# Patient Record
Sex: MALE | Race: WHITE | Employment: FULL TIME | URBAN - METROPOLITAN AREA
[De-identification: names, ages, dates, MRNs, and addresses within clinical notes are randomized per-mention and may not be internally consistent; named-entity substitution may affect disease eponyms.]

---

## 2018-11-13 ENCOUNTER — HOSPITAL ENCOUNTER (EMERGENCY)
Age: 38
Discharge: HOME | End: 2018-11-13
Payer: COMMERCIAL

## 2018-11-13 ENCOUNTER — APPOINTMENT (OUTPATIENT)
Dept: CT IMAGING | Age: 38
End: 2018-11-13
Payer: COMMERCIAL

## 2018-11-13 VITALS
DIASTOLIC BLOOD PRESSURE: 82 MMHG | WEIGHT: 220 LBS | OXYGEN SATURATION: 97 % | RESPIRATION RATE: 16 BRPM | TEMPERATURE: 98.6 F | BODY MASS INDEX: 30.8 KG/M2 | SYSTOLIC BLOOD PRESSURE: 122 MMHG | HEART RATE: 78 BPM | HEIGHT: 71 IN

## 2018-11-13 DIAGNOSIS — M47.816 OSTEOARTHRITIS OF LUMBAR SPINE, UNSPECIFIED SPINAL OSTEOARTHRITIS COMPLICATION STATUS: ICD-10-CM

## 2018-11-13 DIAGNOSIS — S39.012A STRAIN OF LUMBAR REGION, INITIAL ENCOUNTER: Primary | ICD-10-CM

## 2018-11-13 LAB
BILIRUBIN URINE: NEGATIVE
BLOOD, URINE: NEGATIVE
CLARITY: CLEAR
COLOR: YELLOW
GLUCOSE URINE: NEGATIVE MG/DL
KETONES, URINE: NEGATIVE MG/DL
LEUKOCYTE ESTERASE, URINE: NEGATIVE
NITRITE, URINE: NEGATIVE
PH UA: 7 (ref 5–9)
PROTEIN UA: NEGATIVE MG/DL
SPECIFIC GRAVITY UA: 1.01 (ref 1–1.03)
UROBILINOGEN, URINE: 0.2 E.U./DL

## 2018-11-13 PROCEDURE — 6370000000 HC RX 637 (ALT 250 FOR IP): Performed by: NURSE PRACTITIONER

## 2018-11-13 PROCEDURE — 81003 URINALYSIS AUTO W/O SCOPE: CPT

## 2018-11-13 PROCEDURE — 72131 CT LUMBAR SPINE W/O DYE: CPT

## 2018-11-13 PROCEDURE — 99284 EMERGENCY DEPT VISIT MOD MDM: CPT

## 2018-11-13 PROCEDURE — 96372 THER/PROPH/DIAG INJ SC/IM: CPT

## 2018-11-13 PROCEDURE — 6360000002 HC RX W HCPCS: Performed by: NURSE PRACTITIONER

## 2018-11-13 RX ORDER — NAPROXEN 500 MG/1
500 TABLET ORAL 2 TIMES DAILY PRN
Qty: 14 TABLET | Refills: 0 | Status: SHIPPED | OUTPATIENT
Start: 2018-11-13 | End: 2020-08-24 | Stop reason: HOSPADM

## 2018-11-13 RX ORDER — KETOROLAC TROMETHAMINE 30 MG/ML
30 INJECTION, SOLUTION INTRAMUSCULAR; INTRAVENOUS ONCE
Status: COMPLETED | OUTPATIENT
Start: 2018-11-13 | End: 2018-11-13

## 2018-11-13 RX ORDER — CYCLOBENZAPRINE HCL 10 MG
10 TABLET ORAL 3 TIMES DAILY PRN
Qty: 10 TABLET | Refills: 0 | Status: SHIPPED | OUTPATIENT
Start: 2018-11-13 | End: 2020-10-05 | Stop reason: CLARIF

## 2018-11-13 RX ORDER — ORPHENADRINE CITRATE 30 MG/ML
60 INJECTION INTRAMUSCULAR; INTRAVENOUS ONCE
Status: DISCONTINUED | OUTPATIENT
Start: 2018-11-13 | End: 2018-11-13

## 2018-11-13 RX ADMIN — MAGESIUM CITRATE 300 ML: 1.75 LIQUID ORAL at 17:38

## 2018-11-13 RX ADMIN — KETOROLAC TROMETHAMINE 30 MG: 30 INJECTION, SOLUTION INTRAMUSCULAR at 16:21

## 2018-11-13 ASSESSMENT — PAIN DESCRIPTION - ORIENTATION: ORIENTATION: LOWER

## 2018-11-13 ASSESSMENT — PAIN SCALES - GENERAL: PAINLEVEL_OUTOF10: 10

## 2018-11-13 ASSESSMENT — PAIN DESCRIPTION - PAIN TYPE: TYPE: ACUTE PAIN

## 2018-11-13 ASSESSMENT — PAIN DESCRIPTION - LOCATION: LOCATION: BACK

## 2018-11-13 NOTE — ED PROVIDER NOTES
[facility]         Department of Emergency Medicine   ED  Provider Note  Admit Date/RoomTime: 11/13/2018  3:15 PM  ED Room: 12/12   Chief Complaint:   Back Pain (during work-  occured at [phone] today. diificulty walking)    History of Present Illness   Source of history provided by:  patient. History/Exam Limitations: none. Ezzie Kussmaul is a 43 y.o. old male who has a past medical history of:   Past Medical History:   Diagnosis Date    Diabetes mellitus ([facility] [address]     Hypertension     Thyroid disease     presents to the emergency department by private vehicle, for acute, sharp and throbbing bilateral, midline lower lumbar spine pain with radiation to bilateral hips since 1:30 PM today. He reports Was using a sawzaw and it got stuck in a piece of material and he felt pain with the jerking movement of pulling on the saw. He has a history of no prior back problems. Since onset the symptoms have been constant and gradually worsening and moderate to severe in severity. There has been no bowel or bladder incontinence associated with the pain. There have been associated symptoms of pain in left leg and denies any weakness, numbness, incontinence, dysuria, hematuria, abdominal pain, fever, hx cancer, weight loss, recent steroid use, tingling, morning stiffness, leg weakness, new numbness, new weakness, new tingling, abdominal swelling, chest pain, pelvic pain or perianal numbness. The the pain is aggraveated by nothing in particular and any movement and relieved by none, nothing. ROS   Pertinent positives and negatives are stated within HPI, all other systems reviewed and are negative. Past Surgical History:   Procedure Laterality Date    APPENDECTOMY  3/23/2016    laparoscopic   Social History:  reports that he has never smoked. He has never used smokeless tobacco. He reports that he does not drink alcohol or use drugs. Family History: family history is not on file.   Allergies: Norco [hydrocodone-acetaminophen]    Physical Exam           ED Triage Vitals [11/13/18 1520]   BP Temp Temp src Pulse Resp SpO2 Height Weight   -- -- -- -- -- -- 5' 11\" (1.803 m) 220 lb (99.8 kg)      Oxygen Saturation Interpretation: Normal.    Constitutional:  Alert, development consistent with age. HEENT:  NC/NT. Airway patent. Neck:  Normal ROM. Supple. Respiratory:  Clear to auscultation and breath sounds equal.  CV:  Regular rate and rhythm, normal heart sounds, without pathological murmurs, ectopy, gallops, or rubs. GI:  Abdomen Soft, nontender, good bowel sounds. No firm or pulsatile mass. Back: lower lumbar spine central and bilateral.             Tenderness: Moderate. Swelling: no.              Range of Motion: diminished range with pain. CVA Tenderness: No.            Straight leg raising:  Left positive at 30 degrees and right is negative. Skin:  no erythema, rash or swelling noted. Distal Function:              Motor deficit: limited due to pain with guarded movements. Sensory deficit: none; full sensation intact to light touch in bilateral lower extremities. Pulse deficit: none. Calf Tenderness:  No Bilateral.               Edema:  none Bilateral lower extremity(s). Reflexes: Bilateral knee,ankle,biceps normal.  Gait:  limp. Integument:  Normal turgor. Warm, dry, without visible rash. Lymphatics: No lymphangitis or adenopathy noted. Neurological:  Oriented. Motor functions intact.   Physical Exam   Musculoskeletal:        Back:      Lab / Imaging Results   (All laboratory and radiology results have been personally reviewed by myself)  Labs:  Results for orders placed or performed during the hospital encounter of 11/13/18   Urinalysis   Result Value Ref Range    Color, UA Yellow Straw/Yellow    Clarity, UA Clear Clear    Glucose, Ur Negative Negative mg/dL    Bilirubin Urine Negative Negative    Ketones, Urine

## 2018-12-04 ENCOUNTER — HOSPITAL ENCOUNTER (OUTPATIENT)
Dept: PHYSICAL THERAPY | Age: 38
Setting detail: THERAPIES SERIES
Discharge: HOME | End: 2018-12-04
Payer: COMMERCIAL

## 2018-12-04 PROCEDURE — 97161 PT EVAL LOW COMPLEX 20 MIN: CPT

## 2018-12-04 PROCEDURE — G0283 ELEC STIM OTHER THAN WOUND: HCPCS

## 2018-12-04 PROCEDURE — G8982 BODY POS GOAL STATUS: HCPCS

## 2018-12-04 PROCEDURE — G8981 BODY POS CURRENT STATUS: HCPCS

## 2018-12-04 NOTE — FLOWSHEET NOTE
[facility]  Phone: [phone] Fax: [phone]     Industrial Rehabilitation Initial Evaluation      Client Name:Chago Leary Led Number:NA  Evaluation date:12/04/2018                                     Job Title :   Diagnosis:Lumbar strain S40.0A                          Normal Work Hrs:  40/wk  Referring Physician: Dr Madelin Traylor                           Present work status:   ________  First date of Lost time:11/13/2018                             * Not working  ___  Date of Injury: 11/13/2018                                            Luann Deakomal Duty    __X__  Employer:[employer]                                           * Reg.  Duty     ____    Authorized Services:   _X__Therapeautic Exercise   _X__ Electrical Stimulation, Traction, Ultrasound  __X_ Education/Body mechanics  ___ Ergonomic Assessment/FCE    Authorized Visits: ________  Chelsey Johns ___12____Visits  By 12/31/2018    From __11/15/2018 ___ to 12/31/2018     Vocational Status:  Employer: [employer]   Job Title:      Rehabilitation Problems/Impairments:  [x]Pain  4-7/10 mid lumbar region   [x]Decreased ROM:trunk, lumbo-pelvic region and bilateral hips   []Joint Hypomobility:______________________________________  []Joint Hypermobility:______________________________________  []Muscle Spasms:_________________________________________  []Gait: __________________________________________________  [x]Decreased strength:trunk/core and proximal LE's   []Unsafe body mechanics related to work/home activities  [x]Subjective reports of pain limiting work/home activities  []Inability to control onset symptoms  [x]Load handling strength levels below employable levels  [x]Work endurances less than required for employment levels  []Other:__________________________      Short Term Rehabilitation Goals:                    [x] Decrease pain 5/10 or less                     [x] Increase

## 2018-12-06 ENCOUNTER — HOSPITAL ENCOUNTER (OUTPATIENT)
Dept: PHYSICAL THERAPY | Age: 38
Setting detail: THERAPIES SERIES
Discharge: HOME | End: 2018-12-06
Payer: COMMERCIAL

## 2018-12-06 PROCEDURE — G0283 ELEC STIM OTHER THAN WOUND: HCPCS

## 2018-12-06 PROCEDURE — 97110 THERAPEUTIC EXERCISES: CPT

## 2018-12-07 ENCOUNTER — HOSPITAL ENCOUNTER (OUTPATIENT)
Dept: PHYSICAL THERAPY | Age: 38
Setting detail: THERAPIES SERIES
Discharge: HOME | End: 2018-12-07
Payer: COMMERCIAL

## 2018-12-07 PROCEDURE — G0283 ELEC STIM OTHER THAN WOUND: HCPCS

## 2018-12-07 NOTE — PROGRESS NOTES
[facility]  Phone: [phone] Fax: [phone]       Physical Therapy Daily Treatment Note  Date:  2018    Patient Name:  Alexys Kohli    :  1980  MRN: [id number]    Restrictions/Precautions:    Diagnosis:  Back Pain   Treatment Diagnosis:    Insurance/Certification information: Industrial    Referring Physician: Dr Odalys Dodson of care signed (Y/N):    Visit# / total visits:  3/  Pain level: /10  Time In: 1030       Time Out:  1110        Subjective:      Exercises:  Exercise/Equipment Resistance/Repetitions Other comments     Bike 10 min  nt            Seated flex with ball  10 reps  nt     LTR with ball  10 reps  nt                                                                                                                 Other Therapeutic Activities:      Home Exercise Program:      Manual Treatments:      Modalities:  Interferential Estim 20 min     Timed Code Treatment Minutes:  15    Total Treatment Minutes:  25    Treatment/Activity Tolerance:  [x] Patient tolerated treatment well [] Patient limited by fatigue  [] Patient limited by pain  [] Patient limited by other medical complications  [] Other:     Prognosis: [] Good [x] Fair  [] Poor    Patient Requires Follow-up: [x] Yes  [] No    Plan:   [x] Continue per plan of care [] Alter current plan (see comments)  [] Plan of care initiated [] Hold pending MD visit [] Discharge  Plan for Next Session:         Electronically signed by:  Enoch Harper, [address] [facility]

## 2018-12-10 ENCOUNTER — HOSPITAL ENCOUNTER (OUTPATIENT)
Dept: PHYSICAL THERAPY | Age: 38
Setting detail: THERAPIES SERIES
Discharge: HOME | End: 2018-12-10
Payer: COMMERCIAL

## 2018-12-10 PROCEDURE — 97110 THERAPEUTIC EXERCISES: CPT

## 2018-12-10 PROCEDURE — G0283 ELEC STIM OTHER THAN WOUND: HCPCS

## 2018-12-10 NOTE — PROGRESS NOTES
[facility]  Phone: [phone] Fax: [phone]       Physical Therapy Daily Treatment Note  Date:  12/10/2018    Patient Name:  Cee Bunn    :  1980  MRN: [id number]    Restrictions/Precautions:    Diagnosis:  Back Pain   Treatment Diagnosis:    Insurance/Certification information: Industrial    Referring Physician: Dr Que Irby of care signed (Y/N):    Visit# / total visits:  4/  Pain level: /10  Time In: 1400      Time Out:  1450       Subjective:  Client reports pain symptoms modified over the weekend Cites limited activity as the reason for the improvement     Exercises:  Exercise/Equipment Resistance/Repetitions Other comments     Bike 10 min              Seated flex with ball  10 reps       LTR with ball  10 reps  nt                                                                                                                 Other Therapeutic Activities:      Home Exercise Program:      Manual Treatments:      Modalities:  Interferential Estim 20 min     Timed Code Treatment Minutes:  15    Total Treatment Minutes:  25    Treatment/Activity Tolerance:  [x] Patient tolerated treatment well [] Patient limited by fatigue  [] Patient limited by pain  [] Patient limited by other medical complications  [] Other:     Prognosis: [] Good [x] Fair  [] Poor    Patient Requires Follow-up: [x] Yes  [] No    Plan:   [x] Continue per plan of care [] Alter current plan (see comments)  [] Plan of care initiated [] Hold pending MD visit [] Discharge  Plan for Next Session:  Progress exercise as tolerated        Electronically signed by:  Jeremías Delvalle, [address] [place] [address]

## 2018-12-12 ENCOUNTER — HOSPITAL ENCOUNTER (OUTPATIENT)
Dept: PHYSICAL THERAPY | Age: 38
Setting detail: THERAPIES SERIES
Discharge: HOME | End: 2018-12-12
Payer: COMMERCIAL

## 2018-12-12 PROCEDURE — 97110 THERAPEUTIC EXERCISES: CPT

## 2018-12-12 PROCEDURE — G0283 ELEC STIM OTHER THAN WOUND: HCPCS

## 2018-12-13 ENCOUNTER — HOSPITAL ENCOUNTER (OUTPATIENT)
Dept: PHYSICAL THERAPY | Age: 38
Setting detail: THERAPIES SERIES
Discharge: HOME | End: 2018-12-13
Payer: COMMERCIAL

## 2018-12-13 PROCEDURE — 97110 THERAPEUTIC EXERCISES: CPT

## 2018-12-13 PROCEDURE — G0283 ELEC STIM OTHER THAN WOUND: HCPCS

## 2018-12-17 ENCOUNTER — HOSPITAL ENCOUNTER (OUTPATIENT)
Dept: PHYSICAL THERAPY | Age: 38
Setting detail: THERAPIES SERIES
Discharge: HOME | End: 2018-12-17
Payer: COMMERCIAL

## 2018-12-17 PROCEDURE — G0283 ELEC STIM OTHER THAN WOUND: HCPCS

## 2018-12-17 PROCEDURE — 97110 THERAPEUTIC EXERCISES: CPT

## 2018-12-20 ENCOUNTER — HOSPITAL ENCOUNTER (OUTPATIENT)
Dept: PHYSICAL THERAPY | Age: 38
Setting detail: THERAPIES SERIES
Discharge: HOME | End: 2018-12-20
Payer: COMMERCIAL

## 2018-12-20 PROCEDURE — 97110 THERAPEUTIC EXERCISES: CPT

## 2018-12-20 PROCEDURE — G0283 ELEC STIM OTHER THAN WOUND: HCPCS

## 2018-12-31 ENCOUNTER — HOSPITAL ENCOUNTER (OUTPATIENT)
Dept: PHYSICAL THERAPY | Age: 38
Setting detail: THERAPIES SERIES
Discharge: HOME | End: 2018-12-31
Payer: COMMERCIAL

## 2018-12-31 PROCEDURE — G0283 ELEC STIM OTHER THAN WOUND: HCPCS

## 2018-12-31 PROCEDURE — 97110 THERAPEUTIC EXERCISES: CPT

## 2019-01-02 ENCOUNTER — HOSPITAL ENCOUNTER (OUTPATIENT)
Dept: PHYSICAL THERAPY | Age: 39
Setting detail: THERAPIES SERIES
Discharge: HOME | End: 2019-01-02
Payer: COMMERCIAL

## 2019-01-02 PROCEDURE — G0283 ELEC STIM OTHER THAN WOUND: HCPCS

## 2019-01-02 PROCEDURE — 97110 THERAPEUTIC EXERCISES: CPT

## 2019-01-07 ENCOUNTER — HOSPITAL ENCOUNTER (OUTPATIENT)
Dept: PHYSICAL THERAPY | Age: 39
Setting detail: THERAPIES SERIES
Discharge: HOME | End: 2019-01-07
Payer: COMMERCIAL

## 2019-01-07 PROCEDURE — G0283 ELEC STIM OTHER THAN WOUND: HCPCS

## 2019-01-07 PROCEDURE — 97110 THERAPEUTIC EXERCISES: CPT

## 2020-08-24 ENCOUNTER — ANESTHESIA (OUTPATIENT)
Dept: OPERATING ROOM | Age: 40
End: 2020-08-24
Payer: COMMERCIAL

## 2020-08-24 ENCOUNTER — APPOINTMENT (OUTPATIENT)
Dept: GENERAL RADIOLOGY | Age: 40
End: 2020-08-24
Payer: COMMERCIAL

## 2020-08-24 ENCOUNTER — ANESTHESIA EVENT (OUTPATIENT)
Dept: OPERATING ROOM | Age: 40
End: 2020-08-24
Payer: COMMERCIAL

## 2020-08-24 ENCOUNTER — HOSPITAL ENCOUNTER (OUTPATIENT)
Age: 40
Setting detail: OBSERVATION
Discharge: HOME | End: 2020-08-25
Attending: EMERGENCY MEDICINE | Admitting: ORTHOPAEDIC SURGERY
Payer: COMMERCIAL

## 2020-08-24 PROBLEM — S67.22XA: Status: ACTIVE | Noted: 2020-08-24

## 2020-08-24 PROBLEM — S62.613B: Status: ACTIVE | Noted: 2020-08-24

## 2020-08-24 PROBLEM — I99.8 ISCHEMIA OF FINGER: Status: ACTIVE | Noted: 2020-08-24

## 2020-08-24 LAB
ABO/RH: NORMAL
ALBUMIN SERPL-MCNC: 4.4 G/DL (ref 3.5–5.2)
ALP BLD-CCNC: 60 U/L (ref 40–129)
ALT SERPL-CCNC: 24 U/L (ref 0–40)
ANION GAP SERPL CALCULATED.3IONS-SCNC: 15 MMOL/L (ref 7–16)
ANTIBODY SCREEN: NORMAL
APTT: 26.5 SEC (ref 24.5–35.1)
AST SERPL-CCNC: 19 U/L (ref 0–39)
BASOPHILS ABSOLUTE: 0.08 E9/L (ref 0–0.2)
BASOPHILS RELATIVE PERCENT: 0.6 % (ref 0–2)
BILIRUB SERPL-MCNC: 0.3 MG/DL (ref 0–1.2)
BUN BLDV-MCNC: 14 MG/DL (ref 6–20)
CALCIUM SERPL-MCNC: 9.2 MG/DL (ref 8.6–10.2)
CHLORIDE BLD-SCNC: 103 MMOL/L (ref 98–107)
CO2: 24 MMOL/L (ref 22–29)
CREAT SERPL-MCNC: 1.2 MG/DL (ref 0.7–1.2)
EOSINOPHILS ABSOLUTE: 0.11 E9/L (ref 0.05–0.5)
EOSINOPHILS RELATIVE PERCENT: 0.8 % (ref 0–6)
GFR AFRICAN AMERICAN: >60
GFR NON-AFRICAN AMERICAN: >60 ML/MIN/1.73
GLUCOSE BLD-MCNC: 152 MG/DL (ref 74–99)
HCT VFR BLD CALC: 42.9 % (ref 37–54)
HEMOGLOBIN: 14.7 G/DL (ref 12.5–16.5)
IMMATURE GRANULOCYTES #: 0.07 E9/L
IMMATURE GRANULOCYTES %: 0.5 % (ref 0–5)
INR BLD: 1
LYMPHOCYTES ABSOLUTE: 1.97 E9/L (ref 1.5–4)
LYMPHOCYTES RELATIVE PERCENT: 14.7 % (ref 20–42)
MCH RBC QN AUTO: 29 PG (ref 26–35)
MCHC RBC AUTO-ENTMCNC: 34.3 % (ref 32–34.5)
MCV RBC AUTO: 84.6 FL (ref 80–99.9)
MONOCYTES ABSOLUTE: 1.14 E9/L (ref 0.1–0.95)
MONOCYTES RELATIVE PERCENT: 8.5 % (ref 2–12)
NEUTROPHILS ABSOLUTE: 10 E9/L (ref 1.8–7.3)
NEUTROPHILS RELATIVE PERCENT: 74.9 % (ref 43–80)
PDW BLD-RTO: 13 FL (ref 11.5–15)
PLATELET # BLD: 264 E9/L (ref 130–450)
PMV BLD AUTO: 10.5 FL (ref 7–12)
POTASSIUM REFLEX MAGNESIUM: 3.6 MMOL/L (ref 3.5–5)
PROTHROMBIN TIME: 11.1 SEC (ref 9.3–12.4)
RBC # BLD: 5.07 E12/L (ref 3.8–5.8)
SODIUM BLD-SCNC: 142 MMOL/L (ref 132–146)
TOTAL PROTEIN: 6.9 G/DL (ref 6.4–8.3)
WBC # BLD: 13.4 E9/L (ref 4.5–11.5)

## 2020-08-24 PROCEDURE — 20103 EXPL PENTRG WOUND EXTREMITY: CPT | Performed by: ORTHOPAEDIC SURGERY

## 2020-08-24 PROCEDURE — 6360000002 HC RX W HCPCS: Performed by: STUDENT IN AN ORGANIZED HEALTH CARE EDUCATION/TRAINING PROGRAM

## 2020-08-24 PROCEDURE — 86900 BLOOD TYPING SEROLOGIC ABO: CPT

## 2020-08-24 PROCEDURE — 7100000000 HC PACU RECOVERY - FIRST 15 MIN: Performed by: ORTHOPAEDIC SURGERY

## 2020-08-24 PROCEDURE — 2500000003 HC RX 250 WO HCPCS: Performed by: STUDENT IN AN ORGANIZED HEALTH CARE EDUCATION/TRAINING PROGRAM

## 2020-08-24 PROCEDURE — 2709999900 HC NON-CHARGEABLE SUPPLY: Performed by: ORTHOPAEDIC SURGERY

## 2020-08-24 PROCEDURE — 26500 HAND TENDON RECONSTRUCTION: CPT | Performed by: ORTHOPAEDIC SURGERY

## 2020-08-24 PROCEDURE — 6360000002 HC RX W HCPCS: Performed by: EMERGENCY MEDICINE

## 2020-08-24 PROCEDURE — 6360000002 HC RX W HCPCS: Performed by: NURSE ANESTHETIST, CERTIFIED REGISTERED

## 2020-08-24 PROCEDURE — 93005 ELECTROCARDIOGRAM TRACING: CPT | Performed by: EMERGENCY MEDICINE

## 2020-08-24 PROCEDURE — 85025 COMPLETE CBC W/AUTO DIFF WBC: CPT

## 2020-08-24 PROCEDURE — 26735 TREAT FINGER FRACTURE EACH: CPT | Performed by: ORTHOPAEDIC SURGERY

## 2020-08-24 PROCEDURE — 3600000002 HC SURGERY LEVEL 2 BASE: Performed by: ORTHOPAEDIC SURGERY

## 2020-08-24 PROCEDURE — 96375 TX/PRO/DX INJ NEW DRUG ADDON: CPT

## 2020-08-24 PROCEDURE — 86850 RBC ANTIBODY SCREEN: CPT

## 2020-08-24 PROCEDURE — 2500000003 HC RX 250 WO HCPCS: Performed by: ORTHOPAEDIC SURGERY

## 2020-08-24 PROCEDURE — 96374 THER/PROPH/DIAG INJ IV PUSH: CPT

## 2020-08-24 PROCEDURE — 3700000000 HC ANESTHESIA ATTENDED CARE: Performed by: ORTHOPAEDIC SURGERY

## 2020-08-24 PROCEDURE — 99283 EMERGENCY DEPT VISIT LOW MDM: CPT

## 2020-08-24 PROCEDURE — 99285 EMERGENCY DEPT VISIT HI MDM: CPT

## 2020-08-24 PROCEDURE — 85730 THROMBOPLASTIN TIME PARTIAL: CPT

## 2020-08-24 PROCEDURE — 7100000001 HC PACU RECOVERY - ADDTL 15 MIN: Performed by: ORTHOPAEDIC SURGERY

## 2020-08-24 PROCEDURE — 2500000003 HC RX 250 WO HCPCS: Performed by: NURSE ANESTHETIST, CERTIFIED REGISTERED

## 2020-08-24 PROCEDURE — 80053 COMPREHEN METABOLIC PANEL: CPT

## 2020-08-24 PROCEDURE — 73120 X-RAY EXAM OF HAND: CPT

## 2020-08-24 PROCEDURE — 3700000001 HC ADD 15 MINUTES (ANESTHESIA): Performed by: ORTHOPAEDIC SURGERY

## 2020-08-24 PROCEDURE — 86901 BLOOD TYPING SEROLOGIC RH(D): CPT

## 2020-08-24 PROCEDURE — 3600000012 HC SURGERY LEVEL 2 ADDTL 15MIN: Performed by: ORTHOPAEDIC SURGERY

## 2020-08-24 PROCEDURE — 99220 PR INITIAL OBSERVATION CARE/DAY 70 MINUTES: CPT | Performed by: ORTHOPAEDIC SURGERY

## 2020-08-24 PROCEDURE — 85610 PROTHROMBIN TIME: CPT

## 2020-08-24 PROCEDURE — 2580000003 HC RX 258: Performed by: NURSE ANESTHETIST, CERTIFIED REGISTERED

## 2020-08-24 PROCEDURE — 99284 EMERGENCY DEPT VISIT MOD MDM: CPT

## 2020-08-24 PROCEDURE — 11012 DEB SKIN BONE AT FX SITE: CPT | Performed by: ORTHOPAEDIC SURGERY

## 2020-08-24 RX ORDER — MEPERIDINE HYDROCHLORIDE 25 MG/ML
12.5 INJECTION INTRAMUSCULAR; INTRAVENOUS; SUBCUTANEOUS EVERY 5 MIN PRN
Status: DISCONTINUED | OUTPATIENT
Start: 2020-08-24 | End: 2020-08-25 | Stop reason: HOSPADM

## 2020-08-24 RX ORDER — NAPROXEN 500 MG/1
500 TABLET ORAL 2 TIMES DAILY WITH MEALS
Qty: 60 TABLET | Refills: 3 | Status: SHIPPED | OUTPATIENT
Start: 2020-08-24 | End: 2021-02-03 | Stop reason: ALTCHOICE

## 2020-08-24 RX ORDER — SUCCINYLCHOLINE/SOD CL,ISO/PF 200MG/10ML
SYRINGE (ML) INTRAVENOUS PRN
Status: DISCONTINUED | OUTPATIENT
Start: 2020-08-24 | End: 2020-08-25 | Stop reason: SDUPTHER

## 2020-08-24 RX ORDER — FENTANYL CITRATE 50 UG/ML
INJECTION, SOLUTION INTRAMUSCULAR; INTRAVENOUS PRN
Status: DISCONTINUED | OUTPATIENT
Start: 2020-08-24 | End: 2020-08-25 | Stop reason: SDUPTHER

## 2020-08-24 RX ORDER — LIDOCAINE HYDROCHLORIDE 10 MG/ML
20 INJECTION, SOLUTION INFILTRATION; PERINEURAL ONCE
Status: COMPLETED | OUTPATIENT
Start: 2020-08-24 | End: 2020-08-24

## 2020-08-24 RX ORDER — BUPIVACAINE HYDROCHLORIDE 2.5 MG/ML
INJECTION, SOLUTION EPIDURAL; INFILTRATION; INTRACAUDAL PRN
Status: DISCONTINUED | OUTPATIENT
Start: 2020-08-24 | End: 2020-08-25 | Stop reason: ALTCHOICE

## 2020-08-24 RX ORDER — FENTANYL CITRATE 50 UG/ML
50 INJECTION, SOLUTION INTRAMUSCULAR; INTRAVENOUS EVERY 5 MIN PRN
Status: DISCONTINUED | OUTPATIENT
Start: 2020-08-24 | End: 2020-08-25 | Stop reason: HOSPADM

## 2020-08-24 RX ORDER — ROCURONIUM BROMIDE 10 MG/ML
INJECTION, SOLUTION INTRAVENOUS PRN
Status: DISCONTINUED | OUTPATIENT
Start: 2020-08-24 | End: 2020-08-25 | Stop reason: SDUPTHER

## 2020-08-24 RX ORDER — PROPOFOL 10 MG/ML
INJECTION, EMULSION INTRAVENOUS PRN
Status: DISCONTINUED | OUTPATIENT
Start: 2020-08-24 | End: 2020-08-25 | Stop reason: SDUPTHER

## 2020-08-24 RX ORDER — ONDANSETRON 2 MG/ML
INJECTION INTRAMUSCULAR; INTRAVENOUS PRN
Status: DISCONTINUED | OUTPATIENT
Start: 2020-08-24 | End: 2020-08-25 | Stop reason: SDUPTHER

## 2020-08-24 RX ORDER — NAPROXEN 500 MG/1
500 TABLET ORAL 2 TIMES DAILY WITH MEALS
Qty: 60 TABLET | Refills: 3 | Status: SHIPPED | OUTPATIENT
Start: 2020-08-24 | End: 2020-08-24 | Stop reason: SDUPTHER

## 2020-08-24 RX ORDER — TRAMADOL HYDROCHLORIDE 50 MG/1
50 TABLET ORAL EVERY 4 HOURS PRN
Qty: 42 TABLET | Refills: 0 | Status: SHIPPED | OUTPATIENT
Start: 2020-08-24 | End: 2020-08-31

## 2020-08-24 RX ORDER — MIDAZOLAM HYDROCHLORIDE 1 MG/ML
INJECTION INTRAMUSCULAR; INTRAVENOUS PRN
Status: DISCONTINUED | OUTPATIENT
Start: 2020-08-24 | End: 2020-08-25 | Stop reason: SDUPTHER

## 2020-08-24 RX ORDER — CEFAZOLIN SODIUM 1 G/3ML
INJECTION, POWDER, FOR SOLUTION INTRAMUSCULAR; INTRAVENOUS PRN
Status: DISCONTINUED | OUTPATIENT
Start: 2020-08-24 | End: 2020-08-25 | Stop reason: SDUPTHER

## 2020-08-24 RX ORDER — FENTANYL CITRATE 50 UG/ML
25 INJECTION, SOLUTION INTRAMUSCULAR; INTRAVENOUS EVERY 5 MIN PRN
Status: DISCONTINUED | OUTPATIENT
Start: 2020-08-24 | End: 2020-08-25 | Stop reason: HOSPADM

## 2020-08-24 RX ORDER — FENTANYL CITRATE 50 UG/ML
100 INJECTION, SOLUTION INTRAMUSCULAR; INTRAVENOUS ONCE
Status: COMPLETED | OUTPATIENT
Start: 2020-08-24 | End: 2020-08-24

## 2020-08-24 RX ORDER — SODIUM CHLORIDE 9 MG/ML
INJECTION, SOLUTION INTRAVENOUS CONTINUOUS PRN
Status: DISCONTINUED | OUTPATIENT
Start: 2020-08-24 | End: 2020-08-25 | Stop reason: SDUPTHER

## 2020-08-24 RX ORDER — DIPHENHYDRAMINE HYDROCHLORIDE 50 MG/ML
12.5 INJECTION INTRAMUSCULAR; INTRAVENOUS
Status: ACTIVE | OUTPATIENT
Start: 2020-08-24 | End: 2020-08-24

## 2020-08-24 RX ORDER — DEXAMETHASONE SODIUM PHOSPHATE 10 MG/ML
INJECTION, SOLUTION INTRAMUSCULAR; INTRAVENOUS PRN
Status: DISCONTINUED | OUTPATIENT
Start: 2020-08-24 | End: 2020-08-25 | Stop reason: SDUPTHER

## 2020-08-24 RX ORDER — LIDOCAINE HYDROCHLORIDE 20 MG/ML
INJECTION, SOLUTION INTRAVENOUS PRN
Status: DISCONTINUED | OUTPATIENT
Start: 2020-08-24 | End: 2020-08-25 | Stop reason: SDUPTHER

## 2020-08-24 RX ORDER — PROMETHAZINE HYDROCHLORIDE 25 MG/ML
6.25 INJECTION, SOLUTION INTRAMUSCULAR; INTRAVENOUS
Status: DISCONTINUED | OUTPATIENT
Start: 2020-08-24 | End: 2020-08-25 | Stop reason: HOSPADM

## 2020-08-24 RX ADMIN — FENTANYL CITRATE 100 MCG: 50 INJECTION, SOLUTION INTRAMUSCULAR; INTRAVENOUS at 23:13

## 2020-08-24 RX ADMIN — MIDAZOLAM 2 MG: 1 INJECTION INTRAMUSCULAR; INTRAVENOUS at 22:58

## 2020-08-24 RX ADMIN — LIDOCAINE HYDROCHLORIDE 100 MG: 20 INJECTION, SOLUTION INTRAVENOUS at 23:02

## 2020-08-24 RX ADMIN — ROCURONIUM BROMIDE 50 MG: 10 INJECTION, SOLUTION INTRAVENOUS at 23:10

## 2020-08-24 RX ADMIN — FENTANYL CITRATE 100 MCG: 50 INJECTION, SOLUTION INTRAMUSCULAR; INTRAVENOUS at 19:27

## 2020-08-24 RX ADMIN — LIDOCAINE HYDROCHLORIDE 20 ML: 10 INJECTION, SOLUTION INFILTRATION; PERINEURAL at 19:30

## 2020-08-24 RX ADMIN — SODIUM CHLORIDE: 9 INJECTION, SOLUTION INTRAVENOUS at 22:58

## 2020-08-24 RX ADMIN — FENTANYL CITRATE 50 MCG: 50 INJECTION, SOLUTION INTRAMUSCULAR; INTRAVENOUS at 23:02

## 2020-08-24 RX ADMIN — CEFAZOLIN 2000 MG: 1 INJECTION, POWDER, FOR SOLUTION INTRAMUSCULAR; INTRAVENOUS at 23:14

## 2020-08-24 RX ADMIN — Medication 2 G: at 19:30

## 2020-08-24 RX ADMIN — PROPOFOL 200 MG: 10 INJECTION, EMULSION INTRAVENOUS at 23:02

## 2020-08-24 RX ADMIN — DEXAMETHASONE SODIUM PHOSPHATE 10 MG: 10 INJECTION, SOLUTION INTRAMUSCULAR; INTRAVENOUS at 23:10

## 2020-08-24 RX ADMIN — Medication 150 MG: at 23:02

## 2020-08-24 RX ADMIN — ONDANSETRON HYDROCHLORIDE 4 MG: 2 INJECTION, SOLUTION INTRAMUSCULAR; INTRAVENOUS at 23:10

## 2020-08-24 ASSESSMENT — PULMONARY FUNCTION TESTS
PIF_VALUE: 22
PIF_VALUE: 18
PIF_VALUE: 22
PIF_VALUE: 18
PIF_VALUE: 0
PIF_VALUE: 22
PIF_VALUE: 2
PIF_VALUE: 22
PIF_VALUE: 23
PIF_VALUE: 23
PIF_VALUE: 22
PIF_VALUE: 1
PIF_VALUE: 22
PIF_VALUE: 22
PIF_VALUE: 23
PIF_VALUE: 18
PIF_VALUE: 18
PIF_VALUE: 23
PIF_VALUE: 22
PIF_VALUE: 16
PIF_VALUE: 22
PIF_VALUE: 20
PIF_VALUE: 23
PIF_VALUE: 22
PIF_VALUE: 23
PIF_VALUE: 23
PIF_VALUE: 22
PIF_VALUE: 18
PIF_VALUE: 22
PIF_VALUE: 23
PIF_VALUE: 22
PIF_VALUE: 23
PIF_VALUE: 22
PIF_VALUE: 22
PIF_VALUE: 18
PIF_VALUE: 22
PIF_VALUE: 18
PIF_VALUE: 23
PIF_VALUE: 23
PIF_VALUE: 1
PIF_VALUE: 22
PIF_VALUE: 18
PIF_VALUE: 0
PIF_VALUE: 23
PIF_VALUE: 22
PIF_VALUE: 0

## 2020-08-24 ASSESSMENT — PAIN DESCRIPTION - PAIN TYPE: TYPE: ACUTE PAIN

## 2020-08-24 ASSESSMENT — ENCOUNTER SYMPTOMS
CHEST TIGHTNESS: 0
ABDOMINAL PAIN: 0

## 2020-08-24 ASSESSMENT — PAIN SCALES - GENERAL
PAINLEVEL_OUTOF10: 10

## 2020-08-24 ASSESSMENT — PAIN DESCRIPTION - LOCATION: LOCATION: HAND

## 2020-08-24 ASSESSMENT — PAIN DESCRIPTION - ORIENTATION: ORIENTATION: LEFT

## 2020-08-25 ENCOUNTER — APPOINTMENT (OUTPATIENT)
Dept: GENERAL RADIOLOGY | Age: 40
End: 2020-08-25
Payer: COMMERCIAL

## 2020-08-25 VITALS
BODY MASS INDEX: 31.5 KG/M2 | OXYGEN SATURATION: 99 % | HEIGHT: 71 IN | TEMPERATURE: 98.1 F | SYSTOLIC BLOOD PRESSURE: 139 MMHG | RESPIRATION RATE: 18 BRPM | WEIGHT: 225 LBS | DIASTOLIC BLOOD PRESSURE: 70 MMHG | HEART RATE: 110 BPM

## 2020-08-25 VITALS
SYSTOLIC BLOOD PRESSURE: 124 MMHG | OXYGEN SATURATION: 100 % | TEMPERATURE: 97.5 F | DIASTOLIC BLOOD PRESSURE: 60 MMHG | RESPIRATION RATE: 2 BRPM

## 2020-08-25 PROBLEM — S66.922A HAND LACERATION INVOLVING TENDON, LEFT, INITIAL ENCOUNTER: Status: ACTIVE | Noted: 2020-08-25

## 2020-08-25 PROBLEM — S61.412A HAND LACERATION INVOLVING TENDON, LEFT, INITIAL ENCOUNTER: Status: ACTIVE | Noted: 2020-08-25

## 2020-08-25 LAB
EKG ATRIAL RATE: 99 BPM
EKG P AXIS: 66 DEGREES
EKG P-R INTERVAL: 158 MS
EKG Q-T INTERVAL: 360 MS
EKG QRS DURATION: 92 MS
EKG QTC CALCULATION (BAZETT): 462 MS
EKG R AXIS: 42 DEGREES
EKG T AXIS: 32 DEGREES
EKG VENTRICULAR RATE: 99 BPM
HCT VFR BLD CALC: 45.2 % (ref 37–54)
HEMOGLOBIN: 15 G/DL (ref 12.5–16.5)
MCH RBC QN AUTO: 29 PG (ref 26–35)
MCHC RBC AUTO-ENTMCNC: 33.2 % (ref 32–34.5)
MCV RBC AUTO: 87.3 FL (ref 80–99.9)
PDW BLD-RTO: 13.2 FL (ref 11.5–15)
PLATELET # BLD: 259 E9/L (ref 130–450)
PMV BLD AUTO: 10.6 FL (ref 7–12)
RBC # BLD: 5.18 E12/L (ref 3.8–5.8)
WBC # BLD: 13.3 E9/L (ref 4.5–11.5)

## 2020-08-25 PROCEDURE — 6360000002 HC RX W HCPCS: Performed by: NURSE ANESTHETIST, CERTIFIED REGISTERED

## 2020-08-25 PROCEDURE — 93010 ELECTROCARDIOGRAM REPORT: CPT | Performed by: INTERNAL MEDICINE

## 2020-08-25 PROCEDURE — 3209999900 FLUORO FOR SURGICAL PROCEDURES

## 2020-08-25 PROCEDURE — 97530 THERAPEUTIC ACTIVITIES: CPT

## 2020-08-25 PROCEDURE — 2580000003 HC RX 258: Performed by: STUDENT IN AN ORGANIZED HEALTH CARE EDUCATION/TRAINING PROGRAM

## 2020-08-25 PROCEDURE — G0378 HOSPITAL OBSERVATION PER HR: HCPCS

## 2020-08-25 PROCEDURE — 36415 COLL VENOUS BLD VENIPUNCTURE: CPT

## 2020-08-25 PROCEDURE — 85027 COMPLETE CBC AUTOMATED: CPT

## 2020-08-25 PROCEDURE — 73130 X-RAY EXAM OF HAND: CPT

## 2020-08-25 PROCEDURE — 2500000003 HC RX 250 WO HCPCS: Performed by: NURSE ANESTHETIST, CERTIFIED REGISTERED

## 2020-08-25 PROCEDURE — 97165 OT EVAL LOW COMPLEX 30 MIN: CPT

## 2020-08-25 PROCEDURE — 6370000000 HC RX 637 (ALT 250 FOR IP): Performed by: STUDENT IN AN ORGANIZED HEALTH CARE EDUCATION/TRAINING PROGRAM

## 2020-08-25 PROCEDURE — 6360000002 HC RX W HCPCS: Performed by: STUDENT IN AN ORGANIZED HEALTH CARE EDUCATION/TRAINING PROGRAM

## 2020-08-25 RX ORDER — CEPHALEXIN 500 MG/1
500 CAPSULE ORAL 4 TIMES DAILY
Qty: 12 CAPSULE | Refills: 0 | Status: SHIPPED | OUTPATIENT
Start: 2020-08-25 | End: 2020-08-28

## 2020-08-25 RX ORDER — GLYCOPYRROLATE 1 MG/5 ML
SYRINGE (ML) INTRAVENOUS PRN
Status: DISCONTINUED | OUTPATIENT
Start: 2020-08-25 | End: 2020-08-25 | Stop reason: SDUPTHER

## 2020-08-25 RX ORDER — TRAMADOL HYDROCHLORIDE 50 MG/1
100 TABLET ORAL EVERY 6 HOURS PRN
Status: DISCONTINUED | OUTPATIENT
Start: 2020-08-25 | End: 2020-08-25 | Stop reason: HOSPADM

## 2020-08-25 RX ORDER — SODIUM CHLORIDE 0.9 % (FLUSH) 0.9 %
10 SYRINGE (ML) INJECTION EVERY 12 HOURS SCHEDULED
Status: DISCONTINUED | OUTPATIENT
Start: 2020-08-25 | End: 2020-08-25 | Stop reason: HOSPADM

## 2020-08-25 RX ORDER — TRAMADOL HYDROCHLORIDE 50 MG/1
50 TABLET ORAL EVERY 6 HOURS PRN
Status: DISCONTINUED | OUTPATIENT
Start: 2020-08-25 | End: 2020-08-25 | Stop reason: HOSPADM

## 2020-08-25 RX ORDER — SODIUM CHLORIDE 0.9 % (FLUSH) 0.9 %
10 SYRINGE (ML) INJECTION PRN
Status: DISCONTINUED | OUTPATIENT
Start: 2020-08-25 | End: 2020-08-25 | Stop reason: HOSPADM

## 2020-08-25 RX ORDER — NAPROXEN 500 MG/1
500 TABLET ORAL 2 TIMES DAILY
Status: DISCONTINUED | OUTPATIENT
Start: 2020-08-25 | End: 2020-08-25 | Stop reason: HOSPADM

## 2020-08-25 RX ORDER — NEOSTIGMINE METHYLSULFATE 1 MG/ML
INJECTION, SOLUTION INTRAVENOUS PRN
Status: DISCONTINUED | OUTPATIENT
Start: 2020-08-25 | End: 2020-08-25 | Stop reason: SDUPTHER

## 2020-08-25 RX ADMIN — NAPROXEN 500 MG: 500 TABLET ORAL at 08:14

## 2020-08-25 RX ADMIN — TRAMADOL HYDROCHLORIDE 100 MG: 50 TABLET, FILM COATED ORAL at 02:24

## 2020-08-25 RX ADMIN — SODIUM CHLORIDE, PRESERVATIVE FREE 10 ML: 5 INJECTION INTRAVENOUS at 08:15

## 2020-08-25 RX ADMIN — Medication 0.6 MG: at 00:06

## 2020-08-25 RX ADMIN — PROPOFOL 50 MG: 10 INJECTION, EMULSION INTRAVENOUS at 00:10

## 2020-08-25 RX ADMIN — PROPOFOL 50 MG: 10 INJECTION, EMULSION INTRAVENOUS at 00:15

## 2020-08-25 RX ADMIN — Medication 3 MG: at 00:06

## 2020-08-25 RX ADMIN — NAPROXEN 500 MG: 500 TABLET ORAL at 02:23

## 2020-08-25 RX ADMIN — Medication 2 G: at 07:10

## 2020-08-25 ASSESSMENT — PULMONARY FUNCTION TESTS
PIF_VALUE: 22
PIF_VALUE: 11
PIF_VALUE: 21
PIF_VALUE: 1
PIF_VALUE: 11
PIF_VALUE: 11
PIF_VALUE: 21
PIF_VALUE: 20
PIF_VALUE: 21
PIF_VALUE: 11
PIF_VALUE: 11
PIF_VALUE: 19
PIF_VALUE: 11
PIF_VALUE: 21
PIF_VALUE: 11
PIF_VALUE: 22
PIF_VALUE: 21
PIF_VALUE: 22
PIF_VALUE: 21
PIF_VALUE: 27
PIF_VALUE: 22
PIF_VALUE: 22
PIF_VALUE: 19
PIF_VALUE: 26
PIF_VALUE: 11
PIF_VALUE: 6
PIF_VALUE: 2
PIF_VALUE: 26
PIF_VALUE: 4
PIF_VALUE: 10
PIF_VALUE: 11
PIF_VALUE: 21
PIF_VALUE: 22
PIF_VALUE: 7
PIF_VALUE: 20
PIF_VALUE: 11
PIF_VALUE: 11
PIF_VALUE: 10
PIF_VALUE: 10

## 2020-08-25 ASSESSMENT — PAIN SCALES - GENERAL
PAINLEVEL_OUTOF10: 7
PAINLEVEL_OUTOF10: 0

## 2020-08-25 NOTE — ED PROVIDER NOTES
79-year-old male presenting via EMS. He has an injury to his left hand. He states that he was working on a large industrial machine, his hand got caught. He does have an obvious deformity to the middle finger of the left hand. The hand appears pale, radial pulses strong and intact, he has sensation to the distal end of all fingers, unable to move the middle finger which has the clear deformity. Laceration across the palm. This timing was sudden, quality is pressure through the machine, severe, was brief and was not an extended period of a crush injury, associated with the deformity and the pale appearance. Concern for diminished blood flow. History reviewed. No pertinent family history. Past Surgical History:   Procedure Laterality Date    APPENDECTOMY  3/23/2016    laparoscopic       Review of Systems   Constitutional: Negative for chills and fever. Respiratory: Negative for chest tightness. Cardiovascular: Negative for chest pain. Gastrointestinal: Negative for abdominal pain. Musculoskeletal:        Deformity to the middle finger   Skin: Positive for wound. All other systems reviewed and are negative. Physical Exam  Constitutional:       General: He is not in acute distress. Appearance: He is well-developed. HENT:      Head: Normocephalic and atraumatic. Eyes:      Pupils: Pupils are equal, round, and reactive to light. Neck:      Musculoskeletal: Normal range of motion and neck supple. Thyroid: No thyromegaly. Cardiovascular:      Rate and Rhythm: Normal rate and regular rhythm. Pulmonary:      Effort: Pulmonary effort is normal. No respiratory distress. Breath sounds: Normal breath sounds. No wheezing. Abdominal:      General: There is no distension. Palpations: Abdomen is soft. There is no mass. Tenderness: There is no abdominal tenderness. There is no guarding or rebound.    Musculoskeletal:         General: Tenderness, deformity and signs of injury present. Right hand: He exhibits decreased range of motion, tenderness, bony tenderness, decreased capillary refill, deformity and laceration. Hands:       Comments: Obvious deformity to the middle finger, unable to flex the middle finger, minimal ability to extend it, concern for flexor tendon laceration, distal portion of the finger is pale, all the fingers appears somewhat discolored but sensation is intact on the remaining digits  Concern for tendon injury, obvious bony abnormality with large laceration on the palm   Skin:     General: Skin is warm and dry. Findings: No erythema. Neurological:      Mental Status: He is alert and oriented to person, place, and time. Cranial Nerves: No cranial nerve deficit. Procedures     MDM     ED Course as of Aug 24 2103   Mon Aug 24, 2020   1851 Spoke with the orthopedic team who will present to evaluate the patient.    [SO]   1948 With the team at the bedside, they are talking about take the patient to the operating room tonight.    [SO]      ED Course User Index  [SO] Gus Huang DO        ED Course as of Aug 24 2103   Mon Aug 24, 2020   1851 Spoke with the orthopedic team who will present to evaluate the patient.    [SO]   1948 With the team at the bedside, they are talking about take the patient to the operating room tonight.    [SO]      ED Course User Index  [SO] Gus Huang, DO       --------------------------------------------- PAST HISTORY ---------------------------------------------  Past Medical History:  has a past medical history of Diabetes mellitus ([facility] [address] Hypertension, and Thyroid disease. Past Surgical History:  has a past surgical history that includes Appendectomy (3/23/2016). Social History:  reports that he has never smoked. He has never used smokeless tobacco. He reports that he does not drink alcohol or use drugs. Family History: family history is not on file.      The patients home medications have been reviewed.     Allergies: Norco [hydrocodone-acetaminophen]    -------------------------------------------------- RESULTS -------------------------------------------------    LABS:  Results for orders placed or performed during the hospital encounter of 08/24/20   Comprehensive Metabolic Panel w/ Reflex to MG   Result Value Ref Range    Sodium 142 132 - 146 mmol/L    Potassium reflex Magnesium 3.6 3.5 - 5.0 mmol/L    Chloride 103 98 - 107 mmol/L    CO2 24 22 - 29 mmol/L    Anion Gap 15 7 - 16 mmol/L    Glucose 152 (H) 74 - 99 mg/dL    BUN 14 6 - 20 mg/dL    CREATININE 1.2 0.7 - 1.2 mg/dL    GFR Non-African American >60 >=60 mL/min/1.73    GFR African American >60     Calcium 9.2 8.6 - 10.2 mg/dL    Total Protein 6.9 6.4 - 8.3 g/dL    Alb 4.4 3.5 - 5.2 g/dL    Total Bilirubin 0.3 0.0 - 1.2 mg/dL    Alkaline Phosphatase 60 40 - 129 U/L    ALT 24 0 - 40 U/L    AST 19 0 - 39 U/L   CBC WITH AUTO DIFFERENTIAL   Result Value Ref Range    WBC 13.4 (H) 4.5 - 11.5 E9/L    RBC 5.07 3.80 - 5.80 E12/L    Hemoglobin 14.7 12.5 - 16.5 g/dL    Hematocrit 42.9 37.0 - 54.0 %    MCV 84.6 80.0 - 99.9 fL    MCH 29.0 26.0 - 35.0 pg    MCHC 34.3 32.0 - 34.5 %    RDW 13.0 11.5 - 15.0 fL    Platelets 277 722 - 164 E9/L    MPV 10.5 7.0 - 12.0 fL    Neutrophils % 74.9 43.0 - 80.0 %    Immature Granulocytes % 0.5 0.0 - 5.0 %    Lymphocytes % 14.7 (L) 20.0 - 42.0 %    Monocytes % 8.5 2.0 - 12.0 %    Eosinophils % 0.8 0.0 - 6.0 %    Basophils % 0.6 0.0 - 2.0 %    Neutrophils Absolute 10.00 (H) 1.80 - 7.30 E9/L    Immature Granulocytes # 0.07 E9/L    Lymphocytes Absolute 1.97 1.50 - 4.00 E9/L    Monocytes Absolute 1.14 (H) 0.10 - 0.95 E9/L    Eosinophils Absolute 0.11 0.05 - 0.50 E9/L    Basophils Absolute 0.08 0.00 - 0.20 E9/L   Protime-INR   Result Value Ref Range    Protime 11.1 9.3 - 12.4 sec    INR 1.0    APTT   Result Value Ref Range    aPTT 26.5 24.5 - 35.1 sec       RADIOLOGY:  XR HAND LEFT (2 VIEWS)   Final Result

## 2020-08-25 NOTE — ANESTHESIA PRE PROCEDURE
Department of Anesthesiology  Preprocedure Note       Name:  Mali Webb   Age:  38 y.o.  :  1980                                          MRN:  [id number]         Date:  2020      Surgeon: Mayelin Mendoza):  Cindy Dickens MD    Procedure: Procedure(s): FINGER OPEN REDUCTION INTERNAL FIXATION  HAND LIGAMENT TENDON REPAIR    Medications prior to admission:   Prior to Admission medications    Medication Sig Start Date End Date Taking? Authorizing Provider   traMADol (ULTRAM) 50 MG tablet Take 1 tablet by mouth every 4 hours as needed for Pain for up to 7 days. Intended supply: 7 days. Take lowest dose possible to manage pain 20 Yes Oraliaara Rim, DO   naproxen (NAPROSYN) 500 MG tablet Take 1 tablet by mouth 2 times daily (with meals) 20  Yes Babara Rim, DO   metFORMIN (GLUCOPHAGE) 500 MG tablet Take 500 mg by mouth daily (with breakfast)    Historical Provider, MD   NONFORMULARY Cholesterol med    Historical Provider, MD   Levothyroxine Sodium (SYNTHROID PO) Take by mouth    Historical Provider, MD   cyclobenzaprine (FLEXERIL) 10 MG tablet Take 1 tablet by mouth 3 times daily as needed for Muscle spasms 18   STAN Webster - CNP       Current medications:    Current Facility-Administered Medications   Medication Dose Route Frequency Provider Last Rate Last Dose    Tetanus-Diphth-Acell Pertussis (BOOSTRIX) injection 0.5 mL  0.5 mL Intramuscular Once Preston Odonnell DO         Current Outpatient Medications   Medication Sig Dispense Refill    traMADol (ULTRAM) 50 MG tablet Take 1 tablet by mouth every 4 hours as needed for Pain for up to 7 days. Intended supply: 7 days.  Take lowest dose possible to manage pain 42 tablet 0    naproxen (NAPROSYN) 500 MG tablet Take 1 tablet by mouth 2 times daily (with meals) 60 tablet 3    metFORMIN (GLUCOPHAGE) 500 MG tablet Take 500 mg by mouth daily (with breakfast)      NONFORMULARY Cholesterol med      Levothyroxine Sodium (SYNTHROID PO) Take by mouth      cyclobenzaprine (FLEXERIL) 10 MG tablet Take 1 tablet by mouth 3 times daily as needed for Muscle spasms 10 tablet 0       Allergies: Allergies   Allergen Reactions    Norco [Hydrocodone-Acetaminophen] Shortness Of Breath       Problem List:    Patient Active Problem List   Diagnosis Code    Gangrenous appendicitis K35.891    Displaced fracture of proximal phalanx of left middle finger, initial encounter for open fracture S62.613B    Crush injury of hand, left, initial encounter S67. 22XA    Ischemia of finger I99.8       Past Medical History:        Diagnosis Date    Diabetes mellitus ([facility] [address]     Hypertension     Thyroid disease        Past Surgical History:        Procedure Laterality Date    APPENDECTOMY  3/23/2016    laparoscopic       Social History:    Social History     Tobacco Use    Smoking status: Never Smoker    Smokeless tobacco: Never Used   Substance Use Topics    Alcohol use: No                                Counseling given: Not Answered      Vital Signs (Current):   Vitals:    08/24/20 1906   BP: 135/78   Pulse: 95   Resp: 18   Temp: 98 °F (36.7 °C)   SpO2: 99%   Weight: 225 lb (102.1 kg)   Height: 5' 11\" (1.803 m)                                              BP Readings from Last 3 Encounters:   08/24/20 135/78   11/13/18 122/82   03/25/16 121/61       NPO Status:   greater than 8 hours                                                                             BMI:   Wt Readings from Last 3 Encounters:   08/24/20 225 lb (102.1 kg)   11/13/18 220 lb (99.8 kg)   03/24/16 264 lb 9.6 oz (120 kg)     Body mass index is 31.38 kg/m².     CBC:   Lab Results   Component Value Date    WBC 13.4 08/24/2020    RBC 5.07 08/24/2020    HGB 14.7 08/24/2020    HCT 42.9 08/24/2020    MCV 84.6 08/24/2020    RDW 13.0 08/24/2020     08/24/2020       CMP:   Lab Results   Component Value Date     08/24/2020    K 3.6 08/24/2020     08/24/2020    CO2 24 08/24/2020    BUN 14 08/24/2020    CREATININE 1.2 08/24/2020    GFRAA >60 08/24/2020    LABGLOM >60 08/24/2020    GLUCOSE 152 08/24/2020    PROT 6.9 08/24/2020    CALCIUM 9.2 08/24/2020    BILITOT 0.3 08/24/2020    ALKPHOS 60 08/24/2020    AST 19 08/24/2020    ALT 24 08/24/2020       POC Tests: No results for input(s): POCGLU, POCNA, POCK, POCCL, POCBUN, POCHEMO, POCHCT in the last 72 hours. Coags:   Lab Results   Component Value Date    PROTIME 11.1 08/24/2020    INR 1.0 08/24/2020    APTT 26.5 08/24/2020       HCG (If Applicable): No results found for: PREGTESTUR, PREGSERUM, HCG, HCGQUANT     ABGs: No results found for: PHART, PO2ART, WZJ5JKP, KAR0RLO, BEART, R6ZHXEEO     Type & Screen (If Applicable):  No results found for: LABABO, LABRH    Drug/Infectious Status (If Applicable):  No results found for: HIV, HEPCAB    COVID-19 Screening (If Applicable): No results found for: COVID19      Anesthesia Evaluation  Patient summary reviewed no history of anesthetic complications:   Airway: Mallampati: II  TM distance: <3 FB     Mouth opening: > = 3 FB Dental:          Pulmonary:Negative Pulmonary ROS breath sounds clear to auscultation                             Cardiovascular:        (-) hypertension (patient denies HTN)      Rhythm: regular  Rate: normal                    Neuro/Psych:   Negative Neuro/Psych ROS              GI/Hepatic/Renal: Neg GI/Hepatic/Renal ROS            Endo/Other:    (+) Diabetes, hypothyroidism::., .                  ROS comment: left hand pain after his hand got caught in a press at work Abdominal:   (+) obese,         Vascular: negative vascular ROS. Anesthesia Plan      general     ASA 3 - emergent       Induction: intravenous and rapid sequence. Anesthetic plan and risks discussed with patient. Plan discussed with CRNA.                   Slade Rae MD   8/24/2020

## 2020-08-25 NOTE — PROGRESS NOTES
Department of Orthopedic Surgery  Resident Progress Note    Patient seen and examined. Pain controlled. No new complaints. Denies chest pain, shortness of breath, dizziness/lightheadedness. Patient denies any drainage around dressing. Patient states sensation on radial side of middle finger has improved. VITALS:  /80   Pulse 90   Temp 97.1 °F (36.2 °C) (Temporal)   Resp 18   Ht 5' 11\" (1.803 m)   Wt 225 lb (102.1 kg)   SpO2 96%   BMI 31.38 kg/m²     General: alert and oriented to person, place and time, well-developed and well-nourished, in no acute distress    MUSCULOSKELETAL:   left upper extremity:  · Dressing C/D/I  · Compartments soft and compressible  · +AIN/PIN/Ulnar/Median/Radial nerve function intact grossly  · +2/4 Radial pulse, Cap refill 3 seconds. Pulse ox reading was able to be obtained on right middle finger. · Sensation intact to touch in radial/ulnar/median nerve distributions to hand. · Patient states sensation in radial boarder of left middle finger sensation to light touch has improved since surgery yesterday. CBC:   Lab Results   Component Value Date    WBC 13.3 08/25/2020    HGB 15.0 08/25/2020    HCT 45.2 08/25/2020     08/25/2020     PT/INR:    Lab Results   Component Value Date    PROTIME 11.1 08/24/2020    INR 1.0 08/24/2020         ASSESSMENT  · S/P Left hand I&D. Left middle finger proximal phalanx ORIF    PLAN      · Continue physical therapy and protocol: NWB - Left UE  · 24 hour abx coverage  · early mobilization  · PT/OT  · Pain Control: PO Tramadol  · D/C Plan:  Patient can be discharged later this morning from an orthopedic stand point  · Patient will follow up with Dr. Nusrat Orellana out patient in 7-10 days.   · Discuss with Dr. Nusrat Orellana

## 2020-08-25 NOTE — CONSULTS
Department of Orthopedic Surgery   Consult Note          Reason for Consult:  Left Hand injury     HISTORY OF PRESENT ILLNESS:       Patient is a 38 y.o. male who presents with left hand pain after his hand got caught in a press at work. The patient was seen in the emergency department at [facility] and orthopedics was consulted. The patient can not remember the mechanism but thinks his hand was crushed in the press. The patient is right hand dominant. The patient denies any other orthopedic complaints at this time. Tobacco use: never a smoker  Alcohol use: does not drink alcohol  Illicit drug use: denies drug use. Past Medical History:        Diagnosis Date    Diabetes mellitus ([facility] [address]     Hypertension     Thyroid disease      Past Surgical History:        Procedure Laterality Date    APPENDECTOMY  3/23/2016    laparoscopic     Current Medications:   Current Facility-Administered Medications: Tetanus-Diphth-Acell Pertussis (BOOSTRIX) injection 0.5 mL, 0.5 mL, Intramuscular, Once  Allergies:  Norco [hydrocodone-acetaminophen]    Social History:   TOBACCO:   reports that he has never smoked. He has never used smokeless tobacco.  ETOH:   reports no history of alcohol use. DRUGS:   reports no history of drug use. ACTIVITIES OF DAILY LIVING:    OCCUPATION:    Family History:   History reviewed. No pertinent family history.     REVIEW OF SYSTEMS:  CONSTITUTIONAL:  negative for  fevers, chills  EYES:  negative for blurred vision, visual disturbance  HEENT:  negative for  hearing loss, voice change  RESPIRATORY:  negative for  dyspnea, wheezing  CARDIOVASCULAR:  negative for  chest pain, palpitations  GASTROINTESTINAL:  negative for nausea, vomiting  GENITOURINARY:  negative for frequency, urinary incontinence  HEMATOLOGIC/LYMPHATIC:  negative for bleeding and petechiae  MUSCULOSKELETAL:  positive for  pain  NEUROLOGICAL:  negative for headaches, dizziness  BEHAVIOR/PSYCH:  negative for increased agitation and anxiety    PHYSICAL EXAM:    VITALS:  /78   Pulse 95   Temp 98 °F (36.7 °C)   Resp 18   Ht 5' 11\" (1.803 m)   Wt 225 lb (102.1 kg)   SpO2 99%   BMI 31.38 kg/m²   CONSTITUTIONAL:  awake, alert, cooperative, no apparent distress, and appears stated age  MUSCULOSKELETAL:  Left upper Extremity:  · Stellate laceration of the volar portion of the left hand over the over the 3rd metacarpal extending into the middle phalanx of the middle finger with a portion of the proximal phalanx protruding through the wound. There is also an oblique laceration of the left index finger from the MCP to the middle phalanx. · +TTP over the index and middle fingers. Compartments soft and compressible  · +AIN/PIN/Ulnar nerve function intact grossly  · + radial pulse. Capillary refill is sluggish and takes 3 seconds. Skin of the middle phalanx is manjit. · The ulnar digital nerve to the middle finger is intact distal to the laceration. The radial digital nerve distribution has pressure sensation when poked with a needle. · Patient is able to flex all fingers at the MCP, PIP, and DIP joints and the thumb at the IP and MCP joint. Patient is unable to hold middle finger in extension. Patient is able to extend all other fingers. · Sensation is otherwise intact in radial, ulnar, and median nerve distributions other than previously noted. · The wound was explored and both flexor tenders of the middle finger are intact. The radial digital nerve was visualized. The radial digital artery of the middle finger was difficult to visualize. · After the finger was reduced and splinted a pulse ox reading was not able to be obtained in the left middle finger. Pulse ox was placed on the left index finger and read 94%. Secondary Exam:   · rightUE: No obvious signs of trauma. -TTP to fingers, hand, wrist, forearm, elbow, humerus, shoulder or clavicle. compartments soft and compressible. · bilateralLE: No obvious signs of trauma. -TTP to foot, ankle, leg, knee, thigh, hip. compartments soft and compressible. DATA:    CBC:   Lab Results   Component Value Date    WBC 13.4 08/24/2020    RBC 5.07 08/24/2020    HGB 14.7 08/24/2020    HCT 42.9 08/24/2020    MCV 84.6 08/24/2020    MCH 29.0 08/24/2020    MCHC 34.3 08/24/2020    RDW 13.0 08/24/2020     08/24/2020    MPV 10.5 08/24/2020     PT/INR:    Lab Results   Component Value Date    PROTIME 11.1 08/24/2020    INR 1.0 08/24/2020     CRP/ESR: No results found for: CRP, SEDRATE  Lactic Acid :   Lab Results   Component Value Date    LACTA 1.4 03/24/2016       Radiology Review:  08/24/20 - XR of left hand shows a severely comminuted, inraarticular fracture of the middle finger proximal phalanx with the distal segment displaced volarly. IMPRESSION:   · Left middle finger proximal phalanx comminuted intraarticular fracture with possible radial digital nerve and artery compromise. PLAN:  NWB - left hand  After informed consent was verbally obtained, Closed reduction was then performed with application of well padded volar and dorsal splint. Patient tolerated well and after reduction a pulse ox was applied to the left middle finger and a reading was unable to be obtained. Pulse ox was also placed on the index finger and read 94%. The case was discussed with Dr. Jeanie Purdy and the decision was to take the patient to the OR STAT. The OR was not available for a STAT case. The patient will be taken to the OR at the first availability. Patient was given 2g of ancef  Discussed with Dr. Jeanie Purdy    The patient was reevaluated at 2200 and pulse ox was reapplied to the left middle finger and a reading of 93% was now obtained. I have seen and evaluated the patient and agree with the above assessment and plan on today's visit.  I have performed the key components of the history and physical examination with significant findings of left hand crush injury with vascular ischemia of finger and open displaced intra-articular proximal phalanx fracture. Plan for emergent surgical intervention. I concur with the findings and plan as documented.     Savannah Adler MD  8/24/2020

## 2020-08-25 NOTE — PROGRESS NOTES
Physical Therapy order received and chart reviewed. Per conversation with patient, Pt is independent with all functional mobility. Pt with no skilled acute PT needs at this time. Pt instructed to notify any medical team member if functional status were to change. Will discontinue PT services.      Bayard Schilder PT, DPT  [id number]

## 2020-08-25 NOTE — OP NOTE
[address]                  [address] [facility],  [facility]                                OPERATIVE REPORT    PATIENT NAME: Sruthi García                    :        1980  MED REC NO:   [id number]                            ROOM:       [id number]  ACCOUNT NO:   [de-identified]                           ADMIT DATE: 2020  PROVIDER:     Tyron Lama MD    DATE OF PROCEDURE:  2020    PREOPERATIVE DIAGNOSIS:  Left hand crush injury with vascular  compromise. PREOPERATIVE DIAGNOSES:  1. Left hand crush injury with complex laceration involving palm and  index finger and middle fingers measuring approximately 20 cm.  2.  Severely comminuted displaced fracture of left middle finger open,  grade II. 3.  Left radial digital artery laceration, middle finger. 4.  Pulley rupture, left middle finger. PROCEDURES:  1. Excisional debridement, left palm, index and middle finger; complex  lacerations and open fracture of middle finger proximal phalanx. 2.  Open reduction and K-wire fixation of comminuted highly displaced  unstable left middle finger proximal phalanx fracture. 3.  Exploration of laceration confirming radial digital artery  laceration and severe contusion of radial digital nerve, middle finger. Neurovascular bundle intact. 4.  Pulley repair in middle finger, A2 pulley. 5.  Complex wound closure of left hand index and middle fingers, total  length approximately 20 cm. ANESTHESIA:  General.    TOURNIQUET TIME:  Approximately 40 minutes, 250 mmHg brachial  tourniquet. BLOOD LOSS:  Minimal.    FINDINGS:  1. Exploration of the wound revealed a complex laceration and severe  injury to the left middle finger involving soft tissues with intact  flexors and extensor tendons and disruption of the pulley system and  avulsion crush injury of radial digital artery middle finger.   2.  The neurovascular bundles to the index finger and the ulnar  neurovascular bundle to the middle finger were intact. 3.  Status post debridement and K-wire fixation, stable alignment was  restored to the comminuted fracture of the middle finger proximal  phalanx, which was severely comminuted, displaced and unstable. 4.  Status post reduction and pinning, the A2 pulley was repaired with  FiberWire suture. 5.  Status post debridement, pinning and repair of the pulley. The  tourniquet was deflated, healthy cap refill was restored to the middle  finger. COMPLICATIONS:  None. DISPOSITION:  The patient remained stable throughout the procedure. OPERATIVE INDICATIONS:  The patient is a 65-year-old gentleman who  sustained a severe crush injury to his left hand while at work. He was  seen in the emergency department and had a dusky dysvascular middle  finger and a severe complex laceration of the palm with a crushing  injury of the middle finger, proximal phalanx. Given these findings,  emergent surgical intervention was recommended. Risks included but not  limited to the risk of digital loss; damage to nerves, vessels, tendons;  infection; wound healing complications; stiffness; malunion; nonunion;  loss of function; loss of the digit; rotatory malalignment; need for  hand therapy and/or additional surgery were explained. He understood  and wished to proceed. SURGERY DETAILS:  The patient was identified in the emergency  department, was taken from the emergency department to the operating  room suite, underwent general anesthesia per Anesthesia Department. All  bony prominences were well-padded. An arm tourniquet was placed. The  left upper extremity was prepped and draped in sterile fashion. The  patient received perioperative antibiotics per open fracture protocol. The wound was explored.   There was a severe soft tissue injury to the  palm of the hand, extending from the distal palmar flexion crease and  distally into the index and middle fingers involving the neurovascular  bundles and severely displaced unstable middle finger fracture. The  flexor tendons were intact. The middle finger as well as the dorsal  extensor mechanism, there was complete disruption of the soft tissues  palmarly through the pulley system. The radial digital artery to the  middle finger was absent, the digital nerve was intact. The  neurovascular bundles to the index finger were intact and the ulnar  neurovascular bundle to the middle finger was intact. The soft tissues  were debrided. Excisional debridement including skin, subcutaneous  tissues and loose bony fracture fragments were debrided with a 15-blade  scalpel, preserving the vital structures followed by thorough and  copious irrigation with several liters of saline. Status post  debridement, the remaining tissues appeared to be healthy and viable. Fluoroscopy was then brought in, the severely unstable comminuted  fracture of the middle finger proximal phalanx was confirmed. This  extended into the articular surface with gross disruption of the  collateral ligaments. Direct reduction was achieved through the zone of  injury and preliminarily held the clamp, was then followed by antegrade  placement of several K-wires stabilizing the large fracture fragment. There was a shear component, which was highly unstable and required  meticulous reduction under fluoroscopic imaging and K-wire fixation. Status post fixation, pavithra and alignment were deemed to be  appropriate. There was stability restored to the finger. K-wires were  trimmed proximally and bent and pin caps placed. Attention was directed towards the palmar wound. The A2 pulley to the  middle finger was completely disrupted. It was amenable to repair  around the tendon and sewn into place with FiberWire suture, which  restored stability to the flexor tendons. Again the radial digital  artery was completely severed.   The radial digital nerve was intact. The ulnar neurovascular bundle was intact to the middle finger. The  tourniquet was deflated. There was good patency to the remaining ulnar  digital artery. There was brisk capillary refill to the digit and good  turgor and color. Hemostasis achieved with bipolar electrocautery. Complex wound closure measuring approximately 20 cm in length involving  the index, middle, palm and webspace were closed with multiple nylon  sutures. Bulky sterile dressing and protective splint was applied.         Maryana Cordero MD    D: 08/25/2020 0:12:02       T: 08/25/2020 0:16:55     [email or web address]  Job#: [id number]     Doc#: [id number]    CC:

## 2020-08-25 NOTE — PROGRESS NOTES
OCCUPATIONAL THERAPY INITIAL EVALUATION      Date:2020  Patient Name: Akash Aldridge  MRN: [id number]  : 1980  Room: [address] [place] [address]  Referring Provider:  Harsha Oates DO    Evaluating OT: Scotty Oscar OTR/L   License #  [id number]     -PAC Daily Activity Raw Score:     Recommended Adaptive Equipment:  None. Recommend Out pt OT as indicated with Dr. Radha Jasso    Diagnosis: Left hand crush injury with vascular compromise. Patient presented to ED following work related injury    Surgery: 2020: 1. Left hand crush injury with complex laceration involving palm and  index finger and middle fingers measuring approximately 20 cm.  2.  Severely comminuted displaced fracture of left middle finger open,  grade II. 3.  Left radial digital artery laceration, middle finger. 4.  Pulley rupture, left middle finger    Pertinent Medical History:   Past Medical History:   Diagnosis Date    Diabetes mellitus ([facility] [address]     Hypertension     Thyroid disease       Precautions:   NWB L hand, elevate L hand in [place] Heller pillow     Home Living: Pt lives with wife & MIL (states able to assist) in a 2 story with 1 steps to enter and no HR. B & B on 2nd floor, up 13 steps with L HR  Bathroom setup: tub/shower   Equipment owned: none    Prior Level of Function: Ind. with ADLs , Ind. with IADLs; ambulated no A.D. Driving: active  Occupation:     Pain Level: 7/10 L hand  Cognition: A&O: 4/4; Follows multi- step directions   Memory:  good   Sequencing:  good   Problem solving:  good   Judgement/safety:  good     Functional Assessment:   Initial Eval Status  Date: 2020 Treatment Status  Date: 2020 Short Term Goals = Long Term Goals  Treatment frequency: 3-5 days   Feeding Mod I with R hand      Grooming Set-up 1 handed technique  Modified [place]    UB Dressing Min A Set-up using [employer].  to bijalpedro gelelr Modified [place]    LB Dressing Min A Set-up using 1 handed technique to bijal B socks at EOB maintaining prec. Modified [place]    Bathing Stand by Assist  Modified [place]    Toileting Supervision   Modified [place]    Bed Mobility  Supine to sit: Independent   Sit to supine: Independent      Functional Transfers Independent sit <> stand, no A.D. Functional Mobility Supervision, 1 min. LOB initially, no A.D. Independent    Balance Sitting:     Static:  Ind. Dynamic:Ind.  Standing: Sup/Ind. Activity Tolerance Fair+ with lt. ax. Visual/  Perceptual Glasses: yes        Safety Awareness Fair+    Cues RE: prec.                   good     Hand dominance: R     Strength ROM Additional Info:    RUE  5/5  WFL good  and wfl FMC/dexterity noted during ADL tasks     LUE NT L shld. 75%  Elbow NT  Wrist & hand NT  NT as well as FMC/dexterity noted during ADL tasks     L UE exercises: instruction for shoulder gliding ex., elevation/edema control for L UE. Pt. demonstrated good understanding. Hearing: WFL   Sensation:  Pt. c/o mod. numbness/ tingling digits # 2-5  Tone: WFL   Edema: L hand/digits/ unable to fully assess d/t cast & wrapping                            Comments: Upon arrival, patient supine, cleared by Nursing, agreeable to OT. Pt demonstrating good understanding of education/techniques, requiring additional training / education. At end of session, patient returned to bed with L UE elevated in [place] pillow, all needs met, RN notified, with call light and phone within reach, all lines and tubes intact. Pt would benefit from continued out pt OT when appropriate to increase safety and independence with completion of ADL/iADL tasks, functional transfers/mobility to improve independence and quality of life.     Treatment:  OT services provided include: facilitation of bed mobility, skilled monitoring of vitals & pt.'s response to treatment (remained WFL throughout), instruction/training on safe & adapted techniques within precautions for completion of ADLs, proper posture and/or positioning, facilitation of functional seated & standing tolerance & balance ax. during ADLs and functional ax., skilled cuing on hand placement & proper body mechanics during functional transfer/mobility training with focus on safety, technique, precautions. Pt.  also Instructed RE: safe transfers/mobility, ADL training, role of OT, E.C. techniques, treatment plan, recs. , prec. Eval Complexity: Low    (Evaluation time includes thorough review of current medical information, gathering information on past medical history/social history and prior level of function, completion of standardized testing/informal observation of tasks, assessment of data, consultation with other medical professions/disciplines, and development of POC/Goals.)      Assessment of current deficits   Functional mobility [x]  ADLs [x] Strength [x]  Cognition []  Functional transfers  [x] IADLs [x] Safety Awareness [x]  Endurance [x]  Fine Motor Coordination [] Balance [x] Vision/perception [] Sensation []   Gross Motor Coordination [] ROM [] Delirium []                  Motor Control []    Plan of Care: OT for 2-5 x/wk. ADL retraining [x]   Equipment needs [x]   Neuromuscular re-education [x] Energy Conservation Techniques [x]  Functional Transfer training [x] Patient and/or Family Education [x]  Functional Mobility training [x]  Environmental Modifications [x]  Cognitive re-training []   Compensatory techniques for ADLs [x]  Splinting Needs []   Positioning to improve overall function [x]   Therapeutic Activity [x]  Therapeutic Exercise  [x]  Visual/Perceptual: []    Delirium prevention/treatment  []   Other:  []    Rehab Potential: Good for established goals     Patient / Family Goal: to return home    Patient and/or family were instructed diagnosis, prognosis/goals and plan of care. Demonstrated good understanding.     [] Malnutrition indicators have been identified and nursing has been notified to ensure a dietitian consult is ordered. Low Evaluation + 15     Treatment Time In:10:10            Treatment Time Out: 10:25               Treatment Charges: Mins Units   Ther Ex  [id number]     Manual Therapy 23802     Thera Activities 30454 5    ADL/Home Mgt [id number] 10    Neuro Re-ed [id number]     Group Therapy      Orthotic manage/training  06914     Non-Billable Time     Total Timed Treatment 15 1           Ani Oscar, OTR/L   License #  [id number]

## 2020-08-25 NOTE — H&P
Department of Orthopedic Surgery  Resident H&P Note          CHIEF COMPLAINT:  Left hand Injury    HISTORY OF PRESENT ILLNESS:           Patient is a 38 y.o. male who presents with left hand pain after his hand got caught in a press at work. The patient was seen in the emergency department at [facility] and orthopedics was consulted. The patient can not remember the mechanism but thinks his hand was crushed in the press. The patient is right hand dominant. The patient denies any other orthopedic complaints at this time. Past Medical History:        Diagnosis Date    Diabetes mellitus ([address]     Hypertension     Thyroid disease      Past Surgical History:        Procedure Laterality Date    APPENDECTOMY  3/23/2016    laparoscopic     Current Medications:   Current Facility-Administered Medications: Tetanus-Diphth-Acell Pertussis (BOOSTRIX) injection 0.5 mL, 0.5 mL, Intramuscular, Once  Allergies:  Norco [hydrocodone-acetaminophen]    Social History:   TOBACCO:   reports that he has never smoked. He has never used smokeless tobacco.  ETOH:   reports no history of alcohol use. DRUGS:   reports no history of drug use. ACTIVITIES OF DAILY LIVING:    OCCUPATION:    Family History:   History reviewed. No pertinent family history.     REVIEW OF SYSTEMS:  CONSTITUTIONAL:  negative for  fevers, chills  EYES:  negative for blurred vision, visual disturbance  HEENT:  negative for  hearing loss, voice change  RESPIRATORY:  negative for  dyspnea, wheezing  CARDIOVASCULAR:  negative for  chest pain, palpitations  GASTROINTESTINAL:  negative for nausea, vomiting  GENITOURINARY:  negative for frequency, urinary incontinence  HEMATOLOGIC/LYMPHATIC:  negative for bleeding and petechiae  MUSCULOSKELETAL:  positive for  pain  NEUROLOGICAL:  negative for headaches, dizziness  BEHAVIOR/PSYCH:  negative for increased agitation and anxiety    PHYSICAL EXAM:    VITALS:  /78   Pulse 95   Temp 98 °F (36.7 °C)   Resp 18 Ht 5' 11\" (1.803 m)   Wt 225 lb (102.1 kg)   SpO2 99%   BMI 31.38 kg/m²   CONSTITUTIONAL:  Awake, alert, answers questions appropriately  EYES:  Lids and lashes normal, pupils equal, round and reactive to light, extra ocular muscles intact  HENT:  Normocephalic, without obvious abnormality, atraumatic, neck supple, symmetric  LUNGS:  No increased work of breathing  CARDIOVASCULAR:  Brisk vascular capillary refill to all extremities  ABDOMEN:  Non-tender, Non-distended  NEUROLOGIC:  Awake, alert, oriented to name, place and time. Cranial nerves II-XII are grossly intact. MUSCULOSKELETAL:  Left upper Extremity:   · Stellate laceration of the volar portion of the left hand over the over the 3rd metacarpal extending into the middle phalanx of the middle finger with a portion of the proximal phalanx protruding through the wound. There is also an oblique laceration of the left index finger from the MCP to the middle phalanx. · +TTP over the index and middle fingers. Compartments soft and compressible  · +AIN/PIN/Ulnar nerve function intact grossly  · + radial pulse. Capillary refill is sluggish and takes 3 seconds. Skin of the middle phalanx is manjit. · The ulnar digital nerve to the middle finger is intact distal to the laceration. The radial digital nerve distribution has pressure sensation when poked with a needle. · Patient is able to flex all fingers at the MCP, PIP, and DIP joints and the thumb at the IP and MCP joint. Patient is unable to hold middle finger in extension. Patient is able to extend all other fingers. · Sensation is otherwise intact in radial, ulnar, and median nerve distributions other than previously noted. · The wound was explored and both flexor tenders of the middle finger are intact. The radial digital nerve was visualized. The radial digital artery of the middle finger was difficult to visualize.   · After the finger was reduced and splinted a pulse ox reading was not able to be obtained in the left middle finger. Pulse ox was placed on the left index finger and read 94%    Secondary Exam:   · rightUE: -TTP to fingers, hand, wrist, forearm, elbow, humerus, shoulder or clavicle, patient able to flex/extend fingers, wrist, elbow and shoulder with active and passive ROM without pain, +2/4 Radial & Ulnar pulses, cap refill <3sec, +AIN/PIN/Radial/Ulanr/Median N, distal sensation grossly intact to C4-T1 dermatomes, compartments soft and compressible  · bilateralLE: ; -TTP to foot, ankle, leg, knee, thigh, hip; patient able to flex/extend toes, ankle, knee and hip with active and passive ROM without pain,+2/4 DP & PT pulses, cap refill <3sec, +5/5 PF/DF/EHL, distal sensation grossly intact to L3-S1 dermatomes, compartments soft and compressible  · Pelvis: -TTP, -Log roll, -Heel strike     DATA:    CBC:   Lab Results   Component Value Date    WBC 13.4 08/24/2020    RBC 5.07 08/24/2020    HGB 14.7 08/24/2020    HCT 42.9 08/24/2020    MCV 84.6 08/24/2020    MCH 29.0 08/24/2020    MCHC 34.3 08/24/2020    RDW 13.0 08/24/2020     08/24/2020    MPV 10.5 08/24/2020     PT/INR:    Lab Results   Component Value Date    PROTIME 11.1 08/24/2020    INR 1.0 08/24/2020     Radiology Review:  08/24/20 - XR of left hand shows a severely comminuted, inraarticular fracture of the middle finger proximal phalanx with the distal segment displaced volarly.                IMPRESSION:  · Left middle finger proximal phalanx comminuted intraarticular fracture with possible radial digital nerve and artery compromise. PLAN:  · NWB - left hand  · After informed consent was verbally obtained, Closed reduction was then performed with application of well padded volar and dorsal splint. Patient tolerated well and after reduction a pulse ox was applied to the left middle finger and a reading was unable to be obtained. Pulse ox was also placed on the index finger and read 94%.   · The case was discussed with Dr. Edward Munguia

## 2020-08-25 NOTE — CARE COORDINATION
Met with the pt at the bedside. Avis Perdue was completed in ED. Copy given to the pt. Original given to [facility]. Worker's compensation procedure explained to the pt. He will return home with his wife.  Yani Jones RN -[phone]

## 2020-09-02 ENCOUNTER — TELEPHONE (OUTPATIENT)
Dept: ORTHOPEDIC SURGERY | Age: 40
End: 2020-09-02

## 2020-09-03 ENCOUNTER — OFFICE VISIT (OUTPATIENT)
Dept: ORTHOPEDIC SURGERY | Age: 40
End: 2020-09-03

## 2020-09-03 VITALS — BODY MASS INDEX: 31.5 KG/M2 | HEIGHT: 71 IN | WEIGHT: 225 LBS | TEMPERATURE: 97.9 F

## 2020-09-03 PROCEDURE — 99024 POSTOP FOLLOW-UP VISIT: CPT | Performed by: ORTHOPAEDIC SURGERY

## 2020-09-03 NOTE — PROGRESS NOTES
HPI: Patient presents today POD #9 s/p excisional debridement of left palm, index and middle finger with open reduction internal fixation of left middle finger proximal phalanx fracture with middle finger A2 pulley repair. Overall the patient is well. Physical Exam: Incision clean dry intact with sutures in place. Flap on the palmar surface with grayish appearance. No erythema or signs of infection. 3 pins in place with no erythema or signs of infection. Radial and ulnar digital nerves intact to light touch. Brisk capillary refill. Otherwise neurovascular intact. X-rays of the left hand were obtained today in the office and reviewed with patient. 3 views: AP, lateral, oblique demonstrate stable fixation of the left middle finger proximal phalanx fracture with pins in place. Impression: Stable left middle finger proximal phalanx fracture    Assessment: POD #9 s/p excisional debridement of left palm, index and middle finger with open reduction internal fixation of left middle finger proximal phalanx fracture with middle finger A2 pulley repair    Plan:   Sutures removed today in the office. Scar management advised  Pin care explained to the patient. Patient noted with a removable brace at today's visit. Patient referred to therapy for range of motion exercises. Discussed signs and symptoms of infection. Patient call with any concerns. Patient to remain off work at this time. Follow up in 3 weeks with repeat x-rays. Anticipate possible return to work light duty at that time. All questions and concerns answered. I have seen and evaluated the patient and agree with the above assessment and plan on today's visit. I have performed the key components of the history and physical examination with significant findings of postop. I concur with the findings and plan as documented.     Jorje Guzman MD  9/3/2020

## 2020-09-08 ENCOUNTER — EVALUATION (OUTPATIENT)
Dept: OCCUPATIONAL THERAPY | Age: 40
End: 2020-09-08
Payer: COMMERCIAL

## 2020-09-08 PROCEDURE — 97110 THERAPEUTIC EXERCISES: CPT | Performed by: OCCUPATIONAL THERAPIST

## 2020-09-08 PROCEDURE — 97760 ORTHOTIC MGMT&TRAING 1ST ENC: CPT | Performed by: OCCUPATIONAL THERAPIST

## 2020-09-08 PROCEDURE — 97165 OT EVAL LOW COMPLEX 30 MIN: CPT | Performed by: OCCUPATIONAL THERAPIST

## 2020-09-08 NOTE — PROGRESS NOTES
[facility] OT  [address]  [place] [address]  Dept: [facility] OT Fax: [phone]    Date:  2020  Initial Evaluation Date: 2020   Evaluating Therapist: Blake Johnston    Patient Name:  Madison Katz    :  1980    Restrictions/Precautions:  ROM as tolerated; modalities prn, low fall risk  Diagnosis:S62.613B (ICD-10-CM) - Displaced fracture of proximal phalanx of left middle finger, initial encounter for open fracture   Excisional debridement of left palm, index and middle finger with open reduction internal fixation of left middle finger proximal phalanx fracture with middle finger A2 pulley repair. Insurance/Certification information:Pending C9- Generic   Referring Practitioner: Dr. Aura Wahl  Date of Surgery/Injury: 2020  Plan of care signed (Y/N):  N  Visit# / total visits: - Pending C9    Past Medical History:   Past Medical History:   Diagnosis Date    Diabetes mellitus ([facility] [address]     Hypertension     Thyroid disease      Past Surgical History:   Past Surgical History:   Procedure Laterality Date    APPENDECTOMY  3/23/2016    laparoscopic    FINGER SURGERY Left 2020    LEFT HAND I&D WITH PERCUTANEOUS PINNING OF THIRD FINGER performed by Donny Dawson MD at [address] [place] [address]       Reason for Referral: Pt is a 38 yr old male who sustained a crushed his L hand in an industrial machine at work. Pt underwent surgery by Dr. Aura Wahl on 2020 on his left hand. The following was completed:  excisional debridement of left palm, index and middle finger with open reduction internal fixation of left middle finger proximal phalanx fracture with middle finger A2 pulley repair. Pt is referred to outpatient Occupational therapy for ROM, splint fabrication, and modalities.       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 Pain Level: 5 on scale of 1-10, needle-like, throbbing and with paresthesia    UE Assessment:  Pt presented to therapy session with dorsal blocking splint. Pt has a healing wound into the palm around the 3rd MCP. Pt demonstrates wound in-between fingers with slight bleeding between 2nd, 3rd and 4th digits. Pt reports numbness throughout 2nd digit. Pt is able to complete opposition to 2nd and 3rd digit only. 3 pins inserted at base of MCP joint of 3rd  Digit. Pt demonstrates ability to complete IF and thumb opposition. Pt demonstrates severe edema with brusing to the dorsal and volar portions of L hand. Pt demonstrates severe PIP flexion/extension limitations of the 3rd digit PIP. Pt demonstrates decreased ROM, severe edema & brusing with 7/10 pain at rest, continued healing of palm laceration, and is unable to use RUE functionally.  Pt would benefit from skilled OT services to improve functional use of RUE by increasing ROM, strength, decreasing pain/edema, and assist with scar tissue management. Therapist unable to obtain measurements due to extensive time required for wound care and splint fabrication. All measurements will be obtained by OTR/L next session. Comment: Hand Dominance is R    Sensation: TBA- unable to complete due to wounds at this time. [place] Median N Ulnar N Radial N Other   Normal Touch  Size: 2.83       Diminished Light Touch   Size: 3.61       Diminished Protective Sense  Size: 4.31       Loss of Protective Sense   Size: 4.56       Loss of Sensation  Size: 6.65         Edema Description/Circumferential Measurements:   TBA on next session  Dynamometer (setting 2): TBA per protocol     Left: TBA       Right: TBA    Pinch Meter:   Lateral: Left= TBA,Right= TBA    Palmar 3 point: Left= TBA, Right= TBA  9 Hole Peg Test:   Left: TBA next session   Right: TBA next session    QuickDASH Score: 75% disability reported    Intervention: Fabricated a MCP blocking splint with a top component to protect pins on the dorsal portion of the hand. Wound care completed to hand due to active bleeding in-between digits and completed to pins on dorsal hand. Therapist placed built up foam on split around 2nd Digit MCP up to PIP joint to use to push in to complete PIP flexion/extension as well as a more comfortable resting position within splint. Pt educated on PIP flexion/extension while placing pressure slightly below PIP joint due to A2 pulley repair. Another exercise splint will be fabricated for pt next session for A2 pulley repair if using foam is not sufficient.      Assessment of current deficits   Functional mobility []  ADLs [] Strength [x]  Cognition []  Functional transfers  [] IADLs [] Safety Awareness [x]  Endurance [x]  Fine Motor Coordination [x] Balance [] Vision/perception [] Sensation [x]   Gross Motor Coordination [x] ROM [x]     Eval Complexity: low  Profile and History- Chart review  Assessment of Occupational Performance and Identification of Deficits- 7 Clinical Decision Making- no additional modifications required     Rehab Potential:                                 [x] Good  [] Fair  [] Poor        Suggested Professional Referral:       [x] No  [] Yes:  Barriers to Goal Achievement[de-identified]          [x] No  [] Yes:  Domestic Concerns:                           [x] No  [] Yes:     Goal Formulation: Patient  Time In: 4:00 pm            Time Out: 5:05 pm                      Timed Code Treatment Minutes: 45 minutes        PLAN      Treatment to include:   [x] Instruction in HEP                   Modalities:  [] Therapeutic Exercise        [x] Ultrasound               [x] Electrical Stimulation/Attended  [x] PROM/Stretching                    [x] Fluidotherapy          [x]  Paraffin                   [x] AAROM  [x] AROM                 [x] Iontophoresis: 4 mg/mL; Dexamethasone Sodium                                        [] Neuromuscular Re-education [x] Splinting         [x] Desensitization          [x] Therapeutic Activity       [x] Pain Management with/without modalities PRN                 [x] Manual Therapy/Fascial release                            [x] Tendon Glides        [x]Joint Protection/Training  []Ergonomics                             [x] Joint Mobilization        [] Adaptive Equipment Assessment/Training                             [x] Manual Edema Mobilization    [x] Energy Conservation/Work Simplification  [x] GM/FM Coordination       [x] Safety retraining/education per  individual diagnosis/goals  [x] Scar Management        [x] ADL/IADL re-training       Patient Specific Goal: Pt would like full use of LUE                             GOALS (Long term same as Short term):  1) Patient will demonstrate good understanding of home program (exercises/activities/diagnosis/prognosis/goals) with good accuracy. 2) Patient will demonstrate increased active/passive range of motion of their LUE to [facility] for ADL/IADL completion.   3) Patient will demonstrate is under my care. Physician's Comments/Revisions:                   Physicians's Printed Name:  Dr. Navin Lance                                   Physician's Signature:                                                               Date:       Please review Patient's OT evaluation and if you agree sign/date and fax back to us at our [facility] Fax: [phone].  Thank you for your referral!

## 2020-09-10 ENCOUNTER — TREATMENT (OUTPATIENT)
Dept: OCCUPATIONAL THERAPY | Age: 40
End: 2020-09-10
Payer: COMMERCIAL

## 2020-09-10 PROCEDURE — 97140 MANUAL THERAPY 1/> REGIONS: CPT | Performed by: OCCUPATIONAL THERAPIST

## 2020-09-10 PROCEDURE — 97110 THERAPEUTIC EXERCISES: CPT | Performed by: OCCUPATIONAL THERAPIST

## 2020-09-10 NOTE — PROGRESS NOTES
OCCUPATIONAL THERAPY PROGRESS NOTE    Date:  9/10/2020   Initial Evaluation Date: 2020                          Evaluating Therapist: Randall Hopkins     Patient Name:  Elli Olivier                  :  1980     Restrictions/Precautions:  ROM as tolerated; modalities prn, low fall risk  Diagnosis:S62.613B (ICD-10-CM) - Displaced fracture of proximal phalanx of left middle finger, initial encounter for open fracture   Excisional debridement of left palm, index and middle finger with open reduction internal fixation of left middle finger proximal phalanx fracture with middle finger A2 pulley repair. Insurance/Certification information:Pending C9- Generic   Referring Practitioner: Dr. Vilma Baxter  Date of Surgery/Injury: 2020  Plan of care signed (Y/N): Y    Visit# / total visits: - Pending C9    Pain Level: 7 on scale of 1-10, burning, needle-like, throbbing and with paresthesia    Subjective: Pt reports the hand is more bothersome today. Objective:  Updated POC to be completed by 11 th visit. INTERVENTION: COMPLETED: SPECIFICS/COMMENTS:   Modality:               AROM:     LUE x Completed flexion/extension of PIP/DIP 3  sets x10 within pt's tolerance. AAROM:               PROM/Stretching:     LUE x Gentle completed to digits into slight extension with slight pain reported from palm incision. Scar Mass/Edema Control:     Retrograde massage x On dorsal portion down digits, hand and wrist throughout session and in-between exercises. Strengthening:               Other:     Wound Care x Completed thoroughly throughout pal, digits and around pins sites. Pt demonstrated decreased maceration in between fingers. Splint adjustments x Completed for improved comfort and postioning. Assessment/Comments: Pt is making Fair progress toward stated plan of care.  Measurements taken this date and pt noted to have limited PIP flexion/extension of 3rd digit with extension lag noted in all digits as well. MCP measurements will be completed once pins are removed as well as pt is currently demonstrating a a C position of the palm due to wound in palm. Pt educated on completion of PIP/DIP flexion/extension within pain tolerance ( 5/10) and completing full extension when completing exercises. Pt demonstrates difficulty with motion due to wound in palm as well as moderate/severe edema around 2nd, 3rd, and 4th digits.      R Upper Extremity ROM       AROM [x]     PROM[] IE        WRIST Flexion 0-70* 23*       Extension 0-80* 34*       Radial Deviation 0-20* 18*       Ulnar Deviation 0-30* 20*        R Hand ROM   AROM [x]         PROM[] IE      THUMB MP Flexion/Extension 0-50*/ 14-23* H 75*       IP Flexion/Extension 0-80*/ 23-30* H 80*       Radial Abduction 53-71* WFL       Palmar Abduction 50-71* WFL         AROM [x]         PROM[]         2nd Digit MCP Flexion/Extension */ 0-45 H TBA       PIP Flexion/Extension 100*/ 0 57*/- 29*       DIP Flexion/Extension 70-90*/0 32*/- 23*          3rd Digit MCP Flexion/Extension */ 0-45 H TBA       PIP Flexion/Extension 100*/ 0 61*/- 48*       DIP Flexion/Extension 70-90*/0 28*/- 24*          4th Digit MCP Flexion/Extension */ 0-45 H TBA       PIP Flexion/Extension 100*/ 0 70*/- 30*       DIP Flexion/Extension 70-90*/0 41*/0*          5th Digit MCP Flexion/Extension */ 0-45 H TBA       PIP Flexion/Extension 100*/ 0 74*/- 4*       DIP Flexion/Extension 70-90*/0 46*/0*        -Rehab Potential: Good  -Requires OT Follow Up: Yes  Time In:2:00 pm            Time Out: 3:00 pm             Visit #: 2    Treatment Charges: Mins Units   Modalities     Ther Exercise 30 2   Manual Therapy 30 2   Thera Activities     ADL/Home Mgt      Neuro Re-education     Gait Training     Group Therapy     Non-Billable Service Time     Other     Total Time/Units 60 4       -Response to Treatment: Pt responded well to treatment  Goals: Goals for pt can be see on initial eval occurring on 09/08/2020    Plan:   [x]  Continues Plan of care: Treatment covered based on POC and graduated to patient's progress. Pt education continues at each visit to obtain maximum benefits from skilled OT intervention.   []  Alter Plan of care:   []  Discharge:      Veronique Rouse [address], OTR/L #[id number]

## 2020-09-14 ENCOUNTER — TREATMENT (OUTPATIENT)
Dept: OCCUPATIONAL THERAPY | Age: 40
End: 2020-09-14
Payer: COMMERCIAL

## 2020-09-14 PROCEDURE — 97140 MANUAL THERAPY 1/> REGIONS: CPT | Performed by: OCCUPATIONAL THERAPIST

## 2020-09-14 PROCEDURE — 97110 THERAPEUTIC EXERCISES: CPT | Performed by: OCCUPATIONAL THERAPIST

## 2020-09-14 NOTE — PROGRESS NOTES
OCCUPATIONAL THERAPY PROGRESS NOTE    Date:  2020   Initial Evaluation Date: 2020                          Evaluating Therapist: Xiomy Dumont     Patient Name:  Nia Herring                  :  1980     Restrictions/Precautions:  ROM as tolerated; modalities prn, low fall risk  Diagnosis:S62.613B (ICD-10-CM) - Displaced fracture of proximal phalanx of left middle finger, initial encounter for open fracture   Excisional debridement of left palm, index and middle finger with open reduction internal fixation of left middle finger proximal phalanx fracture with middle finger A2 pulley repair. Insurance/Certification information:Pending C9- Generic   Referring Practitioner: Dr. Nisreen Ruth  Date of Surgery/Injury: 2020  Plan of care signed (Y/N): Y    Visit# / total visits: - Pending C9    Pain Level: 4 on scale of 1-10, burning, needle-like, throbbing and with paresthesia in the 3rd digit    Subjective: Pt reports more movement in the hand     Objective:  Updated POC to be completed by 11 th visit. INTERVENTION: COMPLETED: SPECIFICS/COMMENTS:   Modality:               AROM:     LUE x Completed flexion/extension of PIP/DIP 3  sets x10 within pt's tolerance. Focus on extension/flexion of 3rd digit. AAROM:               PROM/Stretching:     LUE x Gentle completed to digits into slight extension with slight pain reported from palm incision. Scar Mass/Edema Control:     Retrograde massage x On dorsal portion down digits, hand and wrist throughout session and in-between exercises. Strengthening:               Other:     Wound Care x Completed thoroughly throughout pal, digits and around pins sites. Pt demonstrated decreased maceration in between fingers.     Splint adjustments x Completed for improved comfort and postioning due to decreasing edema     Assessment/Comments: Pt is making Fair progress toward stated plan of care and established goals. Pt demonstrates increased ROM of 2nd and 4th digits and slightly improved PIP flexion and difficulty with extension. Pt noted to have slight drift of 3rd digit towards the ulnar side and this was relayed to Suleiman Jha and Dwaine. Pt was given two small blue sponges to keep digit in better alignment throughout exercises and while resting in his splint. Splint was adjusted due to decreased edema with education on using splint to complete flexion/extension of PIP due to A2 pulley repair. A2 pulley repair splint will be fabricated next session pending continued decreasing edema.     -Rehab Potential: Good  -Requires OT Follow Up: Yes  Time In: 11:00 am            Time Out: 12:00 pm             Visit #: 3    Treatment Charges: Mins Units   Modalities     Ther Exercise 30 2   Manual Therapy 30 2   Thera Activities     ADL/Home Mgt      Neuro Re-education     Gait Training     Group Therapy     Non-Billable Service Time     Other     Total Time/Units 60 4       -Response to Treatment: Pt responded well to treatment  Goals: Goals for pt can be see on initial eval occurring on 09/08/2020    Plan:   [x]  Continues Plan of care: Treatment covered based on POC and graduated to patient's progress. Pt education continues at each visit to obtain maximum benefits from skilled OT intervention.   []  Alter Plan of care:   []  Discharge:      Anibal Rouse [address], OTR/L #[id number]

## 2020-09-17 ENCOUNTER — TREATMENT (OUTPATIENT)
Dept: OCCUPATIONAL THERAPY | Age: 40
End: 2020-09-17
Payer: COMMERCIAL

## 2020-09-17 ENCOUNTER — OFFICE VISIT (OUTPATIENT)
Dept: ORTHOPEDIC SURGERY | Age: 40
End: 2020-09-17
Payer: COMMERCIAL

## 2020-09-17 VITALS — TEMPERATURE: 97.9 F | WEIGHT: 225 LBS | RESPIRATION RATE: 20 BRPM | HEIGHT: 71 IN | BODY MASS INDEX: 31.5 KG/M2

## 2020-09-17 PROCEDURE — 99024 POSTOP FOLLOW-UP VISIT: CPT | Performed by: ORTHOPAEDIC SURGERY

## 2020-09-17 PROCEDURE — 97110 THERAPEUTIC EXERCISES: CPT | Performed by: OCCUPATIONAL THERAPIST

## 2020-09-17 NOTE — PROGRESS NOTES
Completed for improved comfort and postioning due to decreasing edema     Assessment/Comments: Pt is making Fair progress toward stated plan of care and established goals. Pt continues to demonstrate slight ulnar shift of 3rd digit. RN Pancho Wilkins did come look at digit and was to get pt in for x-rays after appointment. Short appointment was completed due to pain reported with PIP movement and around pin sites. -Rehab Potential: Good  -Requires OT Follow Up: Yes  Time In: 1:00 pm            Time Out: 1:38 pm             Visit #: 3    Treatment Charges: Mins Units   Modalities     Ther Exercise 38 3   Manual Therapy     Thera Activities     ADL/Home Mgt      Neuro Re-education     Gait Training     Group Therapy     Non-Billable Service Time     Other     Total Time/Units 38 3       -Response to Treatment: Pt responded well to treatment  Goals: Goals for pt can be see on initial eval occurring on 09/08/2020    Plan:   [x]  Continues Plan of care: Treatment covered based on POC and graduated to patient's progress. Pt education continues at each visit to obtain maximum benefits from skilled OT intervention.   []  Alter Plan of care:   []  Discharge:      Marina Rouse Brad 86, OTR/L #[id number]

## 2020-09-17 NOTE — LETTER
[address] [facility].  [address] [address]  Phone: [phone]  Fax: [phone]    Judy Hogue MD        September 17, 2020     Patient: Denia Mata   YOB: 1980   Date of Visit: 9/17/2020       To Whom It May Concern: It is my medical opinion that Bhavna Hilliard should remain off work until he is re-evaluated in 2 weeks. If you have any questions or concerns, please don't hesitate to call.     Sincerely,        Judy Hogue MD

## 2020-09-17 NOTE — PROGRESS NOTES
HPI: Patient presents today 3 wks s/p excisional debridement of left palm, index and middle finger with open reduction internal fixation of left middle finger proximal phalanx fracture with middle finger A2 pulley repair. Overall the patient is well. Physical Exam: Incision clean dry intact   Flap on the palmar surface with eschar present. No erythema or signs of infection. 3 pins in place with no erythema or signs of infection. Ulnar digital nerve intact, decreased sensation to radial side of affected middle digit and ulnar side of index finger. Brisk capillary refill. Otherwise neurovascular intact. X-rays of the left hand were obtained today in the office and reviewed with patient. 3 views: AP, lateral, oblique demonstrate stable fixation of the left middle finger proximal phalanx fracture with pins in place. Impression: Stable left middle finger proximal phalanx fracture    Assessment: 3 wks s/p excisional debridement of left palm, index and middle finger with open reduction internal fixation of left middle finger proximal phalanx fracture with middle finger A2 pulley repair    Plan:     Scar management advised  Pin care explained to the patient. Continue brace  Continue therapy  Discussed signs and symptoms of infection. Patient call with any concerns. Patient to remain off work at this time. Follow up in 2 weeks with repeat x-rays. Off work until next visit  All questions and concerns answered. I have seen and evaluated the patient and agree with the above assessment and plan on today's visit. I have performed the key components of the history and physical examination with significant findings of continued stiffness of left hand with delayed healing of severe soft tissue injury palmarly. Limited range of motion of digit. . I concur with the findings and plan as documented.     Chon Blackman MD  9/17/2020

## 2020-09-21 ENCOUNTER — TREATMENT (OUTPATIENT)
Dept: OCCUPATIONAL THERAPY | Age: 40
End: 2020-09-21
Payer: COMMERCIAL

## 2020-09-21 PROCEDURE — 97110 THERAPEUTIC EXERCISES: CPT | Performed by: OCCUPATIONAL THERAPIST

## 2020-09-21 PROCEDURE — 97140 MANUAL THERAPY 1/> REGIONS: CPT | Performed by: OCCUPATIONAL THERAPIST

## 2020-09-24 ENCOUNTER — TREATMENT (OUTPATIENT)
Dept: OCCUPATIONAL THERAPY | Age: 40
End: 2020-09-24
Payer: COMMERCIAL

## 2020-09-24 ENCOUNTER — OFFICE VISIT (OUTPATIENT)
Dept: ORTHOPEDIC SURGERY | Age: 40
End: 2020-09-24
Payer: COMMERCIAL

## 2020-09-24 VITALS — TEMPERATURE: 98.2 F | WEIGHT: 225 LBS | BODY MASS INDEX: 31.5 KG/M2 | HEIGHT: 71 IN

## 2020-09-24 PROCEDURE — 97140 MANUAL THERAPY 1/> REGIONS: CPT | Performed by: OCCUPATIONAL THERAPIST

## 2020-09-24 PROCEDURE — 99024 POSTOP FOLLOW-UP VISIT: CPT | Performed by: ORTHOPAEDIC SURGERY

## 2020-09-24 NOTE — PROGRESS NOTES
HPI: Patient presents today 4 wks s/p excisional debridement of left palm, index and middle finger with open reduction internal fixation of left middle finger proximal phalanx fracture with middle finger A2 pulley repair. Overall the patient is well. However today in therapy patient stated that the therapist noticed some possible purulent drainage coming from the palmar flap and he wanted to make sure nothing was infected so he came over to the office. Physical Exam: Incision clean dry intact   Flap on the palmar surface with eschar present, no purulent drainage present. Mild serosanguineous drainage at the proximal aspect of the flap no erythema or signs of infection. 3 pins in place with no erythema or signs of infection. Ulnar digital nerve intact, decreased sensation to radial side of affected middle digit and ulnar side of index finger. Brisk capillary refill. Otherwise neurovascular intact. X-rays of the left hand were obtained today in the office and reviewed with patient. 3 views: AP, lateral, oblique demonstrate stable fixation of the left middle finger proximal phalanx fracture with pins in place. Impression: Stable left middle finger proximal phalanx fracture    Assessment: 4 wks s/p excisional debridement of left palm, index and middle finger with open reduction internal fixation of left middle finger proximal phalanx fracture with middle finger A2 pulley repair    Plan:   Continue pin site care  Eschar removed, no purulent matter found underneath. Recommend continued soaks and wraps with light peroxide use. We will see him back at his scheduled appointment on October 5 for possible pin removal we will also get x-rays at that time. I have seen and evaluated the patient and agree with the above assessment and plan on today's visit.  I have performed the key components of the history and physical examination with significant findings of crush injury left hand with necrotic tissue this was debrided in the office today. No gross findings appearance was encountered. Continue with local wound care. Follow-up as scheduled. I concur with the findings and plan as documented.     Wendi John MD  9/24/2020

## 2020-09-24 NOTE — PROGRESS NOTES
OCCUPATIONAL THERAPY PROGRESS NOTE    Date:  2020   Initial Evaluation Date: 2020                          Evaluating Therapist: Tony Weaver     Patient Name:  Junito Price                  :  1980     Restrictions/Precautions:  ROM as tolerated; modalities prn, low fall risk  Diagnosis:S62.613B (ICD-10-CM) - Displaced fracture of proximal phalanx of left middle finger, initial encounter for open fracture   Excisional debridement of left palm, index and middle finger with open reduction internal fixation of left middle finger proximal phalanx fracture with middle finger A2 pulley repair. Insurance/Certification information:Pending C9- Generic   Referring Practitioner: Dr. Miranda Vaughan  Date of Surgery/Injury: 2020  Plan of care signed (Y/N): Y    Visit# / total visits: - Pending C9    Pain Level: 4 on scale of 1-10, burning, needle-like, throbbing and with paresthesia in the 3rd digit    Subjective: Pt reports  \"This finger is still sore and can't straighten out. \"     Objective:  Updated POC to be completed by  visit. INTERVENTION: COMPLETED: SPECIFICS/COMMENTS:   Modality:               AROM:     LUE x Completed flexion/extension of PIP/DIP 3  sets x10 within pt's tolerance. Focus on extension/flexion of 3rd digit. Completed with A2 pulley splint donned   Extensor tendon digit holds x x10 with palm down: therapist bringing 3rd digit up and pt was to hold for 3 seconds with fair tolerance, HEP within pain tolerance. AAROM:               PROM/Stretching:     LUE  Gentle completed to digits into slight extension with slight pain reported from palm incision. Scar Mass/Edema Control:     Retrograde massage x On dorsal portion down digits, hand and wrist throughout session and in-between exercises.          Strengthening:               Other:     A2 pulley splint   Fabricated an A2 pulley splint with longer end for easy removal of splint after exercises completed. Pt may require adjustments due to edema on 3rd digit. Ext tube splint x With no spring. Wear neoprene only. To wear 4 hrs this evening and if kiki can wear all night and 1 hr 2 x's a day. Wear with his brace. Wound Care x Soaked with sterile salt water. Therapist debrided some of his palmar scar - slight yellow pus came out first and then blood - Pt seeing  today. Splint adjustments  Completed for improved comfort and postioning due to decreasing edema     Assessment/Comments: Pt is making Fair progress toward stated plan of care and established goals. Therapist started to soak and debride his wound. In one section pus can out and therapist called 's nurse to look at it. Dr. Carter Reap to see it - he ended up debriding the entire palm area and redressed it. Session cut short 2* to this 's appointment. Therapist gave the pt some dressing supplies to use at home.       -Rehab Potential: Good  -Requires OT Follow Up: Yes  Time In:  2:00 pm            Time Out: 2:30 pm             Visit #: 5    Treatment Charges: Mins Units   Modalities     Ther Exercise     Manual Therapy 30 2   Thera Activities     ADL/Home Mgt      Neuro Re-education     Gait Training     Group Therapy     Non-Billable Service Time     Other     Total Time/Units 30 2       -Response to Treatment: Pt responded well to treatment. Pt had ^'ed pain 2* to 's appointment - was told to take it easy the next few hours. Goals: Goals for pt can be see on initial eval occurring on 09/08/2020    Plan:   [x]  Continues Plan of care: Treatment covered based on POC and graduated to patient's progress. Pt education continues at each visit to obtain maximum benefits from skilled OT intervention.   []  Alter Plan of care:   []  Discharge:      Godfrey Briscoe OT/L, CHT

## 2020-09-28 ENCOUNTER — TREATMENT (OUTPATIENT)
Dept: OCCUPATIONAL THERAPY | Age: 40
End: 2020-09-28
Payer: COMMERCIAL

## 2020-09-28 PROCEDURE — 97140 MANUAL THERAPY 1/> REGIONS: CPT | Performed by: OCCUPATIONAL THERAPIST

## 2020-09-28 PROCEDURE — 97110 THERAPEUTIC EXERCISES: CPT | Performed by: OCCUPATIONAL THERAPIST

## 2020-09-28 NOTE — PROGRESS NOTES
OCCUPATIONAL THERAPY PROGRESS NOTE    Date:  2020   Initial Evaluation Date: 2020                          Evaluating Therapist: Kailey Holland     Patient Name:  Randi Goodson                  :  1980     Restrictions/Precautions:  ROM as tolerated; modalities prn, low fall risk  Diagnosis:S62.613B (ICD-10-CM) - Displaced fracture of proximal phalanx of left middle finger, initial encounter for open fracture   Excisional debridement of left palm, index and middle finger with open reduction internal fixation of left middle finger proximal phalanx fracture with middle finger A2 pulley repair. Insurance/Certification information:Pending C9- Generic   Referring Practitioner: Dr. Nicholson Amend  Date of Surgery/Injury: 2020  Plan of care signed (Y/N): Y    Visit# / total visits: - Pending C9    Pain Level: 4 on scale of 1-10, burning, needle-like, throbbing and with paresthesia in the 3rd digit    Subjective: Pt reports  His first two fingers feel like rocks and he was having a difficulty sleeping. Objective:  Updated POC to be completed by  visit. INTERVENTION: COMPLETED: SPECIFICS/COMMENTS:   Modality:               AROM:     LUE x Completed flexion/extension of PIP/DIP 3  sets x10 within pt's tolerance. Focus on extension/flexion of 3rd digit. Completed with A2 pulley splint donned   Extensor tendon digit holds x x10 with palm down: therapist bringing 3rd digit up and pt was to hold for 3 seconds with fair tolerance, HEP within pain tolerance. AAROM:               PROM/Stretching:     LUE  Gentle completed to digits into slight extension with slight pain reported from palm incision. Scar Mass/Edema Control:     Retrograde massage x On dorsal portion down digits, hand and wrist throughout session and in-between exercises.          Strengthening:               Other:     A2 pulley splint   Fabricated an A2 pulley splint with longer end for easy removal of splint after exercises completed. Pt may require adjustments due to edema on 3rd digit. Ext tube splint x With no spring. Wear neoprene only. To wear 4 hrs this evening and if kiki can wear all night and 1 hr 2 x's a day. Wear with his brace. Wound Care x Soaked with sterile salt water. Therapist debrided some of his palmar scar - slight yellow pus came out first and then blood -    Splint adjustments  Completed for improved comfort and postioning due to decreasing edema     Assessment/Comments: Pt is making Fair progress toward stated plan of care and established goals. Pt did still have small amount of pus come out however it was not foul smelling or discolored. Another A2 pulley splint was fabricated for in clinic use. Pt reports he was unable to get his extension tube on due to a small wound in between his finger however will attempt to place on digit today due to wound being closed. Pt continues to demonstrate difficulty with flexion/extension of 3rd digit however after working on extensors demonstrates slight improvement with extension.     -Rehab Potential: Good  -Requires OT Follow Up: Yes  Time In: 11:00 am           Time Out: 11:50 am             Visit #: 6    Treatment Charges: Mins Time in - Time out  Units   Modalities      Ther Exercise 30 11:00 am- 11:30 am 2   Manual Therapy 20 11:30 am-11:50 am 1   Thera Activities      ADL/Home Mgt       Neuro Re-education      Gait Training      Group Therapy      Non-Billable Service Time      Other      Total Time/Units 50  3     -Response to Treatment: Pt responded well to treatment. Pt had ^'ed pain 2* to 's appointment - was told to take it easy the next few hours. Goals: Goals for pt can be see on initial eval occurring on 09/08/2020    Plan:   [x]  Continues Plan of care: Treatment covered based on POC and graduated to patient's progress.  Pt education continues at each visit to obtain maximum benefits from skilled OT intervention.   []  Alter Plan of care:   []  Discharge:      Xiomy Rouse Brad 86, OTR/L #[id number]

## 2020-10-01 ENCOUNTER — TREATMENT (OUTPATIENT)
Dept: OCCUPATIONAL THERAPY | Age: 40
End: 2020-10-01
Payer: COMMERCIAL

## 2020-10-01 PROCEDURE — 97110 THERAPEUTIC EXERCISES: CPT | Performed by: OCCUPATIONAL THERAPIST

## 2020-10-01 PROCEDURE — 97140 MANUAL THERAPY 1/> REGIONS: CPT | Performed by: OCCUPATIONAL THERAPIST

## 2020-10-01 NOTE — PROGRESS NOTES
OCCUPATIONAL THERAPY PROGRESS NOTE    Date:  10/1/2020   Initial Evaluation Date: 2020                          Evaluating Therapist: Chan Vega     Patient Name:  Bisi Westfall                  :  1980     Restrictions/Precautions:  ROM as tolerated; modalities prn, low fall risk  Diagnosis:S62.613B (ICD-10-CM) - Displaced fracture of proximal phalanx of left middle finger, initial encounter for open fracture   Excisional debridement of left palm, index and middle finger with open reduction internal fixation of left middle finger proximal phalanx fracture with middle finger A2 pulley repair. Insurance/Certification information:Pending C9- Generic   Referring Practitioner: Dr. Brian Duarte  Date of Surgery/Injury: 2020  Plan of care signed (Y/N): Y    Visit# / total visits: - Pending C9    Pain Level: 4 on scale of 1-10, burning, needle-like, throbbing and with paresthesia in the 3rd digit    Subjective: Pt states \"I have been wearing that extension splint at night. This middle finger is still so tight. \"      Objective:  Updated POC to be completed by  visit. INTERVENTION: COMPLETED: SPECIFICS/COMMENTS:   Modality:     MHP x Hand wrapped in during wound soak for soft tissue warm-up        AROM:          Finger adduc/abduction x A to abduction RF - did with place and hold. Wrist all planes x Stiff in wrist flexion - doing active only   LUE x Completed flexion/extension of PIP/DIP 3  sets x10 within pt's tolerance. Focus on extension/flexion of 3rd digit. Completed with A2 pulley splint donned- or therapist supporting this area. Extensor tendon digit holds x x10 with palm down: therapist bringing 3rd digit up and pt was to hold for 3 seconds with fair tolerance, HEP within pain tolerance.     AAROM:               PROM/Stretching:     L MF PIP ext x Gentle completed to digits into slight extension with slight pain reported from Group Therapy      Non-Billable Service Time [facility] 5  0   Other      Total Time/Units 50  3     -Response to Treatment: Pt responded well to treatment. Goals: Goals for pt can be see on initial eval occurring on 09/08/2020    Plan:   [x]  Continues Plan of care: Treatment covered based on POC and graduated to patient's progress. Pt education continues at each visit to obtain maximum benefits from skilled OT intervention.   []  Alter Plan of care:   []  Discharge:      Marta Jovel OT/L, CHT

## 2020-10-05 ENCOUNTER — TREATMENT (OUTPATIENT)
Dept: OCCUPATIONAL THERAPY | Age: 40
End: 2020-10-05
Payer: COMMERCIAL

## 2020-10-05 ENCOUNTER — OFFICE VISIT (OUTPATIENT)
Dept: ORTHOPEDIC SURGERY | Age: 40
End: 2020-10-05
Payer: COMMERCIAL

## 2020-10-05 VITALS — TEMPERATURE: 97.9 F | HEIGHT: 71 IN | WEIGHT: 225 LBS | BODY MASS INDEX: 31.5 KG/M2

## 2020-10-05 PROCEDURE — 97110 THERAPEUTIC EXERCISES: CPT | Performed by: OCCUPATIONAL THERAPIST

## 2020-10-05 PROCEDURE — 99024 POSTOP FOLLOW-UP VISIT: CPT | Performed by: ORTHOPAEDIC SURGERY

## 2020-10-05 RX ORDER — ATORVASTATIN CALCIUM 10 MG/1
10 TABLET, FILM COATED ORAL DAILY
COMMUNITY
Start: 2020-09-20

## 2020-10-05 NOTE — PROGRESS NOTES
OCCUPATIONAL THERAPY PROGRESS NOTE    Date:  10/5/2020   Initial Evaluation Date: 2020                          Evaluating Therapist: María Elena Cesar     Patient Name:  Jovan Bran                  :  1980     Restrictions/Precautions:  ROM as tolerated; modalities prn, low fall risk  Diagnosis:S62.613B (ICD-10-CM) - Displaced fracture of proximal phalanx of left middle finger, initial encounter for open fracture   Excisional debridement of left palm, index and middle finger with open reduction internal fixation of left middle finger proximal phalanx fracture with middle finger A2 pulley repair. Insurance/Certification information:Pending C9- Generic   Referring Practitioner: Dr. Ludwig Faith  Date of Surgery/Injury: 2020  Plan of care signed (Y/N): Y    Visit# / total visits: - Pending C9    Pain Level: 10 on scale of 1-10, burning, needle-like, throbbing and with paresthesia in the 3rd digit    Subjective: Pt states he is in a lot of pain from the 's visit. Objective:  Updated POC to be completed by  visit. INTERVENTION: COMPLETED: SPECIFICS/COMMENTS:   Modality:     MHP x Hand wrapped in during wound soak for soft tissue warm-up        AROM:          Finger adduc/abduction  A to abduction RF - did with place and hold. Wrist all planes x Stiff in wrist flexion - doing active only   LUE  Completed flexion/extension of PIP/DIP 3  sets x10 within pt's tolerance. Focus on extension/flexion of 3rd digit. Completed with A2 pulley splint donned- or therapist supporting this area. Extensor tendon digit holds  x10 with palm down: therapist bringing 3rd digit up and pt was to hold for 3 seconds with fair tolerance, HEP within pain tolerance. AAROM:               PROM/Stretching:     L MF PIP ext  Gentle completed to digits into slight extension with slight pain reported from palm incision.     Finger flexion IF, RF and SF  R and SF together, IF alone. To do at home so he can loosen fingers up without the confines of the MF             Scar Mass/Edema Control:     Retrograde massage x On dorsal portion down digits, hand and wrist throughout session and in-between exercises. Strengthening:               Other:     A2 pulley splint   Fabricated an A2 pulley splint with longer end for easy removal of splint after exercises completed. Pt may require adjustments due to edema on 3rd digit. Ext tube splint x With no spring. Wear neoprene only. To wear 4 hrs this evening and if kiki can wear all night and 1 hr 2 x's a day. Wear with his brace. Able to wear at night - given spring to try to put in to ^ ext pull. Wound Care x Soaked with sterile salt water. Therapist debrided some of his palmar scar - no debridement today. Pt to keep dressed - given supplies - adaptic, gauze square, and gauze roll. Splint adjustments x Completed for improved comfort and postioning due to decreasing edema. ^;ed space around  MP's     Assessment/Comments: Pt is making Fair progress toward stated plan of care and established goals. Light massage and wrist motion completed this date due to 10/10 pain reported after 's visit. Wound care provided with dressing supplies issued due to recent debridement of the palm. -Rehab Potential: Good  -Requires OT Follow Up: Yes  Time In: 11:30 am           Time Out: 12:00 pm             Visit #: 8    Treatment Charges: Mins Time in - Time out  Units   Modalities      Ther Exercise 10 11:30 am- 11:40 am 1   Manual Therapy 10 11:40 am- 11:50 am 0   Thera Activities      ADL/Home Mgt       Neuro Re-education      Gait Training      Group Therapy      Non-Billable Service Time P      Other      Total Time/Units 20  1     -Response to Treatment: Pt responded well to treatment.      Goals: Goals for pt can be see on initial eval occurring on 09/08/2020    Plan:   [x]  Continues Plan of care: Treatment covered based on POC and graduated to patient's progress. Pt education continues at each visit to obtain maximum benefits from skilled OT intervention.   []  Alter Plan of care:   []  Discharge:      José Miguel Rouse Brad 86, OTR/L #[id number]

## 2020-10-05 NOTE — LETTER
[address] [facility].  [address] [address]  Phone: [phone]  Fax: [phone]    Rayray Perry MD        October 5, 2020     Patient: Benita Matthews   YOB: 1980   Date of Visit: 10/5/2020       To Whom It May Concern: It is my medical opinion that Lupe Pro should remain off week for another 2 weeks. He will be re-evaluated at that time. If you have any questions or concerns, please don't hesitate to call.     Sincerely,        Rayray Perry MD

## 2020-10-08 ENCOUNTER — TREATMENT (OUTPATIENT)
Dept: OCCUPATIONAL THERAPY | Age: 40
End: 2020-10-08
Payer: COMMERCIAL

## 2020-10-08 PROCEDURE — 97140 MANUAL THERAPY 1/> REGIONS: CPT | Performed by: OCCUPATIONAL THERAPIST

## 2020-10-08 PROCEDURE — 97110 THERAPEUTIC EXERCISES: CPT | Performed by: OCCUPATIONAL THERAPIST

## 2020-10-08 NOTE — PROGRESS NOTES
OCCUPATIONAL THERAPY PROGRESS NOTE    Date:  10/8/2020   Initial Evaluation Date: 2020                          Evaluating Therapist: Himanshu Hernandez     Patient Name:  Christiano Crook                  :  1980     Restrictions/Precautions:  ROM as tolerated; modalities prn, low fall risk  Diagnosis:S62.613B (ICD-10-CM) - Displaced fracture of proximal phalanx of left middle finger, initial encounter for open fracture   Excisional debridement of left palm, index and middle finger with open reduction internal fixation of left middle finger proximal phalanx fracture with middle finger A2 pulley repair. Insurance/Certification information:Pending C9- Generic   Referring Practitioner: Dr. Everette Ponce  Date of Surgery/Injury: 2020  Plan of care signed (Y/N): Y    Visit# / total visits: - Pending C9    COVID-19 Screening completed upon entering facility and patient cleared for treatment today. Pt followed all protocols set forth by [facility] for Outpatient services throughout therapy session. Patient has been made aware of all new hygiene protocols due to COVID-19 set forth by [facility] Outpatient services and agrees to abide by all protocols. Pain Level: 3 on scale of 1-10, burning, needle-like, throbbing and with paresthesia in the 3rd digit    Subjective: Pt states he feels like little pins going up and down his 3rd digit right now. Objective:  Updated POC to be completed by 11 th visit. INTERVENTION: COMPLETED: SPECIFICS/COMMENTS:   Modality:     MHP x Hand wrapped in during wound soak for soft tissue warm-up        AROM:          Finger adduc/abduction x A to abduction RF - did with place and hold. Wrist all planes  Stiff in wrist flexion - doing active only   LUE x Completed flexion/extension of PIP/DIP 3  sets x10 within pt's tolerance. Focus on extension/flexion of 3rd digit.  Completed with A2 pulley splint donned- or therapist supporting this area. Extensor tendon digit holds x x10 with palm down: therapist bringing 3rd digit up and pt was to hold for 3 seconds with fair tolerance, HEP within pain tolerance. AAROM:               PROM/Stretching:     L MF PIP ext/Flexion x Gentle completed to digits into slight extension with slight pain reported from palm incision. Finger flexion IF, RF and SF x R and SF together, IF alone. To do at home so he can loosen fingers up without the confines of the MF             Scar Mass/Edema Control:     Retrograde massage x On dorsal portion down digits, hand and wrist throughout session and in-between exercises. Strengthening:               Other:     A2 pulley splint   Fabricated an A2 pulley splint with longer end for easy removal of splint after exercises completed. Pt may require adjustments due to edema on 3rd digit. Ext tube splint x With no spring. Wear neoprene only. To wear 4 hrs this evening and if kiki can wear all night and 1 hr 2 x's a day. Wear with his brace. Able to wear at night - given spring to try to put in to ^ ext pull. Wound Care x Soaked with sterile salt water. Therapist debrided some of his palmar scar - no debridement today. Pt to keep dressed - given supplies - adaptic, gauze square, and gauze roll. Splint adjustments x Completed for improved comfort and postioning due to decreasing edema. ^;ed space around  MP's     Assessment/Comments: Pt is making Fair progress toward stated plan of care and established goals. Pt tolerated session well and demonstrated increased flexion and held extension during exercises. A2 pulley splint modified for a better fit and resting hand MCP blocking splint modified to remove the back due to pin removal. Pending pt's pain may attempt to complete light MCP flexion/extension next session.  Wound care completed to the palm of the hand this date.     -Rehab Potential: Good  -Requires OT Follow Up: Yes  Time In: 1:00 pm          Time Out: 2:00 pm             Visit #: 9    Treatment Charges: Mins Time in - Time out  Units   Modalities      Ther Exercise 30 1:00 pm - 1:30 pm 2   Manual Therapy 30 1:30 pm - 2:00 pm 2   Thera Activities      ADL/Home Mgt       Neuro Re-education      Gait Training      Group Therapy      Non-Billable Service Time MHP      Other      Total Time/Units 60  4     -Response to Treatment: Pt responded well to treatment. Goals: Goals for pt can be see on initial eval occurring on 09/08/2020    Plan:   [x]  Continues Plan of care: Treatment covered based on POC and graduated to patient's progress. Pt education continues at each visit to obtain maximum benefits from skilled OT intervention.   []  Alter Plan of care:   []  Discharge:      Millicent Rouse Brad 86, OTR/L #[id number]

## 2020-10-12 ENCOUNTER — TREATMENT (OUTPATIENT)
Dept: OCCUPATIONAL THERAPY | Age: 40
End: 2020-10-12
Payer: COMMERCIAL

## 2020-10-12 PROCEDURE — 97760 ORTHOTIC MGMT&TRAING 1ST ENC: CPT | Performed by: OCCUPATIONAL THERAPIST

## 2020-10-12 PROCEDURE — 97140 MANUAL THERAPY 1/> REGIONS: CPT | Performed by: OCCUPATIONAL THERAPIST

## 2020-10-12 PROCEDURE — 97110 THERAPEUTIC EXERCISES: CPT | Performed by: OCCUPATIONAL THERAPIST

## 2020-10-12 NOTE — PROGRESS NOTES
OCCUPATIONAL THERAPY PROGRESS NOTE    Date:  10/12/2020   Initial Evaluation Date: 2020                          Evaluating Therapist: Rosas Dumont     Patient Name:  Rosy Sterling                  :  1980     Restrictions/Precautions:  ROM as tolerated; modalities prn, low fall risk  Diagnosis:S62.613B (ICD-10-CM) - Displaced fracture of proximal phalanx of left middle finger, initial encounter for open fracture   Excisional debridement of left palm, index and middle finger with open reduction internal fixation of left middle finger proximal phalanx fracture with middle finger A2 pulley repair. Insurance/Certification information:Pending C9- Generic   Referring Practitioner: Dr. Anna Gonzalez  Date of Surgery/Injury: 2020  Plan of care signed (Y/N): Y    Visit# / total visits:  - Pending C9    COVID-19 Screening completed upon entering facility and patient cleared for treatment today. Pt followed all protocols set forth by [address] [place] [address] for Outpatient services throughout therapy session. Patient has been made aware of all new hygiene protocols due to COVID-19 set forth by [address] [place] [address] Outpatient services and agrees to abide by all protocols. Pain Level: 3 on scale of 1-10, burning, needle-like, throbbing and with paresthesia in the 3rd digit    Subjective: Pt states \" This MF is so tight - I think I'm moving this middle knuckle. \"     Objective:  Updated POC to be completed by  visit. INTERVENTION: COMPLETED: SPECIFICS/COMMENTS:   Modality:     MHP x Hand wrapped in during wound soak for soft tissue warm-up        AROM:          Blocked PIP flex/ext x MF but other fingers can move with it if he wants   Blocked MP flex/ext x All fingers together. Finger adduc/abduction x A to abduction RF - did with place and hold.    Wrist all planes  Stiff in wrist flexion - doing active only   LUE x Completed flexion/extension of PIP/DIP 3  sets x10 within pt's tolerance. Focus on extension/flexion of 3rd digit. Completed with A2 pulley splint donned- or therapist supporting this area. Extensor tendon digit holds x x10 with palm down: therapist bringing 3rd digit up and pt was to hold for 3 seconds with fair tolerance, HEP within pain tolerance. AAROM:               PROM/Stretching:     L MF PIP ext/Flexion x Gentle completed to digits into slight extension with slight pain reported from palm incision. More tension to PIP ext   Finger flexion IF, RF and SF x R and SF together, IF alone. To do at home so he can loosen fingers up without the confines of the MF   L MF DIP flexion x Added to HEP also        Scar Mass/Edema Control:     Retrograde massage x On dorsal portion down digits, hand and wrist throughout session and in-between exercises. Strengthening:               Other:     Metacarpal splint x Fabricated to wear when going out to support metacarpal area and allow finger and wrist motion. A2 pulley splint   Fabricated an A2 pulley splint with longer end for easy removal of splint after exercises completed. Pt may require adjustments due to edema on 3rd digit. Ext tube splint x Wearing with spring coil in . To try to wear at night 7 -9 hours for more a prolonged stretch. Wound Care x Soaked with sterile salt water. Therapist debrided some of his palmar scar - no debridement today. Pt to try wet to dry dressing the next few days - supplies given. Splint adjustments  Completed for improved comfort and postioning due to decreasing edema. ^;ed space around  MP's     Assessment/Comments: Pt is making Fair progress toward stated plan of care and established goals. Pt tolerated session well with less pian. Updated his HEP with MP and PIP motion and PROM to his DIP jt - see above. Wound care completed to the palm of the hand this date.  Therapist instructed pt to try wet to dry dressing until his next appointment to try to progress wound healing. Pt appeared to understand all new instructions. Re- Evaluation:   L MF    AROM [x]         PROM[] 10/12/20         MF Digit MCP Extension/Flexion   0-45 H /* -30*/50*       PIP Extension/Flexion   0*/100* -55*/62*       DIP Extension/Flexion   0*/70-90* Wiggle  P - 40*         PROM:  PIP flexion ^'ed from -38* to -32* by end of session.       -Rehab Potential: Good  -Requires OT Follow Up: Yes  Time In: 11:00 am          Time Out: 12:00 pm             Visit #: 10    Treatment Charges: Mins Time in - Time out  Units   Modalities      Ther Exercise 30 11:15 pm - `1:45 am 2   Manual Therapy 15 11:00 am - 11:15 am 1   Thera Activities      ADL/Home Mgt       Neuro Re-education      Gait Training      Group Therapy      Non-Billable Service Time MHP      Other splint lidia 15 11:45 pm - 12:00 pm 1   Total Time/Units 60  4     -Response to Treatment: Pt responded well to treatment. Pt is tolerated the ^'ed program with low level pian and is glad to be moving more. Goals: Goals for pt can be see on initial eval occurring on 09/08/2020    Plan:   [x]  Continues Plan of care: Treatment covered based on POC and graduated to patient's progress. Pt education continues at each visit to obtain maximum benefits from skilled OT intervention.   []  Alter Plan of care:   []  Discharge:      Marta Jovel OT/RAJAN, CHT

## 2020-10-15 ENCOUNTER — TREATMENT (OUTPATIENT)
Dept: OCCUPATIONAL THERAPY | Age: 40
End: 2020-10-15
Payer: COMMERCIAL

## 2020-10-15 PROCEDURE — 97140 MANUAL THERAPY 1/> REGIONS: CPT | Performed by: OCCUPATIONAL THERAPIST

## 2020-10-15 PROCEDURE — 97110 THERAPEUTIC EXERCISES: CPT | Performed by: OCCUPATIONAL THERAPIST

## 2020-10-15 NOTE — PROGRESS NOTES
OCCUPATIONAL THERAPY PROGRESS NOTE    Date:  10/15/2020   Initial Evaluation Date: 2020                          Evaluating Therapist: Mack Salinas     Patient Name:  Benita Matthews                  :  1980     Restrictions/Precautions:  ROM as tolerated; modalities prn, low fall risk  Diagnosis:S62.613B (ICD-10-CM) - Displaced fracture of proximal phalanx of left middle finger, initial encounter for open fracture   Excisional debridement of left palm, index and middle finger with open reduction internal fixation of left middle finger proximal phalanx fracture with middle finger A2 pulley repair. Insurance/Certification information:Pending C9- Generic   Referring Practitioner: Dr. Jami Trinh  Date of Surgery/Injury: 2020  Plan of care signed (Y/N): Y    Visit# / total visits:  - Pending C9    COVID-19 Screening completed upon entering facility and patient cleared for treatment today. Pt followed all protocols set forth by [address] [place] [address] for Outpatient services throughout therapy session. Patient has been made aware of all new hygiene protocols due to COVID-19 set forth by [address] [place] [address] Outpatient services and agrees to abide by all protocols. Pain Level: 3 on scale of 1-10, burning, needle-like, throbbing and with paresthesia in the 3rd digit    Subjective: Pt states \"I couldn't wear that tube splint all night on my MF - so I wore it during the day from 9 - 5 and then did my exercises in the evening. \"     Objective:  Updated POC to be completed by 11  visit. INTERVENTION: COMPLETED: SPECIFICS/COMMENTS:   Modality:     MHP x Hand wrapped in during wound soak for soft tissue warm-up        AROM:          Blocked PIP flex/ext x MF but other fingers can move with it if he wants   Blocked MP flex/ext x All fingers together. Finger adduc/abduction x A to abduction RF - did with place and hold.    Wrist all planes Stiff in wrist flexion - doing active only   LUE x Completed flexion/extension of PIP/DIP 3  sets x10 within pt's tolerance. Focus on extension/flexion of 3rd digit. Completed with A2 pulley splint donned- or therapist supporting this area. Extensor tendon digit holds  x10 with palm down: therapist bringing 3rd digit up and pt was to hold for 3 seconds with fair tolerance, HEP within pain tolerance. AAROM:               PROM/Stretching:     L MF PIP ext/Flexion x Gentle completed to digits into slight extension with slight pain reported from palm incision. More tension to PIP ext   Finger flexion IF, RF and SF x R and SF together, IF alone. To do at home so he can loosen fingers up without the confines of the MF   L MF DIP flexion x Added to HEP also        Scar Mass/Edema Control:     Retrograde massage x On dorsal portion down digits, hand and wrist throughout session and in-between exercises. Gel compression sheet x Med/lg, to wear and hr 1 -2 x's a day IF to decrease scar. Strengthening:               Other:     Metacarpal splint x  To wear when going out to support metacarpal area and allow finger and wrist motion. A2 pulley splint   Fabricated an A2 pulley splint with longer end for easy removal of splint after exercises completed. Pt may require adjustments due to edema on 3rd digit. Ext tube IF  x Fitted with today - with splint material in it to try to wear at night to ^ PIP ext. Ext tube splint x Wearing with 2 spring coils. Could not wear at night - will try again and take off at 1-2 am if need be. Wound Care x Soaked with sterile salt water. Therapist debrided some of his palmar scar -  amt  debridement today. Pt to cont with wet to dry dressing  - supplies given. Splint adjustments  Completed for improved comfort and postioning due to decreasing edema.  ^;ed space around  MP's     Assessment/Comments: Pt is making Fair progress toward stated plan of care and established goals. Pt tolerated session well with less pian. COnt with wet to dry dressing and motion in all digits. He was fitted with ext tube for his IF 2* to noted decreased ext at that joint. To try to wear at night. Pt appeared to understand all new instructions. To measure all digits AROM next session. AROM   IF - PIP -34*   L MF     AROM [x]? PROM[]? 10/12/20 10/15/20            MF Digit MCP Extension/Flexion   0-45 H /* -30*/50* -20*/47*          PIP Extension/Flexion   0*/100* -55*/62* -50*/ 60*         DIP Extension/Flexion   0*/70-90* Wiggle  P - 40*  wiggle           PROM:  PIP flexion ^'ed from -38* to -32* by end of session. End of session 10/15/20 - PROM -27*    -Rehab Potential: Good  -Requires OT Follow Up: Yes  Time In: 1:00 pm          Time Out: 2:00 pm             Visit #: 11    Treatment Charges: Mins Time in - Time out  Units   Modalities      Ther Exercise 30 1:30  pm - 2:00 pm 2   Manual Therapy 30 1:00 pm -1:30 pm 2   Thera Activities      ADL/Home Mgt       Neuro Re-education      Gait Training      Group Therapy      Non-Billable Service Time MHP      Other splint lidia      Total Time/Units 60  4     -Response to Treatment: Pt responded well to treatment. Pt is tolerated the ^'ed program with low level pian and is glad to be moving more. Goals: Goals for pt can be see on initial eval occurring on 09/08/2020    Plan:   [x]  Continues Plan of care: Treatment covered based on POC and graduated to patient's progress. Pt education continues at each visit to obtain maximum benefits from skilled OT intervention.   []  Alter Plan of care:   []  Discharge:      Davidson Ast OT/L, CHT

## 2020-10-19 ENCOUNTER — TREATMENT (OUTPATIENT)
Dept: OCCUPATIONAL THERAPY | Age: 40
End: 2020-10-19
Payer: COMMERCIAL

## 2020-10-19 ENCOUNTER — OFFICE VISIT (OUTPATIENT)
Dept: ORTHOPEDIC SURGERY | Age: 40
End: 2020-10-19
Payer: COMMERCIAL

## 2020-10-19 VITALS — WEIGHT: 225 LBS | TEMPERATURE: 97.4 F | HEIGHT: 71 IN | RESPIRATION RATE: 20 BRPM | BODY MASS INDEX: 31.5 KG/M2

## 2020-10-19 PROCEDURE — 99024 POSTOP FOLLOW-UP VISIT: CPT | Performed by: ORTHOPAEDIC SURGERY

## 2020-10-19 PROCEDURE — 97140 MANUAL THERAPY 1/> REGIONS: CPT | Performed by: OCCUPATIONAL THERAPIST

## 2020-10-19 PROCEDURE — 97110 THERAPEUTIC EXERCISES: CPT | Performed by: OCCUPATIONAL THERAPIST

## 2020-10-19 NOTE — PROGRESS NOTES
OCCUPATIONAL THERAPY PROGRESS NOTE    Date:  10/19/2020   Initial Evaluation Date: 2020                          Evaluating Therapist: Héctor Washington     Patient Name:  Chava Rojo                  :  1980     Restrictions/Precautions:  ROM as tolerated; modalities prn, low fall risk  Diagnosis:S62.613B (ICD-10-CM) - Displaced fracture of proximal phalanx of left middle finger, initial encounter for open fracture   Excisional debridement of left palm, index and middle finger with open reduction internal fixation of left middle finger proximal phalanx fracture with middle finger A2 pulley repair. Insurance/Certification information:Pending C9- Generic   Referring Practitioner: Dr. Forest Lux  Date of Surgery/Injury: 2020  Plan of care signed (Y/N): Y    Visit# / total visits:  15 / 25- Pending C9    COVID-19 Screening completed upon entering facility and patient cleared for treatment today. Pt followed all protocols set forth by [facility] for Outpatient services throughout therapy session. Patient has been made aware of all new hygiene protocols due to COVID-19 set forth by [facility] Outpatient services and agrees to abide by all protocols. Pain Level: 4 on scale of 1-10, burning, needle-like, throbbing and with paresthesia in the 3rd digit    Subjective: Pt states no new changes     Objective:  Updated POC to be completed by  visit. INTERVENTION: COMPLETED: SPECIFICS/COMMENTS:   Modality:     MHP x Hand wrapped in during wound soak for soft tissue warm-up        AROM:          Blocked PIP flex/ext x MF but other fingers can move with it if he wants   Blocked MP flex/ext x All fingers together. Finger adduc/abduction x A to abduction RF - did with place and hold.    Wrist all planes  Stiff in wrist flexion - doing active only   LUE x Completed flexion/extension of PIP/DIP 3  sets x10 within pt's tolerance. Focus on extension/flexion of 3rd digit. Completed with A2 pulley splint donned- or therapist supporting this area. Extensor tendon digit holds  x10 with palm down: therapist bringing 3rd digit up and pt was to hold for 3 seconds with fair tolerance, HEP within pain tolerance. AAROM:               PROM/Stretching:     L MF PIP ext/Flexion x Gentle completed to digits into slight extension with slight pain reported from palm incision. More tension to PIP ext   Finger flexion IF, RF and SF x R and SF together, IF alone. To do at home so he can loosen fingers up without the confines of the MF   L MF DIP flexion x Added to HEP also        Scar Mass/Edema Control:     Retrograde massage x On dorsal portion down digits, hand and wrist throughout session and in-between exercises. Gel compression sheet x Med/lg, to wear and hr 1 -2 x's a day IF to decrease scar. Strengthening:               Other:     Metacarpal splint x  To wear when going out to support metacarpal area and allow finger and wrist motion. A2 pulley splint   Fabricated an A2 pulley splint with longer end for easy removal of splint after exercises completed. Pt may require adjustments due to edema on 3rd digit. Ext tube IF  x Fitted with today - with splint material in it to try to wear at night to ^ PIP ext. Ext tube splint x Wearing with 2 spring coils. Could not wear at night - will try again and take off at 1-2 am if need be. Wound Care x Soaked with sterile salt water. Therapist debrided some of his palmar scar - sm amt  debridement today. Pt to cont with wet to dry dressing  - supplies given. Splint adjustments  Completed for improved comfort and postioning due to decreasing edema. ^;ed space around  MP's     Assessment/Comments: Pt is making Fair progress toward stated plan of care and established goals. Pt tolerated session well with less pian. Discontinue  wet to dry dressing and motion in all digits.   Pt educated to focus on digit extension and continuation of HEP. To measure all digits AROM next session. AROM   IF - PIP -34*   L MF     AROM [x]? PROM[]? 10/12/20 10/15/20            MF Digit MCP Extension/Flexion   0-45 H /* -30*/50* -20*/47*          PIP Extension/Flexion   0*/100* -55*/62* -50*/ 60*         DIP Extension/Flexion   0*/70-90* Wiggle  P - 40*  wiggle           PROM:  PIP flexion ^'ed from -38* to -32* by end of session. End of session 10/15/20 - PROM -27*    -Rehab Potential: Good  -Requires OT Follow Up: Yes  Time In: 1:00 pm          Time Out: 2:00 pm             Visit #: 12    Treatment Charges: Mins Time in - Time out  Units   Modalities      Ther Exercise 30 11:30  am - 12:00 pm 2   Manual Therapy 30 11:00 am -11:30 am 2   Thera Activities      ADL/Home Mgt       Neuro Re-education      Gait Training      Group Therapy      Non-Billable Service Time MHP      Other splint lidia      Total Time/Units 60  4     -Response to Treatment: Pt responded well to treatment. Pt is tolerated the ^'ed program with low level pian and is glad to be moving more. Goals: Goals for pt can be see on initial eval occurring on 09/08/2020    Plan:   [x]  Continues Plan of care: Treatment covered based on POC and graduated to patient's progress. Pt education continues at each visit to obtain maximum benefits from skilled OT intervention.   []  Alter Plan of care:   []  Discharge:      Kori Rouse Brad 86, OTR/L #[id number]

## 2020-10-19 NOTE — PROGRESS NOTES
HPI: Patient presents today 8 wks s/p excisional debridement of left palm, index and middle finger with open reduction internal fixation of left middle finger proximal phalanx fracture with middle finger A2 pulley repair. Overall the patient is well. He has been attending therapy. He has been doing daily dressing changes. His drainage from his palmar wound has decreased. Physical Exam: Incision healing well. Flap on the palmar surface with healthy granulation tissue present. No drainage. No surrounding erythema. No signs of infection. Ulnar digital nerve intact, decreased sensation to radial side of affected middle digit and ulnar side of index finger. Significant stiffness to the index and middle fingers. Brisk capillary refill. Otherwise neurovascular intact. X-rays of the left hand were obtained today in the office and reviewed with patient. 3 views: AP, lateral, oblique demonstrate stable left middle finger proximal phalanx fracture with interval callus formation  Impression: Stable left middle finger proximal phalanx fracture    Assessment: 8 wks s/p excisional debridement of left palm, index and middle finger with open reduction internal fixation of left middle finger proximal phalanx fracture with middle finger A2 pulley repair    Plan:   Discontinue his soaks to the wound. Okay for antibiotic ointment and a Band-Aid over his wound. Continue with therapy. Do not advise the patient to RTW at this time. We will continue to monitor his wound. We anticipate wound healing over the next couple weeks. Patient will follow-up in 4 weeks with new x-rays. Anticipate return to work light duty at that time. I have seen and evaluated the patient and agree with the above assessment and plan on today's visit. I have performed the key components of the history and physical examination with significant findings of continued healing left middle finger complex wound.   Evidence of bone healing and soft tissue healing. Still remains with open wound over the palmar aspect of his hand. Continue with local wound care. Continue with therapy. Anticipate possible return to work at next visit limited duty. I concur with the findings and plan as documented.     Lebron De Jesus MD  10/19/2020

## 2020-10-22 ENCOUNTER — TREATMENT (OUTPATIENT)
Dept: OCCUPATIONAL THERAPY | Age: 40
End: 2020-10-22
Payer: COMMERCIAL

## 2020-10-22 PROCEDURE — 97110 THERAPEUTIC EXERCISES: CPT | Performed by: OCCUPATIONAL THERAPIST

## 2020-10-22 PROCEDURE — 97140 MANUAL THERAPY 1/> REGIONS: CPT | Performed by: OCCUPATIONAL THERAPIST

## 2020-10-22 NOTE — PROGRESS NOTES
OCCUPATIONAL THERAPY PROGRESS NOTE/RE-ASSESSMENT    Date:  10/22/2020   Initial Evaluation Date: 2020                          Evaluating Therapist: Richi Charlton     Patient Name:  Meenakshi Breaux                  :  1980     Restrictions/Precautions:  ROM as tolerated; modalities prn, low fall risk  Diagnosis:S62.613B (ICD-10-CM) - Displaced fracture of proximal phalanx of left middle finger, initial encounter for open fracture   Excisional debridement of left palm, index and middle finger with open reduction internal fixation of left middle finger proximal phalanx fracture with middle finger A2 pulley repair. Insurance/Certification information:Pending C9- Generic   Referring Practitioner: Dr. Radha Hernandez  Date of Surgery/Injury: 2020  Plan of care signed (Y/N): Y    Visit# / total visits:  15 / 25- Pending C9    COVID-19 Screening completed upon entering facility and patient cleared for treatment today. Pt followed all protocols set forth by [facility] for Outpatient services throughout therapy session. Patient has been made aware of all new hygiene protocols due to COVID-19 set forth by [facility] Outpatient services and agrees to abide by all protocols. Pain Level: 4 on scale of 1-10, burning, needle-like, throbbing and with paresthesia in the 3rd digit    Subjective: Pt states no new changes     Objective:  Updated POC to be completed by  visit. INTERVENTION: COMPLETED: SPECIFICS/COMMENTS:   Modality:     MHP x Hand wrapped in during wound soak for soft tissue warm-up        AROM:          Blocked PIP flex/ext x MF but other fingers can move with it if he wants   Blocked MP flex/ext x All fingers together. Finger adduc/abduction x A to abduction RF - did with place and hold.    Wrist all planes  Stiff in wrist flexion - doing active only   LUE x Completed flexion/extension of PIP/DIP 3  sets x10 within pt's tolerance. Focus on extension/flexion of 3rd digit. Completed with A2 pulley splint donned- or therapist supporting this area. Extensor tendon digit holds  x10 with palm down: therapist bringing 3rd digit up and pt was to hold for 3 seconds with fair tolerance, HEP within pain tolerance. AAROM:               PROM/Stretching:     L MF PIP ext/Flexion x Gentle completed to digits into slight extension with slight pain reported from palm incision. More tension to PIP ext   Finger flexion IF, RF and SF x R and SF together, IF alone. To do at home so he can loosen fingers up without the confines of the MF   L MF DIP flexion x Added to HEP also        Scar Mass/Edema Control:     Retrograde massage x On dorsal portion down digits, hand and wrist throughout session and in-between exercises. Gel compression sheet x Med/lg, to wear and hr 1 -2 x's a day IF to decrease scar. Strengthening:               Other:     Metacarpal splint x  Re- molded splint for a better fit to be worn out and about and at night as needed. A2 pulley splint   Fabricated an A2 pulley splint with longer end for easy removal of splint after exercises completed. Pt may require adjustments due to edema on 3rd digit. Ext tube IF  x Fitted with today - with splint material in it to try to wear at night to ^ PIP ext. Ext tube splint x Wearing with 2 spring coils. Could not wear at night - will try again and take off at 1-2 am if need be. Wound Care x Soaked with sterile salt water. Therapist debrided some of his palmar scar - sm amt  debridement today. Pt to cont with wet to dry dressing  - supplies given. Splint adjustments  Completed for improved comfort and postioning due to decreasing edema. ^;ed space around  MP's     Assessment/Comments: Pt is making Fair progress toward stated plan of care and established goals. Pt demonstrates slight improvement from initial evaluation with increased ROM of R digits.  Pt continues to be limited by his 3rd digit and the scar into the palm. Pt requires continues skilled therapy to meet all established goals. R Upper Extremity ROM       AROM [x]? PROM[]? IE  10/22/2020          WRIST Flexion 0-70* 23*  70*         Extension 0-80* 34*  54*         Radial Deviation 0-20* 18*  20*         Ulnar Deviation 0-30* 20*  30*          R Hand ROM    AROM [x]? PROM[]?    10/22/2020          2nd Digit MCP Flexion/Extension */ 0-45 H TBA 46* /*23         PIP Flexion/Extension 100*/ 0 57*/- 29* 65*/34*          DIP Flexion/Extension 70-90*/0 32*/- 23* 32* /0*            L MF     AROM [x]? PROM[]? 10/12/20 10/15/20   10/22/2020         MF Digit MCP Extension/Flexion   0-45 H /* -30*/50* -20*/47*  14*/46*       PIP Extension/Flexion   0*/100* -55*/62* -50*/ 60* 51*/53*        DIP Extension/Flexion   0*/70-90* Wiggle  P - 40*  wiggle   0*/25*        4th Digit MCP Flexion/Extension */ 0-45 H TBA  31*/0*         PIP Flexion/Extension 100*/ 0 70*/- 30* 72* /0*         DIP Flexion/Extension 70-90*/0 41*/0* 60* /0*             5th Digit MCP Flexion/Extension */ 0-45 H TBA 23*/ 0*         PIP Flexion/Extension 100*/ 0 74*/- 4* 80* /0*         DIP Flexion/Extension 70-90*/0 46*/0*  60*/0*            AROM   IF - PIP -34*   PROM:  PIP flexion ^'ed from -38* to -32* by end of session. End of session 10/15/20 - PROM -27*    -Rehab Potential: Good  -Requires OT Follow Up: Yes  Time In: 1:00 pm          Time Out: 2:00 pm             Visit #: 13    Treatment Charges: Mins Time in - Time out  Units   Modalities      Ther Exercise 30 1:30  pm - 2:00 pm 2   Manual Therapy 30 1:00 pm -1:30 pm 2   Thera Activities      ADL/Home Mgt       Neuro Re-education      Gait Training      Group Therapy      Non-Billable Service Time MHP      Other splint lidia      Total Time/Units 60  4     -Response to Treatment: Pt responded well to treatment.   Pt is tolerated the [place] with low level pian and is glad to be moving more. Goals: Goals for pt can be see on initial eval occurring on 09/08/2020    Plan:   [x]  Continues Plan of care: Treatment covered based on POC and graduated to patient's progress. Pt education continues at each visit to obtain maximum benefits from skilled OT intervention.   []  Alter Plan of care:   []  Discharge:      Héctor Rouse Brad 86, OTR/L #[id number]

## 2020-10-26 ENCOUNTER — TREATMENT (OUTPATIENT)
Dept: OCCUPATIONAL THERAPY | Age: 40
End: 2020-10-26
Payer: COMMERCIAL

## 2020-10-26 PROCEDURE — 97140 MANUAL THERAPY 1/> REGIONS: CPT | Performed by: OCCUPATIONAL THERAPIST

## 2020-10-26 PROCEDURE — 97110 THERAPEUTIC EXERCISES: CPT | Performed by: OCCUPATIONAL THERAPIST

## 2020-10-26 NOTE — PROGRESS NOTES
OCCUPATIONAL THERAPY PROGRESS NOTE/RE-ASSESSMENT    Date:  10/26/2020   Initial Evaluation Date: 2020                          Evaluating Therapist: Millicent Pisano     Patient Name:  Jyoti Nayak                  :  1980     Restrictions/Precautions:  ROM as tolerated; modalities prn, low fall risk  Diagnosis:S62.613B (ICD-10-CM) - Displaced fracture of proximal phalanx of left middle finger, initial encounter for open fracture   Excisional debridement of left palm, index and middle finger with open reduction internal fixation of left middle finger proximal phalanx fracture with middle finger A2 pulley repair. Insurance/Certification information:Pending C9- Generic   Referring Practitioner: Dr. Ruth Ann Demarco  Date of Surgery/Injury: 2020  Plan of care signed (Y/N): Y    Visit# / total visits:  15 / 25- Pending C9    COVID-19 Screening completed upon entering facility and patient cleared for treatment today. Pt followed all protocols set forth by [facility] for Outpatient services throughout therapy session. Patient has been made aware of all new hygiene protocols due to COVID-19 set forth by [facility] Outpatient services and agrees to abide by all protocols. Pain Level: 4 on scale of 1-10, burning, needle-like, throbbing and with paresthesia in the 3rd digit    Subjective: Pt states he has been very stiff all weekend and was unable to move it without pain. Objective:  Updated POC to be completed by  visit. INTERVENTION: COMPLETED: SPECIFICS/COMMENTS:   Modality:     MHP x Hand wrapped in during wound soak for soft tissue warm-up        AROM:          Blocked PIP flex/ext x MF but other fingers can move with it if he wants 2 sets x10    Blocked MP flex/ext x All fingers together. 2 sets x10   Finger adduc/abduction  A to abduction RF - did with place and hold.    Wrist all planes  Stiff in wrist flexion - doing active only   LUE x Completed flexion/extension of PIP/DIP 3  sets x10 within pt's tolerance. Focus on extension/flexion of 3rd digit. Completed with A2 pulley splint donned- or therapist supporting this area. Extensor tendon digit holds  x10 with palm down: therapist bringing 3rd digit up and pt was to hold for 3 seconds with fair tolerance, HEP within pain tolerance. AAROM:               PROM/Stretching:     L MF PIP ext/Flexion x Gentle completed to digits into slight extension with slight pain reported from palm incision. More tension to PIP ext   Finger flexion IF, RF and SF x R and SF together, IF alone. To do at home so he can loosen fingers up without the confines of the MF   L MF DIP flexion x Added to HEP also        Scar Mass/Edema Control:     Retrograde massage x On dorsal portion down digits, hand and wrist throughout session and in-between exercises. Gel compression sheet x Med/lg, to wear and hr 1 -2 x's a day IF to decrease scar. Strengthening:               Other:     Metacarpal splint x  Re- molded splint for a better fit to be worn out and about and at night as needed. A2 pulley splint   Fabricated an A2 pulley splint with longer end for easy removal of splint after exercises completed. Pt may require adjustments due to edema on 3rd digit. Ext tube IF  x Fitted with today - with splint material in it to try to wear at night to ^ PIP ext. Ext tube splint x Wearing with 2 spring coils. Could not wear at night - will try again and take off at 1-2 am if need be. Wound Care x Soaked with sterile salt water. Therapist debrided some of his palmar scar - sm amt  debridement today. Pt to cont with wet to dry dressing  - supplies given. Splint adjustments  Completed for improved comfort and postioning due to decreasing edema. ^;ed space around  MP's     Assessment/Comments: Pt is making Fair progress toward stated plan of care and established goals.  Focused more on PROM of digits due to stiffness noted in all digits. Pt did demonstrate better flexion of all digits by the end of the session and measurement was taken from middle digit to [facility]. Pt educated on flexion with place and hold of middle digit to assist with increasing flexion. Pt continues to demonstrate extension issues and splint material was placed in extension tube for Middle digit. At the end of the session from 3rd digit 8.5 cm [facility]      -Rehab Potential: Good  -Requires OT Follow Up: Yes  Time In: 11:00 am          Time Out: 12:00 pm             Visit #: 14    Treatment Charges: Mins Time in - Time out  Units   Modalities      Ther Exercise 30 11:30  am - 12:00 pm 2   Manual Therapy 30 11:00 am -11:30 am 2   Thera Activities      ADL/Home Mgt       Neuro Re-education      Gait Training      Group Therapy      Non-Billable Service Time MHP      Other splint lidia      Total Time/Units 60  4     -Response to Treatment: Pt responded well to treatment. Pt is tolerated the 'ed program with low level pian and is glad to be moving more. Goals: Goals for pt can be see on initial eval occurring on 09/08/2020    Plan:   [x]  Continues Plan of care: Treatment covered based on POC and graduated to patient's progress. Pt education continues at each visit to obtain maximum benefits from skilled OT intervention.   []  Alter Plan of care:   []  Discharge:      Izaiah Rouse [address] 86, OTR/L #[id number]

## 2020-10-29 ENCOUNTER — TREATMENT (OUTPATIENT)
Dept: OCCUPATIONAL THERAPY | Age: 40
End: 2020-10-29
Payer: COMMERCIAL

## 2020-10-29 PROCEDURE — 97140 MANUAL THERAPY 1/> REGIONS: CPT | Performed by: OCCUPATIONAL THERAPIST

## 2020-10-29 PROCEDURE — 97110 THERAPEUTIC EXERCISES: CPT | Performed by: OCCUPATIONAL THERAPIST

## 2020-10-29 NOTE — PROGRESS NOTES
OCCUPATIONAL THERAPY PROGRESS NOTE    Date:  10/29/2020   Initial Evaluation Date: 2020                          Evaluating Therapist: Kymberly Sal     Patient Name:  David Forman                  :  1980     Restrictions/Precautions:  ROM as tolerated; modalities prn, low fall risk  Diagnosis:S62.613B (ICD-10-CM) - Displaced fracture of proximal phalanx of left middle finger, initial encounter for open fracture   Excisional debridement of left palm, index and middle finger with open reduction internal fixation of left middle finger proximal phalanx fracture with middle finger A2 pulley repair. Insurance/Certification information:Pending C9- Generic   Referring Practitioner: Dr. Kirby Cuenca  Date of Surgery/Injury: 2020  Plan of care signed (Y/N): Y    Visit# / total visits:  - Pending C9    COVID-19 Screening completed upon entering facility and patient cleared for treatment today. Pt followed all protocols set forth by [facility] for Outpatient services throughout therapy session. Patient has been made aware of all new hygiene protocols due to COVID-19 set forth by [facility] Outpatient services and agrees to abide by all protocols. Pain Level: 4 on scale of 1-10, burning, needle-like, throbbing and with paresthesia in the 3rd digit    Subjective: Pt states \"I am trying to use my hand alittle at home - like to pinch my shirt to pull it up my R arm . \"     Objective:  Updated POC to be completed by  visit. INTERVENTION: COMPLETED: SPECIFICS/COMMENTS:   Modality:     MHP x Hand wrapped in during wound soak for soft tissue warm-up        AROM:          Blocked PIP flex/ext x MF but other fingers can move with it if he wants 2 sets x10  And all fingers together. PIP ext MF with place and hold ext - to do frequently during the day. Blocked MP flex/ext x All fingers together. 2 sets x10.  Also did each IF and MF alone and RF and SF together for  Pt to feel the tendon working. Finger adduc/abduction x A to abduction RF - did with place and hold. IF alone. Wrist all planes  Stiff in wrist flexion - doing active only   LUE x Completed flexion/extension of PIP/DIP 3  sets x10 within pt's tolerance. Focus on extension/flexion of 3rd digit. Completed with A2 pulley splint donned- or therapist supporting this area. Extensor tendon digit holds x x10 with palm down: therapist bringing 3rd digit up and pt was to hold for 3 seconds with fair tolerance, HEP within pain tolerance. AAROM:               PROM/Stretching:     L MF PIP ext/Flexion x Gentle completed to digits into slight extension with slight pain reported from palm incision. More tension to PIP ext   Finger flexion IF, RF and SF x R and SF together, IF alone. To do at home so he can loosen fingers up without the confines of the MF   L MF DIP flexion x Added to HEP also   Wrist flex. Ext -  x Stiff end range. Scar Mass/Edema Control:     Retrograde massage x On dorsal portion down digits, hand and wrist throughout session and in-between exercises. elastomere scar forms   x To wear with night splint. IF scar at MP and MF in palm. Gel compression sheet x Med/lg, to wear and hr 1 -2 x's a day IF to decrease scar. Strengthening:               Other:     Metacarpal splint x  Re- molded splint for a better fit to be worn out and about and at night as needed. IF, MF ext splint  x Fabricated to wear at night with his elastomere gel pads to stretch scar tissue and PIP jt. A2 pulley splint   Fabricated an A2 pulley splint with longer end for easy removal of splint after exercises completed. Pt may require adjustments due to edema on 3rd digit. Ext tube IF  x To wear occasionally during the day for an hour. Ext tube splint MF x Wear occasionally during the day for an hour       Wound Care x Soaked with sterile salt water.  Therapist

## 2020-11-02 ENCOUNTER — TREATMENT (OUTPATIENT)
Dept: OCCUPATIONAL THERAPY | Age: 40
End: 2020-11-02
Payer: COMMERCIAL

## 2020-11-02 PROCEDURE — 97110 THERAPEUTIC EXERCISES: CPT | Performed by: OCCUPATIONAL THERAPIST

## 2020-11-02 PROCEDURE — 97140 MANUAL THERAPY 1/> REGIONS: CPT | Performed by: OCCUPATIONAL THERAPIST

## 2020-11-02 NOTE — PROGRESS NOTES
2 sets x10  And all fingers together. PIP ext MF with place and hold ext - to do frequently during the day. Blocked MP flex/ext x All fingers together. 2 sets x10. Also did each IF and MF alone and RF and SF together for  Pt to feel the tendon working. Finger adduc/abduction x A to abduction RF - did with place and hold. IF alone. Wrist all planes  Stiff in wrist flexion - doing active only   LUE x Completed flexion/extension of PIP/DIP 3  sets x10 within pt's tolerance. Focus on extension/flexion of 3rd digit. Completed with A2 pulley splint donned- or therapist supporting this area. Extensor tendon digit holds x x10 with palm down: therapist bringing 3rd digit up and pt was to hold for 3 seconds with fair tolerance, HEP within pain tolerance. AAROM:               PROM/Stretching:     L MF PIP ext/Flexion x Gentle completed to digits into slight extension with slight pain reported from palm incision. More tension to PIP ext   Finger flexion IF, RF and SF x R and SF together, IF alone. To do at home so he can loosen fingers up without the confines of the MF   L MF DIP flexion x Added to HEP also   Wrist flex. Ext -   Stiff end range. Scar Mass/Edema Control:     Retrograde massage x On dorsal portion down digits, hand and wrist throughout session and in-between exercises. elastomere scar forms   x To wear with night splint. IF scar at MP and MF in palm. Opened wound again so did not use  Last night - to not use until we see him next session. Gel compression sheet x Med/lg, to wear and hr 1 -2 x's a day IF to decrease scar. Strengthening:               Other:     Metacarpal splint x  Re- molded splint for a better fit to be worn out and about and at night as needed. IF, MF ext splint  x Fabricated to wear at night with his elastomere gel pads to stretch scar tissue and PIP jt. On hold until wound closes.     A2 pulley splint   Fabricated an A2 pulley splint with longer end for easy removal of splint after exercises completed. Pt may require adjustments due to edema on 3rd digit. Ext tube IF  x To wear occasionally during the day for an hour. Ext tube splint MF x Wear occasionally during the day for an hour       Wound Care x Wound opened again - pt to use neosporin on it 2 -3 x's a day and let it breathe with gauze wrap    Splint adjustments  Completed for improved comfort and postioning due to decreasing edema. ^;ed space around  MP's     Assessment/Comments: Pt is making Fair progress toward stated plan of care and established goals. Pt  Showing slightly improved MF PIP flex and trace of extension. At one point during therapy while passivly stretching his PIP jt he had severe pian. Therapist stopped doing any ROM and gave him ice. After icing the pain had decreased to 4/10 again.     -Rehab Potential: Good  -Requires OT Follow Up: Yes  Time In: 1:00 am          Time Out:  12:00 pm             Visit #: 16    Treatment Charges: Mins Time in - Time out  Units   Modalities      Ther Exercise 30 11:30  pm - 12:00 pm 2   Manual Therapy 30 11:00 am -11:30 am 2   Thera Activities      ADL/Home Mgt       Neuro Re-education      Gait Training      Group Therapy      Non-Billable Service Time MHP      Other splint lidia      Total Time/Units 60  4     -Response to Treatment: Pt responded well to treatment. Pt frustrated that he still gets some sharp pains that keep him awake. Goals: Goals for pt can be see on initial eval occurring on 09/08/2020 and on 10/22/2020    Plan:   [x]  Continues Plan of care: Treatment covered based on POC and graduated to patient's progress. Pt education continues at each visit to obtain maximum benefits from skilled OT intervention.   []  Alter Plan of care:   []  Discharge:      Chantal Harris OT/L,  CHT

## 2020-11-05 ENCOUNTER — TREATMENT (OUTPATIENT)
Dept: OCCUPATIONAL THERAPY | Age: 40
End: 2020-11-05
Payer: COMMERCIAL

## 2020-11-05 ENCOUNTER — OFFICE VISIT (OUTPATIENT)
Dept: ORTHOPEDIC SURGERY | Age: 40
End: 2020-11-05

## 2020-11-05 VITALS — TEMPERATURE: 97.9 F | BODY MASS INDEX: 31.5 KG/M2 | HEIGHT: 71 IN | WEIGHT: 225 LBS

## 2020-11-05 PROCEDURE — 97140 MANUAL THERAPY 1/> REGIONS: CPT | Performed by: OCCUPATIONAL THERAPIST

## 2020-11-05 PROCEDURE — 97110 THERAPEUTIC EXERCISES: CPT | Performed by: OCCUPATIONAL THERAPIST

## 2020-11-05 PROCEDURE — 99024 POSTOP FOLLOW-UP VISIT: CPT | Performed by: ORTHOPAEDIC SURGERY

## 2020-11-05 RX ORDER — IBUPROFEN 600 MG/1
600 TABLET ORAL 4 TIMES DAILY PRN
Qty: 360 TABLET | Refills: 1 | Status: SHIPPED
Start: 2020-11-05 | End: 2021-09-17

## 2020-11-05 NOTE — LETTER
[facility].  [address] [address]  Phone: [phone]  Fax: [phone]    Shawn Laura MD        November 5, 2020     Patient: Marium Lowry   YOB: 1980   Date of Visit: 11/5/2020       To Whom It May Concern: It is my medical opinion that Ilan Carson may return to work Monday, 11/16/20, light duty. Right handed work only. If you have any questions or concerns, please don't hesitate to call.     Sincerely,        Shawn Laura MD

## 2020-11-05 NOTE — PROGRESS NOTES
OCCUPATIONAL THERAPY PROGRESS NOTE    Date:  2020   Initial Evaluation Date: 2020                          Evaluating Therapist: Selina Gregg     Patient Name:  Frida Gates                  :  1980     Restrictions/Precautions:  ROM as tolerated; modalities prn, low fall risk  Diagnosis:S62.613B (ICD-10-CM) - Displaced fracture of proximal phalanx of left middle finger, initial encounter for open fracture   Excisional debridement of left palm, index and middle finger with open reduction internal fixation of left middle finger proximal phalanx fracture with middle finger A2 pulley repair. Insurance/Certification information:Pending C9- Generic   Referring Practitioner: Dr. Herrera Morris  Date of Surgery/Injury: 2020  Plan of care signed (Y/N): Y    Visit# / total visits:  -   (End date 2020 - called to extend date to 2020) Waiting for Pt's new C-9). COVID-19 Screening completed upon entering facility and patient cleared for treatment today. Pt followed all protocols set forth by [facility] for Outpatient services throughout therapy session. Patient has been made aware of all new hygiene protocols due to COVID-19 set forth by [facility] Outpatient services and agrees to abide by all protocols. Pain Level: 5-8 on scale of 1-10, burning, needle-like, throbbing and with paresthesia in the 3rd digit. ^'ed pain since the beginning of the week. Subjective: Pt states \"My finger still hurts really bad - so I called the Dr's office but they can't see me until . But it really hurts here ( PIP jt distally to the DIP jt) . \"     Objective:  Updated POC to be completed by  visit. INTERVENTION: COMPLETED: SPECIFICS/COMMENTS:   Modality:     MHP x Hand wrapped in during wound soak for soft tissue warm-up   Ice   End of session to decrease pain.     AROM:          Blocked PIP flex/ext x MF but other fingers can move with it if he wants 2 sets x10  And all fingers together. PIP ext MF with place and hold ext - to do frequently during the day. Gentle this date. Blocked MP flex/ext x All fingers together. 2 sets x10. Also did each IF and MF alone and RF and SF together for  Pt to feel the tendon working. Gentle this date. Finger adduc/abduction x A to abduction RF - did with place and hold. IF alone. Wrist all planes  Stiff in wrist flexion - doing active only   LUE x Completed flexion/extension of PIP/DIP 3  sets x10 within pt's tolerance. Focus on extension/flexion of 3rd digit. Completed with A2 pulley splint donned- or therapist supporting this area. Extensor tendon digit holds  x10 with palm down: therapist bringing 3rd digit up and pt was to hold for 3 seconds with fair tolerance, HEP within pain tolerance. AAROM:               PROM/Stretching:     L MF PIP ext/Flexion  Gentle completed to digits into slight extension with slight pain reported from palm incision. More tension to PIP ext   Finger flexion IF, RF and SF  R and SF together, IF alone. To do at home so he can loosen fingers up without the confines of the MF   L MF DIP flexion  Added to HEP also   Wrist flex. Ext -   Stiff end range. Scar Mass/Edema Control:     Retrograde massage x On dorsal portion down digits, hand and wrist throughout session and in-between exercises. elastomere scar forms   x To wear with night splint. IF scar at MP and MF in palm. Fabrciated a new elastomere form with an open area over the wound. Gel compression sheet x Med/lg, to wear and hr 1 -2 x's a day IF to decrease scar. Strengthening:               Other:     Metacarpal splint x  Re- molded splint for a better fit to be worn out and about and at night as needed. IF, MF ext splint  x Fabricated to wear at night with his elastomere gel pads to stretch scar tissue and PIP jt. On hold until wound closes.  Made a new elastomere form leaving an open area for his wound to breathe. Pt can try wearing at night. A2 pulley splint   Fabricated an A2 pulley splint with longer end for easy removal of splint after exercises completed. Pt may require adjustments due to edema on 3rd digit. Ext tube IF  x To wear occasionally during the day for an hour. Ext tube splint MF x Wear occasionally during the day for an hour       Wound Care x Wound opened again - pt to use neosporin on it 2 -3 x's a day and let it breathe with gauze wrap    Splint adjustments  Completed for improved comfort and postioning due to decreasing edema. ^;ed space around  MP's     Assessment/Comments: Pt is making Fair progress toward stated plan of care and established goals. Therapist did more scar massage today and retrograde 2* to his ^'ed pain. Therapist talked to the nurse and he is seeing the Dr. Nimesh Taylor. Pt saw the Dr today and his pain is from scar tissue stretch. Therapist stated she would call the Pt when his new C-9 comes in to schedule. QuickDASH Score: 75% disability reported to 66% disability    -Rehab Potential: Good  -Requires OT Follow Up: Yes  Time In: 1:00 am          Time Out:  2:00  pm             Visit #: 18    Treatment Charges: Mins Time in - Time out  Units   Modalities      Ther Exercise 30 1:30  pm - 2:00 pm 2   Manual Therapy 30 1:00 pm -1:30 pm 2   Thera Activities      ADL/Home Mgt       Neuro Re-education      Gait Training      Group Therapy      Non-Billable Service Time MHP      Other splint lidia      Total Time/Units 45  3     -Response to Treatment: Pt had a modified session today as his finger pain continues to be 5 -8/10. Goals: Goals for pt can be see on initial eval occurring on 09/08/2020 and on 10/22/2020    Plan:   [x]  Continues Plan of care: Waiting for Pt's new C-9. Treatment covered based on POC and graduated to patient's progress.  Pt education continues at each visit to obtain maximum benefits from skilled OT

## 2020-11-05 NOTE — PROGRESS NOTES
HPI: Patient presents today 2.5 months s/p excisional debridement of left palm, index and middle finger with open reduction internal fixation of left middle finger proximal phalanx fracture with middle finger A2 pulley repair. He has been attending therapy. Patient states on Monday in therapy they were working on passive range of motion exercises when he felt a pop in his middle finger. Since then he has had extreme pain to his right middle finger. Physical Exam: Incision healing well. Flap on the palmar surface almost healed. No drainage. No surrounding erythema. No signs of infection. Ulnar digital nerve intact, decreased sensation to radial side of affected middle digit and ulnar side of index finger. Middle finger with significant stiffness with flexion and extension. Tendons intact. Brisk capillary refill. Otherwise neurovascular intact. X-rays of the left hand were obtained today in the office and reviewed with patient. 3 views: AP, lateral, oblique demonstrate stable left middle finger proximal phalanx fracture. Interval healing present. Impression: Stable left middle finger proximal phalanx fracture    Assessment: 2.5 months s/p excisional debridement of left palm, index and middle finger with open reduction internal fixation of left middle finger proximal phalanx fracture with middle finger A2 pulley repair    Plan:     Discussed with the patient that he most likely was experiencing pain from breaking up scar tissue. Encouraged continuing therapy. We will ask for additional therapy through [facility]. May incorporate strengthening. Advance to no restrictions. Patient to RTW November 16 light duty. No use of left hand. Follow up in 3-4 weeks with repeat x-rays. I have seen and evaluated the patient and agree with the above assessment and plan on today's visit.  I have performed the key components of the history and physical examination with significant findings of postop with stiffness and adhesions. I concur with the findings and plan as documented.     Silvia Winters MD  11/5/2020

## 2020-11-17 ENCOUNTER — TREATMENT (OUTPATIENT)
Dept: OCCUPATIONAL THERAPY | Age: 40
End: 2020-11-17
Payer: COMMERCIAL

## 2020-11-17 PROCEDURE — 97110 THERAPEUTIC EXERCISES: CPT | Performed by: OCCUPATIONAL THERAPIST

## 2020-11-17 PROCEDURE — 97022 WHIRLPOOL THERAPY: CPT | Performed by: OCCUPATIONAL THERAPIST

## 2020-11-17 PROCEDURE — 97140 MANUAL THERAPY 1/> REGIONS: CPT | Performed by: OCCUPATIONAL THERAPIST

## 2020-11-17 NOTE — PROGRESS NOTES
OCCUPATIONAL THERAPY PROGRESS NOTE    Date:  2020   Initial Evaluation Date: 2020                          Evaluating Therapist: Nettie Waters     Patient Name:  Foreign Friday                  :  1980     Restrictions/Precautions:  ROM as tolerated; modalities prn, low fall risk  Diagnosis:S62.613B (ICD-10-CM) - Displaced fracture of proximal phalanx of left middle finger, initial encounter for open fracture   Excisional debridement of left palm, index and middle finger with open reduction internal fixation of left middle finger proximal phalanx fracture with middle finger A2 pulley repair. Insurance/Certification information:Pending C9- Generic   Referring Practitioner: Dr. Cindy Cooper  Date of Surgery/Injury: 2020  Plan of care signed (Y/N): Y    Visit# / total visits:  - (used 18 visits to date)  (End date 2020 - called to extend date to 2020) Waiting for Pt's new C-9). COVID-19 Screening completed upon entering facility and patient cleared for treatment today. Pt followed all protocols set forth by [facility] for Outpatient services throughout therapy session. Patient has been made aware of all new hygiene protocols due to COVID-19 set forth by [facility] Outpatient services and agrees to abide by all protocols. Pain Level:  3-5 on scale of 1-10, burning, needle-like, throbbing and with paresthesia in al digits with 3rd being worse. .     Subjective: Pt states \"Even though I missed therapy last week - I worked it every day last week. \"     Objective:  Updated POC to be completed by  visit. INTERVENTION: COMPLETED: SPECIFICS/COMMENTS:   Modality:     MHP x Hand wrapped in during wound soak for soft tissue warm-up   fluiotherapy X- 12 min. For soft tissue warm-up while intermittently moving his wrist and fingers. For desensitization also. Ice   End of session to decrease pain. AROM:          Blocked PIP flex/ext x MF but other fingers can move with it if he wants 2 sets x10  And all fingers together. PIP ext MF with place and hold ext - to do frequently during the day. Gentle this date. Blocked MP flex/ext x All fingers together. 2 sets x10. Also did each IF and MF alone and RF and SF together for  Pt to feel the tendon working. Gentle this date. Finger adduc/abduction  A to abduction RF - did with place and hold. IF alone. Wrist all planes  Stiff in wrist flexion - doing active only   LUE x Completed flexion/extension of PIP/DIP 3  sets x10 within pt's tolerance. Focus on extension/flexion of 3rd digit. Completed with A2 pulley splint donned- or therapist supporting this area. Extensor tendon digit holds  x10 with palm down: therapist bringing 3rd digit up and pt was to hold for 3 seconds with fair tolerance, HEP within pain tolerance. AAROM:               PROM/Stretching:     L MF PIP ext/Flexion x Gentle completed to digits into slight extension with slight pain reported from palm incision. More tension to PIP ext   Finger flexion IF, RF and SF x R and SF together, IF alone. To do at home so he can loosen fingers up without the confines of the MF   MP flexion PROM with IP flex/ext X 3 sets of 5 rep's  Passively flex MP's while pt bends and straigthens his IP's. HEP        L MF DIP flexion x Added to HEP also   Wrist flex. Ext -   Stiff end range. Scar Mass/Edema Control:     Retrograde massage x On dorsal portion down digits, hand and wrist throughout session and in-between exercises. elastomere scar forms   x To wear with night splint. IF scar at MP and MF in palm. Fabrciated a new elastomere form with an open area over the wound. Gel compression sheet x Med/lg, to wear and hr 1 -2 x's a day IF to decrease scar. Strengthenin# wt X  10 rep's  Wrist flex, wrist ext, RD - UD . Pt able to hold onto wt.               Other:     Metacarpal splint D/C  Re- molded splint for a better fit to be worn out and about and at night as needed. IF, MF ext splint  x Fabricated to wear at night with his elastomere gel pads to stretch scar tissue and PIP jt. . Made a new elastomere form leaving an open area for his wound to breathe. Pt can try wearing at night. To bring in next session to readjust to ^ ext pull. LMB X  For IF and MF. Wear 10 min to 30 for a total of 1 hr a day. A2 pulley splint  D/C Fabricated an A2 pulley splint with longer end for easy removal of splint after exercises completed. Pt may require adjustments due to edema on 3rd digit. Ext tube IF  D/c To wear occasionally during the day for an hour. HEP  X  Cont with, added MP flexion while flex/ext IP's - can use frap strap also, changed PIP ext from ext tube to LMB splints. Ext tube splint MF D/C Wear occasionally during the day for an hour       frap strap x Can be used to work full finger flexion and or hold MP's down with IP flex. ext- pt to play around with   Wound Care x May be closed but pt states he has occasional drainage. Splint adjustments  Completed for improved comfort and postioning due to decreasing edema. ^;ed space around  MP's     Assessment/Comments: Pt is making Fair progress toward stated plan of care and established goals. Therapist changed splints - see above and added to his HEP. Pt appeared to understand all instructions. Pt showing about 46* of motion in his IF PIP jt ( from -31* to 83*) and 17* of motion in his MF PIP jt ( from -52* to 75*).    DPC:   IF   4.5 cm             MF  5 cm             RF  4 cm             SF   3 cm     -Rehab Potential: Good  -Requires OT Follow Up: Yes  Time In: 1:00 am          Time Out:  2:00  pm             Visit #: 1 ( 18 session used already)    Treatment Charges: Mins Time in - Time out  Units   Modalities- fluiothe 12 3:00 pm - 3:12 pm 1   Ther Exercise 33 3:27  pm - 4:00 pm 2   Manual Therapy 15 3:12 pm- 3:27 pm 1   Thera Activities      ADL/Home Mgt       Neuro Re-education      Gait Training      Group Therapy      Non-Billable Service Time MHP      Other splint lidia      Total Time/Units 45  3     -Response to Treatment: Pt  Moises Martinez to get his finger moving better into a fist.   Goals: Goals for pt can be see on initial eval occurring on 09/08/2020 and on 10/22/2020    Plan:   [x]  Continues Plan of care: Waiting for Pt's new C-9. Treatment covered based on POC and graduated to patient's progress. Pt education continues at each visit to obtain maximum benefits from skilled OT intervention.   []  Alter Plan of care:   []  Discharge:      Dexter Comer OT/L,  CHT

## 2020-11-18 ENCOUNTER — TREATMENT (OUTPATIENT)
Dept: OCCUPATIONAL THERAPY | Age: 40
End: 2020-11-18
Payer: COMMERCIAL

## 2020-11-18 PROCEDURE — 97140 MANUAL THERAPY 1/> REGIONS: CPT | Performed by: OCCUPATIONAL THERAPIST

## 2020-11-18 PROCEDURE — 97110 THERAPEUTIC EXERCISES: CPT | Performed by: OCCUPATIONAL THERAPIST

## 2020-11-18 PROCEDURE — 97022 WHIRLPOOL THERAPY: CPT | Performed by: OCCUPATIONAL THERAPIST

## 2020-11-18 NOTE — PROGRESS NOTES
OCCUPATIONAL THERAPY PROGRESS NOTE    Date:  2020   Initial Evaluation Date: 2020                          Evaluating Therapist: Himanshu Hernandez     Patient Name:  Christiano Crook                  :  1980     Restrictions/Precautions:  ROM as tolerated; modalities prn, low fall risk  Diagnosis:S62.613B (ICD-10-CM) - Displaced fracture of proximal phalanx of left middle finger, initial encounter for open fracture   Excisional debridement of left palm, index and middle finger with open reduction internal fixation of left middle finger proximal phalanx fracture with middle finger A2 pulley repair. Insurance/Certification information:[facility]  Claim # [id number]  Referring Practitioner: Dr. Everette Ponce  Date of Surgery/Injury: 2020  Plan of care signed (Y/N): Y    Visit# / total visits:  - (used 18 visits to date) 3x/week for 6 weeks ( total of 18 visits) until 2021    COVID-19 Screening completed upon entering facility and patient cleared for treatment today. Pt followed all protocols set forth by [address] [place] [address] for Outpatient services throughout therapy session. Patient has been made aware of all new hygiene protocols due to COVID-19 set forth by [address] [place] [address] Outpatient services and agrees to abide by all protocols. Pain Level:   Discomfort with numbness, throbbing comes/goes and numbness all the time in between index and middle finger. Subjective: Pt states no new changes     Objective:  Updated POC to be completed by  visit. INTERVENTION: COMPLETED: SPECIFICS/COMMENTS:   Modality:     MHP  Hand wrapped in during wound soak for soft tissue warm-up   fluiotherapy x  For soft tissue warm-up while intermittently moving his wrist and fingers. For desensitization also. 20 mins   Ice   End of session to decrease pain.     AROM:          Blocked PIP flex/ext x MF but other fingers can move with it if he wants 2 sets x10  And all fingers together. PIP ext MF with place and hold ext - to do frequently during the day. Gentle this date. Blocked MP flex/ext x All fingers together. 2 sets x10. Also did each IF and MF alone and RF and SF together for  Pt to feel the tendon working. Gentle this date. Finger adduc/abduction x A to abduction RF - did with place and hold. IF alone. Wrist all planes  Stiff in wrist flexion - doing active only   LUE x Completed flexion/extension of PIP/DIP 3  sets x10 within pt's tolerance. Focus on extension/flexion of 3rd digit. Completed with A2 pulley splint donned- or therapist supporting this area. Extensor tendon digit holds  x10 with palm down: therapist bringing 3rd digit up and pt was to hold for 3 seconds with fair tolerance, HEP within pain tolerance. AAROM:               PROM/Stretching:     L MF PIP ext/Flexion x Gentle completed to digits into slight extension with slight pain reported from palm incision. More tension to PIP ext   Finger flexion IF, RF and SF x R and SF together, IF alone. To do at home so he can loosen fingers up without the confines of the MF   MP flexion PROM with IP flex/ext X 3 sets of 5 rep's  Passively flex MP's while pt bends and straigthens his IP's. HEP        L MF DIP flexion x Added to HEP also   Wrist flex. Ext -   Stiff end range. Scar Mass/Edema Control:     Retrograde massage x On dorsal portion down digits, hand and wrist throughout session and in-between exercises. elastomere scar forms   x To wear with night splint. IF scar at MP and MF in palm. Fabrciated a new elastomere form with an open area over the wound. Gel compression sheet x Med/lg, to wear and hr 1 -2 x's a day IF to decrease scar. Strengthenin# wt   Wrist flex, wrist ext, RD - UD . Pt able to hold onto wt. Table top, bend at pip than extend digits while in table top and then full extend with slight resistance.   x x10 with fair tolerance and slight 15 3:00 pm - 3:15 pm 1   Ther Exercise 25 3:15 pm - 3:40 pm 2   Manual Therapy 20 3:40 pm - 4:00 pm 1   Thera Activities      ADL/Home Mgt       Neuro Re-education      Gait Training      Group Therapy      Non-Billable Service Time MHP      Other splint lidia      Total Time/Units 60  4     -Response to Treatment: Pt  Randy Villegas to get his finger moving better into a fist.   Goals: Goals for pt can be see on initial eval occurring on 09/08/2020 and on 10/22/2020    Plan:   [x]  Continues Plan of care: 3x/week for 6 weeks until 01/18/2021. Treatment covered based on POC and graduated to patient's progress. Pt education continues at each visit to obtain maximum benefits from skilled OT intervention.   []  Alter Plan of care:   []  Discharge:      Minus Ute Rouse [address] 86, OTR/L #[id number]

## 2020-11-23 ENCOUNTER — TREATMENT (OUTPATIENT)
Dept: OCCUPATIONAL THERAPY | Age: 40
End: 2020-11-23
Payer: COMMERCIAL

## 2020-11-23 PROCEDURE — 97022 WHIRLPOOL THERAPY: CPT | Performed by: OCCUPATIONAL THERAPIST

## 2020-11-23 PROCEDURE — 97140 MANUAL THERAPY 1/> REGIONS: CPT | Performed by: OCCUPATIONAL THERAPIST

## 2020-11-23 PROCEDURE — 97110 THERAPEUTIC EXERCISES: CPT | Performed by: OCCUPATIONAL THERAPIST

## 2020-11-23 NOTE — PROGRESS NOTES
OCCUPATIONAL THERAPY PROGRESS NOTE    Date:  2020   Initial Evaluation Date: 2020                          Evaluating Therapist: Richi Charlton     Patient Name:  Meenakshi Breaux                  :  1980     Restrictions/Precautions:  ROM as tolerated; modalities prn, low fall risk  Diagnosis:S62.613B (ICD-10-CM) - Displaced fracture of proximal phalanx of left middle finger, initial encounter for open fracture   Excisional debridement of left palm, index and middle finger with open reduction internal fixation of left middle finger proximal phalanx fracture with middle finger A2 pulley repair. Insurance/Certification information:[facility]  Claim # [id number]  Referring Practitioner: Dr. Radha Hernandez  Date of Surgery/Injury: 2020  Plan of care signed (Y/N): Y    Visit# / total visits:  3 / 18- (used 18 visits to date) 3x/week for 6 weeks ( total of 18 visits) until 2021    COVID-19 Screening completed upon entering facility and patient cleared for treatment today. Pt followed all protocols set forth by [facility] for Outpatient services throughout therapy session. Patient has been made aware of all new hygiene protocols due to COVID-19 set forth by [facility] Outpatient services and agrees to abide by all protocols. Pain Level:   Discomfort with numbness, throbbing comes/goes and numbness all the time in between index and middle finger. Subjective: Pt states no new changes     Objective:  Updated POC to be completed by 20 th visit. INTERVENTION: COMPLETED: SPECIFICS/COMMENTS:   Modality:     MHP  Hand wrapped in during wound soak for soft tissue warm-up   fluiotherapy x  For soft tissue warm-up while intermittently moving his wrist and fingers. For desensitization also. 20 mins   Ice   End of session to decrease pain.     AROM:          Blocked PIP flex/ext x MF but other fingers can move with it if he 3:15 pm - 3:40 pm 2   Manual Therapy 20 3:40 pm - 4:00 pm 1   Thera Activities      ADL/Home Mgt       Neuro Re-education      Gait Training      Group Therapy      Non-Billable Service Time MHP      Other splint lidia      Total Time/Units 60  4     -Response to Treatment: Pt  Andre Joya to get his finger moving better into a fist.   Goals: Goals for pt can be see on initial eval occurring on 09/08/2020 and on 10/22/2020    Plan:   [x]  Continues Plan of care: 3x/week for 6 weeks until 01/18/2021. Treatment covered based on POC and graduated to patient's progress. Pt education continues at each visit to obtain maximum benefits from skilled OT intervention.   []  Alter Plan of care:   []  Discharge:      Hafsa Rouse [address] 86, OTR/L #[id number]

## 2020-11-25 ENCOUNTER — TREATMENT (OUTPATIENT)
Dept: OCCUPATIONAL THERAPY | Age: 40
End: 2020-11-25
Payer: COMMERCIAL

## 2020-11-25 PROCEDURE — 97140 MANUAL THERAPY 1/> REGIONS: CPT | Performed by: OCCUPATIONAL THERAPIST

## 2020-11-25 PROCEDURE — 97110 THERAPEUTIC EXERCISES: CPT | Performed by: OCCUPATIONAL THERAPIST

## 2020-11-25 PROCEDURE — 97022 WHIRLPOOL THERAPY: CPT | Performed by: OCCUPATIONAL THERAPIST

## 2020-11-25 NOTE — PROGRESS NOTES
OCCUPATIONAL THERAPY PROGRESS NOTE    Date:  2020   Initial Evaluation Date: 2020                          Evaluating Therapist: Hafsa Ambriz     Patient Name:  Madeline Shields                  :  1980     Restrictions/Precautions:  ROM as tolerated; modalities prn, low fall risk  Diagnosis:S62.613B (ICD-10-CM) - Displaced fracture of proximal phalanx of left middle finger, initial encounter for open fracture   Excisional debridement of left palm, index and middle finger with open reduction internal fixation of left middle finger proximal phalanx fracture with middle finger A2 pulley repair. Insurance/Certification information:[facility]  Claim # [id number]  Referring Practitioner: Dr. Montague  Date of Surgery/Injury: 2020  Plan of care signed (Y/N): Y    Visit# / total visits:  - (used 18 visits to date) 3x/week for 6 weeks ( total of 18 visits) until 2021    COVID-19 Screening completed upon entering facility and patient cleared for treatment today. Pt followed all protocols set forth by [facility] throughout therapy session. Patient has been made aware of all new hygiene protocols due to COVID-19 set forth by [facility] and agrees to abide by all protocols. Pain Level:   Discomfort with numbness, throbbing comes/goes and numbness all the time in between index and middle finger. Subjective: Pt states no new changes     Objective:  Updated POC to be completed by 20 th visit. INTERVENTION: COMPLETED: SPECIFICS/COMMENTS:   Modality:     MHP  Hand wrapped in during wound soak for soft tissue warm-up   fluiotherapy x  For soft tissue warm-up while intermittently moving his wrist and fingers. For desensitization also. 20 mins   Ice   End of session to decrease pain.     AROM:          Blocked PIP flex/ext x MF but other fingers can move with it if he wants 2 sets x10  And all fingers together. PIP ext MF with place and hold ext - to do frequently during the day. Gentle this date. Blocked MP flex/ext x All fingers together. 2 sets x10. Also did each IF and MF alone and RF and SF together for  Pt to feel the tendon working. Gentle this date. Finger adduc/abduction x A to abduction RF - did with place and hold. IF alone. Wrist all planes  Stiff in wrist flexion - doing active only   LUE x Completed flexion/extension of PIP/DIP 3  sets x10 within pt's tolerance. Focus on extension/flexion of 3rd digit. Completed with A2 pulley splint donned- or therapist supporting this area. Extensor tendon digit holds  x10 with palm down: therapist bringing 3rd digit up and pt was to hold for 3 seconds with fair tolerance, HEP within pain tolerance. AAROM:               PROM/Stretching:     L MF PIP ext/Flexion x Gentle completed to digits into slight extension with slight pain reported from palm incision. More tension to PIP ext   Finger flexion IF, RF and SF x R and SF together, IF alone. To do at home so he can loosen fingers up without the confines of the MF   MP flexion PROM with IP flex/ext X 3 sets of 5 rep's  Passively flex MP's while pt bends and straigthens his IP's. HEP        L MF DIP flexion x Added to HEP also   Wrist flex. Ext -   Stiff end range. Scar Mass/Edema Control:     Retrograde massage/Scar tissue massage x On dorsal portion down digits, hand and wrist throughout session and in-between exercises. elastomere scar forms   x To wear with night splint. IF scar at MP and MF in palm. Fabrciated a new elastomere form with an open area over the wound. Gel compression sheet x Med/lg, to wear and hr 1 -2 x's a day IF to decrease scar. Strengthenin# wt  x Wrist flex, wrist ext, RD - UD . Pt able to hold onto wt. Table top, bend at pip than extend digits while in table top and then full extend with slight resistance.    x10 with fair tolerance and slight resistance provided for isometrics strengthening. Hand gripper x One yellow rubber hand, working on full composite fist and bringing the gripper in joint by joint, DIP, PIP and MCPs   Yellow therabar x 15x Pronation and supination, full composite fist         Other:     Metacarpal splint D/C  Re- molded splint for a better fit to be worn out and about and at night as needed. IF, MF ext splint  x Fabricated to wear at night with his elastomere gel pads to stretch scar tissue and PIP jt. . Made a new elastomere form leaving an open area for his wound to breathe. Pt can try wearing at night. To bring in next session to readjust to ^ ext pull. LMB X  For IF and MF. Wear 10 min to 30 for a total of 1 hr a day. A2 pulley splint  D/C Fabricated an A2 pulley splint with longer end for easy removal of splint after exercises completed. Pt may require adjustments due to edema on 3rd digit. Ext tube IF  D/c To wear occasionally during the day for an hour. HEP  X  Cont with, added MP flexion while flex/ext IP's - can use frap strap also, changed PIP ext from ext tube to LMB splints. Ext tube splint MF D/C Wear occasionally during the day for an hour       frap strap x Can be used to work full finger flexion and or hold MP's down with IP flex. ext- pt to play around with   Wound Care x May be closed but pt states he has occasional drainage. Splint adjustments  Completed for improved comfort and postioning due to decreasing edema. ^;ed space around  MP's     Assessment/Comments: Pt is making Fair progress toward stated plan of care and established goals. Pt tolerated session well, progressed strengthening this date with therbar and hand gripper focus on full composite fist. Prolonged stretching to the MCP and PIP and full composite fist with active resistive extension. Pt tolerated well and was pleased with increased flexion into composite fist. Will increase as tolerated. -Rehab Potential: Good  -Requires OT Follow Up: Yes  Time In: 4:00 pm     Time Out: 4:45 pm            Visit #: 4 ( 18 session used already)    Treatment Charges: Mins Time in - Time out  Units   Modalities- fluiothe 15 4:00 pm - 4:15pm 1   Ther Exercise 25 4:15 pm - 4:40 pm 2   Manual Therapy 15 4:40 pm - 4:55 pm 1   Thera Activities      ADL/Home Mgt       Neuro Re-education      Gait Training      Group Therapy      Non-Billable Service Time MHP      Other splint lidia      Total Time/Units 55  4     -Response to Treatment: Pt  Brandi More to get his finger moving better into a fist.   Goals: Goals for pt can be see on initial eval occurring on 09/08/2020 and on 10/22/2020    Plan:   [x]  Continues Plan of care: 3x/week for 6 weeks until 01/18/2021. Treatment covered based on POC and graduated to patient's progress. Pt education continues at each visit to obtain maximum benefits from skilled OT intervention. []  Alter Plan of care:   []  Discharge:       [address] [facility], OTR/L #[id number]

## 2020-11-28 ENCOUNTER — HOSPITAL ENCOUNTER (EMERGENCY)
Age: 40
Discharge: HOME | End: 2020-11-28
Attending: EMERGENCY MEDICINE
Payer: COMMERCIAL

## 2020-11-28 ENCOUNTER — APPOINTMENT (OUTPATIENT)
Dept: CT IMAGING | Age: 40
End: 2020-11-28
Payer: COMMERCIAL

## 2020-11-28 ENCOUNTER — APPOINTMENT (OUTPATIENT)
Dept: GENERAL RADIOLOGY | Age: 40
End: 2020-11-28
Payer: COMMERCIAL

## 2020-11-28 VITALS
TEMPERATURE: 98.6 F | WEIGHT: 225 LBS | OXYGEN SATURATION: 98 % | HEIGHT: 71 IN | RESPIRATION RATE: 18 BRPM | HEART RATE: 98 BPM | BODY MASS INDEX: 31.5 KG/M2 | SYSTOLIC BLOOD PRESSURE: 125 MMHG | DIASTOLIC BLOOD PRESSURE: 87 MMHG

## 2020-11-28 LAB
ALBUMIN SERPL-MCNC: 3.9 G/DL (ref 3.5–5.2)
ALP BLD-CCNC: 81 U/L (ref 40–129)
ALT SERPL-CCNC: 19 U/L (ref 0–40)
ANION GAP SERPL CALCULATED.3IONS-SCNC: 12 MMOL/L (ref 7–16)
AST SERPL-CCNC: 19 U/L (ref 0–39)
BASOPHILS ABSOLUTE: 0.01 E9/L (ref 0–0.2)
BASOPHILS RELATIVE PERCENT: 0.2 % (ref 0–2)
BILIRUB SERPL-MCNC: 0.6 MG/DL (ref 0–1.2)
BUN BLDV-MCNC: 7 MG/DL (ref 6–20)
CALCIUM SERPL-MCNC: 8.8 MG/DL (ref 8.6–10.2)
CHLORIDE BLD-SCNC: 95 MMOL/L (ref 98–107)
CO2: 25 MMOL/L (ref 22–29)
CREAT SERPL-MCNC: 1 MG/DL (ref 0.7–1.2)
D DIMER: 279 NG/ML DDU
EKG ATRIAL RATE: 113 BPM
EKG P AXIS: 64 DEGREES
EKG P-R INTERVAL: 138 MS
EKG Q-T INTERVAL: 322 MS
EKG QRS DURATION: 90 MS
EKG QTC CALCULATION (BAZETT): 441 MS
EKG R AXIS: 51 DEGREES
EKG T AXIS: 33 DEGREES
EKG VENTRICULAR RATE: 113 BPM
EOSINOPHILS ABSOLUTE: 0.01 E9/L (ref 0.05–0.5)
EOSINOPHILS RELATIVE PERCENT: 0.2 % (ref 0–6)
GFR AFRICAN AMERICAN: >60
GFR NON-AFRICAN AMERICAN: >60 ML/MIN/1.73
GLUCOSE BLD-MCNC: 476 MG/DL (ref 74–99)
HCT VFR BLD CALC: 40.6 % (ref 37–54)
HEMOGLOBIN: 14 G/DL (ref 12.5–16.5)
IMMATURE GRANULOCYTES #: 0.02 E9/L
IMMATURE GRANULOCYTES %: 0.4 % (ref 0–5)
LYMPHOCYTES ABSOLUTE: 1.26 E9/L (ref 1.5–4)
LYMPHOCYTES RELATIVE PERCENT: 22.4 % (ref 20–42)
MCH RBC QN AUTO: 29.2 PG (ref 26–35)
MCHC RBC AUTO-ENTMCNC: 34.5 % (ref 32–34.5)
MCV RBC AUTO: 84.6 FL (ref 80–99.9)
MONOCYTES ABSOLUTE: 0.64 E9/L (ref 0.1–0.95)
MONOCYTES RELATIVE PERCENT: 11.4 % (ref 2–12)
NEUTROPHILS ABSOLUTE: 3.68 E9/L (ref 1.8–7.3)
NEUTROPHILS RELATIVE PERCENT: 65.4 % (ref 43–80)
PDW BLD-RTO: 12.8 FL (ref 11.5–15)
PLATELET # BLD: 279 E9/L (ref 130–450)
PMV BLD AUTO: 10.6 FL (ref 7–12)
POTASSIUM REFLEX MAGNESIUM: 4.1 MMOL/L (ref 3.5–5)
RBC # BLD: 4.8 E12/L (ref 3.8–5.8)
SODIUM BLD-SCNC: 132 MMOL/L (ref 132–146)
TOTAL PROTEIN: 7 G/DL (ref 6.4–8.3)
TROPONIN: <0.01 NG/ML (ref 0–0.03)
WBC # BLD: 5.6 E9/L (ref 4.5–11.5)

## 2020-11-28 PROCEDURE — U0003 INFECTIOUS AGENT DETECTION BY NUCLEIC ACID (DNA OR RNA); SEVERE ACUTE RESPIRATORY SYNDROME CORONAVIRUS 2 (SARS-COV-2) (CORONAVIRUS DISEASE [COVID-19]), AMPLIFIED PROBE TECHNIQUE, MAKING USE OF HIGH THROUGHPUT TECHNOLOGIES AS DESCRIBED BY CMS-2020-01-R: HCPCS

## 2020-11-28 PROCEDURE — 2580000003 HC RX 258: Performed by: STUDENT IN AN ORGANIZED HEALTH CARE EDUCATION/TRAINING PROGRAM

## 2020-11-28 PROCEDURE — 99284 EMERGENCY DEPT VISIT MOD MDM: CPT

## 2020-11-28 PROCEDURE — 84484 ASSAY OF TROPONIN QUANT: CPT

## 2020-11-28 PROCEDURE — 85025 COMPLETE CBC W/AUTO DIFF WBC: CPT

## 2020-11-28 PROCEDURE — 2580000003 HC RX 258: Performed by: EMERGENCY MEDICINE

## 2020-11-28 PROCEDURE — 71045 X-RAY EXAM CHEST 1 VIEW: CPT

## 2020-11-28 PROCEDURE — 80053 COMPREHEN METABOLIC PANEL: CPT

## 2020-11-28 PROCEDURE — 93005 ELECTROCARDIOGRAM TRACING: CPT | Performed by: EMERGENCY MEDICINE

## 2020-11-28 PROCEDURE — 85378 FIBRIN DEGRADE SEMIQUANT: CPT

## 2020-11-28 PROCEDURE — 71275 CT ANGIOGRAPHY CHEST: CPT

## 2020-11-28 PROCEDURE — 6360000004 HC RX CONTRAST MEDICATION: Performed by: RADIOLOGY

## 2020-11-28 RX ORDER — 0.9 % SODIUM CHLORIDE 0.9 %
1000 INTRAVENOUS SOLUTION INTRAVENOUS ONCE
Status: COMPLETED | OUTPATIENT
Start: 2020-11-28 | End: 2020-11-28

## 2020-11-28 RX ADMIN — SODIUM CHLORIDE 1000 ML: 9 INJECTION, SOLUTION INTRAVENOUS at 22:01

## 2020-11-28 RX ADMIN — IOPAMIDOL 75 ML: 755 INJECTION, SOLUTION INTRAVENOUS at 21:18

## 2020-11-28 RX ADMIN — SODIUM CHLORIDE 1000 ML: 9 INJECTION, SOLUTION INTRAVENOUS at 19:44

## 2020-11-29 ASSESSMENT — ENCOUNTER SYMPTOMS
COLOR CHANGE: 0
RHINORRHEA: 0
VOMITING: 0
ABDOMINAL DISTENTION: 0
CHEST TIGHTNESS: 0
NAUSEA: 0
APNEA: 0
VOICE CHANGE: 0
TROUBLE SWALLOWING: 0
SHORTNESS OF BREATH: 0
CHOKING: 0
COUGH: 1
WHEEZING: 0

## 2020-11-29 NOTE — ED PROVIDER NOTES
nursing note reviewed. Constitutional:       General: He is not in acute distress. Appearance: Normal appearance. He is well-developed. He is obese. He is not ill-appearing or diaphoretic. HENT:      Head: Normocephalic and atraumatic. Nose: Nose normal.      Mouth/Throat:      Mouth: Mucous membranes are moist.      Pharynx: Oropharynx is clear. Eyes:      Extraocular Movements: Extraocular movements intact. Pupils: Pupils are equal, round, and reactive to light. Neck:      Musculoskeletal: Normal range of motion. Cardiovascular:      Rate and Rhythm: Normal rate and regular rhythm. Pulses: Normal pulses. Heart sounds: Normal heart sounds. No murmur. Pulmonary:      Effort: Pulmonary effort is normal. No respiratory distress. Breath sounds: Normal breath sounds. No stridor. No decreased breath sounds, wheezing, rhonchi or rales. Chest:      Chest wall: No tenderness. Abdominal:      General: Bowel sounds are normal.      Palpations: Abdomen is soft. Tenderness: There is no abdominal tenderness. There is no guarding or rebound. Musculoskeletal: Normal range of motion. General: No swelling or deformity. Right lower leg: No edema. Left lower leg: No edema. Skin:     General: Skin is warm and dry. Capillary Refill: Capillary refill takes less than 2 seconds. Coloration: Skin is not jaundiced. Neurological:      General: No focal deficit present. Mental Status: He is alert and oriented to person, place, and time. Cranial Nerves: No cranial nerve deficit.       Coordination: Coordination normal.   Psychiatric:         Mood and Affect: Mood normal.          Procedures     MDM  Number of Diagnoses or Management Options  COVID-19 virus test result unknown:   Hyperglycemia:   Suspected COVID-19 virus infection:   Diagnosis management comments: 41-year-old male with a past medical history of diabetes on Metformin is complaining of cough, shortness of breath, and chest pain for the last 4 days. Vitals within normal limits. On physical exam patient is in no acute distress, speaking full sentences, alert and oriented x3. Lungs are clear to auscultation bilaterally. No wheezes rales rhonchi. Normal S1-S2. No murmurs rubs gallops. Abdomen soft nontender, no rebound or guarding. Negative CVA tenderness bilaterally. No bilateral leg edema. Diagnostic labs are unremarkable. Except for an elevated glucose of 400. As well as a elevated D-dimer. A CTA was ordered as patient was tachycardic and there was concern for pulmonary embolism. Imaging is negative for pulmonary embolism but the patient does have bilateral patchy infiltrates. Concerning for Covid. No clear consolidations were visualized so patient is not getting antibiotic therapy as an outpatient. He was informed to socially distance until he is asymptomatic and has a negative result. He is agreeable with the plan. Patient discharged home with follow-up with a primary care physician. Amount and/or Complexity of Data Reviewed  Decide to obtain previous medical records or to obtain history from someone other than the patient: yes           Patient is awake alert, hemodynamic stable, afebrile and in no respiratory distress. Discussed with patient plan for close outpatient follow-up with the patient's PCP as well as return precautions and the patient understands and agrees to the plan. ED Course as of Nov 29 0214   Sat Nov 28, 2020   1920 EKG: This EKG is signed and interpreted by me. Rate: 113  Rhythm: Sinus  Interpretation: Sinus tachycardia, normal [place] interval, normal QRS, normal QT interval, no acute ST or T wave changes  Comparison: no previous EKG available        [JA]   2211 Patient's symptoms are consistent with suspected COVID-19 infection. Patient has stable vital signs and good oxygen saturations at rest and with ambulation.  No evidence of respiratory distress. Baseline mentation. No acute emergent findings in the ED. Patient is appropriate for outpatient management and PCP follow up. Supportive care instructions and strict ED return precautions discussed. [JA]      ED Course User Index  [RENEE] Mykel Khanna MD           --------------------------------------------- PAST HISTORY ---------------------------------------------  Past Medical History:  has a past medical history of Diabetes mellitus ([facility] [address] Hypertension, and Thyroid disease. Past Surgical History:  has a past surgical history that includes Appendectomy (3/23/2016) and Finger surgery (Left, 8/24/2020). Social History:  reports that he has never smoked. He has never used smokeless tobacco. He reports that he does not drink alcohol or use drugs. Family History: family history is not on file. The patients home medications have been reviewed.     Allergies: Norco [hydrocodone-acetaminophen]    -------------------------------------------------- RESULTS -------------------------------------------------  Labs:  Results for orders placed or performed during the hospital encounter of 11/28/20   CBC Auto Differential   Result Value Ref Range    WBC 5.6 4.5 - 11.5 E9/L    RBC 4.80 3.80 - 5.80 E12/L    Hemoglobin 14.0 12.5 - 16.5 g/dL    Hematocrit 40.6 37.0 - 54.0 %    MCV 84.6 80.0 - 99.9 fL    MCH 29.2 26.0 - 35.0 pg    MCHC 34.5 32.0 - 34.5 %    RDW 12.8 11.5 - 15.0 fL    Platelets 736 535 - 419 E9/L    MPV 10.6 7.0 - 12.0 fL    Neutrophils % 65.4 43.0 - 80.0 %    Immature Granulocytes % 0.4 0.0 - 5.0 %    Lymphocytes % 22.4 20.0 - 42.0 %    Monocytes % 11.4 2.0 - 12.0 %    Eosinophils % 0.2 0.0 - 6.0 %    Basophils % 0.2 0.0 - 2.0 %    Neutrophils Absolute 3.68 1.80 - 7.30 E9/L    Immature Granulocytes # 0.02 E9/L    Lymphocytes Absolute 1.26 (L) 1.50 - 4.00 E9/L    Monocytes Absolute 0.64 0.10 - 0.95 E9/L    Eosinophils Absolute 0.01 (L) 0.05 - 0.50 E9/L    Basophils Absolute 0.01 0.00 - 0.20 E9/L   Comprehensive Metabolic Panel w/ Reflex to MG   Result Value Ref Range    Sodium 132 132 - 146 mmol/L    Potassium reflex Magnesium 4.1 3.5 - 5.0 mmol/L    Chloride 95 (L) 98 - 107 mmol/L    CO2 25 22 - 29 mmol/L    Anion Gap 12 7 - 16 mmol/L    Glucose 476 (H) 74 - 99 mg/dL    BUN 7 6 - 20 mg/dL    CREATININE 1.0 0.7 - 1.2 mg/dL    GFR Non-African American >60 >=60 mL/min/1.73    GFR African American >60     Calcium 8.8 8.6 - 10.2 mg/dL    Total Protein 7.0 6.4 - 8.3 g/dL    Alb 3.9 3.5 - 5.2 g/dL    Total Bilirubin 0.6 0.0 - 1.2 mg/dL    Alkaline Phosphatase 81 40 - 129 U/L    ALT 19 0 - 40 U/L    AST 19 0 - 39 U/L   Troponin   Result Value Ref Range    Troponin <0.01 0.00 - 0.03 ng/mL   D-Dimer, Quantitative   Result Value Ref Range    D-Dimer, Quant 279 ng/mL DDU   Covid-19 Ambulatory   Result Value Ref Range    Source NP swab    EKG 12 Lead   Result Value Ref Range    Ventricular Rate 113 BPM    Atrial Rate 113 BPM    P-R Interval 138 ms    QRS Duration 90 ms    Q-T Interval 322 ms    QTc Calculation (Bazett) 441 ms    P Axis 64 degrees    R Axis 51 degrees    T Axis 33 degrees     EKG read by me. Heart rate 113. Sinus tachycardia. Normal axis deviation. No QTC prolongation. No ST elevations or depressions. Stable as compared to previous EKG on 8/24/2020. Radiology:  CTA PULMONARY W CONTRAST   Final Result   1. No evidence of acute pulmonary embolism. 2. Multifocal bilateral lung mild ill-defined consolidation suggesting   pneumonia. XR CHEST PORTABLE   Final Result   No acute process. ------------------------- NURSING NOTES AND VITALS REVIEWED ---------------------------  Date / Time Roomed:  11/28/2020  7:10 PM  ED Bed Assignment:  03/03    The nursing notes within the ED encounter and vital signs as below have been reviewed.    /87   Pulse 98   Temp 98.6 °F (37 °C) (Oral)   Resp 18   Ht 5' 11\" (1.803 m)   Wt 225 lb (102.1 kg)   SpO2 98%   BMI 31.38 kg/m²   Oxygen Saturation Interpretation: normal      ------------------------------------------ PROGRESS NOTES ------------------------------------------    I have spoken with the patient and discussed todays results, in addition to providing specific details for the plan of care and counseling regarding the diagnosis and prognosis. Their questions are answered at this time and they are agreeable with the plan. I discussed at length with them reasons for immediate return here for re evaluation. They will followup with their PCP by calling their office tomorrow. --------------------------------- ADDITIONAL PROVIDER NOTES ---------------------------------  At this time the patient is without objective evidence of an acute process requiring hospitalization or inpatient management. They have remained hemodynamically stable throughout their entire ED visit and are stable for discharge with outpatient follow-up. The plan has been discussed in detail and they are aware of the specific conditions for emergent return, as well as the importance of follow-up. Discharge Medication List as of 11/28/2020 11:20 PM          Diagnosis:  1. Suspected COVID-19 virus infection    2. Hyperglycemia    3. COVID-19 virus test result unknown        Disposition:  Patient's disposition: Discharge to home  Patient's condition is stable.       Kristie Nolan MD  Resident  11/29/20 0535

## 2020-11-29 NOTE — ED NOTES
Discharge instructions reviewed with pt, no concerns. Dr Person Host okay to d/c home at this time.      Susanna Silva RN  11/28/20 [id number]

## 2020-11-30 ENCOUNTER — CARE COORDINATION (OUTPATIENT)
Dept: CASE MANAGEMENT | Age: 40
End: 2020-11-30

## 2020-11-30 LAB
SARS-COV-2: DETECTED
SOURCE: ABNORMAL

## 2020-11-30 NOTE — CARE COORDINATION
Covid-19 Outreach call, no answer.   Left VM with contact information and request  for return call at [address] [facility], [address] [facility] Coordination Transition

## 2020-12-01 ENCOUNTER — CARE COORDINATION (OUTPATIENT)
Dept: CARE COORDINATION | Age: 40
End: 2020-12-01

## 2020-12-01 ENCOUNTER — CARE COORDINATION (OUTPATIENT)
Dept: CASE MANAGEMENT | Age: 40
End: 2020-12-01

## 2020-12-01 NOTE — CARE COORDINATION
Patient contacted regarding COVID-19 risk and screening. Discussed COVID-19 related testing which was available at this time. Test results were positive. Patient informed of results, if available? Yes. Care Transition Nurse/ Ambulatory Care Manager contacted the patient by telephone to perform follow-up assessment. Verified name and  with patient as identifiers. Patient has following risk factors of: no known risk factors. Symptoms reviewed with patient who verbalized the following symptoms: cough. Due to no new or worsening symptoms encounter was not routed to provider for escalation. Education provided regarding infection prevention, and signs and symptoms of COVID-19 and when to seek medical attention with patient who verbalized understanding. Discussed exposure protocols and quarantine from [address] you at higher risk for severe illness  and given an opportunity for questions and concerns. The patient agrees to contact the COVID-19 hotline [phone] or PCP office for questions related to their healthcare. CTN/ACM provided contact information for future reference. From CDC: Are you at higher risk for severe illness?  Wash your hands often.  Avoid close contact (6 feet, which is about two arm lengths) with people who are sick.  Put distance between yourself and other people if COVID-19 is spreading in your community.  Clean and disinfect frequently touched surfaces.  Avoid all cruise travel and non-essential air travel.  Call your healthcare professional if you have concerns about COVID-19 and your underlying condition or if you are sick. For more information on steps you can take to protect yourself, see CDC's How to [id number] [place] [address] for follow-up call in 5-7 days based on severity of symptoms and risk factors. Patient states he is feeling much better at this time. Covid 19 Results given to Patient (POSITIVE).     Patient was instructed to rest, drink plenty of liquids, eat healthy diet, Tylenol as needed for body aches, fever, and headache. If you have a sore throat then gargle with warm salt water. Report any worsening symptoms to PCP and  Go to ER or call 911 if symptoms severe. Patient expresses understanding. Reviewed CDC Covid Protocol for [employer]. Patient has all medications is taking medications as directed and has no questions concerning medications at this time. Will continue to follow.     Johnny Champagne LPN    [phone]  [employer] [facility] [address] Coordinator

## 2020-12-01 NOTE — CARE COORDINATION
Spoke with patient, states he is feeling better than he was. Patient indicated he has not received his results. Call ended and hand off made for nurse call.     Nusrat Castro, [address]  Care Coordination Transition

## 2020-12-02 ENCOUNTER — TELEPHONE (OUTPATIENT)
Dept: ORTHOPEDIC SURGERY | Age: 40
End: 2020-12-02

## 2020-12-10 ENCOUNTER — TREATMENT (OUTPATIENT)
Dept: OCCUPATIONAL THERAPY | Age: 40
End: 2020-12-10
Payer: COMMERCIAL

## 2020-12-10 ENCOUNTER — OFFICE VISIT (OUTPATIENT)
Dept: ORTHOPEDIC SURGERY | Age: 40
End: 2020-12-10
Payer: COMMERCIAL

## 2020-12-10 VITALS — WEIGHT: 225 LBS | TEMPERATURE: 97 F | BODY MASS INDEX: 31.5 KG/M2 | HEIGHT: 71 IN

## 2020-12-10 PROCEDURE — 97110 THERAPEUTIC EXERCISES: CPT | Performed by: OCCUPATIONAL THERAPIST

## 2020-12-10 PROCEDURE — 97140 MANUAL THERAPY 1/> REGIONS: CPT | Performed by: OCCUPATIONAL THERAPIST

## 2020-12-10 PROCEDURE — 99212 OFFICE O/P EST SF 10 MIN: CPT | Performed by: ORTHOPAEDIC SURGERY

## 2020-12-10 PROCEDURE — 97022 WHIRLPOOL THERAPY: CPT | Performed by: OCCUPATIONAL THERAPIST

## 2020-12-10 NOTE — PROGRESS NOTES
with place and hold ext - to do frequently during the day. Gentle this date. Blocked MP flex/ext  All fingers together. 2 sets x10. Also did each IF and MF alone and RF and SF together for  Pt to feel the tendon working. Gentle this date. Finger adduc/abduction x A to abduction RF - did with place and hold. IF alone. Wrist all planes  Stiff in wrist flexion - doing active only   LUE x Completed flexion/extension of PIP/DIP 3  sets x10 within pt's tolerance. Focus on extension/flexion of 3rd digit. Completed with A2 pulley splint donned- or therapist supporting this area. Hook/fist and target guiding x x10 to complete hook/fist using a pen with pt educated on proper positioning throughout. Added to HEP   -Red foam tube: placing half tube into palm and completing flexion of all digits to touch tube. Pt educated to pull down with DIP to assist with improving flexion and touching tube. Extensor tendon digit holds x x10 with palm down: therapist bringing 3rd digit up and pt was to hold for 3 seconds with fair tolerance, HEP within pain tolerance. AAROM:               PROM/Stretching:     L MF PIP ext/Flexion x Gentle completed to digits into slight extension with slight pain reported from palm incision. More tension to PIP ext   Finger flexion IF, RF and SF x R and SF together, IF alone. To do at home so he can loosen fingers up without the confines of the MF   MP flexion PROM with IP flex/ext X 3 sets of 5 rep's  Passively flex MP's while pt bends and straigthens his IP's. HEP   Towel scrunches x 5 mins added to HEP   L MF DIP flexion  Added to HEP also   Wrist flex. Ext -   Stiff end range. Scar Mass/Edema Control:     Retrograde massage/Scar tissue massage  On dorsal portion down digits, hand and wrist throughout session and in-between exercises. elastomere scar forms    To wear with night splint. IF scar at MP and MF in palm. Fabrciated a new elastomere form with an open area over the wound. Gel compression sheet  Med/lg, to wear and hr 1 -2 x's a day IF to decrease scar. Strengthenin# wt   Wrist flex, wrist ext, RD - UD . Pt able to hold onto wt. Table top, bend at pip than extend digits while in table top and then full extend with slight resistance. x10 with fair tolerance and slight resistance provided for isometrics strengthening. Hand gripper  One yellow rubber hand, working on full composite fist and bringing the gripper in joint by joint, DIP, PIP and MCPs   Yellow therabar  15x Pronation and supination, full composite fist         Other:     Metacarpal splint D/C  Re- molded splint for a better fit to be worn out and about and at night as needed. IF, MF ext splint  x Fabricated to wear at night with his elastomere gel pads to stretch scar tissue and PIP jt. . Made a new elastomere form leaving an open area for his wound to breathe. Pt can try wearing at night. To bring in next session to readjust to ^ ext pull. LMB X  For IF and MF. Wear 10 min to 30 for a total of 1 hr a day. A2 pulley splint  D/C Fabricated an A2 pulley splint with longer end for easy removal of splint after exercises completed. Pt may require adjustments due to edema on 3rd digit. Ext tube IF  D/c To wear occasionally during the day for an hour. HEP  X  Cont with, added MP flexion while flex/ext IP's - can use frap strap also, changed PIP ext from ext tube to LMB splints. Ext tube splint MF D/C Wear occasionally during the day for an hour       frap strap x Can be used to work full finger flexion and or hold MP's down with IP flex. ext- pt to play around with   Wound Care x May be closed but pt states he has occasional drainage. Splint adjustments  Completed for improved comfort and postioning due to decreasing edema. ^;ed space around  MP's     Assessment/Comments: Pt is making Fair progress toward stated plan of care and established goals.  Pt demonstrated significant improvements with ROM and reports he has been able to lift objects with his hand. Pt demonstrated increased ROM, and is able to almost make a full fist. Added hook/fist exercises to his HEP and issued red foam tube to assist with target flexion exercise. Pt continues to progress towards goals and will continue with strengthening next session after strength measurements are taken at this session. R Upper Extremity ROM       AROM [x]? ?     PROM[]?? IE  10/22/2020 12/10/2020         WRIST Flexion 0-70* 23*  70*  75*       Extension 0-80* 34*  54*  60*       Radial Deviation 0-20* 18*  20*  20*       Ulnar Deviation 0-30* 20*  30*  30*        R Hand ROM    AROM [x]? ?         PROM[]? ?    10/22/2020 12/10/2020       2nd Digit MCP Flexion/Extension */ 0-45 H TBA 46* /*23  70*/0*     PIP Flexion/Extension 100*/ 0 57*/- 29* 65*/34*  95*/-25*     DIP Flexion/Extension 70-90*/0 32*/- 23* 32* /0*  60*/0*        L MF     AROM [x]? ?         PROM[]?? 10/12/20 10/15/20   10/22/2020 12/10/2020        MF Digit MCP Extension/Flexion   0-45 H /* -30*/50* -20*/47*  14*/46*  0*/64*     PIP Extension/Flexion   0*/100* -55*/62* -50*/ 60* 51*/53*  -40*/ 82*     DIP Extension/Flexion   0*/70-90* Wiggle  P - 40*  wiggle   0*/25*  0*/55*      4th Digit MCP Flexion/Extension */ 0-45 H TBA  31*/0*    60*/0*     PIP Flexion/Extension 100*/ 0 70*/- 30* 72* /0*   95*/0*      DIP Flexion/Extension 70-90*/0 41*/0* 60* /0*   63*/0*          5th Digit MCP Flexion/Extension */ 0-45 H TBA 23*/ 0*   66* /0*     PIP Flexion/Extension 100*/ 0 74*. +  /- 4* 80* /0*   95* /0*     DIP Flexion/Extension 70-90*/0 46*/0*  60*/0*   70* /0*         AROM   IF - PIP -34*   PROM:  PIP flexion ^'ed from -38* to -32* by end of session.  End of session 10/15/20 - PROM -27*       -Rehab Potential: Good  -Requires OT Follow Up: Yes  Time In: 3:00 pm     Time Out: 4:00 pm            Visit #: 5 ( 18 session used already)d    Treatment Charges: Mins Time in - Time out  Units   Modalities- fluiothe 15 3:00 pm - 3:15  pm 1   Ther Exercise 30 3:15 pm - 3:45 pm 2   Manual Therapy 15 3:45 pm - 4:00 pm 1   Thera Activities      ADL/Home Mgt       Neuro Re-education      Gait Training      Group Therapy      Non-Billable Service Time MHP      Other splint lidia      Total Time/Units 60  4     -Response to Treatment: Pt  Grant Belling to get his finger moving better into a fist.   Goals: Goals for pt can be see on initial eval occurring on 09/08/2020 and on 10/22/2020, 12/10/2020    Plan:   [x]  Continues Plan of care: 3x/week for 6 weeks until 01/18/2021. Treatment covered based on POC and graduated to patient's progress. Pt education continues at each visit to obtain maximum benefits from skilled OT intervention.   []  Alter Plan of care:   []  Discharge:      Shameka Rouse Brad 86, OTR/L #[id number] Satisfactory

## 2020-12-15 ENCOUNTER — TREATMENT (OUTPATIENT)
Dept: OCCUPATIONAL THERAPY | Age: 40
End: 2020-12-15
Payer: COMMERCIAL

## 2020-12-15 PROCEDURE — 97110 THERAPEUTIC EXERCISES: CPT | Performed by: OCCUPATIONAL THERAPIST

## 2020-12-15 PROCEDURE — 97022 WHIRLPOOL THERAPY: CPT | Performed by: OCCUPATIONAL THERAPIST

## 2020-12-15 PROCEDURE — 97140 MANUAL THERAPY 1/> REGIONS: CPT | Performed by: OCCUPATIONAL THERAPIST

## 2020-12-16 ENCOUNTER — TREATMENT (OUTPATIENT)
Dept: OCCUPATIONAL THERAPY | Age: 40
End: 2020-12-16
Payer: COMMERCIAL

## 2020-12-16 PROCEDURE — 97110 THERAPEUTIC EXERCISES: CPT | Performed by: OCCUPATIONAL THERAPIST

## 2020-12-16 PROCEDURE — 97022 WHIRLPOOL THERAPY: CPT | Performed by: OCCUPATIONAL THERAPIST

## 2020-12-16 PROCEDURE — 97140 MANUAL THERAPY 1/> REGIONS: CPT | Performed by: OCCUPATIONAL THERAPIST

## 2020-12-16 NOTE — PROGRESS NOTES
OCCUPATIONAL THERAPY PROGRESS NOTE    Date:  2020   Initial Evaluation Date: 2020                          Evaluating Therapist: Carlin Pérez     Patient Name:  Damari Woods                  :  1980     Restrictions/Precautions:  ROM as tolerated; modalities prn, low fall risk  Diagnosis:S62.613B (ICD-10-CM) - Displaced fracture of proximal phalanx of left middle finger, initial encounter for open fracture   Excisional debridement of left palm, index and middle finger with open reduction internal fixation of left middle finger proximal phalanx fracture with middle finger A2 pulley repair. Insurance/Certification information:[facility]  Claim # [id number]  Referring Practitioner: Dr. Kaye Alcazar  Date of Surgery/Injury: 2020  Plan of care signed (Y/N): Y    Visit# / total visits:  - (used 18 visits to date) 3x/week for 6 weeks ( total of 18 visits) until 2021    COVID-19 Screening completed upon entering facility and patient cleared for treatment today. Pt followed all protocols set forth by [facility] throughout therapy session. Patient has been made aware of all new hygiene protocols due to COVID-19 set forth by [facility] and agrees to abide by all protocols. Pain Level:  discomfort    Subjective: Pt states no new changes     Objective:  Updated POC to be completed by  visit. INTERVENTION: COMPLETED: SPECIFICS/COMMENTS:   Modality:     MHP  Hand wrapped in during wound soak for soft tissue warm-up   US x Scar management in the palm of LUE  Intensity: 0.8  Duration: 8 mins  Duty Cycle: 100%  Depth: 3.3 MHz   fluiotherapy x For soft tissue warm-up while intermittently moving his wrist and fingers. For desensitization also. 20 mins   Ice   End of session to decrease pain.     AROM:     Towel scrunches x 5 mins to LUE Blocked PIP flex/ext  MF but other fingers can move with it if he wants 2 sets x10  And all fingers together. PIP ext MF with place and hold ext - to do frequently during the day. Gentle this date. Blocked MP flex/ext  All fingers together. 2 sets x10. Also did each IF and MF alone and RF and SF together for  Pt to feel the tendon working. Gentle this date. Wristisizer x 5 mins with LUE with full grasp throughout. Finger adduc/abduction  A to abduction RF - did with place and hold. IF alone. - Added yellow sponge to complete on either side of 3rd digit. x10   Wrist all planes  Stiff in wrist flexion - doing active only   LUE x Completed flexion/extension of PIP/DIP 3  sets x10 within pt's tolerance. Focus on extension/flexion of 3rd digit. Completed with A2 pulley splint donned- or therapist supporting this area. Hook/fist and target guiding  x10 to complete hook/fist using a pen with pt educated on proper positioning throughout. Added to HEP   -Red foam tube: placing half tube into palm and completing flexion of all digits to touch tube. Pt educated to pull down with DIP to assist with improving flexion and touching tube. Extensor tendon digit holds  x10 with palm down: therapist bringing 3rd digit up and pt was to hold for 3 seconds with fair tolerance, HEP within pain tolerance. AAROM:               PROM/Stretching:     L MF PIP ext/Flexion x Gentle completed to digits into slight extension with slight pain reported from palm incision. More tension to PIP ext   Finger flexion IF, RF and SF x R and SF together, IF alone. To do at home so he can loosen fingers up without the confines of the MF   MP flexion PROM with IP flex/ext  Passively flex MP's while pt bends and straigthens his IP's. HEP   Towel scrunches  5 mins added to HEP   L MF DIP flexion  Added to HEP also   Wrist flex. Ext -   Stiff end range.     Scar Mass/Edema Control: Retrograde massage/Scar tissue massage x On dorsal portion down digits, hand and wrist and also using small massager to break up scar tissue. elastomere scar forms    To wear with night splint. IF scar at MP and MF in palm. Fabrciated a new elastomere form with an open area over the wound. Gel compression sheet  Med/lg, to wear and hr 1 -2 x's a day IF to decrease scar. Strengthenin# wt   Wrist flex, wrist ext, RD - UD . Increased weight    Table top, bend at pip than extend digits while in table top and then full extend with slight resistance. x10 with fair tolerance and slight resistance provided for isometrics strengthening. Hand gripper  One yellow rubber hand, working on full composite fist and bringing the gripper in joint by joint, DIP, PIP and MCPs   Yellow therabar  15x Pronation and supination, full composite fist    Red foam block  2 sets x10 : composite fist, claw, middle and ring finger press, IF & F/thumb press. Other:     Metacarpal splint D/C  Re- molded splint for a better fit to be worn out and about and at night as needed. IF, MF ext splint  x Fabricated to wear at night with his elastomere gel pads to stretch scar tissue and PIP jt. . Made a new elastomere form leaving an open area for his wound to breathe. Pt can try wearing at night. To bring in next session to readjust to ^ ext pull. LMB X  For IF and MF. Wear 10 min to 30 for a total of 1 hr a day. A2 pulley splint  D/C Fabricated an A2 pulley splint with longer end for easy removal of splint after exercises completed. Pt may require adjustments due to edema on 3rd digit. Ext tube IF  D/c To wear occasionally during the day for an hour. HEP  X  Cont with, added MP flexion while flex/ext IP's - can use frap strap also, changed PIP ext from ext tube to LMB splints.          Ext tube splint MF D/C Wear occasionally during the day for an hour AROM   IF - PIP -34*   PROM:  PIP flexion ^'ed from -38* to -32* by end of session. End of session 10/15/20 - PROM -27*     Dynamometer (setting 2):                                    Left: 38#                                              Right: 107#                    Pinch Meter:              Lateral: Left=15# ,      Right=23#                Palmar 3 point: Left=9# ,      Right=20#       -Rehab Potential: Good  -Requires OT Follow Up: Yes  Time In: 3:00 pm     Time Out: 4:00 pm            Visit #: 6 ( 18 session used already)    Treatment Charges: Mins Time in - Time out  Units   Modalities- fluiothe 15 3:00 pm - 3:15  pm 1   Ther Exercise 30 3:15 pm - 3:45 pm 2   Manual Therapy 15 3:45 pm - 4:00 pm 1   Thera Activities      ADL/Home Mgt       Neuro Re-education      Gait Training      Group Therapy      Non-Billable Service Time MHP      Other splint lidia      Total Time/Units 60  4     -Response to Treatment: Pt  [place] to get his finger moving better into a fist.   Goals: Goals for pt can be see on initial eval occurring on 09/08/2020 and on 10/22/2020, 12/10/2020    Plan:   [x]  Continues Plan of care: 3x/week for 6 weeks until 01/18/2021. Treatment covered based on POC and graduated to patient's progress. Pt education continues at each visit to obtain maximum benefits from skilled OT intervention.   []  Alter Plan of care:   []  Discharge:      Juan Antonio Rouse Brad 86, OTR/L #[id number]

## 2020-12-17 ENCOUNTER — TREATMENT (OUTPATIENT)
Dept: OCCUPATIONAL THERAPY | Age: 40
End: 2020-12-17
Payer: COMMERCIAL

## 2020-12-17 PROCEDURE — 97140 MANUAL THERAPY 1/> REGIONS: CPT | Performed by: OCCUPATIONAL THERAPIST

## 2020-12-17 PROCEDURE — 97110 THERAPEUTIC EXERCISES: CPT | Performed by: OCCUPATIONAL THERAPIST

## 2020-12-17 PROCEDURE — 97022 WHIRLPOOL THERAPY: CPT | Performed by: OCCUPATIONAL THERAPIST

## 2020-12-18 NOTE — PROGRESS NOTES
Retrograde massage/Scar tissue massage x On dorsal portion down digits, hand and wrist and also using small massager to break up scar tissue. elastomere scar forms    To wear with night splint. IF scar at MP and MF in palm. Fabrciated a new elastomere form with an open area over the wound. Gel compression sheet  Med/lg, to wear and hr 1 -2 x's a day IF to decrease scar. Strengthenin# wt  x Wrist flex, wrist ext, RD - UD . Increased weight    Table top, bend at pip than extend digits while in table top and then full extend with slight resistance. x10 with fair tolerance and slight resistance provided for isometrics strengthening. Hand gripper  One yellow rubber hand, working on full composite fist and bringing the gripper in joint by joint, DIP, PIP and MCPs   Yellow therabar x 15x Pronation and supination, full composite fist    Red foam block x 2 sets x10 : composite fist, claw, middle and ring finger press, IF & F/thumb press. Other:     Metacarpal splint D/C  Re- molded splint for a better fit to be worn out and about and at night as needed. IF, MF ext splint  x Fabricated to wear at night with his elastomere gel pads to stretch scar tissue and PIP jt. . Made a new elastomere form leaving an open area for his wound to breathe. Pt can try wearing at night. To bring in next session to readjust to ^ ext pull. LMB X  For IF and MF. Wear 10 min to 30 for a total of 1 hr a day. A2 pulley splint  D/C Fabricated an A2 pulley splint with longer end for easy removal of splint after exercises completed. Pt may require adjustments due to edema on 3rd digit. Ext tube IF  D/c To wear occasionally during the day for an hour. HEP  X  Cont with, added MP flexion while flex/ext IP's - can use frap strap also, changed PIP ext from ext tube to LMB splints.          Ext tube splint MF D/C Wear occasionally during the day for an hour frap strap x Can be used to work full finger flexion and or hold MP's down with IP flex. ext- pt to play around with   Wound Care x May be closed but pt states he has occasional drainage. Splint adjustments  Completed for improved comfort and postioning due to decreasing edema. ^;ed space around  MP's     Assessment/Comments: Pt is making Fair progress toward stated plan of care and established goals. Pt continued with strengthening with good tolerance. Continued to focus on wrist and hand strengthening and continuing to avoid individual finger strengthening due to continued healing of fracture site. Pt was able to touch his palm this date with his MF and did attempt to work on extension however continues to demonstrate inability to complete extension. R Upper Extremity ROM       AROM [x]? ?     PROM[]?? IE  10/22/2020 12/10/2020         WRIST Flexion 0-70* 23*  70*  75*       Extension 0-80* 34*  54*  60*       Radial Deviation 0-20* 18*  20*  20*       Ulnar Deviation 0-30* 20*  30*  30*        R Hand ROM    AROM [x]? ?         PROM[]?? IE   10/22/2020 12/10/2020       2nd Digit MCP Flexion/Extension */ 0-45 H TBA 46* /*23  70*/0*     PIP Flexion/Extension 100*/ 0 57*/- 29* 65*/34*  95*/-25*     DIP Flexion/Extension 70-90*/0 32*/- 23* 32* /0*  60*/0*        L MF     AROM [x]? ?         PROM[]?? 10/12/20 10/15/20   10/22/2020 12/10/2020        MF Digit MCP Extension/Flexion   0-45 H /* -30*/50* -20*/47*  14*/46*  0*/64*     PIP Extension/Flexion   0*/100* -55*/62* -50*/ 60* 51*/53*  -40*/ 82*     DIP Extension/Flexion   0*/70-90* Wiggle  P - 40*  wiggle   0*/25*  0*/55*      4th Digit MCP Flexion/Extension */ 0-45 H TBA  31*/0*    60*/0*     PIP Flexion/Extension 100*/ 0 70*/- 30* 72* /0*   95*/0*      DIP Flexion/Extension 70-90*/0 41*/0* 60* /0*   63*/0*          5th Digit MCP Flexion/Extension */ 0-45 H TBA 23*/ 0*   66* /0*     PIP Flexion/Extension 100*/ 0 74*.  + /- 4* 80* /0*   95* /0*     DIP Flexion/Extension 70-90*/0 46*/0*  60*/0*   70* /0*         AROM   IF - PIP -34*   PROM:  PIP flexion ^'ed from -38* to -32* by end of session. End of session 10/15/20 - PROM -27*     Dynamometer (setting 2):                                    Left: 38#                                              Right: 107#                    Pinch Meter:              Lateral: Left=15# ,      Right=23#                Palmar 3 point: Left=9# ,      Right=20#       -Rehab Potential: Good  -Requires OT Follow Up: Yes  Time In: 3:00 pm     Time Out: 4:00 pm            Visit #: 8 ( 18 session used already)    Treatment Charges: Mins Time in - Time out  Units   Modalities- fluiothe 15 3:00 pm - 3:15  pm 1   Ther Exercise 30 3:15 pm - 3:45 pm 2   Manual Therapy 15 3:45 pm - 4:00 pm 1   Thera Activities      ADL/Home Mgt       Neuro Re-education      Gait Training      Group Therapy      Non-Billable Service Time MHP      Other splint lidia      Total Time/Units 60  4     -Response to Treatment: Pt  Fabiola Kohler to get his finger moving better into a fist.   Goals: Goals for pt can be see on initial eval occurring on 09/08/2020 and on 10/22/2020, 12/10/2020    Plan:   [x]  Continues Plan of care: 3x/week for 6 weeks until 01/18/2021. Treatment covered based on POC and graduated to patient's progress. Pt education continues at each visit to obtain maximum benefits from skilled OT intervention.   []  Alter Plan of care:   []  Discharge:      Ayad Rouse Brad 86, OTR/L #[id number]

## 2020-12-21 ENCOUNTER — TREATMENT (OUTPATIENT)
Dept: OCCUPATIONAL THERAPY | Age: 40
End: 2020-12-21
Payer: COMMERCIAL

## 2020-12-21 PROCEDURE — 97140 MANUAL THERAPY 1/> REGIONS: CPT | Performed by: OCCUPATIONAL THERAPIST

## 2020-12-21 PROCEDURE — 97110 THERAPEUTIC EXERCISES: CPT | Performed by: OCCUPATIONAL THERAPIST

## 2020-12-21 PROCEDURE — 97022 WHIRLPOOL THERAPY: CPT | Performed by: OCCUPATIONAL THERAPIST

## 2020-12-21 NOTE — PROGRESS NOTES
OCCUPATIONAL THERAPY PROGRESS NOTE    Date:  2020   Initial Evaluation Date: 2020                          Evaluating Therapist: Héctor Washington     Patient Name:  Chava Rojo                  :  1980     Restrictions/Precautions:  ROM as tolerated; modalities prn, low fall risk  Diagnosis:S62.613B (ICD-10-CM) - Displaced fracture of proximal phalanx of left middle finger, initial encounter for open fracture   Excisional debridement of left palm, index and middle finger with open reduction internal fixation of left middle finger proximal phalanx fracture with middle finger A2 pulley repair. Insurance/Certification information:[facility]  Claim # [id number]  Referring Practitioner: Dr. Forest Lux  Date of Surgery/Injury: 2020  Plan of care signed (Y/N): Y    Visit# / total visits:  - (used 18 visits to date) 3x/week for 6 weeks ( total of 18 visits) until 2021    COVID-19 Screening completed upon entering facility and patient cleared for treatment today. Pt followed all protocols set forth by [facility] for Outpatient services throughout therapy session. Patient has been made aware of all new hygiene protocols due to COVID-19 set forth by [facility] Outpatient services and agrees to abide by all protocols. Pain Level:  discomfort    Subjective: Pt states \"I am learning how to use this hand - even though I can't open all the way - I am getting use to not having that middle finger in the way. \"     Objective:  Updated POC to be completed by  visit. INTERVENTION: COMPLETED: SPECIFICS/COMMENTS:   Modality:     MHP  Hand wrapped in during wound soak for soft tissue warm-up   US x Scar management in the palm of KELSEY  Intensity: 0.8  Duration: 8 mins  Duty Cycle: 100%  Depth: 3.3 MHz   fluiotherapy x For soft tissue warm-up while intermittently moving his wrist and fingers. For desensitization also. 20 mins   Ice   End of session to decrease pain. AROM:     Towel scrunches  5 mins to LUE    Blocked PIP flex/ext x MF but other fingers can move with it if he wants 2 sets x10  And all fingers together. PIP ext MF with place and hold ext - to do frequently during the day. Blocked MP flex/ext x All fingers together. 2 sets x10. Also did each IF and MF alone and RF and SF together for  Pt to feel the tendon working. Wristisizer  5 mins with LUE with full grasp throughout. Finger adduc/abduction  A to abduction RF - did with place and hold. IF alone. - Added yellow sponge to complete on either side of 3rd digit. x10   Wrist all planes  Stiff in wrist flexion - doing active only   LUE x Completed flexion/extension of PIP/DIP 3  sets x10 within pt's tolerance. Focus on extension/flexion of 3rd digit. Completed with A2 pulley splint donned- or therapist supporting this area. Hook/fist and target guiding  x10 to complete hook/fist using a pen with pt educated on proper positioning throughout. Added to HEP   -Red foam tube: placing half tube into palm and completing flexion of all digits to touch tube. Pt educated to pull down with DIP to assist with improving flexion and touching tube. Extensor tendon digit holds  x10 with palm down: therapist bringing 3rd digit up and pt was to hold for 3 seconds with fair tolerance, HEP within pain tolerance. AAROM:               PROM/Stretching:     L MF PIP ext/Flexion x Gentle completed to digits into slight extension with slight pain reported from palm incision. More tension to PIP ext   Finger flexion IF, RF and SF x R and SF together, IF alone. To do at home so he can loosen fingers up without the confines of the MF   MP flexion PROM with IP flex/ext  Passively flex MP's while pt bends and straigthens his IP's. HEP   Towel scrunches  5 mins added to HEP   L MF DIP flexion  Added to HEP also   Wrist flex. Ext -   Stiff end range. Scar Mass/Edema Control:     Retrograde massage/Scar tissue massage x On dorsal portion down digits, hand and wrist and also using small massager to break up scar tissue. elastomere scar forms   x To wear with night splint. made a new form to wear at night with elastomere in his web spaces. .    Gel compression sheet  Med/lg, to wear and hr 1 -2 x's a day IF to decrease scar. Strengthenin# wt   Wrist flex, wrist ext, RD - UD . Increased weight    Table top, bend at pip than extend digits while in table top and then full extend with slight resistance. x10 with fair tolerance and slight resistance provided for isometrics strengthening. Yellow putty X- 5 min. Squeeze and manipulate using all fingers and thumb. Hand gripper  One yellow rubber hand, working on full composite fist and bringing the gripper in joint by joint, DIP, PIP and MCPs   Yellow therabar x 15x Pronation and supination, full composite fist    Red foam block  2 sets x10 : composite fist, claw, middle and ring finger press, IF & F/thumb press. Other:     Metacarpal splint D/C  Re- molded splint for a better fit to be worn out and about and at night as needed. IF, MF ext splint  x Fabricated to wear at night with his elastomere gel pads to stretch scar tissue and PIP jt. . Made a new elastomere form leaving an open area for his wound to breathe. Pt can try wearing at night. To bring in next session to readjust to ^ ext pull. LMB X  For IF and MF. Wear 10 min to 30 for a total of 1 hr a day. A2 pulley splint  D/C Fabricated an A2 pulley splint with longer end for easy removal of splint after exercises completed. Pt may require adjustments due to edema on 3rd digit. Ext tube IF  D/c To wear occasionally during the day for an hour. HEP  X  Cont with, added MP flexion while flex/ext IP's - can use frap strap also, changed PIP ext from ext tube to LMB splints.  Added yellow putty for gentle srengthening Ext tube splint MF D/C Wear occasionally during the day for an hour       frap strap  Can be used to work full finger flexion and or hold MP's down with IP flex. ext- pt to play around with   Wound Care  May be closed but pt states he has occasional drainage. Splint adjustments  Completed for improved comfort and postioning due to decreasing edema. ^;ed space around  MP's     Assessment/Comments: Pt is making Fair progress toward stated plan of care and established goals. Pt continued with strengthening with good tolerance. Continued to focus on wrist and hand strengthening and continuing to avoid individual finger strengthening due to continued healing of fracture site. Pt was able to touch his palm this date with his MF and did attempt to work on extension however continues to demonstrate inability to complete extension. R Upper Extremity ROM       AROM [x]? ?     PROM[]?? IE  10/22/2020 12/10/2020         WRIST Flexion 0-70* 23*  70*  75*       Extension 0-80* 34*  54*  60*       Radial Deviation 0-20* 18*  20*  20*       Ulnar Deviation 0-30* 20*  30*  30*        R Hand ROM    AROM [x]? ?         PROM[]?? IE   10/22/2020 12/10/2020       2nd Digit MCP Flexion/Extension */ 0-45 H TBA 46* /*23  70*/0*     PIP Flexion/Extension 100*/ 0 57*/- 29* 65*/34*  95*/-25*     DIP Flexion/Extension 70-90*/0 32*/- 23* 32* /0*  60*/0*        L MF     AROM [x]? ?         PROM[]?? 10/12/20 10/15/20   10/22/2020 12/10/2020        MF Digit MCP Extension/Flexion   0-45 H /* -30*/50* -20*/47*  14*/46*  0*/64*     PIP Extension/Flexion   0*/100* -55*/62* -50*/ 60* 51*/53*  -40*/ 82*     DIP Extension/Flexion   0*/70-90* Wiggle  P - 40*  wiggle   0*/25*  0*/55*      4th Digit MCP Flexion/Extension */ 0-45 H TBA  31*/0*    60*/0*     PIP Flexion/Extension 100*/ 0 70*/- 30* 72* /0*   95*/0*      DIP Flexion/Extension 70-90*/0 41*/0* 60* /0*   63*/0*         5th Digit MCP Flexion/Extension */ 0-45 H TBA 23*/ 0*   66* /0*     PIP Flexion/Extension 100*/ 0 74*. +  /- 4* 80* /0*   95* /0*     DIP Flexion/Extension 70-90*/0 46*/0*  60*/0*   70* /0*         AROM   IF - PIP -34*   PROM:  PIP flexion ^'ed from -38* to -32* by end of session. End of session 10/15/20 - PROM -27*     Dynamometer (setting 2):                                    Left: 38#                                              Right: 107#                    Pinch Meter:              Lateral: Left=15# ,      Right=23#                Palmar 3 point: Left=9# ,      Right=20#       -Rehab Potential: Good  -Requires OT Follow Up: Yes  Time In: 3:00 pm     Time Out: 4:00 pm            Visit #: 9 ( 18 session used already)    Treatment Charges: Mins Time in - Time out  Units   Modalities- fluiothe 15 3:00 pm - 3:15  pm 1   Ther Exercise 30 3:15 pm - 3:45 pm 2   Manual Therapy 15 3:45 pm - 4:00 pm 1   Thera Activities      ADL/Home Mgt       Neuro Re-education      Gait Training      Group Therapy      Non-Billable Service Time MHP      Other splint lidia      Total Time/Units 60  4     -Response to Treatment: Pt pleased that he is making a full fist. .   Goals: Goals for pt can be see on initial eval occurring on 09/08/2020 and on 10/22/2020, 12/10/2020    Plan:   [x]  Continues Plan of care: 3x/week for 6 weeks until 01/18/2021. Treatment covered based on POC and graduated to patient's progress. Pt education continues at each visit to obtain maximum benefits from skilled OT intervention.   []  Alter Plan of care:   []  Discharge:      Lana Courser  OT/L, CHT

## 2020-12-23 ENCOUNTER — TREATMENT (OUTPATIENT)
Dept: OCCUPATIONAL THERAPY | Age: 40
End: 2020-12-23
Payer: COMMERCIAL

## 2020-12-23 PROCEDURE — 97110 THERAPEUTIC EXERCISES: CPT | Performed by: OCCUPATIONAL THERAPIST

## 2020-12-23 PROCEDURE — 97140 MANUAL THERAPY 1/> REGIONS: CPT | Performed by: OCCUPATIONAL THERAPIST

## 2020-12-23 PROCEDURE — 97022 WHIRLPOOL THERAPY: CPT | Performed by: OCCUPATIONAL THERAPIST

## 2020-12-23 NOTE — PROGRESS NOTES
OCCUPATIONAL THERAPY PROGRESS NOTE    Date:  2020   Initial Evaluation Date: 2020                          Evaluating Therapist: Kori Garrison     Patient Name:  Izzy Giron  \"Henok\"               :  1980     Restrictions/Precautions:  ROM as tolerated; modalities prn, low fall risk  Diagnosis:S62.613B (ICD-10-CM) - Displaced fracture of proximal phalanx of left middle finger, initial encounter for open fracture   Excisional debridement of left palm, index and middle finger with open reduction internal fixation of left middle finger proximal phalanx fracture with middle finger A2 pulley repair. Insurance/Certification information:[facility]  Claim # [id number]  Referring Practitioner: Dr. Ilda Payan  Date of Surgery/Injury: 2020  Plan of care signed (Y/N): Y    Visit# / total visits:  10 / 25- (used 18 visits to date) 3x/week for 6 weeks ( total of 18 visits) until 2021    COVID-19 Screening completed upon entering facility and patient cleared for treatment today. Pt followed all protocols set forth by [facility] for Outpatient services throughout therapy session. Patient has been made aware of all new hygiene protocols due to COVID-19 set forth by [facility] Outpatient services and agrees to abide by all protocols. Pain Level:  discomfort    Subjective: Pt states \"My putty exercises are going real well. \" At end of session pt reports, \"I can get my hand flat on the table now. \"     Objective:  Updated POC to be completed by 20 th visit. INTERVENTION: COMPLETED: SPECIFICS/COMMENTS:   Modality:     MHP  Hand wrapped in during wound soak for soft tissue warm-up   US x Scar management in the palm of KELSEY  Intensity: 0.8  Duration: 8 mins  Duty Cycle: 100%  Depth: 3.3 MHz   fluiotherapy x For soft tissue warm-up while intermittently moving his wrist and fingers. For desensitization also.     15 mins Ice   End of session to decrease pain. AROM:     Towel scrunches  5 mins to LUE    Blocked PIP flex/ext x MF but other fingers can move with it if he wants 2 sets x10  And all fingers together. PIP ext MF with place and hold ext - to do frequently during the day. Blocked MP flex/ext x All fingers together. 2 sets x10. Also did each IF and MF alone and RF and SF together for  Pt to feel the tendon working. Wristisizer  5 mins with LUE with full grasp throughout. Finger adduc/abduction  A to abduction RF - did with place and hold. IF alone. - Added yellow sponge to complete on either side of 3rd digit. x10   Wrist all planes  Stiff in wrist flexion - doing active only   LUE x Completed flexion/extension of PIP/DIP 3  sets x10 within pt's tolerance. Focus on extension/flexion of 3rd digit. Completed with A2 pulley splint donned- or therapist supporting this area. Hook/fist and target guiding  x10 to complete hook/fist using a pen with pt educated on proper positioning throughout. Added to HEP   -Red foam tube: placing half tube into palm and completing flexion of all digits to touch tube. Pt educated to pull down with DIP to assist with improving flexion and touching tube. Extensor tendon digit holds  x10 with palm down: therapist bringing 3rd digit up and pt was to hold for 3 seconds with fair tolerance, HEP within pain tolerance. AAROM:               PROM/Stretching:     L MF PIP ext/Flexion x Gentle completed to digits into slight extension with slight pain reported from palm incision. More tension to PIP ext   Finger flexion IF, RF and SF x R and SF together, IF alone. To do at home so he can loosen fingers up without the confines of the MF   MP flexion PROM with IP flex/ext  Passively flex MP's while pt bends and straigthens his IP's. HEP   Towel scrunches  5 mins added to HEP   L MF DIP flexion  Added to HEP also   Wrist flex. Ext -   Stiff end range.     Scar Mass/Edema Control: Retrograde massage/Scar tissue massage x On dorsal portion down digits, hand and wrist and also using small massager to break up scar tissue. -Graston tool implemented this date   elastomere scar forms  Continues To wear with night splint. made a new form to wear at night with elastomere in his web spaces. .    Gel compression sheet  Med/lg, to wear and hr 1 -2 x's a day IF to decrease scar. Strengthenin# wt   Wrist flex, wrist ext, RD - UD . Increased weight    Table top, bend at pip than extend digits while in table top and then full extend with slight resistance. x10 with fair tolerance and slight resistance provided for isometrics strengthening. Yellow putty X- 10 min. Squeeze and manipulate using all fingers and thumb. -Added hand fanning, scar tissues presses between digits and against palm, abduction of IF/MF, MF/RF. Hand gripper x One yellow rubber hand, working on full composite fist and bringing the gripper in joint by joint, DIP, PIP and MCPs   Yellow therabar  15x Pronation and supination, full composite fist    Red foam block  2 sets x10 : composite fist, claw, middle and ring finger press, IF & F/thumb press. Other:     Metacarpal splint D/C  Re- molded splint for a better fit to be worn out and about and at night as needed. IF, MF ext splint  x Fabricated to wear at night with his elastomere gel pads to stretch scar tissue and PIP jt. . Made a new elastomere form leaving an open area for his wound to breathe. Pt can try wearing at night. To bring in next session to readjust to ^ ext pull. LMB X  For IF and MF. Wear 10 min to 30 for a total of 1 hr a day. A2 pulley splint  D/C Fabricated an A2 pulley splint with longer end for easy removal of splint after exercises completed. Pt may require adjustments due to edema on 3rd digit. Ext tube IF  D/c To wear occasionally during the day for an hour. HEP  X  Cont with, added MP flexion while flex/ext IP's - can use frap strap also, changed PIP ext from ext tube to LMB splints. Added yellow putty for gentle srengthening         Ext tube splint MF D/C Wear occasionally during the day for an hour       frap strap  Can be used to work full finger flexion and or hold MP's down with IP flex. ext- pt to play around with   Wound Care  May be closed but pt states he has occasional drainage. Splint adjustments  Completed for improved comfort and postioning due to decreasing edema. ^;ed space around  MP's     Assessment/Comments: Pt is making Fair progress toward stated plan of care and established goals. Pt continued with strengthening with good tolerance. Increased aggressiveness using graston tool technique to break up scar tissue. Therapist able to achieve good break up of scar tissue today. Added new putty exercises and scar stretching to HEP with good understanding and tolerance. Pt able to place hand flat on table by end of session. R Upper Extremity ROM       AROM [x]? ?     PROM[]?? IE  10/22/2020 12/10/2020         WRIST Flexion 0-70* 23*  70*  75*       Extension 0-80* 34*  54*  60*       Radial Deviation 0-20* 18*  20*  20*       Ulnar Deviation 0-30* 20*  30*  30*        R Hand ROM    AROM [x]? ?         PROM[]?? IE   10/22/2020 12/10/2020       2nd Digit MCP Flexion/Extension */ 0-45 H TBA 46* /*23  70*/0*     PIP Flexion/Extension 100*/ 0 57*/- 29* 65*/34*  95*/-25*     DIP Flexion/Extension 70-90*/0 32*/- 23* 32* /0*  60*/0*        L MF     AROM [x]? ?         PROM[]?? 10/12/20 10/15/20   10/22/2020 12/10/2020        MF Digit MCP Extension/Flexion   0-45 H /* -30*/50* -20*/47*  14*/46*  0*/64*     PIP Extension/Flexion   0*/100* -55*/62* -50*/ 60* 51*/53*  -40*/ 82*     DIP Extension/Flexion   0*/70-90* Wiggle  P - 40*  wiggle   0*/25*  0*/55*      4th Digit MCP Flexion/Extension */ 0-45 H TBA  31*/0*    60*/0*   PIP Flexion/Extension 100*/ 0 70*/- 30* 72* /0*   95*/0*      DIP Flexion/Extension 70-90*/0 41*/0* 60* /0*   63*/0*          5th Digit MCP Flexion/Extension */ 0-45 H TBA 23*/ 0*   66* /0*     PIP Flexion/Extension 100*/ 0 74*. +  /- 4* 80* /0*   95* /0*     DIP Flexion/Extension 70-90*/0 46*/0*  60*/0*   70* /0*         AROM   IF - PIP -34*   PROM:  PIP flexion ^'ed from -38* to -32* by end of session. End of session 10/15/20 - PROM -27*     Dynamometer (setting 2):                                    Left: 38#                                              Right: 107#                    Pinch Meter:              Lateral: Left=15# ,      Right=23#                Palmar 3 point: Left=9# ,      Right=20#       -Rehab Potential: Good  -Requires OT Follow Up: Yes  Time In: 2:05 pm     Time Out: 3:00 pm            Visit #: 10 ( 18 session used already)    Treatment Charges: Mins Time in - Time out  Units   Modalities- fluiothe 15 2:05 pm - 2:20 pm 1   Ther Exercise 15 2:45 pm - 3:00 pm 1   Manual Therapy 25 2:20 pm - 2:45 pm 2   Thera Activities      ADL/Home Mgt       Neuro Re-education      Gait Training      Group Therapy      Non-Billable Service Time MHP      Other splint lidia      Total Time/Units 55  4     -Response to Treatment: Pt pleased that he can place hand flat on table by end of session. Goals: Goals for pt can be see on initial eval occurring on 09/08/2020 and on 10/22/2020, 12/10/2020    Plan:   [x]  Continues Plan of care: 3x/week for 6 weeks until 01/18/2021. Treatment covered based on POC and graduated to patient's progress. Pt education continues at each visit to obtain maximum benefits from skilled OT intervention.   []  Alter Plan of care:   []  Discharge:      Mat Party  Luite Brad 86, OTR/L #[id number]

## 2020-12-28 ENCOUNTER — TREATMENT (OUTPATIENT)
Dept: OCCUPATIONAL THERAPY | Age: 40
End: 2020-12-28
Payer: COMMERCIAL

## 2020-12-28 PROCEDURE — 97022 WHIRLPOOL THERAPY: CPT | Performed by: OCCUPATIONAL THERAPIST

## 2020-12-28 PROCEDURE — 97140 MANUAL THERAPY 1/> REGIONS: CPT | Performed by: OCCUPATIONAL THERAPIST

## 2020-12-28 PROCEDURE — 97110 THERAPEUTIC EXERCISES: CPT | Performed by: OCCUPATIONAL THERAPIST

## 2020-12-28 NOTE — PROGRESS NOTES
OCCUPATIONAL THERAPY PROGRESS NOTE    Date:  2020   Initial Evaluation Date: 2020                          Evaluating Therapist: Carlin Pérez     Patient Name:  Damari Woods  \"Henok\"               :  1980     Restrictions/Precautions:  ROM as tolerated; modalities prn, low fall risk  Diagnosis:S62.613B (ICD-10-CM) - Displaced fracture of proximal phalanx of left middle finger, initial encounter for open fracture   Excisional debridement of left palm, index and middle finger with open reduction internal fixation of left middle finger proximal phalanx fracture with middle finger A2 pulley repair. Insurance/Certification information:[facility]  Claim # [id number]  Referring Practitioner: Dr. Kaye Alcazar  Date of Surgery/Injury: 2020  Plan of care signed (Y/N): Y    Visit# / total visits:  - (used 18 visits to date) 3x/week for 6 weeks ( total of 18 visits) until 2021    COVID-19 Screening completed upon entering facility and patient cleared for treatment today. Pt followed all protocols set forth by [facility] for Outpatient services throughout therapy session. Patient has been made aware of all new hygiene protocols due to COVID-19 set forth by [facility] Outpatient services and agrees to abide by all protocols. Pain Level:  Discomfort, stiffness    Subjective: Pt states that he fell this weekend and landed on his hand. He is having increased stiffness this date. Objective:  Updated POC to be completed by  visit. INTERVENTION: COMPLETED: SPECIFICS/COMMENTS:   Modality:     MHP  Hand wrapped in during wound soak for soft tissue warm-up   US  Scar management in the palm of LUE  Intensity: 0.8  Duration: 8 mins  Duty Cycle: 100%  Depth: 3.3 MHz   fluiotherapy x For soft tissue warm-up while intermittently moving his wrist and fingers. For desensitization also.     15 mins Ice   End of session to decrease pain. AROM:     Towel scrunches  5 mins to LUE    Blocked PIP flex/ext x MF but other fingers can move with it if he wants 2 sets x10  And all fingers together. PIP ext MF with place and hold ext - to do frequently during the day. Blocked MP flex/ext x All fingers together. 2 sets x10. Also did each IF and MF alone and RF and SF together for  Pt to feel the tendon working. Wristisizer  5 mins with LUE with full grasp throughout. Finger adduc/abduction  A to abduction RF - did with place and hold. IF alone. - Added yellow sponge to complete on either side of 3rd digit. x10   Wrist all planes  Stiff in wrist flexion - doing active only   LUE x Completed flexion/extension of PIP/DIP 3  sets x10 within pt's tolerance. Focus on extension/flexion of 3rd digit. Completed with A2 pulley splint donned- or therapist supporting this area. Tendon glides x    Hook/fist and target guiding  x10 to complete hook/fist using a pen with pt educated on proper positioning throughout. Added to HEP   -Red foam tube: placing half tube into palm and completing flexion of all digits to touch tube. Pt educated to pull down with DIP to assist with improving flexion and touching tube. Extensor tendon digit holds  x10 with palm down: therapist bringing 3rd digit up and pt was to hold for 3 seconds with fair tolerance, HEP within pain tolerance. AAROM:               PROM/Stretching:     L MF PIP ext/Flexion x Gentle completed to digits into slight extension with slight pain reported from palm incision. More tension to PIP ext   Finger flexion IF, RF and SF x R and SF together, IF alone. To do at home so he can loosen fingers up without the confines of the MF   MP flexion PROM with IP flex/ext  Passively flex MP's while pt bends and straigthens his IP's. HEP   Towel scrunches  5 mins added to HEP   L MF DIP flexion  Added to HEP also   Wrist flex. Ext -   Stiff end range. Scar Mass/Edema Control:     Retrograde massage/Scar tissue massage x On dorsal portion down digits, hand and wrist and also using small massager to break up scar tissue. -[place] tool implemented this date   elastomere scar forms  Continues To wear with night splint. made a new form to wear at night with elastomere in his web spaces. .    Gel compression sheet  Med/lg, to wear and hr 1 -2 x's a day IF to decrease scar. Strengthenin# wt   Wrist flex, wrist ext, RD - UD . Increased weight    Table top, bend at pip than extend digits while in table top and then full extend with slight resistance. x10 with fair tolerance and slight resistance provided for isometrics strengthening. Yellow putty  Squeeze and manipulate using all fingers and thumb. -Added hand fanning, scar tissues presses between digits and against palm, abduction of IF/MF, MF/RF. Hand gripper  One yellow rubber hand, working on full composite fist and bringing the gripper in joint by joint, DIP, PIP and MCPs   Yellow therabar  15x Pronation and supination, full composite fist    Red foam block  2 sets x10 : composite fist, claw, middle and ring finger press, IF & F/thumb press. Other:     Metacarpal splint D/C  Re- molded splint for a better fit to be worn out and about and at night as needed. IF, MF ext splint  x Fabricated to wear at night with his elastomere gel pads to stretch scar tissue and PIP jt. . Made a new elastomere form leaving an open area for his wound to breathe. Pt can try wearing at night. To bring in next session to readjust to ^ ext pull. LMB X  For IF and MF. Wear 10 min to 30 for a total of 1 hr a day. A2 pulley splint  D/C Fabricated an A2 pulley splint with longer end for easy removal of splint after exercises completed. Pt may require adjustments due to edema on 3rd digit. Ext tube IF  D/c To wear occasionally during the day for an hour. HEP  X  Cont with, added MP flexion while flex/ext IP's - can use frap strap also, changed PIP ext from ext tube to LMB splints. Added yellow putty for gentle srengthening         Ext tube splint MF D/C Wear occasionally during the day for an hour       frap strap  Can be used to work full finger flexion and or hold MP's down with IP flex. ext- pt to play around with   Wound Care  May be closed but pt states he has occasional drainage. Splint adjustments  Completed for improved comfort and postioning due to decreasing edema. ^;ed space around  MP's     Assessment/Comments: Pt is making Fair progress toward stated plan of care and established goals. Pt demonstrates pain with certain motions this date due to falling however demonstrated increased extension of the 3rd digit PIP joint ( -36*). Strengthening was not completed this session due to recent fall and increased discomfort with slight resistance. Pt however does not demonstrate more limitations than previous session and was issued a finger sleeve for edema management due to slightly increased edema. R Upper Extremity ROM       AROM [x]? ?     PROM[]?? IE  10/22/2020 12/10/2020         WRIST Flexion 0-70* 23*  70*  75*       Extension 0-80* 34*  54*  60*       Radial Deviation 0-20* 18*  20*  20*       Ulnar Deviation 0-30* 20*  30*  30*        R Hand ROM    AROM [x]? ?         PROM[]?? IE   10/22/2020 12/10/2020       2nd Digit MCP Flexion/Extension */ 0-45 H TBA 46* /*23  70*/0*     PIP Flexion/Extension 100*/ 0 57*/- 29* 65*/34*  95*/-25*     DIP Flexion/Extension 70-90*/0 32*/- 23* 32* /0*  60*/0*        L MF     AROM [x]? ?         PROM[]?? 10/12/20 10/15/20   10/22/2020 12/10/2020        MF Digit MCP Extension/Flexion   0-45 H /* -30*/50* -20*/47*  14*/46*  0*/64*     PIP Extension/Flexion   0*/100* -55*/62* -50*/ 60* 51*/53*  -40*/ 82*     DIP Extension/Flexion   0*/70-90* Wiggle  P - 40*  wiggle   0*/25*  0*/55*

## 2020-12-29 ENCOUNTER — TREATMENT (OUTPATIENT)
Dept: OCCUPATIONAL THERAPY | Age: 40
End: 2020-12-29
Payer: COMMERCIAL

## 2020-12-29 PROCEDURE — 97022 WHIRLPOOL THERAPY: CPT | Performed by: OCCUPATIONAL THERAPIST

## 2020-12-29 PROCEDURE — 97140 MANUAL THERAPY 1/> REGIONS: CPT | Performed by: OCCUPATIONAL THERAPIST

## 2020-12-29 PROCEDURE — 97110 THERAPEUTIC EXERCISES: CPT | Performed by: OCCUPATIONAL THERAPIST

## 2020-12-29 NOTE — PROGRESS NOTES
OCCUPATIONAL THERAPY PROGRESS NOTE    Date:  2020   Initial Evaluation Date: 2020                          Evaluating Therapist: Sacha Euceda     Patient Name:  Willow Krishnamurthy  \"Henok\"               :  1980     Restrictions/Precautions:  ROM as tolerated; modalities prn, low fall risk  Diagnosis:S62.613B (ICD-10-CM) - Displaced fracture of proximal phalanx of left middle finger, initial encounter for open fracture  Excisional debridement of left palm, index and middle finger with open reduction internal fixation of left middle finger proximal phalanx fracture with middle finger A2 pulley repair. Insurance/Certification information:[facility]  Claim # [id number]  Referring Practitioner: Dr. Jesús Harvey  Date of Surgery/Injury: 2020  Plan of care signed (Y/N): Y    Visit# / total visits:  15 / 25- (used 18 visits to date) 3x/week for 6 weeks ( total of 18 visits) until 2021    COVID-19 Screening completed upon entering facility and patient cleared for treatment today. Pt followed all protocols set forth by [facility] for Outpatient services throughout therapy session. Patient has been made aware of all new hygiene protocols due to COVID-19 set forth by [facility] Outpatient services and agrees to abide by all protocols. Pain Level: 0/10 reported    Subjective: Pt states he is still having a little bit of stiffness however better than last session and less edema noted. Objective:  Updated POC to be completed by  visit. INTERVENTION: COMPLETED: SPECIFICS/COMMENTS:   Modality:     MHP  Hand wrapped in during wound soak for soft tissue warm-up   US  Scar management in the palm of LUE  Intensity: 0.8  Duration: 8 mins  Duty Cycle: 100%  Depth: 3.3 MHz   fluiotherapy x For soft tissue warm-up while intermittently moving his wrist and fingers. For desensitization also.     15 mins  0*/100* -55*/62* -50*/ 60* 51*/53*  -40*/ 82*     DIP Extension/Flexion   0*/70-90* Wiggle  P - 40*  wiggle   0*/25*  0*/55*      4th Digit MCP Flexion/Extension */ 0-45 H TBA  31*/0*    60*/0*     PIP Flexion/Extension 100*/ 0 70*/- 30* 72* /0*   95*/0*      DIP Flexion/Extension 70-90*/0 41*/0* 60* /0*   63*/0*          5th Digit MCP Flexion/Extension */ 0-45 H TBA 23*/ 0*   66* /0*     PIP Flexion/Extension 100*/ 0 74*. +  /- 4* 80* /0*   95* /0*     DIP Flexion/Extension 70-90*/0 46*/0*  60*/0*   70* /0*         AROM   IF - PIP -34*   PROM:  PIP flexion ^'ed from -38* to -32* by end of session. End of session 10/15/20 - PROM -27*     Dynamometer (setting 2):                                    Left: 38#                                              Right: 107#                    Pinch Meter:              Lateral: Left=15# ,      Right=23#                Palmar 3 point: Left=9# ,      Right=20#     -Rehab Potential: Good  -Requires OT Follow Up: Yes  Time In: 3:00 pm     Time Out: 4:00 pm            Visit #: 12 ( 18 session used already)    Treatment Charges: Mins Time in - Time out  Units   Modalities- fluiothe 15 3:00 pm - 3:15 pm 1   Ther Exercise 25 3:15 pm - 3:40 pm 2   Manual Therapy 20 3:40 pm - 400 pm 1   Thera Activities      ADL/Home Mgt       Neuro Re-education      Gait Training      Group Therapy      Non-Billable Service Time MHP      Other splint lidia      Total Time/Units        -Response to Treatment: Pt pleased that he can place hand flat on table by end of session. Goals: Goals for pt can be see on initial eval occurring on 09/08/2020 and on 10/22/2020, 12/10/2020    Plan:   [x]  Continues Plan of care: 3x/week for 6 weeks until 01/18/2021. Treatment covered based on POC and graduated to patient's progress. Pt education continues at each visit to obtain maximum benefits from skilled OT intervention.   []  Alter Plan of care:   []  Discharge:      Selina Rouse [address] 86, OTR/L #[id number]

## 2020-12-30 ENCOUNTER — TREATMENT (OUTPATIENT)
Dept: OCCUPATIONAL THERAPY | Age: 40
End: 2020-12-30
Payer: COMMERCIAL

## 2020-12-30 PROCEDURE — 97022 WHIRLPOOL THERAPY: CPT | Performed by: OCCUPATIONAL THERAPIST

## 2020-12-30 PROCEDURE — 97140 MANUAL THERAPY 1/> REGIONS: CPT | Performed by: OCCUPATIONAL THERAPIST

## 2020-12-30 PROCEDURE — 97110 THERAPEUTIC EXERCISES: CPT | Performed by: OCCUPATIONAL THERAPIST

## 2020-12-30 NOTE — PROGRESS NOTES
OCCUPATIONAL THERAPY PROGRESS NOTE    Date:  2020   Initial Evaluation Date: 2020                          Evaluating Therapist: Ana Luisa Unger     Patient Name:  Cristel Naranjo  \"Henok\"               :  1980     Restrictions/Precautions:  ROM as tolerated; modalities prn, low fall risk  Diagnosis:S62.613B (ICD-10-CM) - Displaced fracture of proximal phalanx of left middle finger, initial encounter for open fracture  Excisional debridement of left palm, index and middle finger with open reduction internal fixation of left middle finger proximal phalanx fracture with middle finger A2 pulley repair. Insurance/Certification information:[facility]  Claim # [id number]  Referring Practitioner: Dr. Vern Rosario  Date of Surgery/Injury: 2020  Plan of care signed (Y/N): Y    Visit# / total visits:  15 / 25- (used 18 visits to date) 3x/week for 6 weeks ( total of 18 visits) until 2021    COVID-19 Screening completed upon entering facility and patient cleared for treatment today. Pt followed all protocols set forth by [facility] for Outpatient services throughout therapy session. Patient has been made aware of all new hygiene protocols due to COVID-19 set forth by [facility] Outpatient services and agrees to abide by all protocols. Pain Level: 0/10 reported    Subjective: Pt states \"I can make a full fist since I have seen you last!\"      Objective:  Updated POC to be completed by 20  visit. INTERVENTION: COMPLETED: SPECIFICS/COMMENTS:   Modality:     MHP  Hand wrapped in during wound soak for soft tissue warm-up   US x Scar management in the palm of KELSEY  Intensity: 0.8  Duration: 8 mins  Duty Cycle: 100%  Depth: 3.3 MHz   fluiotherapy x For soft tissue warm-up while intermittently moving his wrist and fingers. For desensitization also. 15 mins   Ice   End of session to decrease pain.     AROM: Towel scrunches  5 mins to LUE    Blocked PIP flex/ext  MF but other fingers can move with it if he wants 2 sets x10  And all fingers together. PIP ext MF with place and hold ext - to do frequently during the day. Blocked MP flex/ext  All fingers together. 2 sets x10. Also did each IF and MF alone and RF and SF together for  Pt to feel the tendon working. Wristisizer  5 mins with LUE with full grasp throughout. Finger adduc/abduction  A to abduction RF - did with place and hold. IF alone. - Added yellow sponge to complete on either side of 3rd digit. x10   Wrist all planes  Stiff in wrist flexion - doing active only   LUE  Completed flexion/extension of PIP/DIP 3  sets x10 within pt's tolerance. Focus on extension/flexion of 3rd digit. Completed with A2 pulley splint donned- or therapist supporting this area. Tendon glides x x10 completed with proper education provided. Boading balls x 5 mins clock-wise and counter clock-wise. Hook/fist and target guiding  x10 to complete hook/fist using a pen with pt educated on proper positioning throughout. Added to HEP   -Red foam tube: placing half tube into palm and completing flexion of all digits to touch tube. Pt educated to pull down with DIP to assist with improving flexion and touching tube. Extensor tendon digit holds  x10 with palm down: therapist bringing 3rd digit up and pt was to hold for 3 seconds with fair tolerance, HEP within pain tolerance. AAROM:               PROM/Stretching:     L MF PIP ext/Flexion x Gentle completed to digits into slight extension with slight pain reported from palm incision. More tension to PIP ext   Finger flexion IF, RF and SF x R and SF together, IF alone. To do at home so he can loosen fingers up without the confines of the MF   MP flexion PROM with IP flex/ext  Passively flex MP's while pt bends and straigthens his IP's.  HEP   Towel scrunches  5 mins added to HEP   L MF DIP flexion  Added to HEP also Wrist flex. Ext -   Stiff end range. Scar Mass/Edema Control:     Retrograde massage/Scar tissue massage x On dorsal portion down digits, hand and wrist and also using small massager to break up scar tissue. -[place] tool implemented this date   elastomere scar forms  Continues To wear with night splint. made a new form to wear at night with elastomere in his web spaces. .    Gel compression sheet  Med/lg, to wear and hr 1 -2 x's a day IF to decrease scar. Strengthening:     3# wt x  Wrist flex, wrist ext, RD - UD . 2 sets x15   Table top, bend at pip than extend digits while in table top and then full extend with slight resistance. x10 with fair tolerance and slight resistance provided for isometrics strengthening. Finger extension x 2 sets x15 yellow    Yellow putty x Squeeze and manipulate using all fingers and thumb. -Added hand fanning, scar tissues presses between digits and against palm, abduction of IF/MF, MF/RF. Digi flex x 5.0# red 2 sets x10 pronation, supination and neutral with pulling the fingers down to the bottom of the palm. Hand gripper x On the second setting to  30 pom poms to place in a container. Red therabar x 15x Pronation and supination, twisting, full composite fist    Red foam block   2 sets x10 : composite fist, claw, middle and ring finger press, IF & F/thumb press. Other:     Metacarpal splint D/C  Re- molded splint for a better fit to be worn out and about and at night as needed. IF, MF ext splint  x Fabricated to wear at night with his elastomere gel pads to stretch scar tissue and PIP jt. . Made a new elastomere form leaving an open area for his wound to breathe. Pt can try wearing at night. To bring in next session to readjust to ^ ext pull. LMB X  For IF and MF. Wear 10 min to 30 for a total of 1 hr a day. A2 pulley splint  D/C Fabricated an A2 pulley splint with longer end for easy removal of splint after exercises completed. Pt may require adjustments due to edema on 3rd digit. Ext tube IF  D/c To wear occasionally during the day for an hour. HEP  X  Cont with, added MP flexion while flex/ext IP's - can use frap strap also, changed PIP ext from ext tube to LMB splints. Added yellow putty for gentle srengthening         Ext tube splint MF D/C Wear occasionally during the day for an hour       frap strap  Can be used to work full finger flexion and or hold MP's down with IP flex. ext- pt to play around with   Wound Care  May be closed but pt states he has occasional drainage. Splint adjustments  Completed for improved comfort and postioning due to decreasing edema. ^;ed space around  MP's     Assessment/Comments: Pt is making Fair progress toward stated plan of care and established goals. Pt demonstrated ability to complete increased strengthening this date without pain. Pt tolerated increased strengthening this date with full composite fist with hand gripper and 3 point pinch and lateral pinch, good endurance demonstrated. Pt continues to demonstrate dense scar tissue which continues to required massage, US and scar tissue management with increased AROM after completing these activities. -Rehab Potential: Good  -Requires OT Follow Up: Yes  Time In: 4:00 pm     Time Out: 5:00 pm            Visit #: 13 ( 18 session used already)    Treatment Charges: Mins Time in - Time out  Units   Modalities- fluiothe 15 4:00 pm - 4:15 pm 1   Ther Exercise 25 4:15 pm - 4:40 pm 2   Manual Therapy 20 4:40 pm - 500 pm 1   Thera Activities      ADL/Home Mgt       Neuro Re-education      Gait Training      Group Therapy      Non-Billable Service Time MHP      Other splint lidia      Total Time/Units        -Response to Treatment: Pt pleased that he can place hand flat on table by end of session.

## 2021-01-04 ENCOUNTER — TREATMENT (OUTPATIENT)
Dept: OCCUPATIONAL THERAPY | Age: 41
End: 2021-01-04
Payer: COMMERCIAL

## 2021-01-04 ENCOUNTER — TELEPHONE (OUTPATIENT)
Dept: ORTHOPEDIC SURGERY | Age: 41
End: 2021-01-04

## 2021-01-04 DIAGNOSIS — S62.613B DISPLACED FRACTURE OF PROXIMAL PHALANX OF LEFT MIDDLE FINGER, INITIAL ENCOUNTER FOR OPEN FRACTURE: Primary | ICD-10-CM

## 2021-01-04 PROCEDURE — 97022 WHIRLPOOL THERAPY: CPT | Performed by: OCCUPATIONAL THERAPIST

## 2021-01-04 PROCEDURE — 97110 THERAPEUTIC EXERCISES: CPT | Performed by: OCCUPATIONAL THERAPIST

## 2021-01-04 PROCEDURE — 97140 MANUAL THERAPY 1/> REGIONS: CPT | Performed by: OCCUPATIONAL THERAPIST

## 2021-01-04 NOTE — PROGRESS NOTES
Ice   End of session to decrease pain. AROM:     Towel scrunches  5 mins to LUE    Blocked PIP flex/ext x MF but other fingers can move with it if he wants 2 sets x10  And all fingers together. PIP ext MF with place and hold ext - to do frequently during the day. Blocked MP flex/ext  All fingers together. 2 sets x10. Also did each IF and MF alone and RF and SF together for  Pt to feel the tendon working. Wristisizer  5 mins with LUE with full grasp throughout. Finger adduc/abduction  A to abduction RF - did with place and hold. IF alone. - Added yellow sponge to complete on either side of 3rd digit. x10   Wrist all planes  Stiff in wrist flexion - doing active only   LUE  Completed flexion/extension of PIP/DIP 3  sets x10 within pt's tolerance. Focus on extension/flexion of 3rd digit. Completed with A2 pulley splint donned- or therapist supporting this area. Tendon glides  x10 completed with proper education provided. Boading balls  5 mins clock-wise and counter clock-wise. Hook/fist and target guiding  x10 to complete hook/fist using a pen with pt educated on proper positioning throughout. Added to HEP   -Red foam tube: placing half tube into palm and completing flexion of all digits to touch tube. Pt educated to pull down with DIP to assist with improving flexion and touching tube. Extensor tendon digit holds x x10 with palm down: therapist bringing 3rd digit up and pt was to hold for 3 seconds with fair tolerance, HEP within pain tolerance. AAROM:               PROM/Stretching:     L MF PIP ext/Flexion x Gentle completed to digits into slight extension with slight pain reported from palm incision. More tension to PIP ext   Finger flexion IF, RF and SF x R and SF together, IF alone. To do at home so he can loosen fingers up without the confines of the MF   MP flexion PROM with IP flex/ext  Passively flex MP's while pt bends and straigthens his IP's.  HEP IF, MF ext splint  x Fabricated to wear at night with his elastomere gel pads to stretch scar tissue and PIP jt. . Made a new elastomere form leaving an open area for his wound to breathe. Pt can try wearing at night. To bring in next session to readjust to ^ ext pull. LMB X  For IF and MF. Wear 10 min to 30 for a total of 1 hr a day. A2 pulley splint  D/C Fabricated an A2 pulley splint with longer end for easy removal of splint after exercises completed. Pt may require adjustments due to edema on 3rd digit. Ext tube IF  D/c To wear occasionally during the day for an hour. HEP  X  Cont with, added MP flexion while flex/ext IP's - can use frap strap also, changed PIP ext from ext tube to LMB splints. Added yellow putty for gentle srengthening         Ext tube splint MF D/C Wear occasionally during the day for an hour       frap strap  Can be used to work full finger flexion and or hold MP's down with IP flex. ext- pt to play around with   Wound Care  May be closed but pt states he has occasional drainage. Splint adjustments  Completed for improved comfort and postioning due to decreasing edema. ^;ed space around  MP's     Assessment/Comments: Pt is making Fair progress toward stated plan of care and established goals. Pt fairly fatigued by end of session following PIP ext strengthening. Reports ^'d stiffness with flexion resistive ex's.  Continuing with aggressive break up of scar tissue in webspace between IF and MF as well as in distal palm of hand.    -Rehab Potential: Good  -Requires OT Follow Up: Yes  Time In: 4:00 pm     Time Out: 5:00 pm            Visit #: 14 ( 18 session used already)    Treatment Charges: Mins Time in - Time out  Units   Modalities- fluiothe 10 4:00 pm - 4:10 pm 1   Ther Exercise 30 4:30 pm - 5:00 pm 2   Manual Therapy 20 4:10 pm - 4:30 pm 1   Thera Activities      ADL/Home Mgt       Neuro Re-education      Gait Training      Group Therapy Non-Billable Service Time MHP      Other splint lidia      Total Time/Units 60  4     -Response to Treatment: Pt responded well to treatment, MF fatigued by end of session. Goals: Goals for pt can be see on initial eval occurring on 09/08/2020 and on 10/22/2020, 12/10/2020    Plan:   [x]  Continues Plan of care: 3x/week for 6 weeks until 01/18/2021. Treatment covered based on POC and graduated to patient's progress. Pt education continues at each visit to obtain maximum benefits from skilled OT intervention.   []  Alter Plan of care:   []  Discharge:      Alvina Brewer  [place], OTR/L #[id number]

## 2021-01-05 ENCOUNTER — TREATMENT (OUTPATIENT)
Dept: OCCUPATIONAL THERAPY | Age: 41
End: 2021-01-05
Payer: COMMERCIAL

## 2021-01-05 DIAGNOSIS — S62.613B DISPLACED FRACTURE OF PROXIMAL PHALANX OF LEFT MIDDLE FINGER, INITIAL ENCOUNTER FOR OPEN FRACTURE: Primary | ICD-10-CM

## 2021-01-05 PROCEDURE — 97140 MANUAL THERAPY 1/> REGIONS: CPT | Performed by: OCCUPATIONAL THERAPIST

## 2021-01-05 PROCEDURE — 97110 THERAPEUTIC EXERCISES: CPT | Performed by: OCCUPATIONAL THERAPIST

## 2021-01-05 PROCEDURE — 97022 WHIRLPOOL THERAPY: CPT | Performed by: OCCUPATIONAL THERAPIST

## 2021-01-05 NOTE — PROGRESS NOTES
OCCUPATIONAL THERAPY PROGRESS NOTE      RE- Assessment    Date:  2021   Initial Evaluation Date: 2020                          Evaluating Therapist: Nicole Partida     Patient Name:  Chuck Beauchamp  \"Henok\"               :  1980     Restrictions/Precautions:  ROM as tolerated; modalities prn, low fall risk  Diagnosis:S62.613B (ICD-10-CM) - Displaced fracture of proximal phalanx of left middle finger, initial encounter for open fracture  Excisional debridement of left palm, index and middle finger with open reduction internal fixation of left middle finger proximal phalanx fracture with middle finger A2 pulley repair. Insurance/Certification information:[facility]  Claim # [id number]  Referring Practitioner: Dr. Laverne Scott  Date of Surgery/Injury: 2020  Plan of care signed (Y/N): Y    Visit# / total visits:  - (used 18 visits to date) 3x/week for 6 weeks ( total of 18 visits) until 2021    COVID-19 Screening completed upon entering facility and patient cleared for treatment today. Pt followed all protocols set forth by [facility] for Outpatient services throughout therapy session. Patient has been made aware of all new hygiene protocols due to COVID-19 set forth by [facility] Outpatient services and agrees to abide by all protocols. Pain Level: 0/10 reported    Subjective: Pt reports no new complaints. Objective:  Updated POC to be completed by  visit. INTERVENTION: COMPLETED: SPECIFICS/COMMENTS:   Modality:     MHP  Hand wrapped in during wound soak for soft tissue warm-up   US x Scar management in the palm of LUE  Intensity: 0.8  Duration: 8 mins  Duty Cycle: 100%  Depth: 3.3 MHz   fluiotherapy x For soft tissue warm-up while intermittently moving his wrist and fingers. For desensitization also. 15 mins   Ice   End of session to decrease pain.     AROM:     Towel scrunches  5 mins to LUE Blocked PIP flex/ext x MF but other fingers can move with it if he wants 2 sets x10  And all fingers together. PIP ext MF with place and hold ext - to do frequently during the day. Blocked MP flex/ext  All fingers together. 2 sets x10. Also did each IF and MF alone and RF and SF together for  Pt to feel the tendon working. Wristisizer  5 mins with LUE with full grasp throughout. Finger adduc/abduction  A to abduction RF - did with place and hold. IF alone. - Added yellow sponge to complete on either side of 3rd digit. x10   Wrist all planes  Stiff in wrist flexion - doing active only   LUE  Completed flexion/extension of PIP/DIP 3  sets x10 within pt's tolerance. Focus on extension/flexion of 3rd digit. Completed with A2 pulley splint donned- or therapist supporting this area. Tendon glides  x10 completed with proper education provided. Boading balls  5 mins clock-wise and counter clock-wise. Hook/fist and target guiding  x10 to complete hook/fist using a pen with pt educated on proper positioning throughout. Added to HEP   -Red foam tube: placing half tube into palm and completing flexion of all digits to touch tube. Pt educated to pull down with DIP to assist with improving flexion and touching tube. Extensor tendon digit holds x x10 with palm down: therapist bringing 3rd digit up and pt was to hold for 3 seconds with fair tolerance, HEP within pain tolerance. AAROM:               PROM/Stretching:     L MF PIP ext/Flexion x Gentle completed to digits into slight extension with slight pain reported from palm incision. More tension to PIP ext   Finger flexion IF, RF and SF x R and SF together, IF alone. To do at home so he can loosen fingers up without the confines of the MF   MP flexion PROM with IP flex/ext  Passively flex MP's while pt bends and straigthens his IP's. HEP   Towel scrunches  5 mins added to HEP   L MF DIP flexion  Added to HEP also   Wrist flex. Ext -   Stiff end range. Scar Mass/Edema Control:     Retrograde massage/Scar tissue massage x On dorsal portion down digits, hand and wrist and also using small massager to-   break up scar tissue. -[place] tool implemented this date   elastomere scar forms  Continues To wear with night splint. made a new form to wear at night with elastomere in his web spaces. .    Gel compression sheet  Med/lg, to wear and hr 1 -2 x's a day IF to decrease scar. Strengthenin # wt  ^ from 3# x Wrist flex, wrist ext, RD - UD . 20 rep's each   Table top, bend at pip than extend digits while in table top and then full extend with slight resistance. x10 with fair tolerance and slight resistance provided for isometrics strengthening. Finger extension x 2 sets x15 yellow    PIP Ext Strengthening  12 reps completed with orange t-band and MCPs blocked in 90*   Yellow putty  Squeeze and manipulate using all fingers and thumb. -Added hand fanning, scar tissues presses between digits and against palm, abduction of IF/MF, MF/RF. [employer]  25 reps - pressing both hands into ball with extended fingers, focus on driving PIPs into ball. Digi flex x 5.0# red 2 sets x10 pronation, supination and neutral with pulling the fingers down to the bottom of the palm. Hand gripper  On the second setting to  30 pom poms to place in a container.   -2x15 reps - round end pointed down at level 2 resistance with yellow spring. Red therabar x 15x Pronation and supination, twisting, full composite fist   -Isometric shaking x 2 - as long as can   Red foam block   2 sets x10 : composite fist, claw, middle and ring finger press, IF & F/thumb press. Other:     Metacarpal splint D/C  Re- molded splint for a better fit to be worn out and about and at night as needed. IF, MF ext splint  x Fabricated to wear at night with his elastomere gel pads to stretch scar tissue and PIP jt. . Made a new elastomere form leaving an open area for his wound to breathe. Pt can try wearing at night. To bring in next session to readjust to ^ ext pull. LMB X  For IF and MF. Wear 10 min to 30 for a total of 1 hr a day. A2 pulley splint  D/C Fabricated an A2 pulley splint with longer end for easy removal of splint after exercises completed. Pt may require adjustments due to edema on 3rd digit. Ext tube IF  D/c To wear occasionally during the day for an hour. HEP  X  Cont with, added MP flexion while flex/ext IP's - can use frap strap also, changed PIP ext from ext tube to LMB splints. Yellow putty for gentle srengthening         Ext tube splint MF D/C Wear occasionally during the day for an hour       frap strap  Can be used to work full finger flexion and or hold MP's down with IP flex. ext- pt to play around with   Wound Care  May be closed but pt states he has occasional drainage. Splint adjustments  Completed for improved comfort and postioning due to decreasing edema. ^;ed space around  MP's     Assessment/Comments: Pt is making Fair progress toward stated plan of care and established goals. Pt fairly fatigued by end of session following PIP ext strengthening. Reports ^'d stiffness with flexion resistive ex's. Continuing with aggressive break up of scar tissue in webspace between IF and MF as well as in distal palm of hand. R Upper Extremity ROM       AROM [x]? ??     PROM[]??? IE  10/22/2020 1/5 /19321         WRIST Flexion 0-70* 23*  70*  75*       Extension 0-80* 34*  54*  60*       Radial Deviation 0-20* 18*  20*  20*       Ulnar Deviation 0-30* 20*  30*  30*        R Hand ROM    AROM [x]? ??         PROM[]??? IE   10/22/2020 1/5/2021       2nd Digit MCP Flexion/Extension */ 0-45 H TBA 46* /*23  70*/0*     PIP Flexion/Extension 100*/ 0 57*/- 29* 65*/34*  95*/-22* Goals: Goals for pt can be see on initial eval occurring on 09/08/2020 and on 10/22/2020, 12/10/2020 and on 1/5/2021    GOALS (Long term same as Short term):  1) Patient will demonstrate good understanding of home program (exercises/activities/diagnosis/prognosis/goals) with good accuracy. Goal in progress. 2) Patient will demonstrate increased active/passive range of motion of their LUE to [facility] for ADL/IADL completion. Goal making good gains. 3) Patient will demonstrate increased /pinch strength of at least 10 / 5 pinch pounds of their L hand. Goal initiated recently - working on at this time. 4) Patient to report decreased pain in their affected L upper extremity from 8/10 to 2/10 or less with resistive functional use. Goal making good gains. Pain with regular ADL tasks 0-3/10. Pain with resistive tasks 3-5/10  5) Increase in fine motor function as evidenced by decreased time to complete 9-hole peg test and/or MRMT test by at least 5-10 seconds. Goal making good gains. 6) Patient to report 100% compliance with their splint wear, care, and precautions if needed. Goal met. Splint d/c'ed. 7) Patient to report a decrease in hypersensitivity from 80% to 20% or less in their LUE. Goal making good gains. 8) Patient to demonstrate decreased guarding of their affected extremity from 100% to 20% or less. Goal making good gains. 9) Patient will be knowledgeable of edema control techniques as evident with decreases from severe to mild/none. Goal making good gains. 10) Patient will demonstrate a non-tender/non-adherent scar. Goal making fair gains. 11) Patient will report ADL functions as Mod I/I using LUE. Goal making good gains. 12) Patient will demonstrate improved functional activity tolerance from poor to fair + for ADL/IADL completion. Goal making good gains. 13) Patient will decrease QuickDASH score by 50% for increased participation in daily functional activities. Goal making good gains. Plan:   [x]  Continues Plan of care: 3x/week for 6 weeks until 01/18/2021. Treatment covered based on POC and graduated to patient's progress. Pt education continues at each visit to obtain maximum benefits from skilled OT intervention.   []  Alter Plan of care:   []  Discharge:      Sybil Beltran  OT/L, CHT

## 2021-01-07 ENCOUNTER — OFFICE VISIT (OUTPATIENT)
Dept: ORTHOPEDIC SURGERY | Age: 41
End: 2021-01-07
Payer: COMMERCIAL

## 2021-01-07 ENCOUNTER — TREATMENT (OUTPATIENT)
Dept: OCCUPATIONAL THERAPY | Age: 41
End: 2021-01-07
Payer: COMMERCIAL

## 2021-01-07 VITALS — BODY MASS INDEX: 30.8 KG/M2 | RESPIRATION RATE: 20 BRPM | TEMPERATURE: 97.1 F | HEIGHT: 71 IN | WEIGHT: 220 LBS

## 2021-01-07 DIAGNOSIS — S62.613B DISPLACED FRACTURE OF PROXIMAL PHALANX OF LEFT MIDDLE FINGER, INITIAL ENCOUNTER FOR OPEN FRACTURE: Primary | ICD-10-CM

## 2021-01-07 PROCEDURE — 97022 WHIRLPOOL THERAPY: CPT | Performed by: OCCUPATIONAL THERAPIST

## 2021-01-07 PROCEDURE — 99212 OFFICE O/P EST SF 10 MIN: CPT | Performed by: ORTHOPAEDIC SURGERY

## 2021-01-07 PROCEDURE — 97140 MANUAL THERAPY 1/> REGIONS: CPT | Performed by: OCCUPATIONAL THERAPIST

## 2021-01-07 PROCEDURE — 97110 THERAPEUTIC EXERCISES: CPT | Performed by: OCCUPATIONAL THERAPIST

## 2021-01-07 NOTE — PROGRESS NOTES
OCCUPATIONAL THERAPY PROGRESS NOTE     Date:  2021   Initial Evaluation Date: 2020                          Evaluating Therapist: Dereck Tatum     Patient Name:  Monica Cali  \"Henok\"               :  1980     Restrictions/Precautions:  ROM as tolerated; modalities prn, low fall risk  Diagnosis:S62.613B (ICD-10-CM) - Displaced fracture of proximal phalanx of left middle finger, initial encounter for open fracture  Excisional debridement of left palm, index and middle finger with open reduction internal fixation of left middle finger proximal phalanx fracture with middle finger A2 pulley repair. Insurance/Certification information:[facility]  Claim # [id number]  Referring Practitioner: Dr. Sinai Etienne  Date of Surgery/Injury: 2020  Plan of care signed (Y/N): Y    Visit# / total visits:  - (used 18 visits to date) 3x/week for 6 weeks ( total of 18 visits) until 2021    COVID-19 Screening completed upon entering facility and patient cleared for treatment today. Pt followed all protocols set forth by [facility] for Outpatient services throughout therapy session. Patient has been made aware of all new hygiene protocols due to COVID-19 set forth by [facility] Outpatient services and agrees to abide by all protocols. Pain Level: 0/10 reported    Subjective: Pt reports tingling on the inner part of the digits. Objective:  Updated POC to be completed by  visit. INTERVENTION: COMPLETED: SPECIFICS/COMMENTS:   Modality:     MHP  Hand wrapped in during wound soak for soft tissue warm-up   US  Scar management in the palm of LUE  Intensity: 0.8  Duration: 8 mins  Duty Cycle: 100%  Depth: 3.3 MHz   fluiotherapy x For soft tissue warm-up while intermittently moving his wrist and fingers. For desensitization also. 15 mins   Ice   End of session to decrease pain.     AROM: Towel scrunches x 5 mins to LUE    Blocked PIP flex/ext  MF but other fingers can move with it if he wants 2 sets x10  And all fingers together. PIP ext MF with place and hold ext - to do frequently during the day. Blocked MP flex/ext  All fingers together. 2 sets x10. Also did each IF and MF alone and RF and SF together for  Pt to feel the tendon working. Wristisizer  5 mins with LUE with full grasp throughout. Finger adduc/abduction  A to abduction RF - did with place and hold. IF alone. - Added yellow sponge to complete on either side of 3rd digit. x10   Wrist all planes  Stiff in wrist flexion - doing active only   LUE  Completed flexion/extension of PIP/DIP 3  sets x10 within pt's tolerance. Focus on extension/flexion of 3rd digit. Completed with A2 pulley splint donned- or therapist supporting this area. Tendon glides  x10 completed with proper education provided. Boading balls  5 mins clock-wise and counter clock-wise. Hook/fist and target guiding  x10 to complete hook/fist using a pen with pt educated on proper positioning throughout. Added to HEP   -Red foam tube: placing half tube into palm and completing flexion of all digits to touch tube. Pt educated to pull down with DIP to assist with improving flexion and touching tube. Extensor tendon digit holds  x10 with palm down: therapist bringing 3rd digit up and pt was to hold for 3 seconds with fair tolerance, HEP within pain tolerance. AAROM:               PROM/Stretching:     L MF PIP ext/Flexion x Gentle completed to digits into slight extension with slight pain reported from palm incision. More tension to PIP ext   Finger flexion IF, RF and SF x R and SF together, IF alone. To do at home so he can loosen fingers up without the confines of the MF   MP flexion PROM with IP flex/ext  Passively flex MP's while pt bends and straigthens his IP's.  HEP   Towel scrunches  5 mins added to HEP   L MF DIP flexion  Added to HEP also Wrist flex. Ext -   Stiff end range. Scar Mass/Edema Control:     Retrograde massage/Scar tissue massage x On dorsal portion down digits, hand and wrist and also using small massager to-   break up scar tissue. -[place] tool implemented this date   elastomere scar forms  Continues To wear with night splint. made a new form to wear at night with elastomere in his web spaces. .    Gel compression sheet  Med/lg, to wear and hr 1 -2 x's a day IF to decrease scar. Strengthenin # wt  ^ from 3# x Wrist flex, wrist ext, RD - UD . 2 sets x15.   1 set x15 of supination/pronation holds   Table top, bend at pip than extend digits while in table top and then full extend with slight resistance. x10 with fair tolerance and slight resistance provided for isometrics strengthening. Finger extension x 2 sets x15 red   PIP Ext Strengthening  12 reps completed with orange t-band and MCPs blocked in 90*   Yellow putty  Squeeze and manipulate using all fingers and thumb. -Added hand fanning, scar tissues presses between digits and against palm, abduction of IF/MF, MF/RF. [employer]  25 reps - pressing both hands into ball with extended fingers, focus on driving PIPs into ball. Digi flex  5.0# red 2 sets x10 pronation, supination and neutral with pulling the fingers down to the bottom of the palm. BEU x 10 mins at level 2 resistance completing 5 mins forward and 5 mins back. Weighted ball exercises x 4# weighted ball throwing and catching in his LUE x20 with good tolerance. - rolling ball forward and back promoting digit extension   Hand gripper  On the second setting to  30 pom poms to place in a container.   -2x15 reps - round end pointed down at level 2 resistance with yellow spring.    green therabar x 2 sets x15 Pronation and supination, twisting, full composite fist   -Isometric shaking x 2 - as long as can Red foam block   2 sets x10 : composite fist, claw, middle and ring finger press, IF & F/thumb press. Other:     Metacarpal splint D/C  Re- molded splint for a better fit to be worn out and about and at night as needed. IF, MF ext splint  x Fabricated to wear at night with his elastomere gel pads to stretch scar tissue and PIP jt. . Made a new elastomere form leaving an open area for his wound to breathe. Pt can try wearing at night. To bring in next session to readjust to ^ ext pull. LMB X  For IF and MF. Wear 10 min to 30 for a total of 1 hr a day. A2 pulley splint  D/C Fabricated an A2 pulley splint with longer end for easy removal of splint after exercises completed. Pt may require adjustments due to edema on 3rd digit. Ext tube IF  D/c To wear occasionally during the day for an hour. HEP  X  Cont with, added MP flexion while flex/ext IP's - can use frap strap also, changed PIP ext from ext tube to LMB splints. Yellow putty for gentle srengthening         Ext tube splint MF D/C Wear occasionally during the day for an hour       frap strap  Can be used to work full finger flexion and or hold MP's down with IP flex. ext- pt to play around with   Wound Care  May be closed but pt states he has occasional drainage. Splint adjustments  Completed for improved comfort and postioning due to decreasing edema. ^;ed space around  MP's     Assessment/Comments: Pt is making Fair progress toward stated plan of care and established goals. Pt tolerated session well with increased weight through his RUE without pain/difficulty. Pt reports he will be returning to work light duty with a 10 # weight restriction. Pt continues to demonstrate improvement with ROM however continues to be limited with extension lag of the injured digit. R Upper Extremity ROM       AROM [x]? ??     PROM[]??? IE  10/22/2020 1/5 /40786         WRIST Flexion 0-70* 23*  70*  75*       Extension 0-80* 34*  54*  60*     Radial Deviation 0-20* 18*  20*  20*       Ulnar Deviation 0-30* 20*  30*  30*        R Hand ROM    AROM [x]? ??         PROM[]??? IE   10/22/2020 1/5/2021       2nd Digit MCP Flexion/Extension */ 0-45 H TBA 46* /*23  70*/0*     PIP Flexion/Extension 100*/ 0 57*/- 29* 65*/34*  95*/-22*     DIP Flexion/Extension 70-90*/0 32*/- 23* 32* /0*  60*/0*        L MF     AROM [x]? ??         PROM[]??? 10/12/20 10/15/20   10/22/2020 1/ 5 /2021        MF Digit MCP Extension/Flexion   0-45 H /* -30*/50* -20*/47*  14*/46*  0*/64*     PIP Extension/Flexion   0*/100* -55*/62* -50*/ 60* 51*/53*  -40*/ 84*     DIP Extension/Flexion   0*/70-90* Wiggle  P - 40*  wiggle   0*/25*  0*/55*      4th Digit MCP Flexion/Extension */ 0-45 H TBA  31*/0*    66*/0*     PIP Flexion/Extension 100*/ 0 70*/- 30* 72* /0*   97*/0*      DIP Flexion/Extension 70-90*/0 41*/0* 60* /0*   63*/0*          5th Digit MCP Flexion/Extension */ 0-45 H TBA 23*/ 0*   73* /0*     PIP Flexion/Extension 100*/ 0 74*. +  /- 4* 80* /0*   95* /0*     DIP Flexion/Extension 70-90*/0 46*/0*  60*/0*   70* /0*         PROM:  PIP flexion ^'ed from -38* to -12*  On this date.      Dynamometer (setting 2):    assessed 12/29/2020                                Left: 38#                                              Right: 107#                    Pinch Meter:              Lateral: Left=15# ,                                                                   Right=23#                Palmar 3 point: Left=9# ,                                                        Right=20#      9 hole peg test:  Assessed this date.         R  - :22        L   - :45     -Rehab Potential: Good  -Requires OT Follow Up: Yes  Time In: 3:00 pm     Time Out: 4:00 pm            Visit #: 16 ( 18 session used already)    Treatment Charges: Mins Time in - Time out  Units   Modalities- fluiothe 10 3:00 pm - 3:10 pm 1   Ther Exercise 30 3:30 pm - 4:00 pm 2 Manual Therapy 20 3:10 pm - 3:30 pm 1   Thera Activities      ADL/Home Mgt       Neuro Re-education      Gait Training      Group Therapy      Non-Billable Service Time MHP      Other splint lidia      Total Time/Units 60  4     -Response to Treatment: Pt responded well to treatment, MF fatigued by end of session. Goals: Goals for pt can be see on initial eval occurring on 09/08/2020 and on 10/22/2020, 12/10/2020 and on 1/5/2021    GOALS (Long term same as Short term):  1) Patient will demonstrate good understanding of home program (exercises/activities/diagnosis/prognosis/goals) with good accuracy. Goal in progress. 2) Patient will demonstrate increased active/passive range of motion of their LUE to [facility] for ADL/IADL completion. Goal making good gains. 3) Patient will demonstrate increased /pinch strength of at least 10 / 5 pinch pounds of their L hand. Goal initiated recently - working on at this time. 4) Patient to report decreased pain in their affected L upper extremity from 8/10 to 2/10 or less with resistive functional use. Goal making good gains. Pain with regular ADL tasks 0-3/10. Pain with resistive tasks 3-5/10  5) Increase in fine motor function as evidenced by decreased time to complete 9-hole peg test and/or MRMT test by at least 5-10 seconds. Goal making good gains. 6) Patient to report 100% compliance with their splint wear, care, and precautions if needed. Goal met. Splint d/c'ed. 7) Patient to report a decrease in hypersensitivity from 80% to 20% or less in their LUE. Goal making good gains. 8) Patient to demonstrate decreased guarding of their affected extremity from 100% to 20% or less. Goal making good gains. 9) Patient will be knowledgeable of edema control techniques as evident with decreases from severe to mild/none. Goal making good gains. 10) Patient will demonstrate a non-tender/non-adherent scar. Goal making fair gains.

## 2021-01-07 NOTE — LETTER
[address] [facility].  [address] [address]  Phone: [phone]  Fax: [phone]    Hue Samuels MD        January 7, 2021     Patient: Stevie Lucas   YOB: 1980   Date of Visit: 1/7/2021       To Whom It May Concern: It is my medical opinion that Emogene Min may return to work on 1/8/2021 with the following restrictions: lifting/carrying not to exceed 10 lbs. , pushing/pulling not to exceed 10 lbs. If you have any questions or concerns, please don't hesitate to call.     Sincerely,        Hue Samuels MD

## 2021-01-07 NOTE — PROGRESS NOTES
Chief Complaint   Patient presents with    Follow Up After Procedure     4 months out excisional debridement of left palm, index and middle finger with open reduction internal fixation of left middle finger proximal phalanx fracture with middle finger A2 pulley repair         Naveed Garcia is a 38y.o. year old  who presents for follow up of left hand crush injury with open fractures of middle finger. He is just over 4 months out. He has continued in therapy. He feels he is making great strides in therapy. He still reports inability to fully extend his middle finger PIP joint.     Past Medical History:   Diagnosis Date    Diabetes mellitus ([address]     Hypertension     Thyroid disease      Past Surgical History:   Procedure Laterality Date    APPENDECTOMY  3/23/2016    laparoscopic    FINGER SURGERY Left 8/24/2020    LEFT HAND I&D WITH PERCUTANEOUS PINNING OF THIRD FINGER performed by Roosevelt Matute MD at [address] [place] [address]       Current Outpatient Medications:     ibuprofen (ADVIL;MOTRIN) 600 MG tablet, Take 1 tablet by mouth 4 times daily as needed for Pain, Disp: 360 tablet, Rfl: 1    atorvastatin (LIPITOR) 10 MG tablet, TAKE ONE TABLET BY MOUTH EVERY DAY, Disp: , Rfl:     naproxen (NAPROSYN) 500 MG tablet, Take 1 tablet by mouth 2 times daily (with meals), Disp: 60 tablet, Rfl: 3    metFORMIN (GLUCOPHAGE) 500 MG tablet, Take 500 mg by mouth daily (with breakfast), Disp: , Rfl:     Levothyroxine Sodium (SYNTHROID PO), Take by mouth, Disp: , Rfl:   Allergies   Allergen Reactions    Norco [Hydrocodone-Acetaminophen] Shortness Of Breath     Social History     Socioeconomic History    Marital status:      Spouse name: Not on file    Number of children: Not on file    Years of education: Not on file    Highest education level: Not on file   Occupational History    Not on file   Social Needs    Financial resource strain: Not on file    Food insecurity     Worry: Not on file     Inability: Not on file  Transportation needs     Medical: Not on file     Non-medical: Not on file   Tobacco Use    Smoking status: Never Smoker    Smokeless tobacco: Never Used   Substance and Sexual Activity    Alcohol use: No    Drug use: No    Sexual activity: Not on file   Lifestyle    Physical activity     Days per week: Not on file     Minutes per session: Not on file    Stress: Not on file   Relationships    Social connections     Talks on phone: Not on file     Gets together: Not on file     Attends Buddhist service: Not on file     Active member of club or organization: Not on file     Attends meetings of clubs or organizations: Not on file     Relationship status: Not on file    Intimate partner violence     Fear of current or ex partner: Not on file     Emotionally abused: Not on file     Physically abused: Not on file     Forced sexual activity: Not on file   Other Topics Concern    Not on file   Social History Narrative    Not on file     No family history on file. Skin: negative   Musculoskeletal: left middle finger pain  Neurologic: negative  Cardiovascular: negative    SUBJECTIVE:      Constitutional:    The patient is alert and oriented x 3, appears to be stated age and in no distress. Left hand: Scar mature. Complete healing over the palmar surface. He does have scar hypertrophy and tenderness palpation. This does limit his MCP and PIP joint extension. MCP extension lacks about 10 degrees and PIP extension lacks about 40 degrees. MCP joint flexion measures 65 degrees and PIP joint flexion measures 80 degrees. He is nontender over his fracture. Radial, ulnar, median nerves are intact.   2+ brisk cap refill of the middle finger as well as all digits of the hand he is grossly neurovascular tact with diminished sensation of the middle finger Xrays: X-rays of his left hand AP lateral obliques were obtained today in the office. There is a healing fracture comminuted fracture of the middle finger proximal phalanx. There is persistent lucency. However there is evidence of callus formation. No significant interval change in alignment. Impression office x-rays: Healing complex intra-articular comminuted fracture of the left middle finger proximal phalanx  Radiographic findings reviewed with patient      Impression:   Crush injury left hand with comminuted fracture of the middle finger proximal phalanx  DM, HTN    Plan:   Patient to continue therapy. Will ask for additional sessions. Okay to return to work light duty with no lifting greater than 10 pounds. Did discuss briefly an additional surgery if needed if no improvement with his extensor lag. Patient follow-up in 6 weeks with repeat x-rays. I have seen and evaluated the patient and agree with the above assessment and plan on today's visit. I have performed the key components of the history and physical examination with significant findings of improving function left hand status post comminuted proximal phalanx fracture middle finger. He does have improved digital flexion. He does have intact extensor function but significant adhesions dorsally resulting in extensor lag. Continue with therapy and advance as tolerated. May return to work light duty. . I concur with the findings and plan as documented.     Terrence Messer MD  1/7/2021

## 2021-01-11 ENCOUNTER — TREATMENT (OUTPATIENT)
Dept: OCCUPATIONAL THERAPY | Age: 41
End: 2021-01-11
Payer: COMMERCIAL

## 2021-01-11 DIAGNOSIS — S62.613B DISPLACED FRACTURE OF PROXIMAL PHALANX OF LEFT MIDDLE FINGER, INITIAL ENCOUNTER FOR OPEN FRACTURE: Primary | ICD-10-CM

## 2021-01-11 PROCEDURE — 97022 WHIRLPOOL THERAPY: CPT | Performed by: OCCUPATIONAL THERAPIST

## 2021-01-11 PROCEDURE — 97140 MANUAL THERAPY 1/> REGIONS: CPT | Performed by: OCCUPATIONAL THERAPIST

## 2021-01-11 PROCEDURE — 97110 THERAPEUTIC EXERCISES: CPT | Performed by: OCCUPATIONAL THERAPIST

## 2021-01-11 NOTE — PROGRESS NOTES
Blocked PIP flex/ext  MF but other fingers can move with it if he wants 2 sets x10  And all fingers together. PIP ext MF with place and hold ext - to do frequently during the day. Blocked MP flex/ext  All fingers together. 2 sets x10. Also did each IF and MF alone and RF and SF together for  Pt to feel the tendon working. Wristisizer  5 mins with LUE with full grasp throughout. Finger adduc/abduction  A to abduction RF - did with place and hold. IF alone. - Added yellow sponge to complete on either side of 3rd digit. x10   Wrist all planes  Stiff in wrist flexion - doing active only   LUE  Completed flexion/extension of PIP/DIP 3  sets x10 within pt's tolerance. Focus on extension/flexion of 3rd digit. Completed with A2 pulley splint donned- or therapist supporting this area. Tendon glides  x10 completed with proper education provided. Boading balls  5 mins clock-wise and counter clock-wise. Hook/fist and target guiding  x10 to complete hook/fist using a pen with pt educated on proper positioning throughout. Added to HEP   -Red foam tube: placing half tube into palm and completing flexion of all digits to touch tube. Pt educated to pull down with DIP to assist with improving flexion and touching tube. Extensor tendon digit holds  x10 with palm down: therapist bringing 3rd digit up and pt was to hold for 3 seconds with fair tolerance, HEP within pain tolerance. AAROM:               PROM/Stretching:     L MF PIP ext/Flexion  Gentle completed to digits into slight extension with slight pain reported from palm incision. More tension to PIP ext   Finger flexion IF, RF and SF  R and SF together, IF alone. To do at home so he can loosen fingers up without the confines of the MF   MP flexion PROM with IP flex/ext  Passively flex MP's while pt bends and straigthens his IP's. HEP   Towel scrunches  5 mins added to HEP   L MF DIP flexion  Added to HEP also   Wrist flex. Ext -   Stiff end range. Scar Mass/Edema Control:     Retrograde massage/Scar tissue massage x On dorsal portion down digits, hand and wrist and also using small massager to-   break up scar tissue. -Graston tool implemented this date   elastomere scar forms  Continues To wear with night splint. made a new form to wear at night with elastomere in his web spaces. .    Gel compression sheet  Med/lg, to wear and hr 1 -2 x's a day IF to decrease scar. Strengthenin# x Wrist flex, wrist ext, RD - UD . 2 sets x15.   1 set x15 of supination/pronation holds   Table top, bend at pip than extend digits while in table top and then full extend with slight resistance. x10 with fair tolerance and slight resistance provided for isometrics strengthening. Finger extension x 2 sets x15 red   PIP Ext Strengthening  12 reps completed with orange t-band and MCPs blocked in 90*   Functional Work task x #10 weight in a bin and placing from surface to surface x10 at waist height with fair tolerance. Pt reported after 6 reps he felt that he could feel the weight more through his injured hand. Pt will complete exercise with 5# weight and more reps to work up to his 10 # limit with no pressure on injured hand. Yellow putty  Squeeze and manipulate using all fingers and thumb. -Added hand fanning, scar tissues presses between digits and against palm, abduction of IF/MF, MF/RF. [employer]  25 reps - pressing both hands into ball with extended fingers, focus on driving PIPs into ball. Digi flex x 5.0# red 2 sets x15 pronation, supination and neutral with pulling the fingers down to the bottom of the palm. BEU  10 mins at level 2 resistance completing 5 mins forward and 5 mins back. Weighted ball exercises  4# weighted ball throwing and catching in his LUE x20 with good tolerance. - rolling ball forward and back promoting digit extension   Hand gripper  On the second setting to  30 pom poms to place in a container. -2x15 reps - round end pointed down at level 2 resistance with yellow spring. green therabar  2 sets x15 Pronation and supination, twisting, full composite fist   -Isometric shaking x 2 - as long as can   Red foam block   2 sets x10 : composite fist, claw, middle and ring finger press, IF & F/thumb press. Other:     Metacarpal splint D/C  Re- molded splint for a better fit to be worn out and about and at night as needed. IF, MF ext splint  x Fabricated to wear at night with his elastomere gel pads to stretch scar tissue and PIP jt. . Made a new elastomere form leaving an open area for his wound to breathe. Pt can try wearing at night. To bring in next session to readjust to ^ ext pull. LMB X  For IF and MF. Wear 10 min to 30 for a total of 1 hr a day. A2 pulley splint  D/C Fabricated an A2 pulley splint with longer end for easy removal of splint after exercises completed. Pt may require adjustments due to edema on 3rd digit. Ext tube IF  D/c To wear occasionally during the day for an hour. HEP  X  Cont with, added MP flexion while flex/ext IP's - can use frap strap also, changed PIP ext from ext tube to LMB splints. Yellow putty for gentle srengthening         Ext tube splint MF D/C Wear occasionally during the day for an hour       frap strap  Can be used to work full finger flexion and or hold MP's down with IP flex. ext- pt to play around with   Wound Care  May be closed but pt states he has occasional drainage. Splint adjustments  Completed for improved comfort and postioning due to decreasing edema.  ^;ed space around  MP's Assessment/Comments: Pt is making Fair progress toward stated plan of care and established goals. Pt tolerated session well and completed functional task with 10# weight restriction. Pt did tolerate activity however at x10 reps he felt that he needed a rest break. Pt will work up to max limit of 10# at waist height. 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 Continue to progress towards functional tasks without difficulty. R Upper Extremity ROM       AROM [x]? ??     PROM[]??? IE  10/22/2020 1/5 /87998         WRIST Flexion 0-70* 23*  70*  75*       Extension 0-80* 34*  54*  60*       Radial Deviation 0-20* 18*  20*  20*       Ulnar Deviation 0-30* 20*  30*  30*        R Hand ROM    AROM [x]? ??         PROM[]??? IE   10/22/2020 1/5/2021       2nd Digit MCP Flexion/Extension */ 0-45 H TBA 46* /*23  70*/0*     PIP Flexion/Extension 100*/ 0 57*/- 29* 65*/34*  95*/-22*     DIP Flexion/Extension 70-90*/0 32*/- 23* 32* /0*  60*/0*        L MF     AROM [x]? ??         PROM[]??? 10/12/20 10/15/20   10/22/2020 1/ 5 /2021        MF Digit MCP Extension/Flexion   0-45 H /* -30*/50* -20*/47*  14*/46*  0*/64*     PIP Extension/Flexion   0*/100* -55*/62* -50*/ 60* 51*/53*  -40*/ 84*     DIP Extension/Flexion   0*/70-90* Wiggle  P - 40*  wiggle   0*/25*  0*/55*      4th Digit MCP Flexion/Extension */ 0-45 H TBA  31*/0*    66*/0*     PIP Flexion/Extension 100*/ 0 70*/- 30* 72* /0*   97*/0*      DIP Flexion/Extension 70-90*/0 41*/0* 60* /0*   63*/0*          5th Digit MCP Flexion/Extension */ 0-45 H TBA 23*/ 0*   73* /0*     PIP Flexion/Extension 100*/ 0 74*.  +  /- 4* 80* /0*   95* /0*     DIP Flexion/Extension 70-90*/0 46*/0*  60*/0*   70* /0*         PROM:  PIP flexion ^'ed from -38* to -12*  On this date.      Dynamometer (setting 2):    assessed 12/29/2020                                Left: 38#                                             YGDNO: 661#                    Pinch Meter:              Lateral: Left=15# ,                                                                   Right=23#                Palmar 3 point: Left=9# ,                                                        Right=20#      9 hole peg test:  Assessed this date. R  - :22        L   - :45     -Rehab Potential: Good  -Requires OT Follow Up: Yes  Time In: 3:00 pm     Time Out: 4:00 pm            Visit #: 17 ( 18 session used already)    Treatment Charges: Mins Time in - Time out  Units   Modalities- fluiothe 15 3:00 pm - 3:15 pm 1   Ther Exercise 25 3:15 pm - 3:40 pm 2   Manual Therapy 20 3:40 pm - 4:00 pm 1   Thera Activities      ADL/Home Mgt       Neuro Re-education      Gait Training      Group Therapy      Non-Billable Service Time MHP      Other splint lidia      Total Time/Units 60  4     -Response to Treatment: Pt responded well to treatment, MF fatigued by end of session. Goals: Goals for pt can be see on initial eval occurring on 09/08/2020 and on 10/22/2020, 12/10/2020 and on 1/5/2021    GOALS (Long term same as Short term):  1) Patient will demonstrate good understanding of home program (exercises/activities/diagnosis/prognosis/goals) with good accuracy. Goal in progress. 2) Patient will demonstrate increased active/passive range of motion of their LUE to [facility] for ADL/IADL completion. Goal making good gains. 3) Patient will demonstrate increased /pinch strength of at least 10 / 5 pinch pounds of their L hand. Goal initiated recently - working on at this time. 4) Patient to report decreased pain in their affected L upper extremity from 8/10 to 2/10 or less with resistive functional use. Goal making good gains. Pain with regular ADL tasks 0-3/10.    Pain with resistive tasks 3-5/10 5) Increase in fine motor function as evidenced by decreased time to complete 9-hole peg test and/or MRMT test by at least 5-10 seconds. Goal making good gains. 6) Patient to report 100% compliance with their splint wear, care, and precautions if needed. Goal met. Splint d/c'ed. 7) Patient to report a decrease in hypersensitivity from 80% to 20% or less in their LUE. Goal making good gains. 8) Patient to demonstrate decreased guarding of their affected extremity from 100% to 20% or less. Goal making good gains. 9) Patient will be knowledgeable of edema control techniques as evident with decreases from severe to mild/none. Goal making good gains. 10) Patient will demonstrate a non-tender/non-adherent scar. Goal making fair gains. 11) Patient will report ADL functions as Mod I/I using LUE. Goal making good gains. 12) Patient will demonstrate improved functional activity tolerance from poor to fair + for ADL/IADL completion. Goal making good gains. 13) Patient will decrease QuickDASH score by 50% for increased participation in daily functional activities. Goal making good gains. Plan:   [x]  Continues Plan of care: 3x/week for 6 weeks until 01/18/2021. Treatment covered based on POC and graduated to patient's progress. Pt education continues at each visit to obtain maximum benefits from skilled OT intervention.   []  Alter Plan of care:   []  Discharge:      Christy Rouse [address] 86, OTR/L #[id number]

## 2021-01-12 ENCOUNTER — TREATMENT (OUTPATIENT)
Dept: OCCUPATIONAL THERAPY | Age: 41
End: 2021-01-12
Payer: COMMERCIAL

## 2021-01-12 DIAGNOSIS — S62.613B DISPLACED FRACTURE OF PROXIMAL PHALANX OF LEFT MIDDLE FINGER, INITIAL ENCOUNTER FOR OPEN FRACTURE: Primary | ICD-10-CM

## 2021-01-12 PROCEDURE — 97110 THERAPEUTIC EXERCISES: CPT | Performed by: OCCUPATIONAL THERAPIST

## 2021-01-12 PROCEDURE — 97140 MANUAL THERAPY 1/> REGIONS: CPT | Performed by: OCCUPATIONAL THERAPIST

## 2021-01-12 PROCEDURE — 97022 WHIRLPOOL THERAPY: CPT | Performed by: OCCUPATIONAL THERAPIST

## 2021-01-12 PROCEDURE — 97035 APP MDLTY 1+ULTRASOUND EA 15: CPT | Performed by: OCCUPATIONAL THERAPIST

## 2021-01-12 NOTE — PROGRESS NOTES
L MF DIP flexion  Added to HEP also   Wrist flex. Ext -   Stiff end range. Scar Mass/Edema Control:     Retrograde massage/Scar tissue massage x On dorsal portion down digits, hand and wrist and also using small massager to-   break up scar tissue. -Graston tool implemented this date   elastomere scar forms  Continues To wear with night splint. made a new form to wear at night with elastomere in his web spaces. .    Gel compression sheet  Med/lg, to wear and hr 1 -2 x's a day IF to decrease scar. Strengthenin# x Wrist flex, wrist ext, RD - UD . 2 sets x15.   1 set x15 of supination/pronation holds   Table top, bend at pip than extend digits while in table top and then full extend with slight resistance. x10 with fair tolerance and slight resistance provided for isometrics strengthening. Finger extension x 2 sets x15 red   PIP Ext Strengthening  12 reps completed with orange t-band and MCPs blocked in 90*   Functional Work task  #10 weight in a bin and placing from surface to surface x10 at waist height with fair tolerance. Pt reported after 6 reps he felt that he could feel the weight more through his injured hand. Pt will complete exercise with 5# weight and more reps to work up to his 10 # limit with no pressure on injured hand. Yellow putty  Squeeze and manipulate using all fingers and thumb. -Added hand fanning, scar tissues presses between digits and against palm, abduction of IF/MF, MF/RF. [employer]  25 reps - pressing both hands into ball with extended fingers, focus on driving PIPs into ball. Digi flex  5.0# red 2 sets x15 pronation, supination and neutral with pulling the fingers down to the bottom of the palm. BEU x 10 mins at level 3 resistance completing 5 mins forward and 5 mins back. Weighted ball exercises  4# weighted ball throwing and catching in his LUE x20 with good tolerance.    - rolling ball forward and back promoting digit extension Hand gripper  On the second setting to  30 pom poms to place in a container.   -2x15 reps - round end pointed down at level 2 resistance with yellow spring. green therabar  2 sets x15 Pronation and supination, twisting, full composite fist   -Isometric shaking x 2 - as long as can   Red foam block   2 sets x10 : composite fist, claw, middle and ring finger press, IF & F/thumb press. Other:     Metacarpal splint D/C  Re- molded splint for a better fit to be worn out and about and at night as needed. IF, MF ext splint  x Fabricated to wear at night with his elastomere gel pads to stretch scar tissue and PIP jt. . Made a new elastomere form leaving an open area for his wound to breathe. Pt can try wearing at night. To bring in next session to readjust to ^ ext pull. LMB X  For IF and MF. Wear 10 min to 30 for a total of 1 hr a day. A2 pulley splint  D/C Fabricated an A2 pulley splint with longer end for easy removal of splint after exercises completed. Pt may require adjustments due to edema on 3rd digit. Ext tube IF  D/c To wear occasionally during the day for an hour. HEP  X  Cont with, added MP flexion while flex/ext IP's - can use frap strap also, changed PIP ext from ext tube to LMB splints. Yellow putty for gentle srengthening         Ext tube splint MF D/C Wear occasionally during the day for an hour       frap strap  Can be used to work full finger flexion and or hold MP's down with IP flex. ext- pt to play around with   Wound Care  May be closed but pt states he has occasional drainage. Splint adjustments  Completed for improved comfort and postioning due to decreasing edema.  ^;ed space around  MP's Assessment/Comments: Pt is making Fair progress toward stated plan of care and established goals. Pt tolerated session well and focus this session was on scar tissue management. Pt did report soreness in the affected digit this date from functional work task prior session. Pt will complete 4# and less reps until working up to 10# without having increased soreness after the task. Pt educated to continue with ROM exercises to decrease stiffness of the digits which was reported after functional work task. Pt is on hold at this time until new C9 is obtained for continued therapy services. R Upper Extremity ROM       AROM [x]? ??     PROM[]??? IE  10/22/2020 1/5 /28018         WRIST Flexion 0-70* 23*  70*  75*       Extension 0-80* 34*  54*  60*       Radial Deviation 0-20* 18*  20*  20*       Ulnar Deviation 0-30* 20*  30*  30*        R Hand ROM    AROM [x]? ??         PROM[]??? IE   10/22/2020 1/5/2021       2nd Digit MCP Flexion/Extension */ 0-45 H TBA 46* /*23  70*/0*     PIP Flexion/Extension 100*/ 0 57*/- 29* 65*/34*  95*/-22*     DIP Flexion/Extension 70-90*/0 32*/- 23* 32* /0*  60*/0*        L MF     AROM [x]? ??         PROM[]??? 10/12/20 10/15/20   10/22/2020 1/ 5 /2021        MF Digit MCP Extension/Flexion   0-45 H /* -30*/50* -20*/47*  14*/46*  0*/64*     PIP Extension/Flexion   0*/100* -55*/62* -50*/ 60* 51*/53*  -40*/ 84*     DIP Extension/Flexion   0*/70-90* Wiggle  P - 40*  wiggle   0*/25*  0*/55*      4th Digit MCP Flexion/Extension */ 0-45 H TBA  31*/0*    66*/0*     PIP Flexion/Extension 100*/ 0 70*/- 30* 72* /0*   97*/0*      DIP Flexion/Extension 70-90*/0 41*/0* 60* /0*   63*/0*          5th Digit MCP Flexion/Extension */ 0-45 H TBA 23*/ 0*   73* /0*     PIP Flexion/Extension 100*/ 0 74*.  +  /- 4* 80* /0*   95* /0*     DIP Flexion/Extension 70-90*/0 46*/0*  60*/0*   70* /0*         PROM:  PIP flexion ^'ed from -38* to -12*  On this date.     Dynamometer (setting 2):    assessed 12/29/2020                                Left: 38#                                              KQOXJ: 483#                    Pinch Meter:              Lateral: Left=15# ,                                                                   Right=23#                Palmar 3 point: Left=9# ,                                                        Right=20#      9 hole peg test:  Assessed this date. R  - :22        L   - :45     -Rehab Potential: Good  -Requires OT Follow Up: Yes  Time In: 3:00 pm     Time Out: 4:00 pm            Visit #: 18 ( 18 session used already)    Treatment Charges: Mins Time in - Time out  Units   Modalities- fluiothe 15/8 3:00 pm - 3:15 pm/  3:42 pm - 4:00 pm 1/1   Ther Exercise 20 3:15 pm - 3:25 pm 1   Manual Therapy 17 3:25pm - 3:42 pm 1   Thera Activities      ADL/Home Mgt       Neuro Re-education      Gait Training      Group Therapy      Non-Billable Service Time MHP      Other splint lidia      Total Time/Units 60  4     -Response to Treatment: Pt responded well to treatment, MF fatigued by end of session. Goals: Goals for pt can be see on initial eval occurring on 09/08/2020 and on 10/22/2020, 12/10/2020 and on 1/5/2021    GOALS (Long term same as Short term):  1) Patient will demonstrate good understanding of home program (exercises/activities/diagnosis/prognosis/goals) with good accuracy. Goal in progress. 2) Patient will demonstrate increased active/passive range of motion of their LUE to [facility] for ADL/IADL completion. Goal making good gains. 3) Patient will demonstrate increased /pinch strength of at least 10 / 5 pinch pounds of their L hand. Goal initiated recently - working on at this time. 4) Patient to report decreased pain in their affected L upper extremity from 8/10 to 2/10 or less with resistive functional use. Goal making good gains. Pain with regular ADL tasks 0-3/10.    Pain with resistive tasks 3-5/10 5) Increase in fine motor function as evidenced by decreased time to complete 9-hole peg test and/or MRMT test by at least 5-10 seconds. Goal making good gains. 6) Patient to report 100% compliance with their splint wear, care, and precautions if needed. Goal met. Splint d/c'ed. 7) Patient to report a decrease in hypersensitivity from 80% to 20% or less in their LUE. Goal making good gains. 8) Patient to demonstrate decreased guarding of their affected extremity from 100% to 20% or less. Goal making good gains. 9) Patient will be knowledgeable of edema control techniques as evident with decreases from severe to mild/none. Goal making good gains. 10) Patient will demonstrate a non-tender/non-adherent scar. Goal making fair gains. 11) Patient will report ADL functions as Mod I/I using LUE. Goal making good gains. 12) Patient will demonstrate improved functional activity tolerance from poor to fair + for ADL/IADL completion. Goal making good gains. 13) Patient will decrease QuickDASH score by 50% for increased participation in daily functional activities. Goal making good gains. Plan:   [x]  Continues Plan of care: 3x/week for 6 weeks until 01/18/2021. Treatment covered based on POC and graduated to patient's progress. Pt education continues at each visit to obtain maximum benefits from skilled OT intervention.   []  Alter Plan of care:   []  Discharge:      Dereck Rouse Brad 86, OTR/L #[id number]

## 2021-02-02 ENCOUNTER — TREATMENT (OUTPATIENT)
Dept: OCCUPATIONAL THERAPY | Age: 41
End: 2021-02-02
Payer: COMMERCIAL

## 2021-02-02 DIAGNOSIS — S62.613B DISPLACED FRACTURE OF PROXIMAL PHALANX OF LEFT MIDDLE FINGER, INITIAL ENCOUNTER FOR OPEN FRACTURE: Primary | ICD-10-CM

## 2021-02-02 PROCEDURE — 97110 THERAPEUTIC EXERCISES: CPT | Performed by: OCCUPATIONAL THERAPIST

## 2021-02-02 PROCEDURE — 97140 MANUAL THERAPY 1/> REGIONS: CPT | Performed by: OCCUPATIONAL THERAPIST

## 2021-02-02 NOTE — PROGRESS NOTES
OCCUPATIONAL THERAPY PROGRESS NOTE    Date:  2021   Initial Evaluation Date: 2020                          Evaluating Therapist: Joyce Plunkett     Patient Name:  Patricio Thurston  \"Henok\"               :  1980     Restrictions/Precautions:  ROM as tolerated; modalities prn, low fall risk  Diagnosis:S62.613B (ICD-10-CM) - Displaced fracture of proximal phalanx of left middle finger, initial encounter for open fracture  Excisional debridement of left palm, index and middle finger with open reduction internal fixation of left middle finger proximal phalanx fracture with middle finger A2 pulley repair. Insurance/Certification information:[facility]  Claim # [id number]  Referring Practitioner: Dr. Zina Justice  Date of Surgery/Injury: 2020  Plan of care signed (Y/N): Y    Visit# / total visits:  -  3x/week for 6 weeks until 2021    COVID-19 Screening completed upon entering facility and patient cleared for treatment today. Pt followed all protocols set forth by [address] [place] [address] for Outpatient services throughout therapy session. Patient has been made aware of all new hygiene protocols due to COVID-19 set forth by [address] [place] [address] Outpatient services and agrees to abide by all protocols. Pain Level: 0/10 however reported soreness in the middle phalanx    Subjective: Pt reports a tingling/numb feeling at the base of his 2nd and 3rd fingers and increased stiffness d/t working in colder temperatures. Objective:  Updated POC to be completed by  visit. INTERVENTION: COMPLETED: SPECIFICS/COMMENTS:   Modality:     MHP x Hand wrapped in during wound soak for soft tissue warm-up   US x Scar management in the palm of KELSEY  Intensity: 0.8  Duration: 8 mins  Duty Cycle: 100%  Depth: 3.3 MHz   fluidotherapy  For soft tissue warm-up while intermittently moving his wrist and fingers. For desensitization also.     15 mins Ice   End of session to decrease pain. AROM:     Towel scrunches x 5 mins to LUE    Blocked PIP flex/ext  MF but other fingers can move with it if he wants 2 sets x10  And all fingers together. PIP ext MF with place and hold ext - to do frequently during the day. Blocked MP flex/ext  All fingers together. 2 sets x10. Also did each IF and MF alone and RF and SF together for  Pt to feel the tendon working. Wristisizer  5 mins with LUE with full grasp throughout. Finger adduc/abduction  A to abduction RF - did with place and hold. IF alone. - Added yellow sponge to complete on either side of 3rd digit. x10   Wrist all planes  Stiff in wrist flexion - doing active only   LUE  Completed flexion/extension of PIP/DIP 3  sets x10 within pt's tolerance. Focus on extension/flexion of 3rd digit. Completed with A2 pulley splint donned- or therapist supporting this area. Tendon glides  x10 completed with proper education provided. Boading balls  5 mins clock-wise and counter clock-albarran. [place] activity x Completed marble transfer supinate-pronate to  as many marbles as possible and roll down first digit for transfer to container   Hook/fist and target guiding  x10 to complete hook/fist using a pen with pt educated on proper positioning throughout. Added to HEP   -Red foam tube: placing half tube into palm and completing flexion of all digits to touch tube. Pt educated to pull down with DIP to assist with improving flexion and touching tube. Extensor tendon digit holds x x10 with palm down: therapist bringing 3rd digit up and pt was to hold for 3 seconds with fair tolerance, HEP within pain tolerance. AAROM:               PROM/Stretching:     L MF PIP ext/Flexion x Gentle completed to digits into extension  More tension to PIP ext   Finger flexion IF, RF and SF x R and SF together, IF alone.   To do at home so he can loosen fingers up without the confines of the MF MP flexion PROM with IP flex/ext  Passively flex MP's while pt bends and straigthens his IP's. HEP   Towel scrunches x 5 mins added to HEP   L MF DIP flexion x Added to HEP also   Wrist flex. Ext -  x Stiff end range. Completed with massage   Scar Mass/Edema Control:     Retrograde massage/Scar tissue massage x On palmar portion down digits and hand  -also using small massager to-   break up scar tissue. -Graston tool implemented this date   elastomere scar forms  Continues To wear with night splint. made a new form to wear at night with elastomere in his web spaces. .    Gel compression sheet  Med/lg, to wear and hr 1 -2 x's a day IF to decrease scar. Strengthenin#  Wrist flex, wrist ext, RD - UD . 2 sets x15.   1 set x15 of supination/pronation holds   Table top, bend at pip than extend digits while in table top and then full extend with slight resistance. x10 with fair tolerance and slight resistance provided for isometrics strengthening. Finger extension  2 sets x15 red   PIP Ext Strengthening  12 reps completed with orange t-band and MCPs blocked in 90*   Functional Work task  #10 weight in a bin and placing from surface to surface x10 at waist height with fair tolerance. Pt reported after 6 reps he felt that he could feel the weight more through his injured hand. Pt will complete exercise with 5# weight and more reps to work up to his 10 # limit with no pressure on injured hand. Yellow putty  Squeeze and manipulate using all fingers and thumb. -Added hand fanning, scar tissues presses between digits and against palm, abduction of IF/MF, MF/RF. [employer]  25 reps - pressing both hands into ball with extended fingers, focus on driving PIPs into ball. Digi flex x 5.0# red 2 sets x15 pronation, supination and neutral with pulling the fingers down to the bottom of the palm. Yellow flex/ext 2 sets x15   BEU x 10 mins at level 3 resistance completing 5 mins forward and 5 mins back. Weighted ball exercises  4# weighted ball throwing and catching in his LUE x20 with good tolerance. - rolling ball forward and back promoting digit extension   Hand gripper  On the second setting to  30 pom poms to place in a container.   -2x15 reps - round end pointed down at level 2 resistance with yellow spring. green therabar  2 sets x15 Pronation and supination, twisting, full composite fist   -Isometric shaking x 2 - as long as can   Red foam block   2 sets x10 : composite fist, claw, middle and ring finger press, IF & F/thumb press. Other:     Metacarpal splint D/C  Re- molded splint for a better fit to be worn out and about and at night as needed. IF, MF ext splint  x Fabricated to wear at night with his elastomere gel pads to stretch scar tissue and PIP jt. . Made a new elastomere form leaving an open area for his wound to breathe. Pt can try wearing at night. To bring in next session to readjust to ^ ext pull. LMB X  For IF and MF. Wear 10 min to 30 for a total of 1 hr a day. A2 pulley splint  D/C Fabricated an A2 pulley splint with longer end for easy removal of splint after exercises completed. Pt may require adjustments due to edema on 3rd digit. Ext tube IF  D/c To wear occasionally during the day for an hour. HEP  X  Cont with, added MP flexion while flex/ext IP's - can use frap strap also, changed PIP ext from ext tube to LMB splints. Yellow putty for gentle srengthening         Ext tube splint MF D/C Wear occasionally during the day for an hour       frap strap  Can be used to work full finger flexion and or hold MP's down with IP flex. ext- pt to play around with   Wound Care  May be closed but pt states he has occasional drainage. Splint adjustments  Completed for improved comfort and postioning due to decreasing edema.  ^;ed space around  MP's Assessment/Comments: Pt is making Fair progress toward stated plan of care and established goals. Pt tolerated session well and focus this session was on scar tissue management and ROM. Pt reported increased stiffness/soreness d/t work tasks (in cold) during strengthening activity (digiflex). Ultrasound and massage completed at end of session for scar tissue management with reported increased mobility and decreased soreness. Reassessment to be completed next session. R Upper Extremity ROM       AROM [x]? ??     PROM[]??? IE  10/22/2020 1/5 /07302         WRIST Flexion 0-70* 23*  70*  75*       Extension 0-80* 34*  54*  60*       Radial Deviation 0-20* 18*  20*  20*       Ulnar Deviation 0-30* 20*  30*  30*        R Hand ROM    AROM [x]? ??         PROM[]??? IE   10/22/2020 1/5/2021       2nd Digit MCP Flexion/Extension */ 0-45 H TBA 46* /*23  70*/0*     PIP Flexion/Extension 100*/ 0 57*/- 29* 65*/34*  95*/-22*     DIP Flexion/Extension 70-90*/0 32*/- 23* 32* /0*  60*/0*        L MF     AROM [x]? ??         PROM[]??? 10/12/20 10/15/20   10/22/2020 1/ 5 /2021        MF Digit MCP Extension/Flexion   0-45 H /* -30*/50* -20*/47*  14*/46*  0*/64*     PIP Extension/Flexion   0*/100* -55*/62* -50*/ 60* 51*/53*  -40*/ 84*     DIP Extension/Flexion   0*/70-90* Wiggle  P - 40*  wiggle   0*/25*  0*/55*      4th Digit MCP Flexion/Extension */ 0-45 H TBA  31*/0*    66*/0*     PIP Flexion/Extension 100*/ 0 70*/- 30* 72* /0*   97*/0*      DIP Flexion/Extension 70-90*/0 41*/0* 60* /0*   63*/0*          5th Digit MCP Flexion/Extension */ 0-45 H TBA 23*/ 0*   73* /0*     PIP Flexion/Extension 100*/ 0 74*.  +  /- 4* 80* /0*   95* /0*     DIP Flexion/Extension 70-90*/0 46*/0*  60*/0*   70* /0*         PROM:  PIP flexion ^'ed from -38* to -12*  On this date.      Dynamometer (setting 2):    assessed 12/29/2020                                Left: 38#                                             DOKZX: 582#                    Pinch Meter:              Lateral: Left=15# ,                                                                   Right=23#                Palmar 3 point: Left=9# ,                                                        Right=20#      9 hole peg test:  Assessed this date. R  - :22        L   - :45     -Rehab Potential: Good  -Requires OT Follow Up: Yes  Time In: 4:00 pm     Time Out: 5:00 pm            Visit #: 1    Treatment Charges: Mins Time in - Time out  Units   Modalities- fluiothe      Ther Exercise 25 4:00 pm - 4:25 pm 2   Manual Therapy 35 4:25pm - 5:00 pm 2   Thera Activities      ADL/Home Mgt       Neuro Re-education      Gait Training      Group Therapy      Non-Billable Service Time MHP      Other splint lidia      Total Time/Units 60  4     -Response to Treatment: Pt responded well to treatment, MF fatigued by end of session. Goals: Goals for pt can be see on initial eval occurring on 09/08/2020 and on 10/22/2020, 12/10/2020 and on 1/5/2021    GOALS (Long term same as Short term):  1) Patient will demonstrate good understanding of home program (exercises/activities/diagnosis/prognosis/goals) with good accuracy. Goal in progress. 2) Patient will demonstrate increased active/passive range of motion of their LUE to [facility] for ADL/IADL completion. Goal making good gains. 3) Patient will demonstrate increased /pinch strength of at least 10 / 5 pinch pounds of their L hand. Goal initiated recently - working on at this time. 4) Patient to report decreased pain in their affected L upper extremity from 8/10 to 2/10 or less with resistive functional use. Goal making good gains. Pain with regular ADL tasks 0-3/10. Pain with resistive tasks 3-5/10  5) Increase in fine motor function as evidenced by decreased time to complete 9-hole peg test and/or MRMT test by at least 5-10 seconds. Goal making good gains. 6) Patient to report 100% compliance with their splint wear, care, and precautions if needed. Goal met. Splint d/c'ed. 7) Patient to report a decrease in hypersensitivity from 80% to 20% or less in their LUE. Goal making good gains. 8) Patient to demonstrate decreased guarding of their affected extremity from 100% to 20% or less. Goal making good gains. 9) Patient will be knowledgeable of edema control techniques as evident with decreases from severe to mild/none. Goal making good gains. 10) Patient will demonstrate a non-tender/non-adherent scar. Goal making fair gains. 11) Patient will report ADL functions as Mod I/I using LUE. Goal making good gains. 12) Patient will demonstrate improved functional activity tolerance from poor to fair + for ADL/IADL completion. Goal making good gains. 13) Patient will decrease QuickDASH score by 50% for increased participation in daily functional activities. Goal making good gains. Plan:   [x]  Continues Plan of care: 3x/week for 6 weeks until 02/19/2021. Treatment covered based on POC and graduated to patient's progress. Pt education continues at each visit to obtain maximum benefits from skilled OT intervention.   []  Alter Plan of care:   []  Discharge:    Campbell Flaherty S/OT  Marylu Spence  Luite Brad 86, OTR/L #[id number]

## 2021-02-03 ENCOUNTER — HOSPITAL ENCOUNTER (EMERGENCY)
Age: 41
Discharge: HOME | End: 2021-02-03
Attending: EMERGENCY MEDICINE
Payer: COMMERCIAL

## 2021-02-03 ENCOUNTER — APPOINTMENT (OUTPATIENT)
Dept: GENERAL RADIOLOGY | Age: 41
End: 2021-02-03
Payer: COMMERCIAL

## 2021-02-03 VITALS
WEIGHT: 220 LBS | SYSTOLIC BLOOD PRESSURE: 132 MMHG | HEIGHT: 71 IN | RESPIRATION RATE: 16 BRPM | HEART RATE: 85 BPM | BODY MASS INDEX: 30.8 KG/M2 | DIASTOLIC BLOOD PRESSURE: 78 MMHG | OXYGEN SATURATION: 98 % | TEMPERATURE: 97.4 F

## 2021-02-03 DIAGNOSIS — S80.10XA CONTUSION OF MULTIPLE SITES OF LOWER EXTREMITY, UNSPECIFIED LATERALITY, INITIAL ENCOUNTER: ICD-10-CM

## 2021-02-03 DIAGNOSIS — S63.92XA SPRAIN OF LEFT HAND, INITIAL ENCOUNTER: ICD-10-CM

## 2021-02-03 DIAGNOSIS — S60.222A CONTUSION OF LEFT HAND, INITIAL ENCOUNTER: Primary | ICD-10-CM

## 2021-02-03 PROCEDURE — 72170 X-RAY EXAM OF PELVIS: CPT

## 2021-02-03 PROCEDURE — 73590 X-RAY EXAM OF LOWER LEG: CPT

## 2021-02-03 PROCEDURE — 72220 X-RAY EXAM SACRUM TAILBONE: CPT

## 2021-02-03 PROCEDURE — 99283 EMERGENCY DEPT VISIT LOW MDM: CPT

## 2021-02-03 PROCEDURE — 73110 X-RAY EXAM OF WRIST: CPT

## 2021-02-03 ASSESSMENT — PAIN DESCRIPTION - ORIENTATION
ORIENTATION_2: RIGHT;LOWER
ORIENTATION: LEFT

## 2021-02-03 ASSESSMENT — PAIN DESCRIPTION - PROGRESSION
CLINICAL_PROGRESSION: NOT CHANGED
CLINICAL_PROGRESSION_2: NOT CHANGED

## 2021-02-03 ASSESSMENT — PAIN DESCRIPTION - ONSET
ONSET_3: ON-GOING
ONSET_2: ON-GOING

## 2021-02-03 ASSESSMENT — PAIN DESCRIPTION - LOCATION
LOCATION_2: LEG
LOCATION: HAND

## 2021-02-03 ASSESSMENT — PAIN DESCRIPTION - DESCRIPTORS: DESCRIPTORS_3: CONSTANT;DISCOMFORT

## 2021-02-03 ASSESSMENT — PAIN SCALES - GENERAL: PAINLEVEL_OUTOF10: 9

## 2021-02-03 ASSESSMENT — PAIN DESCRIPTION - FREQUENCY: FREQUENCY: CONTINUOUS

## 2021-02-03 NOTE — ED PROVIDER NOTES
HPI:  2/3/21, Time: 4:21 AM ENRIQUE Mosquera is a 38 y.o. male presenting to the ED for pain in left wrist, anterior mid left and right tibias and posterior buttocks, beginning about 1-2 hours ago after work injury. He states he was at work and a heavy bundle of parts fell off and onto both his shins. He fell backwards onto his buttocks nad landed with his left wrist outstretched backwards as well to break his fall. Denies head injury, loss of consciousness, neck or back pain, focal paresthesias, focal weakness, any other extremity injury or pain. He has no lacerations. He has full range of motion of his left thumb fingers at this time without difficulty or pain. He has range of motion of the left wrist but with pain. He is able to walk without difficulty and has no knee ankle or hip pain. His tetanus is up-to-date. The complaint has been persistent, moderate in severity, and worsened by movement. Patient denies fever/chills, sore throat, cough, congestion, chest pain, shortness of breath, edema, headache, visual disturbances, focal paresthesias, focal weakness, abdominal pain, nausea, vomiting, diarrhea, constipation, dysuria, hematuria, neck or back pain, rash or other complaints. He is right-handed. ROS:   A complete review of systems was performed and all pertinent positives and negatives are stated within HPI, all other systems reviewed and are negative.      --------------------------------------------- PAST HISTORY ---------------------------------------------  Past Medical History:  has a past medical history of Diabetes mellitus ([facility] [address] Hypertension, and Thyroid disease. Past Surgical History:  has a past surgical history that includes Appendectomy (3/23/2016) and Finger surgery (Left, 8/24/2020). Social History:  reports that he has never smoked. He has never used smokeless tobacco. He reports that he does not drink alcohol or use drugs. Family History: family history is not on file. The patients home medications have been reviewed. Allergies: Norco [hydrocodone-acetaminophen]        ----------------------------------------PHYSICAL EXAM--------------------------------------  Constitutional:  Well developed, well nourished, no acute distress, non-toxic appearance   Eyes:  PERRL, conjunctiva normal, EOMI  HENT:  Atraumatic, external ears normal, nose normal, oropharynx moist. Neck- normal range of motion, no nuchal rigidity   Respiratory:  No respiratory distress, normal breath sounds, no rales, no wheezing   Cardiovascular:  Normal rate, normal rhythm, no murmurs, no gallops, no rubs. Radial and DP pulses 2+ bilaterally. GI:  Soft, nondistended, normal bowel sounds, nontender,  no rebound, no guarding   :  No costovertebral angle tenderness   Musculoskeletal:  No edema, no tenderness, no deformities. Back-no midline or paraspinal cervical, thoracic or lumbar tenderness. No step-offs. Chest able. Pelvis stable. Moves all 4 extremities without difficulty or pain. Patient has some tenderness diffusely around the left wrist with some mild swelling limiting his range of motion. He moves all PIP, DIP joints of the left fingers as well as the MCP joint of the left thumb. He is warm and well perfused. Cap refill less than 3 seconds. His bilateral anterior mid tibial areas are slightly red with some mild contusions but his compartments are soft and he is warm and well-perfused. He has no pain and no difficulty or deformity of bilateral hips knees ankles and feet. Integument:  Well hydrated, no rash. Adequate perfusion.    Lymphatic:  No cervical lymphadenopathy noted Neurologic:  Alert & oriented x 3, CN 2-12 normal, no focal deficits noted. Normal gait. Left median, radial and ulnar nerves sensation intact to light touch and strength 5/5. Recurrent branch of left median nerve intact. Patient does have some old scarring and persistent mild flexion of the left middle finger from an old surgery. Psychiatric:  Speech and behavior appropriate       -------------------------------------------------- RESULTS -------------------------------------------------  I have personally reviewed all laboratory and imaging results for this patient. Results are listed below. LABS:  No results found for this visit on 02/03/21. RADIOLOGY:  Interpreted by Radiologist.  XR WRIST LEFT (MIN 3 VIEWS)   Final Result   No acute findings. XR TIBIA FIBULA LEFT (2 VIEWS)   Final Result   No acute findings on either side. XR TIBIA FIBULA RIGHT (2 VIEWS)   Final Result   No acute findings on either side. XR SACRUM COCCYX (MIN 2 VIEWS)   Final Result   No acute findings. XR PELVIS (1-2 VIEWS)   Final Result   No acute findings. ------------------------- NURSING NOTES AND VITALS REVIEWED ---------------------------  The nursing notes within the ED encounter and vital signs as below have been reviewed by myself. /78   Pulse 85   Temp 97.4 °F (36.3 °C) (Temporal)   Resp 16   Ht 5' 11\" (1.803 m)   Wt 220 lb (99.8 kg)   SpO2 98%   BMI 30.68 kg/m²   Oxygen Saturation Interpretation: Normal      The patients available past medical records and past encounters were reviewed. ------------------------------ ED COURSE/MEDICAL DECISION MAKING----------------------  Medications - No data to display        Procedures:   none      Medical Decision Making:    Patient is presenting emergency department with a chief  Injury at work. Imaging negative. Extremities neurovascular intact. Patient be discharged. This patient's ED course included: a personal history and physicial examination, re-evaluation prior to disposition and a personal history and physicial eaxmination    This patient has remained hemodynamically stable during their ED course. Re-Evaluations:  Patient is in the bed no acute distress. The results were discussed. Patient be discharged. Counseling: The emergency provider has spoken with the patient and discussed todays results, in addition to providing specific details for the plan of care and counseling regarding the diagnosis and prognosis. Questions are answered at this time and they are agreeable with the plan.    --------------------------- IMPRESSION AND DISPOSITION ---------------------------------    IMPRESSION  1. Contusion of left hand, initial encounter    2. Contusion of multiple sites of lower extremity, unspecified laterality, initial encounter    3.  Sprain of left hand, initial encounter        DISPOSITION  Disposition: Discharge to home  Patient condition is stable             Yi Angel DO  02/03/21 [id number]

## 2021-02-04 ENCOUNTER — TREATMENT (OUTPATIENT)
Dept: OCCUPATIONAL THERAPY | Age: 41
End: 2021-02-04
Payer: COMMERCIAL

## 2021-02-04 DIAGNOSIS — S62.613B DISPLACED FRACTURE OF PROXIMAL PHALANX OF LEFT MIDDLE FINGER, INITIAL ENCOUNTER FOR OPEN FRACTURE: Primary | ICD-10-CM

## 2021-02-04 PROCEDURE — 97140 MANUAL THERAPY 1/> REGIONS: CPT | Performed by: OCCUPATIONAL THERAPIST

## 2021-02-04 PROCEDURE — 97022 WHIRLPOOL THERAPY: CPT | Performed by: OCCUPATIONAL THERAPIST

## 2021-02-04 PROCEDURE — 97110 THERAPEUTIC EXERCISES: CPT | Performed by: OCCUPATIONAL THERAPIST

## 2021-02-04 NOTE — PROGRESS NOTES
OCCUPATIONAL THERAPY PROGRESS NOTE    Date:  2021   Initial Evaluation Date: 2020                          Evaluating Therapist: Sanaz Payne     Patient Name:  Nathaniel Green  \"Henok\"               :  1980     Restrictions/Precautions:  ROM as tolerated; modalities prn, low fall risk  Diagnosis:S62.613B (ICD-10-CM) - Displaced fracture of proximal phalanx of left middle finger, initial encounter for open fracture  Excisional debridement of left palm, index and middle finger with open reduction internal fixation of left middle finger proximal phalanx fracture with middle finger A2 pulley repair. Insurance/Certification information:[facility]  Claim # [id number]  Referring Practitioner: Dr. Kate Farrell  Date of Surgery/Injury: 2020  Plan of care signed (Y/N): Y    Visit# / total visits:  -  3x/week for 6 weeks until 2021    COVID-19 Screening completed upon entering facility and patient cleared for treatment today. Pt followed all protocols set forth by [address] [place] [address] for Outpatient services throughout therapy session. Patient has been made aware of all new hygiene protocols due to COVID-19 set forth by [address] [place] [address] Outpatient services and agrees to abide by all protocols. Pain Level: 0/10 however reported soreness in the middle phalanx    Subjective: Pt reports falling and spraining his LUE at work yesterday. Pt stated that ice, heat, and ultrasound helped decrease his pain last session. Objective:  Updated POC to be completed by  visit.     INTERVENTION: COMPLETED: SPECIFICS/COMMENTS:   Modality:     MHP  Hand wrapped in during wound soak for soft tissue warm-up   US x Scar management in the palm of LUE and up affected digit  Intensity: 0.8  Duration: 8 mins  Duty Cycle: 100%  Depth: 3.3 MHz fluidotherapy x For soft tissue warm-up while intermittently moving his wrist and fingers. For desensitization also. 15 mins   Ice   End of session to decrease pain. AROM:     Towel scrunches x 5 mins to LUE    Blocked PIP flex/ext x MF but other fingers can move with it if he wants 2 sets x10  And all fingers together. PIP ext MF with place and hold ext - to do frequently during the day. Blocked MP flex/ext x All fingers together. 2 sets x10. Also did each IF and MF alone and RF and SF together for  Pt to feel the tendon working. Wristisizer x 5 mins with LUE with full grasp throughout fair tolerance   Marker roll x Completed x15 marker rolls with coband from tip of fingers to palm with fair tolerance. Pt reports increased soreness in MF with this movement. Finger adduc/abduction  A to abduction RF - did with place and hold. IF alone. - Added yellow sponge to complete on either side of 3rd digit. x10   Wrist all planes x Stiff in wrist flexion    LUE  Completed flexion/extension of PIP/DIP 3  sets x10 within pt's tolerance. Focus on extension/flexion of 3rd digit. Completed with A2 pulley splint donned- or therapist supporting this area. Tendon glides  x10 completed with proper education provided. Boading balls  5 mins clock-wise and counter clock-wise. Bean transfer x Completed bean transfer with elbow at side for wrist flex/ext and digit flex with fair tolerance   Fishers activity x Completed marble transfer supinate-pronate to  as many marbles as possible and roll down first digit for transfer to container   Hook/fist and target guiding  x10 to complete hook/fist using a pen with pt educated on proper positioning throughout. Added to HEP   -Red foam tube: placing half tube into palm and completing flexion of all digits to touch tube. Pt educated to pull down with DIP to assist with improving flexion and touching tube. Extensor tendon digit holds x x10 with palm down: therapist bringing 3rd digit up and pt was to hold for 3 seconds with fair tolerance, HEP within pain tolerance. AAROM:               PROM/Stretching:     L MF PIP ext/Flexion x Gentle completed to digits into extension  More tension to PIP ext   Finger flexion IF, RF and SF x R and SF together, IF alone. To do at home so he can loosen fingers up without the confines of the MF   MP flexion PROM with IP flex/ext  Passively flex MP's while pt bends and straigthens his IP's. HEP   L MF DIP flexion x Added to HEP also   Wrist flex. Ext -  x Stiff end range. Completed with massage   Scar Mass/Edema Control:     Retrograde massage/Scar tissue massage x On palmar portion down digits and hand  -also using small massager to-   break up scar tissue. -Graston tool implemented this date   elastomere scar forms  Continues To wear with night splint. made a new form to wear at night with elastomere in his web spaces. .    Gel compression sheet  Med/lg, to wear and hr 1 -2 x's a day IF to decrease scar. Strengthenin#  Wrist flex, wrist ext, RD - UD . 2 sets x15.   1 set x15 of supination/pronation holds   Table top, bend at pip than extend digits while in table top and then full extend with slight resistance. x10 with fair tolerance and slight resistance provided for isometrics strengthening. Finger extension  2 sets x15 red   PIP Ext Strengthening  12 reps completed with orange t-band and MCPs blocked in 90*   Functional Work task  #10 weight in a bin and placing from surface to surface x10 at waist height with fair tolerance. Pt reported after 6 reps he felt that he could feel the weight more through his injured hand. Pt will complete exercise with 5# weight and more reps to work up to his 10 # limit with no pressure on injured hand. Yellow putty  Squeeze and manipulate using all fingers and thumb. -Added hand fanning, scar tissues presses between digits and against palm, abduction of IF/MF, MF/RF. [employer]  25 reps - pressing both hands into ball with extended fingers, focus on driving PIPs into ball. Digi flex  5.0# red 2 sets x15 pronation, supination and neutral with pulling the fingers down to the bottom of the palm. Yellow flex/ext 2 sets x15   BEU  10 mins at level 3 resistance completing 5 mins forward and 5 mins back. Weighted ball exercises  4# weighted ball throwing and catching in his LUE x20 with good tolerance. - rolling ball forward and back promoting digit extension   Hand gripper  On the second setting to  30 pom poms to place in a container.   -2x15 reps - round end pointed down at level 2 resistance with yellow spring. green therabar  2 sets x15 Pronation and supination, twisting, full composite fist   -Isometric shaking x 2 - as long as can   Red foam block   2 sets x10 : composite fist, claw, middle and ring finger press, IF & F/thumb press. Other:     IF, MF ext splint  x Fabricated to wear at night with his elastomere gel pads to stretch scar tissue and PIP jt. . Made a new elastomere form leaving an open area for his wound to breathe. Pt can try wearing at night. To bring in next session to readjust to ^ ext pull. LMB X  For IF and MF. Wear 10 min to 30 for a total of 1 hr a day. HEP  X  Cont with, added MP flexion while flex/ext IP's - can use frap strap also, changed PIP ext from ext tube to LMB splints. Yellow putty for gentle srengthening    frap strap  Can be used to work full finger flexion and or hold MP's down with IP flex. ext- pt to play around with   Splint adjustments  Completed for improved comfort and postioning due to decreasing edema.  ^;ed space around  MP's Assessment/Comments: Pt is making Fair progress toward stated plan of care and established goals. Pt tolerated session well and focus this session was on decreasing pain and stiffness in wrist and digits after reported fall on L wrist. Ultrasound completed at end of session for scar tissue management with reported decreased stiffness and pain. Reassessment to be completed next session. R Upper Extremity ROM       AROM [x]? ??     PROM[]??? IE  10/22/2020 1/5 /[id number]         WRIST Flexion 0-70* 23*  70*  75*       Extension 0-80* 34*  54*  60*       Radial Deviation 0-20* 18*  20*  20*       Ulnar Deviation 0-30* 20*  30*  30*        R Hand ROM    AROM [x]? ??         PROM[]??? IE   10/22/2020 1/5/2021       2nd Digit MCP Flexion/Extension */ 0-45 H TBA 46* /*23  70*/0*     PIP Flexion/Extension 100*/ 0 57*/- 29* 65*/34*  95*/-22*     DIP Flexion/Extension 70-90*/0 32*/- 23* 32* /0*  60*/0*        L MF     AROM [x]? ??         PROM[]??? 10/12/20 10/15/20   10/22/2020 1/ 5 /2021        MF Digit MCP Extension/Flexion   0-45 H /* -30*/50* -20*/47*  14*/46*  0*/64*     PIP Extension/Flexion   0*/100* -55*/62* -50*/ 60* 51*/53*  -40*/ 84*     DIP Extension/Flexion   0*/70-90* Wiggle  P - 40*  wiggle   0*/25*  0*/55*      4th Digit MCP Flexion/Extension */ 0-45 H TBA  31*/0*    66*/0*     PIP Flexion/Extension 100*/ 0 70*/- 30* 72* /0*   97*/0*      DIP Flexion/Extension 70-90*/0 41*/0* 60* /0*   63*/0*          5th Digit MCP Flexion/Extension */ 0-45 H TBA 23*/ 0*   73* /0*     PIP Flexion/Extension 100*/ 0 74*.  +  /- 4* 80* /0*   95* /0*     DIP Flexion/Extension 70-90*/0 46*/0*  60*/0*   70* /0*         PROM:  PIP flexion ^'ed from -38* to -12*  On this date.      Dynamometer (setting 2):    assessed 12/29/2020                                Left: 38#                                              Right: 107#                    Pinch Meter:             Lateral: Left=15# ,                                                                   Right=23#                Palmar 3 point: Left=9# ,                                                        Right=20#      9 hole peg test:  Assessed this date. R  - :22        L   - :45     -Rehab Potential: Good  -Requires OT Follow Up: Yes  Time In: 3:00 pm     Time Out: 4:00 pm            Visit #: 2    Treatment Charges: Mins Time in - Time out  Units   Modalities- fluiothe/US 25 3:00 pm - 3:25 pm 2   Ther Exercise 20 3:25 pm - 3:45 pm 1   Manual Therapy 15 3:45 pm - 4:00 pm 1   Thera Activities      ADL/Home Mgt       Neuro Re-education      Gait Training      Group Therapy      Non-Billable Service Time MHP      Other splint lidia      Total Time/Units 60  4     -Response to Treatment: Pt responded well to treatment, MF fatigued by end of session. Goals: Goals for pt can be see on initial eval occurring on 09/08/2020 and on 10/22/2020, 12/10/2020 and on 1/5/2021    GOALS (Long term same as Short term):  1) Patient will demonstrate good understanding of home program (exercises/activities/diagnosis/prognosis/goals) with good accuracy. Goal in progress. 2) Patient will demonstrate increased active/passive range of motion of their LUE to [facility] for ADL/IADL completion. Goal making good gains. 3) Patient will demonstrate increased /pinch strength of at least 10 / 5 pinch pounds of their L hand. Goal initiated recently - working on at this time. 4) Patient to report decreased pain in their affected L upper extremity from 8/10 to 2/10 or less with resistive functional use. Goal making good gains. Pain with regular ADL tasks 0-3/10. Pain with resistive tasks 3-5/10  5) Increase in fine motor function as evidenced by decreased time to complete 9-hole peg test and/or MRMT test by at least 5-10 seconds. Goal making good gains. 6) Patient to report 100% compliance with their splint wear, care, and precautions if needed. Goal met. Splint d/c'ed. 7) Patient to report a decrease in hypersensitivity from 80% to 20% or less in their LUE. Goal making good gains. 8) Patient to demonstrate decreased guarding of their affected extremity from 100% to 20% or less. Goal making good gains. 9) Patient will be knowledgeable of edema control techniques as evident with decreases from severe to mild/none. Goal making good gains. 10) Patient will demonstrate a non-tender/non-adherent scar. Goal making fair gains. 11) Patient will report ADL functions as Mod I/I using LUE. Goal making good gains. 12) Patient will demonstrate improved functional activity tolerance from poor to fair + for ADL/IADL completion. Goal making good gains. 13) Patient will decrease QuickDASH score by 50% for increased participation in daily functional activities. Goal making good gains. Plan:   [x]  Continues Plan of care: 3x/week for 6 weeks until 02/19/2021. Treatment covered based on POC and graduated to patient's progress. Pt education continues at each visit to obtain maximum benefits from skilled OT intervention.   []  Alter Plan of care:   []  Discharge:    Kandice Hill S/OT  Shahid Rouse [address], OTR/L #[id number]

## 2021-02-09 ENCOUNTER — TREATMENT (OUTPATIENT)
Dept: OCCUPATIONAL THERAPY | Age: 41
End: 2021-02-09
Payer: COMMERCIAL

## 2021-02-09 DIAGNOSIS — S62.613B DISPLACED FRACTURE OF PROXIMAL PHALANX OF LEFT MIDDLE FINGER, INITIAL ENCOUNTER FOR OPEN FRACTURE: Primary | ICD-10-CM

## 2021-02-09 PROCEDURE — 97035 APP MDLTY 1+ULTRASOUND EA 15: CPT | Performed by: OCCUPATIONAL THERAPIST

## 2021-02-09 PROCEDURE — 97110 THERAPEUTIC EXERCISES: CPT | Performed by: OCCUPATIONAL THERAPIST

## 2021-02-09 PROCEDURE — 97140 MANUAL THERAPY 1/> REGIONS: CPT | Performed by: OCCUPATIONAL THERAPIST

## 2021-02-09 PROCEDURE — 97022 WHIRLPOOL THERAPY: CPT | Performed by: OCCUPATIONAL THERAPIST

## 2021-02-09 NOTE — PROGRESS NOTES
OCCUPATIONAL THERAPY PROGRESS NOTE    Date:  2021   Initial Evaluation Date: 2020                          Evaluating Therapist: Barron Saenz     Patient Name:  Mary Hitchcock  \"Henok\"               :  1980     Restrictions/Precautions:  ROM as tolerated; modalities prn, low fall risk  Diagnosis:S62.613B (ICD-10-CM) - Displaced fracture of proximal phalanx of left middle finger, initial encounter for open fracture  Excisional debridement of left palm, index and middle finger with open reduction internal fixation of left middle finger proximal phalanx fracture with middle finger A2 pulley repair. Insurance/Certification information:[facility]  Claim # [id number]  Referring Practitioner: Dr. Diego Rich  Date of Surgery/Injury: 2020  Plan of care signed (Y/N): Y    Visit# / total visits:  3 / 18-  3x/week for 6 weeks until 2021- called to extend date. COVID-19 Screening completed upon entering facility and patient cleared for treatment today. Pt followed all protocols set forth by [facility] for Outpatient services throughout therapy session. Patient has been made aware of all new hygiene protocols due to COVID-19 set forth by [facility] Outpatient services and agrees to abide by all protocols. Pain Level: 0/10 however reported soreness in the middle phalanx    Subjective: Pt reports no new complaints. Objective:  Updated POC to be completed by  visit. INTERVENTION: COMPLETED: SPECIFICS/COMMENTS:   Modality:     MHP  Hand wrapped in during wound soak for soft tissue warm-up   US x Scar management in the palm of LUE and up affected digit  Intensity: 0.8  Duration: 8 mins  Duty Cycle: 100%  Depth: 3.3 MHz   fluidotherapy x For soft tissue warm-up while intermittently moving his wrist and fingers. For desensitization also. 15 mins   Ice   End of session to decrease pain.     AROM: Towel scrunches x 5 mins to LUE    Blocked PIP flex/ext  MF but other fingers can move with it if he wants 2 sets x10  And all fingers together. PIP ext MF with place and hold ext - to do frequently during the day. Blocked MP flex/ext x All fingers together. 2 sets x10. Also did each IF and MF alone and RF and SF together for  Pt to feel the tendon working. Wristisizer  5 mins with LUE with full grasp throughout fair tolerance   Marker roll  Completed x15 marker rolls with coband from tip of fingers to palm with fair tolerance. Pt reports increased soreness in MF with this movement. Finger adduc/abduction  A to abduction RF - did with place and hold. IF alone. - Added yellow sponge to complete on either side of 3rd digit. x10   Wrist all planes  Stiff in wrist flexion    LUE  Completed flexion/extension of PIP/DIP 3  sets x10 within pt's tolerance. Focus on extension/flexion of 3rd digit. Completed with A2 pulley splint donned- or therapist supporting this area. Tendon glides  x10 completed with proper education provided. Boading balls  5 mins clock-wise and counter clock-wise. Bean transfer  Completed bean transfer with elbow at side for wrist flex/ext and digit flex with fair tolerance   [place] activity  Completed marble transfer supinate-pronate to  as many marbles as possible and roll down first digit for transfer to container   Hook/fist and target guiding  x10 to complete hook/fist using a pen with pt educated on proper positioning throughout. Added to HEP   -Red foam tube: placing half tube into palm and completing flexion of all digits to touch tube. Pt educated to pull down with DIP to assist with improving flexion and touching tube. Extensor tendon digit holds x x10 with palm down: therapist bringing 3rd digit up and pt was to hold for 3 seconds with fair tolerance, HEP within pain tolerance.     AAROM:               PROM/Stretching: L MF PIP ext/Flexion x Gentle completed to digits into extension  More tension to PIP ext   Finger flexion IF, RF and SF x R and SF together, IF alone. To do at home so he can loosen fingers up without the confines of the MF   MP flexion PROM with IP flex/ext  Passively flex MP's while pt bends and straigthens his IP's. HEP   L MF DIP flexion x Added to HEP also   Wrist flex. Ext -  x Stiff end range. Completed with massage   Scar Mass/Edema Control:     Retrograde massage/Scar tissue massage x On palmar portion down digits and hand  -also using small massager to-   break up scar tissue. -Graston tool implemented this date  - green therabar - roll onto scar  More time spent on this this date.   elastomere scar forms  Continues To wear with night splint. made a new form to wear at night with elastomere in his web spaces. .    Gel compression sheet  Med/lg, to wear and hr 1 -2 x's a day IF to decrease scar. Strengthenin#  Wrist flex, wrist ext, RD - UD . 2 sets x15.   1 set x15 of supination/pronation holds   Table top, bend at pip than extend digits while in table top and then full extend with slight resistance. x10 with fair tolerance and slight resistance provided for isometrics strengthening. Finger extension  2 sets x15 red   PIP Ext Strengthening  12 reps completed with orange t-band and MCPs blocked in 90*   Functional Work task  #10 weight in a bin and placing from surface to surface x10 at waist height with fair tolerance. Pt reported after 6 reps he felt that he could feel the weight more through his injured hand. Pt will complete exercise with 5# weight and more reps to work up to his 10 # limit with no pressure on injured hand. Yellow putty  Squeeze and manipulate using all fingers and thumb. -Added hand fanning, scar tissues presses between digits and against palm, abduction of IF/MF, MF/RF. [employer]  25 reps - pressing both hands into ball with extended fingers, focus on driving PIPs into ball. Digi flex  5.0# red 2 sets x15 pronation, supination and neutral with pulling the fingers down to the bottom of the palm. Yellow flex/ext 2 sets x15   BEU  10 mins at level 3 resistance completing 5 mins forward and 5 mins back. Weighted ball exercises x 4# weighted ball throwing and catching in his LUE x20 with good tolerance. - rolling ball forward and back promoting digit extension   Hand gripper  On the second setting to  30 pom poms to place in a container.   -2x15 reps - round end pointed down at level 2 resistance with yellow spring. green therabar  2 sets x15 Pronation and supination, twisting, full composite fist   -Isometric shaking x 2 - as long as can   Yellow putty with lg tool X- 6 min. Knob to encourage hook fist and put pressure on scar -        Red foam block   2 sets x10 : composite fist, claw, middle and ring finger press, IF & F/thumb press. Other:     IF, MF ext splint  x Fabricated to wear at night with his elastomere gel pads to stretch scar tissue and PIP jt. . Made a new elastomere form leaving an open area for his wound to breathe. Pt can try wearing at night. To bring in next session to readjust to ^ ext pull. LMB   For IF and MF. Wear 10 min to 30 for a total of 1 hr a day. HEP  X  Cont with, added MP flexion while flex/ext IP's - can use frap strap also, changed PIP ext from ext tube to LMB splints. Yellow putty for gentle srengthening    frap strap  Can be used to work full finger flexion and or hold MP's down with IP flex. ext- pt to play around with   Splint adjustments  Completed for improved comfort and postioning due to decreasing edema.  ^;ed space around  MP's Assessment/Comments: Pt is making Fair progress toward stated plan of care and established goals. Pt tolerated session well and focus this session was on decreasing scar. Pt to bring night splint in next session to check the fit of his elastomere. -Rehab Potential: Good  -Requires OT Follow Up: Yes  Time In: 10:00 am     Time Out: 11:00 am            Visit #: 3    Treatment Charges: Mins Time in - Time out  Units   Modalities- fluiothe/US 15/ 7 10:00 am -10:22 a m 2   Ther Exercise 20 10:40 am - 11:00 am 1   Manual Therapy 18 10:22 am - 10:40 am 1   Thera Activities      ADL/Home Mgt       Neuro Re-education      Gait Training      Group Therapy      Non-Billable Service Time MHP      Other splint lidia      Total Time/Units 60  4     -Response to Treatment: Pt responded well to treatment. Scar density decreased by end of session. Goals: Goals for pt can be see on initial eval occurring on 09/08/2020 and on 10/22/2020, 12/10/2020 and on 1/5/2021    GOALS (Long term same as Short term):  1) Patient will demonstrate good understanding of home program (exercises/activities/diagnosis/prognosis/goals) with good accuracy. Goal in progress. 2) Patient will demonstrate increased active/passive range of motion of their LUE to [facility] for ADL/IADL completion. Goal making good gains. 3) Patient will demonstrate increased /pinch strength of at least 10 / 5 pinch pounds of their L hand. Goal initiated recently - working on at this time. 4) Patient to report decreased pain in their affected L upper extremity from 8/10 to 2/10 or less with resistive functional use. Goal making good gains. Pain with regular ADL tasks 0-3/10. Pain with resistive tasks 3-5/10  5) Increase in fine motor function as evidenced by decreased time to complete 9-hole peg test and/or MRMT test by at least 5-10 seconds. Goal making good gains. 6) Patient to report 100% compliance with their splint wear, care, and precautions if needed. Goal met. Splint d/c'ed. 7) Patient to report a decrease in hypersensitivity from 80% to 20% or less in their LUE. Goal making good gains. 8) Patient to demonstrate decreased guarding of their affected extremity from 100% to 20% or less. Goal making good gains. 9) Patient will be knowledgeable of edema control techniques as evident with decreases from severe to mild/none. Goal making good gains. 10) Patient will demonstrate a non-tender/non-adherent scar. Goal making fair gains. 11) Patient will report ADL functions as Mod I/I using LUE. Goal making good gains. 12) Patient will demonstrate improved functional activity tolerance from poor to fair + for ADL/IADL completion. Goal making good gains. 13) Patient will decrease QuickDASH score by 50% for increased participation in daily functional activities. Goal making good gains. Plan:   [x]  Continues Plan of care: 3x/week for 6 weeks until 02/19/2021. Treatment covered based on POC and graduated to patient's progress. Pt education continues at each visit to obtain maximum benefits from skilled OT intervention.   []  Alter Plan of care:   []  Discharge:      Edu Weber  OT/L, CHT

## 2021-02-11 ENCOUNTER — TREATMENT (OUTPATIENT)
Dept: OCCUPATIONAL THERAPY | Age: 41
End: 2021-02-11
Payer: COMMERCIAL

## 2021-02-11 DIAGNOSIS — S62.613B DISPLACED FRACTURE OF PROXIMAL PHALANX OF LEFT MIDDLE FINGER, INITIAL ENCOUNTER FOR OPEN FRACTURE: Primary | ICD-10-CM

## 2021-02-11 PROCEDURE — 97110 THERAPEUTIC EXERCISES: CPT | Performed by: OCCUPATIONAL THERAPIST

## 2021-02-11 PROCEDURE — 97018 PARAFFIN BATH THERAPY: CPT | Performed by: OCCUPATIONAL THERAPIST

## 2021-02-11 PROCEDURE — 97035 APP MDLTY 1+ULTRASOUND EA 15: CPT | Performed by: OCCUPATIONAL THERAPIST

## 2021-02-11 PROCEDURE — 97140 MANUAL THERAPY 1/> REGIONS: CPT | Performed by: OCCUPATIONAL THERAPIST

## 2021-02-11 NOTE — PROGRESS NOTES
OCCUPATIONAL THERAPY PROGRESS NOTE    Date:  2021   Initial Evaluation Date: 2020                          Evaluating Therapist: Dereck Tatum     Patient Name:  Monica Cali  \"Henok\"               :  1980     Restrictions/Precautions:  ROM as tolerated; modalities prn, low fall risk  Diagnosis:S62.613B (ICD-10-CM) - Displaced fracture of proximal phalanx of left middle finger, initial encounter for open fracture  Excisional debridement of left palm, index and middle finger with open reduction internal fixation of left middle finger proximal phalanx fracture with middle finger A2 pulley repair. Insurance/Certification information:[facility]  Claim # [id number]  Referring Practitioner: Dr. Sinai Etienne  Date of Surgery/Injury: 2020  Plan of care signed (Y/N): Y    Visit# / total visits:  -  3x/week for 6 weeks until 2021- called to extend date. COVID-19 Screening completed upon entering facility and patient cleared for treatment today. Pt followed all protocols set forth by [facility] for Outpatient services throughout therapy session. Patient has been made aware of all new hygiene protocols due to COVID-19 set forth by [facility] Outpatient services and agrees to abide by all protocols. Pain Level: 0/10 however reported discomfort in the middle phalanx    Subjective: Pt reports no changes. Objective:  Updated POC to be completed by  visit. INTERVENTION: COMPLETED: SPECIFICS/COMMENTS:   Modality:     MHP x Hand wrapped in during paraffin for soft tissue warm-up   US x Scar management in the palm of LUE and up affected digit  Intensity: 0.8  Duration: 8 mins  Duty Cycle: 100%  Depth: 3.3 MHz   fluidotherapy  For soft tissue warm-up while intermittently moving his wrist and fingers. For desensitization also.     15 mins Paraffin x Completed for soft tissue warmup with hot pad 15 minutes. Pt reports increased ROM after completion   Ice   End of session to decrease pain. AROM:     Towel scrunches x 5 mins to LUE, digit walks, fist    Blocked PIP flex/ext x MF but other fingers can move with it if he wants 2 sets x10  And all fingers together. PIP ext MF with place and hold ext - to do frequently during the day. Blocked MP flex/ext x All fingers together. 2 sets x10. Also did each IF and MF alone and RF and SF together for  Pt to feel the tendon working. Wristisizer  5 mins with LUE with full grasp throughout fair tolerance   Marker roll x Completed x15 marker rolls with coband from tip of fingers to palm with fair tolerance. Pt reports increased soreness in MF with this movement. Finger adduc/abduction  A to abduction RF - did with place and hold. IF alone. - Added yellow sponge to complete on either side of 3rd digit. x10   Wrist all planes  Stiff in wrist flexion    LUE  Completed flexion/extension of PIP/DIP 3  sets x10 within pt's tolerance. Focus on extension/flexion of 3rd digit. Completed with A2 pulley splint donned- or therapist supporting this area. Tendon glides  x10 completed with proper education provided. Boading balls  5 mins clock-wise and counter clock-wise. Bean transfer  Completed bean transfer with elbow at side for wrist flex/ext and digit flex with fair tolerance   [place] activity  Completed marble transfer supinate-pronate to  as many marbles as possible and roll down first digit for transfer to container   Hook/fist and target guiding  x10 to complete hook/fist using a pen with pt educated on proper positioning throughout. Added to HEP   -Red foam tube: placing half tube into palm and completing flexion of all digits to touch tube. Pt educated to pull down with DIP to assist with improving flexion and touching tube. Extensor tendon digit holds x x10 with palm down: therapist bringing 3rd digit up and pt was to hold for 3 seconds with fair tolerance, HEP within pain tolerance. AAROM:               PROM/Stretching:     L MF PIP ext/Flexion x Gentle completed to digits into extension  More tension to PIP ext   Finger flexion IF, RF and SF x R and SF together, IF alone. To do at home so he can loosen fingers up without the confines of the MF   MP flexion PROM with IP flex/ext  Passively flex MP's while pt bends and straigthens his IP's. HEP   L MF DIP flexion x Added to HEP also   Wrist flex. Ext -  x Stiff end range. Completed with massage   Scar Mass/Edema Control:     Retrograde massage/Scar tissue massage x On palmar portion down digits and hand- 5 minutes  -also using small massager to-   break up scar tissue. -[place]aston tool implemented this date  - green therabar - roll onto scar  More time spent on this this date.   elastomere scar forms  Continues To wear with night splint. made a new form to wear at night with elastomere in his web spaces. .    Gel compression sheet  Med/lg, to wear and hr 1 -2 x's a day IF to decrease scar. Strengthenin#  Wrist flex, wrist ext, RD - UD . 2 sets x15.   1 set x15 of supination/pronation holds   Table top, bend at pip than extend digits while in table top and then full extend with slight resistance. x10 with fair tolerance and slight resistance provided for isometrics strengthening.     Finger extension x 2 sets x15 red    Completed in yellow web extender x15 with 3 second holds in extension   PIP Ext Strengthening  12 reps completed with orange t-band and MCPs blocked in 90* Functional Work task  #10 weight in a bin and placing from surface to surface x10 at waist height with fair tolerance. Pt reported after 6 reps he felt that he could feel the weight more through his injured hand. Pt will complete exercise with 5# weight and more reps to work up to his 10 # limit with no pressure on injured hand. Yellow putty x Squeeze and manipulate using all fingers and thumb. -Added finger extension, scar tissues presses between digits and against palm, abduction of IF/MF, MF/RF. -Completed with MF adduction into RF while extending (pulls MF further into extension) 8 mins   [employer]  25 reps - pressing both hands into ball with extended fingers, focus on driving PIPs into ball. Digi flex  5.0# red 2 sets x15 pronation, supination and neutral with pulling the fingers down to the bottom of the palm. Yellow flex/ext 2 sets x15   BEU  10 mins at level 3 resistance completing 5 mins forward and 5 mins back. Weighted ball exercises  4# weighted ball throwing and catching in his LUE x20 with good tolerance. - rolling ball forward and back promoting digit extension   Hand gripper  On the second setting to  30 pom poms to place in a container.   -2x15 reps - round end pointed down at level 2 resistance with yellow spring. green therabar  2 sets x15 Pronation and supination, twisting, full composite fist   -Isometric shaking x 2 - as long as can   Yellow putty with lg tool  Knob to encourage hook fist and put pressure on scar -6 min        Red foam block   2 sets x10 : composite fist, claw, middle and ring finger press, IF & F/thumb press. Other:     IF, MF ext splint  x Fabricated to wear at night with his elastomere gel pads to stretch scar tissue and PIP jt. . Made a new elastomere form leaving an open area for his wound to breathe. Pt can try wearing at night. To bring in next session to readjust to ^ ext pull. Completed for improved scar tissue management and MF extension. LMB   For IF and MF. Wear 10 min to 30 for a total of 1 hr a day. HEP  X  Cont with, added MP flexion while flex/ext IP's - can use frap strap also, changed PIP ext from ext tube to LMB splints. Yellow putty for gentle srengthening    frap strap  Can be used to work full finger flexion and or hold MP's down with IP flex. ext- pt to play around with   Splint adjustments        Assessment/Comments: Pt is making Fair progress toward stated plan of care and established goals. Pt tolerated session well with increased AROM reported after paraffin. Focused this session on scar tissue management and AROM to decrease stiffness. Night splint adjusted this date for increased pressure on palm for scar tissue management and increased extension in MF.         -Rehab Potential: Good  -Requires OT Follow Up: Yes  Time In: 10:00 am     Time Out: 11:00 am            Visit #: 4    Treatment Charges: Mins Time in - Time out  Units   Modalities- fluiothe/US/Paraffin 15/8  10:00 am -10:23 a m 2   Ther Exercise 20 10:40 am - 11:00 am 1   Manual Therapy 17 10:23 am - 10:40 am 1   Thera Activities      ADL/Home Mgt       Neuro Re-education      Gait Training      Group Therapy      Non-Billable Service Time MHP      Other splint lidia      Total Time/Units 60  4     -Response to Treatment: Pt responded well to treatment. Scar density decreased and ROM increased by end of session. Goals: Goals for pt can be see on initial eval occurring on 09/08/2020 and on 10/22/2020, 12/10/2020 and on 1/5/2021    GOALS (Long term same as Short term):  1) Patient will demonstrate good understanding of home program (exercises/activities/diagnosis/prognosis/goals) with good accuracy. Goal in progress. 2) Patient will demonstrate increased active/passive range of motion of their LUE to [facility] for ADL/IADL completion. Goal making good gains. 3) Patient will demonstrate increased /pinch strength of at least 10 / 5 pinch pounds of their L hand. Goal initiated recently - working on at this time. 4) Patient to report decreased pain in their affected L upper extremity from 8/10 to 2/10 or less with resistive functional use. Goal making good gains. Pain with regular ADL tasks 0-3/10. Pain with resistive tasks 3-5/10  5) Increase in fine motor function as evidenced by decreased time to complete 9-hole peg test and/or MRMT test by at least 5-10 seconds. Goal making good gains. 6) Patient to report 100% compliance with their splint wear, care, and precautions if needed. Goal met. Splint d/c'ed. 7) Patient to report a decrease in hypersensitivity from 80% to 20% or less in their LUE. Goal making good gains. 8) Patient to demonstrate decreased guarding of their affected extremity from 100% to 20% or less. Goal making good gains. 9) Patient will be knowledgeable of edema control techniques as evident with decreases from severe to mild/none. Goal making good gains. 10) Patient will demonstrate a non-tender/non-adherent scar. Goal making fair gains. 11) Patient will report ADL functions as Mod I/I using LUE. Goal making good gains. 12) Patient will demonstrate improved functional activity tolerance from poor to fair + for ADL/IADL completion. Goal making good gains. 13) Patient will decrease QuickDASH score by 50% for increased participation in daily functional activities. Goal making good gains. Plan:   [x]  Continues Plan of care: 3x/week for 6 weeks until 02/19/2021. Treatment covered based on POC and graduated to patient's progress. Pt education continues at each visit to obtain maximum benefits from skilled OT intervention.   []  Alter Plan of care:   []  Discharge:     Karen Rosas S/OT  Sanaz Rouse [address] 86, OTR/L #[id number]

## 2021-02-12 ENCOUNTER — TREATMENT (OUTPATIENT)
Dept: OCCUPATIONAL THERAPY | Age: 41
End: 2021-02-12
Payer: COMMERCIAL

## 2021-02-12 DIAGNOSIS — S62.613B DISPLACED FRACTURE OF PROXIMAL PHALANX OF LEFT MIDDLE FINGER, INITIAL ENCOUNTER FOR OPEN FRACTURE: Primary | ICD-10-CM

## 2021-02-12 PROCEDURE — 97110 THERAPEUTIC EXERCISES: CPT | Performed by: OCCUPATIONAL THERAPIST

## 2021-02-12 PROCEDURE — 97018 PARAFFIN BATH THERAPY: CPT | Performed by: OCCUPATIONAL THERAPIST

## 2021-02-12 PROCEDURE — 97140 MANUAL THERAPY 1/> REGIONS: CPT | Performed by: OCCUPATIONAL THERAPIST

## 2021-02-12 NOTE — PROGRESS NOTES
OCCUPATIONAL THERAPY PROGRESS NOTE    Date:  2021   Initial Evaluation Date: 2020                          Evaluating Therapist: Naman Constantino     Patient Name:  Orquidea Bruce  \"Henok\"               :  1980     Restrictions/Precautions:  ROM as tolerated; modalities prn, low fall risk  Diagnosis:S62.613B (ICD-10-CM) - Displaced fracture of proximal phalanx of left middle finger, initial encounter for open fracture  Excisional debridement of left palm, index and middle finger with open reduction internal fixation of left middle finger proximal phalanx fracture with middle finger A2 pulley repair. Insurance/Certification information:[facility]  Claim # [id number]  Referring Practitioner: Dr. Burtis Closs  Date of Surgery/Injury: 2020  Plan of care signed (Y/N): Y    Visit# / total visits:  -  3x/week for 6 weeks until 2021-  Will call to extend date to 3/19/2021. COVID-19 Screening completed upon entering facility and patient cleared for treatment today. Pt followed all protocols set forth by [facility] for Outpatient services throughout therapy session. Patient has been made aware of all new hygiene protocols due to COVID-19 set forth by [facility] Outpatient services and agrees to abide by all protocols. Pain Level: 0/10 however reported discomfort in the middle phalanx    Subjective: Pt states \" I really like that paraffin dip. My scar feels better and I'm looser. \"     Objective:  Updated POC to be completed by  visit. INTERVENTION: COMPLETED: SPECIFICS/COMMENTS:   Modality:     MHP  Hand wrapped in during paraffin for soft tissue warm-up   US  Scar management in the palm of LUE and up affected digit  Intensity: 0.8  Duration: 8 mins  Duty Cycle: 100%  Depth: 3.3 MHz   fluidotherapy  For soft tissue warm-up while intermittently moving his wrist and fingers. For desensitization also.     15 mins Paraffin x Completed for soft tissue warmup with hot pad 15 minutes. Pt reports increased ROM after completion. With MHP   Ice   End of session to decrease pain. AROM:     Towel scrunches x 5 mins to LUE, digit walks, fist    Blocked PIP flex/ext x MF but other fingers can move with it if he wants 2 sets x10  And all fingers together. PIP ext MF with place and hold ext - to do frequently during the day. Blocked MP flex/ext  All fingers together. 2 sets x10. Also did each IF and MF alone and RF and SF together for  Pt to feel the tendon working. Wristisizer  5 mins with LUE with full grasp throughout fair tolerance   Marker roll  Completed x15 marker rolls with coband from tip of fingers to palm with fair tolerance. Pt reports increased soreness in MF with this movement. Finger adduc/abduction  A to abduction RF - did with place and hold. IF alone. - Added yellow sponge to complete on either side of 3rd digit. x10   Wrist all planes  Stiff in wrist flexion    LUE  Completed flexion/extension of PIP/DIP 3  sets x10 within pt's tolerance. Focus on extension/flexion of 3rd digit. Completed with A2 pulley splint donned- or therapist supporting this area. Tendon glides  x10 completed with proper education provided. Boading balls  5 mins clock-wise and counter clock-wise. Bean transfer  Completed bean transfer with elbow at side for wrist flex/ext and digit flex with fair tolerance   Burney activity  Completed marble transfer supinate-pronate to  as many marbles as possible and roll down first digit for transfer to container   Hook/fist and target guiding  x10 to complete hook/fist using a pen with pt educated on proper positioning throughout. Added to HEP   -Red foam tube: placing half tube into palm and completing flexion of all digits to touch tube. Pt educated to pull down with DIP to assist with improving flexion and touching tube. Extensor tendon digit holds x x10 with palm down: therapist bringing 3rd digit up and pt was to hold for 3 seconds with fair tolerance, HEP within pain tolerance. AAROM:               PROM/Stretching:     L MF PIP ext/Flexion x Gentle completed to digits into extension  More tension to PIP ext   Finger flexion IF, RF and SF x R and SF together, IF alone. To do at home so he can loosen fingers up without the confines of the MF   MP flexion PROM with IP flex/ext  Passively flex MP's while pt bends and straigthens his IP's. HEP   L MF DIP flexion x Added to HEP also   Wrist flex. Ext -  x Stiff end range. Completed with massage   Scar Mass/Edema Control:     Retrograde massage/Scar tissue massage x On palmar portion down digits and hand- 5 minutes  -also using small massager to-   break up scar tissue. -CheikhCalcivis tool implemented this date  - green therabar - roll onto scar     elastomere scar forms  Continues To wear with night splint. made a new form to wear at night with elastomere in his web spaces. .    Gel compression sheet  Med/lg, to wear and hr 1 -2 x's a day IF to decrease scar. Strengthenin#  Wrist flex, wrist ext, RD - UD . 2 sets x15.   1 set x15 of supination/pronation holds   Table top, bend at pip than extend digits while in table top and then full extend with slight resistance. x10 with fair tolerance and slight resistance provided for isometrics strengthening. Finger extension x 2 sets x15 red    Completed in yellow web extender x15 with 3 second holds in extension   PIP Ext Strengthening  12 reps completed with orange t-band and MCPs blocked in 90*   Functional Work task  #10 weight in a bin and placing from surface to surface x10 at waist height with fair tolerance. Pt reported after 6 reps he felt that he could feel the weight more through his injured hand. Pt will complete exercise with 5# weight and more reps to work up to his 10 # limit with no pressure on injured hand. Yellow putty  Squeeze and manipulate using all fingers and thumb. -Added finger extension, scar tissues presses between digits and against palm, abduction of IF/MF, MF/RF. -Completed with MF adduction into RF while extending (pulls MF further into extension) 8 mins   [employer]  25 reps - pressing both hands into ball with extended fingers, focus on driving PIPs into ball. Digi flex x 5.0# red 2 sets x15 pronation, supination and neutral with pulling the fingers down to the bottom of the palm. BEU  10 mins at level 3 resistance completing 5 mins forward and 5 mins back. Weighted ball exercises  4# weighted ball throwing and catching in his LUE x20 with good tolerance. - rolling ball forward and back promoting digit extension   Hand gripper  On the second setting to  30 pom poms to place in a container.   -2x15 reps - round end pointed down at level 2 resistance with yellow spring. green therabar x 30 rep's Pronation and supination, twisting, full composite fist   -Isometric shaking x 2 - as long as can   Yellow putty with lg tool  Knob to encourage hook fist and put pressure on scar -6 min        Red foam block   2 sets x10 : composite fist, claw, middle and ring finger press, IF & F/thumb press. Other:     IF, MF ext splint  x Fabricated to wear at night with his elastomere gel pads to stretch scar tissue and PIP jt. . Made a new elastomere form leaving an open area for his wound to breathe. Pt can try wearing at night. To bring in next session to readjust to ^ ext pull. Completed for improved scar tissue management and MF extension. LMB   For IF and MF. Wear 10 min to 30 for a total of 1 hr a day. HEP  X  Cont with, added MP flexion while flex/ext IP's - can use frap strap also, changed PIP ext from ext tube to LMB splints. Yellow putty for gentle srengthening    frap strap  Can be used to work full finger flexion and or hold MP's down with IP flex. ext- pt to play around with Splint adjustments        Assessment/Comments: Pt is making Fair progress toward stated plan of care and established goals. Pt tolerated session well with increased AROM reported after paraffin. Pt tried the % # wrist wt but 2* to his recent wrist sprain at work could not tolerate. Will try a lesser wt next session. Will progress as tolerated. -Rehab Potential: Good  -Requires OT Follow Up: Yes  Time In: 09:00 am     Time Out: 10:00 am            Visit #: 5    Treatment Charges: Mins Time in - Time out  Units   Modalities- fluiothe/US/Paraffin 15  9:00 am   9:15  a m 1   Ther Exercise 25 9:35 am - 10:00 am 2   Manual Therapy 20 9:15  am - 9:35 am 1   Thera Activities      ADL/Home Mgt       Neuro Re-education      Gait Training      Group Therapy      Non-Billable Service Time MHP      Other splint lidia      Total Time/Units 60  4     -Response to Treatment: Pt responded well to treatment. Scar density decreased and ROM increased by end of session. Goals: Goals for pt can be see on initial eval occurring on 09/08/2020 and on 10/22/2020, 12/10/2020 and on 1/5/2021    GOALS (Long term same as Short term):  1) Patient will demonstrate good understanding of home program (exercises/activities/diagnosis/prognosis/goals) with good accuracy. Goal in progress. 2) Patient will demonstrate increased active/passive range of motion of their LUE to [facility] for ADL/IADL completion. Goal making good gains. 3) Patient will demonstrate increased /pinch strength of at least 10 / 5 pinch pounds of their L hand. Goal initiated recently - working on at this time. 4) Patient to report decreased pain in their affected L upper extremity from 8/10 to 2/10 or less with resistive functional use. Goal making good gains. Pain with regular ADL tasks 0-3/10.    Pain with resistive tasks 3-5/10 5) Increase in fine motor function as evidenced by decreased time to complete 9-hole peg test and/or MRMT test by at least 5-10 seconds. Goal making good gains. 6) Patient to report 100% compliance with their splint wear, care, and precautions if needed. Goal met. Splint d/c'ed. 7) Patient to report a decrease in hypersensitivity from 80% to 20% or less in their LUE. Goal making good gains. 8) Patient to demonstrate decreased guarding of their affected extremity from 100% to 20% or less. Goal making good gains. 9) Patient will be knowledgeable of edema control techniques as evident with decreases from severe to mild/none. Goal making good gains. 10) Patient will demonstrate a non-tender/non-adherent scar. Goal making fair gains. 11) Patient will report ADL functions as Mod I/I using LUE. Goal making good gains. 12) Patient will demonstrate improved functional activity tolerance from poor to fair + for ADL/IADL completion. Goal making good gains. 13) Patient will decrease QuickDASH score by 50% for increased participation in daily functional activities. Goal making good gains. Plan:   [x]  Continues Plan of care: 3x/week for 6 weeks until 02/19/2021. Treatment covered based on POC and graduated to patient's progress. Pt education continues at each visit to obtain maximum benefits from skilled OT intervention.   []  Alter Plan of care:   []  Discharge:      Siria Gonzalez OT/L, CHT

## 2021-02-16 ENCOUNTER — TREATMENT (OUTPATIENT)
Dept: OCCUPATIONAL THERAPY | Age: 41
End: 2021-02-16
Payer: COMMERCIAL

## 2021-02-16 DIAGNOSIS — S62.613B DISPLACED FRACTURE OF PROXIMAL PHALANX OF LEFT MIDDLE FINGER, INITIAL ENCOUNTER FOR OPEN FRACTURE: Primary | ICD-10-CM

## 2021-02-16 PROCEDURE — 97140 MANUAL THERAPY 1/> REGIONS: CPT | Performed by: OCCUPATIONAL THERAPIST

## 2021-02-16 PROCEDURE — 97018 PARAFFIN BATH THERAPY: CPT | Performed by: OCCUPATIONAL THERAPIST

## 2021-02-16 PROCEDURE — 97110 THERAPEUTIC EXERCISES: CPT | Performed by: OCCUPATIONAL THERAPIST

## 2021-02-16 NOTE — PROGRESS NOTES
OCCUPATIONAL THERAPY PROGRESS NOTE    Date:  2021   Initial Evaluation Date: 2020                          Evaluating Therapist: Thomas Wick     Patient Name:  Juan Ge  \"Henok\"               :  1980     Restrictions/Precautions:  ROM as tolerated; modalities prn, low fall risk  Diagnosis:S62.613B (ICD-10-CM) - Displaced fracture of proximal phalanx of left middle finger, initial encounter for open fracture  Excisional debridement of left palm, index and middle finger with open reduction internal fixation of left middle finger proximal phalanx fracture with middle finger A2 pulley repair. Insurance/Certification information:[facility]  Claim # [id number]  Referring Practitioner: Dr. Joel Snell  Date of Surgery/Injury: 2020  Plan of care signed (Y/N): Y    Visit# / total visits:  -  3x/week for 6 weeks until 2021-  Will call to extend date to 3/19/2021. COVID-19 Screening completed upon entering facility and patient cleared for treatment today. Pt followed all protocols set forth by [facility] for Outpatient services throughout therapy session. Patient has been made aware of all new hygiene protocols due to COVID-19 set forth by [facility] Outpatient services and agrees to abide by all protocols. Pain Level: 0/10 however reported discomfort in the middle phalanx    Subjective: Pt states \"I feel like my finger is just going to be stuck like this forever and I want to see my x-ray at  doctor appointment\"     Objective:  Updated POC to be completed by  visit.     INTERVENTION: COMPLETED: SPECIFICS/COMMENTS:   Modality:     MHP x Hand wrapped in during paraffin for soft tissue warm-up   US  Scar management in the palm of LUE and up affected digit  Intensity: 0.8  Duration: 8 mins  Duty Cycle: 100%  Depth: 3.3 MHz fluidotherapy  For soft tissue warm-up while intermittently moving his wrist and fingers. For desensitization also. 15 mins   Paraffin x Completed for soft tissue warmup with hot pad 15 minutes. Pt reports increased ROM after completion. With MHP   Ice   End of session to decrease pain. AROM:     Towel scrunches  5 mins to LUE, digit walks, fist    Blocked PIP flex/ext  MF but other fingers can move with it if he wants 2 sets x10  And all fingers together. PIP ext MF with place and hold ext - to do frequently during the day. Blocked MP flex/ext  All fingers together. 2 sets x10. Also did each IF and MF alone and RF and SF together for  Pt to feel the tendon working. Wristisizer  5 mins with LUE with full grasp throughout fair tolerance   Red ball activity x Completed rolling of red ball up tall end of incline with arm in incline using tips of digits for emphasis on wrist flex/ext. Once to top of incline raise wrist into extension and then place ball into cup with radial deviation (increased difficulty). Then  cup and drop ball into hand with emphasis on pronation/supination. Use digit tips to roll ball back down tall end of incline. x10 reps with fair tolerance   Marker roll  Completed x15 marker rolls with coband from tip of fingers to palm with fair tolerance. Pt reports increased soreness in MF with this movement. Finger adduc/abduction  A to abduction RF - did with place and hold. IF alone. - Added yellow sponge to complete on either side of 3rd digit. x10   Wrist all planes  Stiff in wrist flexion    LUE  Completed flexion/extension of PIP/DIP 3  sets x10 within pt's tolerance. Focus on extension/flexion of 3rd digit. Completed with A2 pulley splint donned- or therapist supporting this area. Tendon glides  x10 completed with proper education provided.     Boading balls x 5 mins clock-wiseand counter clock-wise (increased difficulty with clockwise) Bowers transfer  Completed bean transfer with elbow at side for wrist flex/ext and digit flex with fair tolerance   Hubbard activity  Completed marble transfer supinate-pronate to  as many marbles as possible and roll down first digit for transfer to container   Hook/fist and target guiding  x10 to complete hook/fist using a pen with pt educated on proper positioning throughout. Added to HEP   -Red foam tube: placing half tube into palm and completing flexion of all digits to touch tube. Pt educated to pull down with DIP to assist with improving flexion and touching tube. Extensor tendon digit holds  x10 with palm down: therapist bringing 3rd digit up and pt was to hold for 3 seconds with fair tolerance, HEP within pain tolerance. AAROM:     Red web stretch x Wrist/digit extension, flexion, fist grasp, and twist x15 reps with 3 second holds at end range-fair tolerance. PROM/Stretching:     L MF PIP ext/Flexion x Gentle completed to digits into extension  More tension to PIP ext        Finger flexion IF, RF and SF  R and SF together, IF alone. To do at home so he can loosen fingers up without the confines of the MF   MP flexion PROM with IP flex/ext  Passively flex MP's while pt bends and straigthens his IP's. HEP   L MF DIP flexion x Added to HEP also   Wrist flex. Ext -  x Stiff end range. Completed with massage   Scar Mass/Edema Control:     Retrograde massage/Scar tissue massage x On palmar portion down digits and hand- 5 minutes  -also using small massager to-   break up scar tissue. -[place] tool implemented this date  - green therabar - roll onto scar     elastomere scar forms  Continues To wear with night splint. made a new form to wear at night with elastomere in his web spaces. .    Gel compression sheet  Med/lg, to wear and hr 1 -2 x's a day IF to decrease scar. Strengthenin#  Wrist flex, wrist ext, RD - UD .   2 sets x15.   1 set x15 of supination/pronation holds Table top, bend at pip than extend digits while in table top and then full extend with slight resistance. x10 with fair tolerance and slight resistance provided for isometrics strengthening. Finger extension  2 sets x15 red    Completed in yellow web extender x15 with 3 second holds in extension   PIP Ext Strengthening  12 reps completed with orange t-band and MCPs blocked in 90*   Functional Work task  #10 weight in a bin and placing from surface to surface x10 at waist height with fair tolerance. Pt reported after 6 reps he felt that he could feel the weight more through his injured hand. Pt will complete exercise with 5# weight and more reps to work up to his 10 # limit with no pressure on injured hand. Yellow putty  Squeeze and manipulate using all fingers and thumb. -Added finger extension, scar tissues presses between digits and against palm, abduction of IF/MF, MF/RF. -Completed with MF adduction into RF while extending (pulls MF further into extension) 8 mins   [employer]  25 reps - pressing both hands into ball with extended fingers, focus on driving PIPs into ball. Digi flex  5.0# red 2 sets x15 pronation, supination and neutral with pulling the fingers down to the bottom of the palm. BEU  10 mins at level 3 resistance completing 5 mins forward and 5 mins back. Weighted ball exercises  4# weighted ball throwing and catching in his LUE x20 with good tolerance. - rolling ball forward and back promoting digit extension   Hand gripper  On the second setting to  30 pom poms to place in a container.   -2x15 reps - round end pointed down at level 2 resistance with yellow spring.    green therabar  30 rep's Pronation and supination, twisting, full composite fist   -Isometric shaking x 2 - as long as can   Yellow putty with lg tool  Knob to encourage hook fist and put pressure on scar -6 min 13) Patient will decrease QuickDASH score by 50% for increased participation in daily functional activities. Goal making good gains. Plan:   [x]  Continues Plan of care: 3x/week for 6 weeks until 02/19/2021. Treatment covered based on POC and graduated to patient's progress. Pt education continues at each visit to obtain maximum benefits from skilled OT intervention.   []  Alter Plan of care:   []  Discharge:    Dona Ayers S/OT  Jeremias Rouse [address] 86, OTR/L #[id number]

## 2021-02-17 ENCOUNTER — TREATMENT (OUTPATIENT)
Dept: OCCUPATIONAL THERAPY | Age: 41
End: 2021-02-17
Payer: COMMERCIAL

## 2021-02-17 DIAGNOSIS — S62.613B DISPLACED FRACTURE OF PROXIMAL PHALANX OF LEFT MIDDLE FINGER, INITIAL ENCOUNTER FOR OPEN FRACTURE: Primary | ICD-10-CM

## 2021-02-17 PROCEDURE — 97018 PARAFFIN BATH THERAPY: CPT | Performed by: OCCUPATIONAL THERAPIST

## 2021-02-17 PROCEDURE — 97110 THERAPEUTIC EXERCISES: CPT | Performed by: OCCUPATIONAL THERAPIST

## 2021-02-17 PROCEDURE — 97140 MANUAL THERAPY 1/> REGIONS: CPT | Performed by: OCCUPATIONAL THERAPIST

## 2021-02-17 NOTE — PROGRESS NOTES
OCCUPATIONAL THERAPY PROGRESS NOTE    Date:  2021   Initial Evaluation Date: 2020                          Evaluating Therapist: Shahid Rojas     Patient Name:  Reva Camacho  \"Henok\"               :  1980     Restrictions/Precautions:  ROM as tolerated; modalities prn, low fall risk  Diagnosis:S62.613B (ICD-10-CM) - Displaced fracture of proximal phalanx of left middle finger, initial encounter for open fracture  Excisional debridement of left palm, index and middle finger with open reduction internal fixation of left middle finger proximal phalanx fracture with middle finger A2 pulley repair. Insurance/Certification information:[facility]  Claim # [id number]  Referring Practitioner: Dr. Сергей Fitzpatrick  Date of Surgery/Injury: 2020  Plan of care signed (Y/N): Y    Visit# / total visits:  -  3x/week for 6 weeks until 2021-  Will call to extend date to 3/19/2021. COVID-19 Screening completed upon entering facility and patient cleared for treatment today. Pt followed all protocols set forth by [address] [place] [address] for Outpatient services throughout therapy session. Patient has been made aware of all new hygiene protocols due to COVID-19 set forth by [address] [place] [address] Outpatient services and agrees to abide by all protocols. Pain Level: 2-5/10 in wrist from recent work injury    Subjective: Pt reports no new complaints. Objective:  Updated POC to be completed by  visit. INTERVENTION: COMPLETED: SPECIFICS/COMMENTS:   Modality:     MHP x Hand wrapped in during paraffin for soft tissue warm-up   US  Scar management in the palm of LUE and up affected digit  Intensity: 0.8  Duration: 8 mins  Duty Cycle: 100%  Depth: 3.3 MHz   fluidotherapy  For soft tissue warm-up while intermittently moving his wrist and fingers. For desensitization also.     15 mins Paraffin x Completed for soft tissue warmup with hot pad 15 minutes. Pt reports increased ROM after completion. With MHP. Scar presses to follow. Ice   End of session to decrease pain. AROM:     Towel scrunches  5 mins to LUE, digit walks, fist    Blocked PIP flex/ext  MF but other fingers can move with it if he wants 2 sets x10  And all fingers together. PIP ext MF with place and hold ext - to do frequently during the day. Blocked MP flex/ext  All fingers together. 2 sets x10. Also did each IF and MF alone and RF and SF together for  Pt to feel the tendon working. Wristisizer  5 mins with LUE with full grasp throughout fair tolerance   Red ball activity x Completed rolling of red ball up tall end of incline with arm in incline using tips of digits for emphasis on wrist flex/ext. Once to top of incline raise wrist into extension and then place ball into cup with radial deviation (increased difficulty). Then  cup and drop ball into hand with emphasis on pronation/supination. Use digit tips to roll ball back down tall end of incline. x10 reps with fair tolerance   Marker roll  Completed x15 marker rolls with coband from tip of fingers to palm with fair tolerance. Pt reports increased soreness in MF with this movement. Finger adduc/abduction  A to abduction RF - did with place and hold. IF alone. - Added yellow sponge to complete on either side of 3rd digit. x10   Wrist all planes  Stiff in wrist flexion    LUE  Completed flexion/extension of PIP/DIP 3  sets x10 within pt's tolerance. Focus on extension/flexion of 3rd digit. Completed with A2 pulley splint donned- or therapist supporting this area. Tendon glides  x10 completed with proper education provided.     Boading balls  5 mins clock-wiseand counter clock-wise (increased difficulty with clockwise)     Bean transfer  Completed bean transfer with elbow at side for wrist flex/ext and digit flex with fair tolerance [place] activity  Completed marble transfer supinate-pronate to  as many marbles as possible and roll down first digit for transfer to container   Hook/fist and target guiding  x10 to complete hook/fist using a pen with pt educated on proper positioning throughout. Added to HEP   -Red foam tube: placing half tube into palm and completing flexion of all digits to touch tube. Pt educated to pull down with DIP to assist with improving flexion and touching tube. Paper Tasks x Paper placed between IF/MF and MF/RF, keep paper from being pulled away with flexed fingers x 8 reps ea then extended fingers x 8 reps ea. -Then crumple sheet of paper into a tight ball with no assist x 3.  -Tear sheet of paper into 4 sections, make small balls, then flick them away with MF focusing on extension pull. Extensor tendon digit holds  x10 with palm down: therapist bringing 3rd digit up and pt was to hold for 3 seconds with fair tolerance, HEP within pain tolerance. AAROM:     Red web stretch x Wrist/digit extension, flexion, fist grasp, and twist x15 reps with 3 second holds at end range-fair tolerance. PROM/Stretching:     L MF PIP ext/Flexion x Gentle completed to digits into extension  More tension to PIP ext        Finger flexion IF, RF and SF  R and SF together, IF alone. To do at home so he can loosen fingers up without the confines of the MF   MP flexion PROM with IP flex/ext  Passively flex MP's while pt bends and straigthens his IP's. HEP   L MF DIP flexion x Added to HEP also   Wrist flex. Ext -  x Stiff end range. Completed with massage   Scar Mass/Edema Control:     Retrograde massage/Scar tissue massage x On palmar portion down digits and hand- 5 minutes  -also using small massager to-   break up scar tissue.    -[place] tool implemented this date  - green therabar - roll onto scar elastomere scar forms  Continues To wear with night splint. made a new form to wear at night with elastomere in his web spaces. .    Gel compression sheet  Med/lg, to wear and hr 1 -2 x's a day IF to decrease scar. Strengthenin#  Wrist flex, wrist ext, RD - UD . 2 sets x15.   1 set x15 of supination/pronation holds   Table top, bend at pip than extend digits while in table top and then full extend with slight resistance. x10 with fair tolerance and slight resistance provided for isometrics strengthening. Finger extension  2 sets x15 red  Completed in yellow web extender x15 with 3 second holds in extension   PIP Ext Strengthening  12 reps completed with orange t-band and MCPs blocked in 90*   Functional Work task  #10 weight in a bin and placing from surface to surface x10 at waist height with fair tolerance. Pt reported after 6 reps he felt that he could feel the weight more through his injured hand. Pt will complete exercise with 5# weight and more reps to work up to his 10 # limit with no pressure on injured hand. Yellow putty x Squeeze and manipulate using all fingers and thumb. -Added finger extension, scar tissues presses between digits and against palm, abduction of IF/MF, MF/RF. -Completed with MF adduction into RF while extending (pulls MF further into extension) 8 mins  -IF/MF adduction with putty and pull, MF/RF adduction with putty and pull  -3-pnt pinch and pull  -Claw grasp and pull from underneath x 10 reps, then over top x 10 reps  -Twist putty into an ice cream cone with finger only, no wrist RD/UD   Twilla Dewayne  25 reps - pressing both hands into ball with extended fingers, focus on driving PIPs into ball. Digi flex  5.0# red 2 sets x15 pronation, supination and neutral with pulling the fingers down to the bottom of the palm. BEU  10 mins at level 3 resistance completing 5 mins forward and 5 mins back. Weighted ball exercises  4# weighted ball throwing and catching in his LUE x20 with good tolerance. - rolling ball forward and back promoting digit extension   Hand gripper  On the second setting to  30 pom poms to place in a container.   -2x15 reps - round end pointed down at level 2 resistance with yellow spring. green therabar  30 rep's Pronation and supination, twisting, full composite fist   -Isometric shaking x 2 - as long as can   Yellow putty with lg tool  Knob to encourage hook fist and put pressure on scar -6 min        Red foam block   2 sets x10 : composite fist, claw, middle and ring finger press, IF & F/thumb press. Other:     IF, MF ext splint  x Fabricated to wear at night with his elastomere gel pads to stretch scar tissue and PIP jt. . Made a new elastomere form leaving an open area for his wound to breathe. Pt can try wearing at night. To bring in next session to readjust to ^ ext pull. Completed for improved scar tissue management and MF extension. LMB   For IF and MF. Wear 10 min to 30 for a total of 1 hr a day. HEP  X  Cont with, added MP flexion while flex/ext IP's - can use frap strap also, changed PIP ext from ext tube to LMB splints. Yellow putty for gentle srengthening    frap strap  Can be used to work full finger flexion and or hold MP's down with IP flex. ext- pt to play around with   Splint adjustments        Assessment/Comments: Pt is making Fair progress toward stated plan of care and established goals. Focused on adduction strengthening of the digits today with good tolerance. Overall, hand fairly fatigued by end of session. Deferred any strengthening with wrist involvement today d/t recent injury from fall at work - as light weight ^'s pain.  Pt reporting no functional limitations using hand while at work at this time.      -Rehab Potential: Good  -Requires OT Follow Up: Yes  Time In: 3:00 pm     Time Out: 4:00 pm            Visit #: 7 Treatment Charges: Mins Time in - Time out  Units   Modalities- fluiothe/US/Paraffin 15  3:00 pm   3:15  pm 1   Ther Exercise 30 3:30 pm - 4:00 pm 2   Manual Therapy 15 3:15 pm - 3:30 pm 1   Thera Activities      ADL/Home Mgt       Neuro Re-education      Gait Training      Group Therapy      Non-Billable Service Time MHP      Other splint lidia      Total Time/Units 60  4     -Response to Treatment: Pt responded well to treatment. Goals: Goals for pt can be see on initial eval occurring on 09/08/2020 and on 10/22/2020, 12/10/2020 and on 1/5/2021    GOALS (Long term same as Short term):  1) Patient will demonstrate good understanding of home program (exercises/activities/diagnosis/prognosis/goals) with good accuracy. Goal in progress. 2) Patient will demonstrate increased active/passive range of motion of their LUE to [facility] for ADL/IADL completion. Goal making good gains. 3) Patient will demonstrate increased /pinch strength of at least 10 / 5 pinch pounds of their L hand. Goal initiated recently - working on at this time. 4) Patient to report decreased pain in their affected L upper extremity from 8/10 to 2/10 or less with resistive functional use. Goal making good gains. Pain with regular ADL tasks 0-3/10. Pain with resistive tasks 3-5/10  5) Increase in fine motor function as evidenced by decreased time to complete 9-hole peg test and/or MRMT test by at least 5-10 seconds. Goal making good gains. 6) Patient to report 100% compliance with their splint wear, care, and precautions if needed. Goal met. Splint d/c'ed. 7) Patient to report a decrease in hypersensitivity from 80% to 20% or less in their LUE. Goal making good gains. 8) Patient to demonstrate decreased guarding of their affected extremity from 100% to 20% or less. Goal making good gains. 9) Patient will be knowledgeable of edema control techniques as evident with decreases from severe to mild/none. Goal making good gains. 10) Patient will demonstrate a non-tender/non-adherent scar. Goal making fair gains. 11) Patient will report ADL functions as Mod I/I using LUE. Goal making good gains. 12) Patient will demonstrate improved functional activity tolerance from poor to fair + for ADL/IADL completion. Goal making good gains. 13) Patient will decrease QuickDASH score by 50% for increased participation in daily functional activities. Goal making good gains. Plan:   [x]  Continues Plan of care: 3x/week for 6 weeks until 02/19/2021. Treatment covered based on POC and graduated to patient's progress. Pt education continues at each visit to obtain maximum benefits from skilled OT intervention.   []  Alter Plan of care:   []  Discharge:    Taylor Rouse Brad 86, OTR/L #[id number]

## 2021-02-18 ENCOUNTER — TREATMENT (OUTPATIENT)
Dept: OCCUPATIONAL THERAPY | Age: 41
End: 2021-02-18
Payer: COMMERCIAL

## 2021-02-18 ENCOUNTER — OFFICE VISIT (OUTPATIENT)
Dept: ORTHOPEDIC SURGERY | Age: 41
End: 2021-02-18
Payer: COMMERCIAL

## 2021-02-18 VITALS — RESPIRATION RATE: 20 BRPM | BODY MASS INDEX: 28.98 KG/M2 | WEIGHT: 207 LBS | HEIGHT: 71 IN | TEMPERATURE: 97.4 F

## 2021-02-18 DIAGNOSIS — S62.613B DISPLACED FRACTURE OF PROXIMAL PHALANX OF LEFT MIDDLE FINGER, INITIAL ENCOUNTER FOR OPEN FRACTURE: Primary | ICD-10-CM

## 2021-02-18 DIAGNOSIS — M77.9 EXTENSOR TENDON ADHESIONS: ICD-10-CM

## 2021-02-18 PROCEDURE — 99213 OFFICE O/P EST LOW 20 MIN: CPT | Performed by: ORTHOPAEDIC SURGERY

## 2021-02-18 PROCEDURE — 97018 PARAFFIN BATH THERAPY: CPT | Performed by: OCCUPATIONAL THERAPIST

## 2021-02-18 PROCEDURE — 97035 APP MDLTY 1+ULTRASOUND EA 15: CPT | Performed by: OCCUPATIONAL THERAPIST

## 2021-02-18 PROCEDURE — 97110 THERAPEUTIC EXERCISES: CPT | Performed by: OCCUPATIONAL THERAPIST

## 2021-02-18 NOTE — PROGRESS NOTES
fluidotherapy  For soft tissue warm-up while intermittently moving his wrist and fingers. For desensitization also. 15 mins   Paraffin x Completed for soft tissue warmup with hot pad 15 minutes. Pt reports increased ROM after completion. With MHP. Scar presses to follow. Ice   End of session to decrease pain. AROM:     Towel scrunches  5 mins to LUE, digit walks, fist    Blocked PIP flex/ext  MF but other fingers can move with it if he wants 2 sets x10  And all fingers together. PIP ext MF with place and hold ext - to do frequently during the day. Blocked MP flex/ext  All fingers together. 2 sets x10. Also did each IF and MF alone and RF and SF together for  Pt to feel the tendon working. Wristisizer  5 mins with LUE with full grasp throughout fair tolerance   Red ball activity x Completed rolling of red ball up tall end of incline with arm in incline using tips of digits for emphasis on wrist flex/ext. Once to top of incline raise wrist into extension and then place ball into cup with radial deviation (increased difficulty). Then  cup and drop ball into hand with emphasis on pronation/supination. Use digit tips to roll ball back down tall end of incline. x10 reps with fair tolerance    Completed rolling of red ball up yellow web with tips of digits (emphasis on MF extension), through hole, and down other side with finger and wrist flexion. x20 reps with fair tolerance (fatigue noted)   Marker roll  Completed x15 marker rolls with coband from tip of fingers to palm with fair tolerance. Pt reports increased soreness in MF with this movement. Finger adduc/abduction  A to abduction RF - did with place and hold. IF alone. - Added yellow sponge to complete on either side of 3rd digit.  x10   Wrist all planes  Stiff in wrist flexion LUE  Completed flexion/extension of PIP/DIP 3  sets x10 within pt's tolerance. Focus on extension/flexion of 3rd digit. Completed with A2 pulley splint donned- or therapist supporting this area. Tendon glides  x10 completed with proper education provided. Boading balls  5 mins clock-wiseand counter clock-wise (increased difficulty with clockwise)     Bean transfer  Completed bean transfer with elbow at side for wrist flex/ext and digit flex with fair tolerance   [place] activity  Completed marble transfer supinate-pronate to  as many marbles as possible and roll down first digit for transfer to container   [place] flick  x Completed marble flick using L MF with emphasis on extension pull. x10 reps with increased fatigue reported   Hook/fist and target guiding  x10 to complete hook/fist using a pen with pt educated on proper positioning throughout. Added to HEP   -Red foam tube: placing half tube into palm and completing flexion of all digits to touch tube. Pt educated to pull down with DIP to assist with improving flexion and touching tube. Paper Tasks  Paper placed between IF/MF and MF/RF, keep paper from being pulled away with flexed fingers x 8 reps ea then extended fingers x 8 reps ea. -Then crumple sheet of paper into a tight ball with no assist x 3.  -Tear sheet of paper into 4 sections, make small balls, then flick them away with MF focusing on extension pull. Extensor tendon digit holds  x10 with palm down: therapist bringing 3rd digit up and pt was to hold for 3 seconds with fair tolerance, HEP within pain tolerance. AAROM:     Red web stretch  Wrist/digit extension, flexion, fist grasp, and twist x15 reps with 3 second holds at end range-fair tolerance. PROM/Stretching:     L MF PIP ext/Flexion x Gentle completed to digits into extension  More tension to PIP ext.  Completed during activity rest breaks Finger flexion IF, RF and SF  R and SF together, IF alone. To do at home so he can loosen fingers up without the confines of the MF   MP flexion PROM with IP flex/ext  Passively flex MP's while pt bends and straigthens his IP's. HEP   L MF DIP flexion  Added to HEP also   Wrist flex. Ext -  x Stiff end range. Completed during activity rest breaks   Scar Mass/Edema Control:     Retrograde massage/Scar tissue massage x On palmar portion down digits and hand- 5 minutes  -Using small massager to break up scar tissue on dorsal MF and anterior palm at base of MF for 5 mins  -[place] tool implemented this date  - green therabar - roll onto scar     elastomere scar forms  Continues To wear with night splint. made a new form to wear at night with elastomere in his web spaces. .    Gel compression sheet  Med/lg, to wear and hr 1 -2 x's a day IF to decrease scar. Strengthenin#  Wrist flex, wrist ext, RD - UD . 2 sets x15.   1 set x15 of supination/pronation holds   Table top, bend at pip than extend digits while in table top and then full extend with slight resistance. x10 with fair tolerance and slight resistance provided for isometrics strengthening. Finger extension  2 sets x15 red  Completed in yellow web extender x15 with 3 second holds in extension   PIP Ext Strengthening  12 reps completed with orange t-band and MCPs blocked in 90*   Functional Work task  #10 weight in a bin and placing from surface to surface x10 at waist height with fair tolerance. Pt reported after 6 reps he felt that he could feel the weight more through his injured hand. Pt will complete exercise with 5# weight and more reps to work up to his 10 # limit with no pressure on injured hand. Paraffin wax ball x Manipulation (squeezing, pinching) of wax ball for 3 mins after paraffin.  Fair tolerance Yellow putty x Squeeze and manipulate using all fingers and thumb. -Added finger extension, scar tissues presses between digits and against palm, abduction of IF/MF, MF/RF. -Completed with MF adduction into RF while extending (pulls MF further into extension) 8 mins  -IF/MF adduction with putty and pull, MF/RF adduction with putty and pull  -3-pnt pinch and pull  -Claw grasp and pull from underneath x 10 reps, then over top x 10 reps  -Twist putty into an ice cream cone with finger only, no wrist RD/UD   Heatherbandar Schuster  25 reps - pressing both hands into ball with extended fingers, focus on driving PIPs into ball. Digi flex  5.0# red 2 sets x15 pronation, supination and neutral with pulling the fingers down to the bottom of the palm. BEU  10 mins at level 3 resistance completing 5 mins forward and 5 mins back. Weighted ball exercises  4# weighted ball throwing and catching in his LUE x20 with good tolerance. - rolling ball forward and back promoting digit extension   Hand gripper  On the second setting to  30 pom poms to place in a container.   -2x15 reps - round end pointed down at level 2 resistance with yellow spring. green therabar  30 rep's Pronation and supination, twisting, full composite fist   -Isometric shaking x 2 - as long as can   Yellow putty with lg tool  Knob to encourage hook fist and put pressure on scar -6 min        Red foam block   2 sets x10 : composite fist, claw, middle and ring finger press, IF & F/thumb press. Other:     IF, MF ext splint  x Fabricated to wear at night with his elastomere gel pads to stretch scar tissue and PIP jt. . Made a new elastomere form leaving an open area for his wound to breathe. Pt can try wearing at night. To bring in next session to readjust to ^ ext pull. Completed for improved scar tissue management and MF extension. LMB   For IF and MF. Wear 10 min to 30 for a total of 1 hr a day.

## 2021-02-18 NOTE — PROGRESS NOTES
Chief Complaint   Patient presents with    Follow-up     6 months out Open reduction and K-wire fixation of comminuted highly displaced unstable left middle finger proximal phalanx fracture, Pulley repair in middle finger, A2 debby         Umm Lerner is a 42y.o. year old  who presents for follow up of left hand crush injury with open fractures of middle finger. He is about 6 months out. He has continued in therapy. He still reports inability to fully extend his middle finger PIP joint. He states this is frustrating in regards to daily activities.      Past Medical History:   Diagnosis Date    Diabetes mellitus ([facility] [address]     Hypertension     Thyroid disease      Past Surgical History:   Procedure Laterality Date    APPENDECTOMY  3/23/2016    laparoscopic    FINGER SURGERY Left 8/24/2020    LEFT HAND I&D WITH PERCUTANEOUS PINNING OF THIRD FINGER performed by Lisa Vazquez MD at [address] [place] [address]       Current Outpatient Medications:     ibuprofen (ADVIL;MOTRIN) 600 MG tablet, Take 1 tablet by mouth 4 times daily as needed for Pain, Disp: 360 tablet, Rfl: 1    atorvastatin (LIPITOR) 10 MG tablet, TAKE ONE TABLET BY MOUTH EVERY DAY, Disp: , Rfl:     metFORMIN (GLUCOPHAGE) 500 MG tablet, Take 500 mg by mouth daily (with breakfast), Disp: , Rfl:     Levothyroxine Sodium (SYNTHROID PO), Take 125 mcg by mouth daily , Disp: , Rfl:   Allergies   Allergen Reactions    Norco [Hydrocodone-Acetaminophen] Shortness Of Breath     Social History     Socioeconomic History    Marital status:      Spouse name: Not on file    Number of children: Not on file    Years of education: Not on file    Highest education level: Not on file   Occupational History    Not on file   Social Needs    Financial resource strain: Not on file    Food insecurity     Worry: Not on file     Inability: Not on file    Transportation needs     Medical: Not on file     Non-medical: Not on file   Tobacco Use    Smoking status: Never Smoker  Smokeless tobacco: Never Used   Substance and Sexual Activity    Alcohol use: No    Drug use: No    Sexual activity: Not on file   Lifestyle    Physical activity     Days per week: Not on file     Minutes per session: Not on file    Stress: Not on file   Relationships    Social connections     Talks on phone: Not on file     Gets together: Not on file     Attends Orthodoxy service: Not on file     Active member of club or organization: Not on file     Attends meetings of clubs or organizations: Not on file     Relationship status: Not on file    Intimate partner violence     Fear of current or ex partner: Not on file     Emotionally abused: Not on file     Physically abused: Not on file     Forced sexual activity: Not on file   Other Topics Concern    Not on file   Social History Narrative    Not on file     No family history on file. Skin: negative   Musculoskeletal: left middle finger pain  Neurologic: negative  Cardiovascular: negative    SUBJECTIVE:      Constitutional:    The patient is alert and oriented x 3, appears to be stated age and in no distress. LUNGS: CTA  CARDIOVASCULAR: 2+ radial pulses, extremities warm and well perfused  ABDOMEN: NTTP    Left hand: Scar mature. Complete healing over the palmar surface. He does have scar hypertrophy and tenderness palpation. This does limit his MCP and PIP joint extension. Active PIP extension lacks about 50 degrees, passively this can be improved to 10 degrees passively. Flexion of the middle finger PIP joint is 90 degrees actively and passively. He is nontender over his fracture. Radial, ulnar, median nerves are intact. 2+ brisk cap refill of the middle finger as well as all digits of the hand he is grossly neurovascular tact with diminished sensation of the middle finger    Xrays: X-rays of his left hand AP lateral obliques were obtained today in the office.  There is a healing fracture comminuted fracture of the middle finger proximal phalanx. There is persistent lucency. However there is evidence of callus formation. No significant interval change in alignment. Impression office x-rays: Healing complex intra-articular comminuted fracture of the left middle finger proximal phalanx  Radiographic findings reviewed with patient      Impression:   Crush injury left hand with comminuted fracture of the middle finger proximal phalanx  Extensor tendon adhesions resulting in a PIP joint extensor lag of middle finger  DM, HTN    Plan:   Discussed findings with patient. Discussed conservative and surgical management with patient. Discussed extensor tenolysis. Postoperative course explained to the patient. Patient will need immediate postoperative therapy. Patient would like to think about his options. Patient will follow up in 8 weeks for reevaluation. All questions answered. I explained the risks, benefits, alternatives and complications of surgery with the patient including but not limited to the risks of infection, possible damage to nerves, vessels, or tendons, stiffness, loss of range of motion, scar sensitivity, wound healing complications, worsening symptoms, possible need for therapy, as well as the possible need further surgery and unanticipated complications. The patient voiced understanding and all questions were answered. The patient elected to proceed with surgical intervention. I have seen and evaluated the patient and agree with the above assessment and plan on today's visit. I have performed the key components of the history and physical examination with significant findings of left hand crush injury with comminuted healing fracture of the middle finger proximal phalanx now with continued and recalcitrant left middle finger extensor tendon adhesions resulting in a PIP joint extensor lag. He reports dysfunction secondary to the extensor adhesions and lag.   Discussed extensor tenolysis surgery in detail as well as postoperative expectations and need for continued therapy. At this point he is considering surgery but would like to continue with therapy and follow-up and 2 months for reevaluation. . I concur with the findings and plan as documented.     Jon Alvarez MD  2/18/2021

## 2021-02-22 ENCOUNTER — TREATMENT (OUTPATIENT)
Dept: OCCUPATIONAL THERAPY | Age: 41
End: 2021-02-22
Payer: COMMERCIAL

## 2021-02-22 DIAGNOSIS — S62.613B DISPLACED FRACTURE OF PROXIMAL PHALANX OF LEFT MIDDLE FINGER, INITIAL ENCOUNTER FOR OPEN FRACTURE: Primary | ICD-10-CM

## 2021-02-22 PROCEDURE — 97018 PARAFFIN BATH THERAPY: CPT | Performed by: OCCUPATIONAL THERAPIST

## 2021-02-22 PROCEDURE — 97110 THERAPEUTIC EXERCISES: CPT | Performed by: OCCUPATIONAL THERAPIST

## 2021-02-22 NOTE — PROGRESS NOTES
OCCUPATIONAL THERAPY PROGRESS NOTE    Date:  2021   Initial Evaluation Date: 2020                          Evaluating Therapist: Nicole Partida     Patient Name:  Chuck Beauchamp  \"Henok\"               :  1980     Restrictions/Precautions:  ROM as tolerated; modalities prn, low fall risk  Diagnosis:S62.613B (ICD-10-CM) - Displaced fracture of proximal phalanx of left middle finger, initial encounter for open fracture  Excisional debridement of left palm, index and middle finger with open reduction internal fixation of left middle finger proximal phalanx fracture with middle finger A2 pulley repair. Insurance/Certification information:[facility]  Claim # [id number]  Referring Practitioner: Dr. Laverne Scott  Date of Surgery/Injury: 2020  Plan of care signed (Y/N): Y    Visit# / total visits:  -  3x/week for 6 weeks until 2021-  extend date to 3/19/2021. COVID-19 Screening completed upon entering facility and patient cleared for treatment today. Pt followed all protocols set forth by [facility] throughout therapy session. Patient has been made aware of all new hygiene protocols due to COVID-19 set forth by [facility] and agrees to abide by all protocols. Pain Level: 0/10 however reported discomfort in the middle phalanx    Subjective: Pt reports feeling increased strength in the hand thus far. He states he is experiencing no limitations on light duty with his work thus far. Objective:  Updated POC to be completed by  visit.     INTERVENTION: COMPLETED: SPECIFICS/COMMENTS:   Modality:     MHP  Hand wrapped in during paraffin for soft tissue warm-up   US  Scar management in the palm of LUE and up affected digit  Intensity: 0.8  Duration: 8 mins  Duty Cycle: 100%  Depth: 3.3 MHz fluidotherapy  For soft tissue warm-up while intermittently moving his wrist and fingers. For desensitization also. 15 mins   Paraffin x Completed for soft tissue warmup with hot pad 15 minutes. Pt reports increased ROM after completion. With MHP. Scar presses to follow. Ice   End of session to decrease pain. AROM:     Towel scrunches  5 mins to LUE, digit walks, fist    Blocked PIP flex/ext  MF but other fingers can move with it if he wants 2 sets x10  And all fingers together. PIP ext MF with place and hold ext - to do frequently during the day. Blocked MP flex/ext  All fingers together. 2 sets x10. Also did each IF and MF alone and RF and SF together for  Pt to feel the tendon working. Wristisizer  5 mins with LUE with full grasp throughout fair tolerance   Red ball activity  Completed rolling of red ball up tall end of incline with arm in incline using tips of digits for emphasis on wrist flex/ext. Once to top of incline raise wrist into extension and then place ball into cup with radial deviation (increased difficulty). Then  cup and drop ball into hand with emphasis on pronation/supination. Use digit tips to roll ball back down tall end of incline. x10 reps with fair tolerance    Completed rolling of red ball up yellow web with tips of digits (emphasis on MF extension), through hole, and down other side with finger and wrist flexion. x20 reps with fair tolerance (fatigue noted)   Marker roll  Completed x15 marker rolls with coband from tip of fingers to palm with fair tolerance. Pt reports increased soreness in MF with this movement. Finger adduc/abduction  A to abduction RF - did with place and hold. IF alone. - Added yellow sponge to complete on either side of 3rd digit.  x10   Wrist all planes  Stiff in wrist flexion Finger flexion IF, RF and SF  R and SF together, IF alone. To do at home so he can loosen fingers up without the confines of the MF   MP flexion PROM with IP flex/ext  Passively flex MP's while pt bends and straigthens his IP's. HEP   L MF DIP flexion  Added to HEP also   Wrist flex. Ext -   Stiff end range. Completed during activity rest breaks   Scar Mass/Edema Control:     Retrograde massage/Scar tissue massage x On palmar portion down digits and hand- 5 minutes  -Using small massager to break up scar tissue on dorsal MF and anterior palm at base of MF for 5 mins  -Weddingful tool implemented this date  - green therabar - roll onto scar  -Paraffin palm presses over scar for stretch and manipulation. elastomere scar forms  Continues To wear with night splint. made a new form to wear at night with elastomere in his web spaces. .    Gel compression sheet  Med/lg, to wear and hr 1 -2 x's a day IF to decrease scar. Strengthenin#  Wrist flex, wrist ext, RD - UD . 2 sets x15.   1 set x15 of supination/pronation holds   Table top, bend at pip than extend digits while in table top and then full extend with slight resistance. x10 with fair tolerance and slight resistance provided for isometrics strengthening. Finger extension  2 sets x15 red  Completed in yellow web extender x15 with 3 second holds in extension   PIP Ext Strengthening  12 reps completed with orange t-band and MCPs blocked in 90*   Functional Work task  #10 weight in a bin and placing from surface to surface x10 at waist height with fair tolerance. Pt reported after 6 reps he felt that he could feel the weight more through his injured hand. Pt will complete exercise with 5# weight and more reps to work up to his 10 # limit with no pressure on injured hand. Paraffin wax ball x Manipulation (squeezing, pinching) of wax ball for 3 mins after paraffin.  Fair tolerance Yellow putty x Squeeze and manipulate using all fingers and thumb. -Added finger extension, scar tissues presses between digits and against palm, abduction of IF/MF, MF/RF. -Completed with MF adduction into RF while extending (pulls MF further into extension) 8 mins  -IF/MF adduction with putty and pull, MF/RF adduction with putty and pull  -3-pnt pinch and pull  -Claw grasp and pull from underneath x 10 reps, then over top x 10 reps  -Twist putty into an ice cream cone with finger only, no wrist RD/UD   1.1# ^2.2# Ball  x - Wrist flex/ext w/ elbow on table x 15 reps  - Toss back and forth  - OH release and quickly catch UH  - Pro/Sup with shoulder positioned at 90* flexion and elbow extended   [employer] Web x - Digit flexion with ring placed in web space of thumb - hold for 3 sec at 15 reps  - Grasp center of web into fist and supinate x 15 reps  - Grasp center of web and flex wrist down into web resistance x 15 reps  - Digit adduction in center of web x 15 reps. [employer] x 3# dumbbell - arm resting at side - complete full fist to hook fist 2x20 reps   [employer]  25 reps - pressing both hands into ball with extended fingers, focus on driving PIPs into ball. Digi flex  5.0# red 2 sets x15 pronation, supination and neutral with pulling the fingers down to the bottom of the palm. BEU  10 mins at level 3 resistance completing 5 mins forward and 5 mins back. Weighted ball exercises  4# weighted ball throwing and catching in his LUE x20 with good tolerance. - rolling ball forward and back promoting digit extension   Hand gripper  On the second setting to  30 pom poms to place in a container.   -2x15 reps - round end pointed down at level 2 resistance with yellow spring.    green therabar  30 rep's Pronation and supination, twisting, full composite fist   -Isometric shaking x 2 - as long as can   Yellow putty with lg tool  Knob to encourage hook fist and put pressure on scar -6 min 1) Patient will demonstrate good understanding of home program (exercises/activities/diagnosis/prognosis/goals) with good accuracy. Goal in progress. 2) Patient will demonstrate increased active/passive range of motion of their LUE to [facility] for ADL/IADL completion. Goal making good gains. 3) Patient will demonstrate increased /pinch strength of at least 10 / 5 pinch pounds of their L hand. Goal initiated recently - working on at this time. 4) Patient to report decreased pain in their affected L upper extremity from 8/10 to 2/10 or less with resistive functional use. Goal making good gains. Pain with regular ADL tasks 0-3/10. Pain with resistive tasks 3-5/10  5) Increase in fine motor function as evidenced by decreased time to complete 9-hole peg test and/or MRMT test by at least 5-10 seconds. Goal making good gains. 6) Patient to report 100% compliance with their splint wear, care, and precautions if needed. Goal met. Splint d/c'ed. 7) Patient to report a decrease in hypersensitivity from 80% to 20% or less in their LUE. Goal making good gains. 8) Patient to demonstrate decreased guarding of their affected extremity from 100% to 20% or less. Goal making good gains. 9) Patient will be knowledgeable of edema control techniques as evident with decreases from severe to mild/none. Goal making good gains. 10) Patient will demonstrate a non-tender/non-adherent scar. Goal making fair gains. 11) Patient will report ADL functions as Mod I/I using LUE. Goal making good gains. 12) Patient will demonstrate improved functional activity tolerance from poor to fair + for ADL/IADL completion. Goal making good gains. 13) Patient will decrease QuickDASH score by 50% for increased participation in daily functional activities. Goal making good gains.      Plan: [x]  Continues Plan of care: 3x/week for 6 weeks until 02/19/2021. Treatment covered based on POC and graduated to patient's progress. Pt education continues at each visit to obtain maximum benefits from skilled OT intervention.   []  Alter Plan of care:   []  Discharge:      Vijay Rouse [address], OTR/L #[id number]

## 2021-02-24 ENCOUNTER — TREATMENT (OUTPATIENT)
Dept: OCCUPATIONAL THERAPY | Age: 41
End: 2021-02-24
Payer: COMMERCIAL

## 2021-02-24 DIAGNOSIS — S62.613B DISPLACED FRACTURE OF PROXIMAL PHALANX OF LEFT MIDDLE FINGER, INITIAL ENCOUNTER FOR OPEN FRACTURE: Primary | ICD-10-CM

## 2021-02-24 PROCEDURE — 97110 THERAPEUTIC EXERCISES: CPT | Performed by: OCCUPATIONAL THERAPIST

## 2021-02-24 PROCEDURE — 97140 MANUAL THERAPY 1/> REGIONS: CPT | Performed by: OCCUPATIONAL THERAPIST

## 2021-02-24 PROCEDURE — 97018 PARAFFIN BATH THERAPY: CPT | Performed by: OCCUPATIONAL THERAPIST

## 2021-02-24 NOTE — PROGRESS NOTES
OCCUPATIONAL THERAPY PROGRESS NOTE/RE-ASSESSMENT    Date:  2021   Initial Evaluation Date: 2020                          Evaluating Therapist: Natividad Cordero     Patient Name:  William Herrera  \"Henok\"               :  1980     Restrictions/Precautions:  ROM as tolerated; modalities prn, low fall risk  Diagnosis:S62.613B (ICD-10-CM) - Displaced fracture of proximal phalanx of left middle finger, initial encounter for open fracture  Excisional debridement of left palm, index and middle finger with open reduction internal fixation of left middle finger proximal phalanx fracture with middle finger A2 pulley repair. Insurance/Certification information:[facility]  Claim # [id number]  Referring Practitioner: Dr. Sanam Bradley  Date of Surgery/Injury: 2020  Plan of care signed (Y/N): Y    Visit# / total visits:  10 / 18-  3x/week for 6 weeks until to 3/19/2021. COVID-19 Screening completed upon entering facility and patient cleared for treatment today. Pt followed all protocols set forth by [address] [place] [address] for Outpatient services throughout therapy session. Patient has been made aware of all new hygiene protocols due to COVID-19 set forth by [address] [place] [address] Outpatient services and agrees to abide by all protocols. Pain Level: 0/10 however reported discomfort in the middle phalanx    Subjective: Pt reports no changes this date. Objective:  Updated POC to be completed by  visit. INTERVENTION: COMPLETED: SPECIFICS/COMMENTS:   Modality:     MHP  Hand wrapped in during paraffin for soft tissue warm-up   US  Scar management in the palm of LUE and up affected digit  Intensity: 0.8  Duration: 8 mins  Duty Cycle: 100%  Depth: 3.3 MHz   fluidotherapy  For soft tissue warm-up while intermittently moving his wrist and fingers. For desensitization also.     15 mins Paraffin x Completed for soft tissue warmup- 15 minutes. Pt reports increased ROM after completion. With MHP. Scar presses to follow. Ice   End of session to decrease pain. AROM:     Towel scrunches x 5 mins to LUE, digit walks, fist    Blocked PIP flex/ext x MF but other fingers can move with it if he wants 2 sets x10  And all fingers together. PIP ext MF with place and hold ext - to do frequently during the day. Blocked MP flex/ext x All fingers together. 1 set x10. Also did each IF and MF alone and RF and SF together. Wristisizer  5 mins with LUE with full grasp throughout fair tolerance   Red ball activity  Completed rolling of red ball up tall end of incline with arm in incline using tips of digits for emphasis on wrist flex/ext. Once to top of incline raise wrist into extension and then place ball into cup with radial deviation (increased difficulty). Then  cup and drop ball into hand with emphasis on pronation/supination. Use digit tips to roll ball back down tall end of incline. x10 reps with fair tolerance    Completed rolling of red ball up yellow web with tips of digits (emphasis on MF extension), through hole, and down other side with finger and wrist flexion. x20 reps with fair tolerance (fatigue noted)   Marker roll  Completed x15 marker rolls with coband from tip of fingers to palm with fair tolerance. Pt reports increased soreness in MF with this movement. Finger adduc/abduction  A to abduction RF - did with place and hold. IF alone. - Added yellow sponge to complete on either side of 3rd digit. x10   Wrist all planes  Stiff in wrist flexion    LUE  Completed flexion/extension of PIP/DIP 3  sets x10 within pt's tolerance. Focus on extension/flexion of 3rd digit. Completed with A2 pulley splint donned- or therapist supporting this area. Tendon glides  x10 completed with proper education provided. Boading balls  5 mins clock-wiseand counter clock-wise (increased difficulty with clockwise)     Bean transfer  Completed bean transfer with elbow at side for wrist flex/ext and digit flex with fair tolerance   Intervale activity  Completed marble transfer supinate-pronate to  as many marbles as possible and roll down first digit for transfer to container   Intervale flick   Completed marble flick using L MF with emphasis on extension pull. x10 reps with increased fatigue reported   Hook/fist and target guiding  x10 to complete hook/fist using a pen with pt educated on proper positioning throughout. Added to HEP   -Red foam tube: placing half tube into palm and completing flexion of all digits to touch tube. Pt educated to pull down with DIP to assist with improving flexion and touching tube. Paper Tasks  Paper placed between IF/MF and MF/RF, keep paper from being pulled away with flexed fingers x 8 reps ea then extended fingers x 8 reps ea. -Then crumple sheet of paper into a tight ball with no assist x 3.  -Tear sheet of paper into 4 sections, make small balls, then flick them away with MF focusing on extension pull. Extensor tendon digit holds  x10 with palm down: therapist bringing 3rd digit up and pt was to hold for 3 seconds with fair tolerance, HEP within pain tolerance. AAROM:     Red web stretch  Wrist/digit extension, flexion, fist grasp, and twist x15 reps with 3 second holds at end range-fair tolerance. PROM/Stretching:     L MF PIP ext/Flexion x Gentle completed to digits into extension  More tension to PIP ext. Completed during activity rest breaks        Finger flexion IF, RF and SF x R and SF together, IF alone. To do at home so he can loosen fingers up without the confines of the MF   MP flexion PROM with IP flex/ext x Passively flex MP's while pt bends and straightens his IP's. HEP   L MF DIP flexion x Completed during massage.  Added to HEP also Wrist flex. Ext -  x Stiff end range. Completed during massage. Scar Mass/Edema Control:     Retrograde massage/Scar tissue massage x On palmar portion down digits and hand- 5 minutes  -Using small massager to break up scar tissue on dorsal MF and anterior palm at base of MF for 5 mins  -Lakesha tool implemented this date  - green therabar - roll onto scar   elastomere scar forms  Continues To wear with night splint. made a new form to wear at night with elastomere in his web spaces. .    Gel compression sheet  Med/lg, to wear and hr 1 -2 x's a day IF to decrease scar. Strengthenin#  Wrist flex, wrist ext, RD - UD . 2 sets x15.   1 set x15 of supination/pronation holds   Table top, bend at pip than extend digits while in table top and then full extend with slight resistance. x10 with fair tolerance and slight resistance provided for isometrics strengthening. Finger extension  2 sets x15 red  Completed in yellow web extender x15 with 3 second holds in extension   PIP Ext Strengthening  12 reps completed with orange t-band and MCPs blocked in 90*   Functional Work task  #10 weight in a bin and placing from surface to surface x10 at waist height with fair tolerance. Pt reported after 6 reps he felt that he could feel the weight more through his injured hand. Pt will complete exercise with 5# weight and more reps to work up to his 10 # limit with no pressure on injured hand. Paraffin wax ball x Manipulation (squeezing, pinching) of wax ball for 3 mins after paraffin. Fair tolerance   Yellow putty  Squeeze and manipulate using all fingers and thumb. -Added finger extension, scar tissues presses between digits and against palm, abduction of IF/MF, MF/RF.   -Completed with MF adduction into RF while extending (pulls MF further into extension) 8 mins  -IF/MF adduction with putty and pull, MF/RF adduction with putty and pull  -3-pnt pinch and pull Re-assessment x Completed this date with review of HEP and use of modalities for pain/stiffness management. Assessment/Comments: Pt is making Fair progress toward stated plan of care and established goals. Pt tolerated session well with re-assessment completed this date. Pt demonstrates progress towards all goals and increase in AROM, strength, and decreased pain/soreness, however continued limitations in strength and ROM impact Pt's participation in functional activities. Pt educated on use of modalities (heat) to decrease stiffness and reports satisfaction with progress thus far. R Upper Extremity ROM       AROM [x]? ???     PROM[]???? IE  10/22/2020 1/5 /[id number]  2/24/2021       WRIST Flexion 0-70* 23*  70*  75*  75*     Extension 0-80* 34*  54*  60* 62*      Radial Deviation 0-20* 18*  20*  20* 21*      Ulnar Deviation 0-30* 20*  30*  30* 34*       R Hand ROM    AROM [x]? ???         PROM[]???? IE   10/22/2020 1/5/2021  2/24/2021      2nd Digit MCP Flexion/Extension */ 0-45 H TBA 46* /*23  70*/0* 77*/0     PIP Flexion/Extension 100*/ 0 57*/- 29* 65*/34*  95*/-22* 99*/-22*     DIP Flexion/Extension 70-90*/0 32*/- 23* 32* /0*  60*/0* 66*/0        L MF     AROM [x]? ???         PROM[]???? 10/12/20 10/15/20   10/22/2020 1/ 5 /2021  2/24/2021       MF Digit MCP Extension/Flexion   0-45 H /* -30*/50* -20*/47*  14*/46*  0*/64* 0/69*     PIP Extension/Flexion   0*/100* -55*/62* -50*/ 60* 51*/53*  -40*/ 84* -41/94*     DIP Extension/Flexion   0*/70-90* Wiggle  P - 40*  wiggle   0*/25*  0*/55* 0/73*      4th Digit MCP Flexion/Extension */ 0-45 H TBA  31*/0*    66*/0* 76*/0     PIP Flexion/Extension 100*/ 0 70*/- 30* 72* /0*   97*/0*  102*/-12     DIP Flexion/Extension 70-90*/0 41*/0* 60* /0*   63*/0*  80*/0         5th Digit MCP Flexion/Extension */ 0-45 H TBA 23*/ 0*   73* /0* 73*/0     PIP Flexion/Extension 100*/ 0 74*.  +  /- 4* 80* /0*   95* /0* 93*/-5   DIP Flexion/Extension 70-90*/0 46*/0*  60*/0*   70* /0* 78*/0        Dynamometer (setting 2):                                 Left: 38#    51#                                            OQLUT: 729#                    Pinch Meter:              Lateral: Left=15# ,   20#                                                             Right=23#                Palmar 3 point: Left=9# ,  13# (pain ^6-7/10)                             Right=20#      9 hole peg test:  Assessed this date. R  - :22        L   - :45  30 seconds    -Rehab Potential: Good  -Requires OT Follow Up: Yes  Time In: 2:00 pm     Time Out: 3:00 pm            Visit #: 10    Treatment Charges: Mins Time in - Time out  Units   Modalities- fluiothe/Paraffin 15  2:00 pm   2:15  pm 1   Ther Exercise 30 2:15 pm - 2:45 pm 2   Manual Therapy 15 2:45 pm - 3:00 pm 1   Thera Activities      ADL/Home Mgt       Neuro Re-education      Gait Training      Group Therapy      Non-Billable Service Time MHP      Other splint lidia      Total Time/Units 60  4     -Response to Treatment: Pt responded well to treatment. Goals: Goals for pt can be see on initial eval occurring on 09/08/2020 and on 10/22/2020, 12/10/2020 and on 1/5/2021 and on 2/24/2021    GOALS (Long term same as Short term):  1) Patient will demonstrate good understanding of home program (exercises/activities/diagnosis/prognosis/goals) with good accuracy. Goal in progress. 2) Patient will demonstrate increased active/passive range of motion of their LUE to [facility] for ADL/IADL completion. Goal making good gains-Pt demonstrates increased ROM compared to IE but continues to have limited flexion/extension of wrist and digits 2-5.  3) Patient will demonstrate increased /pinch strength of at least 10 / 5 pinch pounds of their L hand. Goal met and progressing for increased UE use in functional tasks. 4) Patient to report decreased pain in their affected L upper extremity from 8/10 to 2/10 or less with resistive functional use. Goal making good gains. Goal progressing-Pt reports pain with resistive tasks 3-5/10.  5) Increase in fine motor function as evidenced by decreased time to complete 9-hole peg test and/or MRMT test by at least 5-10 seconds. Goal met and progressing for increased UE use in functional tasks. 6) Patient to report 100% compliance with their splint wear, care, and precautions if needed. Progressing-Pt continues wear of splint at night for scar tissue management. 7) Patient to report a decrease in hypersensitivity from 80% to 20% or less in their LUE. Goal met with Pt report of mild/no hypersensitivity in LUE  8) Patient to demonstrate decreased guarding of their affected extremity from 100% to 20% or less. Goal making good gains. 9) Patient will be knowledgeable of edema control techniques as evident with decreases from severe to mild/none. Goal making good gains. 10) Patient will demonstrate a non-tender/non-adherent scar. Goal making fair gains-continued tightness around scar. 11) Patient will report ADL functions as Mod I/I using LUE. Goal met  12) Patient will demonstrate improved functional activity tolerance from poor to fair + for ADL/IADL completion. Goal making good gains. 13) Patient will decrease QuickDASH score by 50% for increased participation in daily functional activities. Goal making good gains. Plan:   [x]  Continues Plan of care: 3x/week for 6 weeks until 03/19/2021. Treatment covered based on POC and graduated to patient's progress. Pt education continues at each visit to obtain maximum benefits from skilled OT intervention.   []  Alter Plan of care:   []  Discharge:    [facility] S/OT  Dianne Rouse Brad 86, OTR/L #[id number]

## 2021-03-02 ENCOUNTER — TREATMENT (OUTPATIENT)
Dept: OCCUPATIONAL THERAPY | Age: 41
End: 2021-03-02
Payer: COMMERCIAL

## 2021-03-02 DIAGNOSIS — S62.613B DISPLACED FRACTURE OF PROXIMAL PHALANX OF LEFT MIDDLE FINGER, INITIAL ENCOUNTER FOR OPEN FRACTURE: Primary | ICD-10-CM

## 2021-03-02 PROCEDURE — 97140 MANUAL THERAPY 1/> REGIONS: CPT | Performed by: OCCUPATIONAL THERAPIST

## 2021-03-02 PROCEDURE — 97110 THERAPEUTIC EXERCISES: CPT | Performed by: OCCUPATIONAL THERAPIST

## 2021-03-02 PROCEDURE — 97018 PARAFFIN BATH THERAPY: CPT | Performed by: OCCUPATIONAL THERAPIST

## 2021-03-03 ENCOUNTER — TREATMENT (OUTPATIENT)
Dept: OCCUPATIONAL THERAPY | Age: 41
End: 2021-03-03
Payer: COMMERCIAL

## 2021-03-03 DIAGNOSIS — S62.613B DISPLACED FRACTURE OF PROXIMAL PHALANX OF LEFT MIDDLE FINGER, INITIAL ENCOUNTER FOR OPEN FRACTURE: Primary | ICD-10-CM

## 2021-03-03 PROCEDURE — 97140 MANUAL THERAPY 1/> REGIONS: CPT | Performed by: OCCUPATIONAL THERAPIST

## 2021-03-03 PROCEDURE — 97110 THERAPEUTIC EXERCISES: CPT | Performed by: OCCUPATIONAL THERAPIST

## 2021-03-03 PROCEDURE — 97018 PARAFFIN BATH THERAPY: CPT | Performed by: OCCUPATIONAL THERAPIST

## 2021-03-03 PROCEDURE — 97035 APP MDLTY 1+ULTRASOUND EA 15: CPT | Performed by: OCCUPATIONAL THERAPIST

## 2021-03-03 NOTE — PROGRESS NOTES
OCCUPATIONAL THERAPY PROGRESS NOTE    Date:  3/3/2021   Initial Evaluation Date: 2020                          Evaluating Therapist: Rosa M Garcia     Patient Name:  Jake Richey  \"Henok\"               :  1980     Restrictions/Precautions:  ROM as tolerated; modalities prn, low fall risk  Diagnosis:S62.613B (ICD-10-CM) - Displaced fracture of proximal phalanx of left middle finger, initial encounter for open fracture  Excisional debridement of left palm, index and middle finger with open reduction internal fixation of left middle finger proximal phalanx fracture with middle finger A2 pulley repair. Insurance/Certification information:[facility]  Claim # [id number]  Referring Practitioner: Dr. Aliyah Molina  Date of Surgery/Injury: 2020  Plan of care signed (Y/N): Y    Visit# / total visits:  -  3x/week for 6 weeks until to 3/19/2021. COVID-19 Screening completed upon entering facility and patient cleared for treatment today. Pt followed all protocols set forth by [facility] throughout therapy session. Patient has been made aware of all new hygiene protocols due to COVID-19 set forth by [facility] and agrees to abide by all protocols. Pain Level: 7/10 occasionally sharp shooting nerve pain in the middle phalanx     Subjective: Pt reports\"I'm still getting that sharp nerve pain in the side of my finger and medication isn't helping anymore. \"     Objective:  Updated POC to be completed at 10th session.     INTERVENTION: COMPLETED: SPECIFICS/COMMENTS:   Modality:     MHP  Hand wrapped in during paraffin for soft tissue warm-up   US x Scar management in radial MF for nerve pain and scar tissue management - Decreased tolerance d/t Domingo Deal  Intensity: 0.8  Duration: 8 mins- Stopped at 6 minutes d/t decreased tolerance  Duty Cycle: 100%  Depth: 3.3 MHz fluidotherapy  For soft tissue warm-up while intermittently moving his wrist and fingers. For desensitization also. 15 mins   Paraffin x Completed for soft tissue warmup- 10 minutes. Pt reports increased ROM after completion. Scar presses to follow. Ice  x Completed middle of session 5 minutes to decrease pain. AROM:     Towel scrunches  5 mins to LUE, digit walks, fist    Blocked PIP flex/ext  MF but other fingers can move with it if he wants 2 sets x10  And all fingers together. PIP ext MF with place and hold ext - to do frequently during the day. Blocked MP flex/ext  All fingers together. 1 set x10. Also did each IF and MF alone and RF and SF together. Wristisizer  5 mins with LUE with full grasp throughout fair tolerance   Red ball activity  Completed rolling of red ball up tall end of incline with arm in incline using tips of digits for emphasis on wrist flex/ext. Once to top of incline raise wrist into extension and then place ball into cup with radial deviation (increased difficulty). Then  cup and drop ball into hand with emphasis on pronation/supination. Use digit tips to roll ball back down tall end of incline. x10 reps with fair tolerance    Completed rolling of red ball up yellow web with tips of digits (emphasis on MF extension), through hole, and down other side with finger and wrist flexion. x20 reps with fair tolerance (fatigue noted)   Marker roll  Completed x15 marker rolls with coband from tip of fingers to palm with fair tolerance. Pt reports increased soreness in MF with this movement. Finger adduc/abduction  A to abduction RF - did with place and hold. IF alone. - Added yellow sponge to complete on either side of 3rd digit. x10   Wrist all planes  Stiff in wrist flexion    LUE  Completed flexion/extension of PIP/DIP 3  sets x10 within pt's tolerance. Focus on extension/flexion of 3rd digit. Completed with A2 pulley splint donned- or therapist supporting this area. MP flexion PROM with IP flex/ext x Passively flex MP's while pt bends and straightens his IP's. HEP   L MF DIP flexion x Completed during massage. Added to HEP also   Wrist flex. Ext -  x Stiff end range. Completed during massage. Scar Mass/Edema Control:     Retrograde massage/Scar tissue massage x On palmar portion down digits and hand- 10 minutes  -Using small massager to break up scar tissue on dorsal MF and anterior palm at base of MF for 5 mins  -INFOGRAPHIQSton tool implemented this date  - green therabar - roll onto scar   elastomere scar forms  Continues To wear with night splint. made a new form to wear at night with elastomere in his web spaces. .    Gel compression sheet  Med/lg, to wear and hr 1 -2 x's a day IF to decrease scar. Strengthenin#  Wrist flex, wrist ext, RD - UD . 2 sets x15.   1 set x15 of supination/pronation holds   Table top, bend at pip than extend digits while in table top and then full extend with slight resistance. x10 with fair tolerance and slight resistance provided for isometrics strengthening. Finger extension  2 sets x15 red  Completed in yellow web extender x15 with 3 second holds in extension   PIP Ext Strengthening  12 reps completed with orange t-band and MCPs blocked in 90*   Functional Work task  #10 weight in a bin and placing from surface to surface x10 at waist height with fair tolerance. Pt reported after 6 reps he felt that he could feel the weight more through his injured hand. Pt will complete exercise with 5# weight and more reps to work up to his 10 # limit with no pressure on injured hand. Paraffin wax ball x Manipulation (squeezing, pinching) of wax ball for 3 mins after paraffin. Fair tolerance   Yellow putty x Squeeze and manipulate using all fingers and thumb. -Added finger extension, scar tissues presses between digits and against palm, abduction of IF/MF, MF/RF. -Completed with MF adduction into RF while extending (pulls MF further into extension) 3 mins  -IF/MF adduction with putty and pull, MF/RF adduction with putty and pull  -3-pnt pinch and pull  -Claw grasp and pull from underneath x 10 reps, then over top x 10 reps  -Twist putty into an ice cream cone with finger only, no wrist RD/UD   1.1# ^2.2# Ball   - Wrist flex/ext w/ elbow on table x 15 reps  - Toss back and forth  - OH release and quickly catch UH  - Pro/Sup with shoulder positioned at 90* flexion and elbow extended    Gomez Johnson  - Digit flexion with ring placed in web space of thumb - hold for 3 sec at 15 reps  - Grasp center of web into fist and supinate x 15 reps  - Grasp center of web and flex wrist down into web resistance x 15 reps  - Digit adduction in center of web x 15 reps. [employer]  25 reps - pressing both hands into ball with extended fingers, focus on driving PIPs into ball. Digi flex  5.0# red 2 sets x15 pronation, supination and neutral with pulling the fingers down to the bottom of the palm. BEU  10 mins at level 3 resistance completing 5 mins forward and 5 mins back. Weighted ball exercises  4# weighted ball throwing and catching in his LUE x20 with good tolerance. - rolling ball forward and back promoting digit extension   Hand gripper  On the second setting to  30 pom poms to place in a container.   -2x15 reps - round end pointed down at level 2 resistance with yellow spring.  - ^ to level 3 resistance (25#) and max out: 20 reps   green therabar  30 rep's Pronation and supination, twisting, full composite fist   -Isometric shaking x 2 - as long as can  -Pull and twist against therapist resistance. Lumbrical Strengthening  Completed with red sponge, first intrinsic + x 15 reps followed by isolated MCP flexion at edge of table x 15 reps.    Yellow putty with lg tool  Knob to encourage hook fist and put pressure on scar -6 min Indu Rias  3# 4# dumbbell - arm resting at side - complete full fist to hook fist 2x20 reps   Red foam block   2 sets x10 : composite fist, claw, middle and ring finger press, IF & F/thumb press. Other:     IF, MF ext splint  x Continues- Fabricated to wear at night with his elastomere gel pads to stretch scar tissue and PIP jt. . Made a new elastomere form leaving an open area for his wound to breathe. Pt can try wearing at night. To bring in next session to readjust to ^ ext pull. Completed for improved scar tissue management and MF extension. LMB   For IF and MF. Wear 10 min to 30 for a total of 1 hr a day. HEP  X  Cont with, added MP flexion while flex/ext IP's - can use frap strap also, changed PIP ext from ext tube to LMB splints. Yellow putty for gentle srengthening    frap strap  Can be used to work full finger flexion and or hold MP's down with IP flex. ext- pt to play around with   Splint adjustments      Re-assessment  Completed this date with review of HEP and use of modalities for pain/stiffness management. Assessment/Comments: Pt is making Fair progress toward stated plan of care and established goals. Pt tolerated session fair with continued sharp/shooting nerve pain occurring throughout session with increased nerve irritation during US (modality stopped). Session activities focused on ROM and pain management d/t reports of increased shooting pain in radial MF. Ice and massage end of session with mild/no pain reported upon completion. R Upper Extremity ROM       AROM [x]? ???     PROM[]???? IE  10/22/2020 1/5 /85626  2/24/2021       WRIST Flexion 0-70* 23*  70*  75*  75*     Extension 0-80* 34*  54*  60* 62*      Radial Deviation 0-20* 18*  20*  20* 21*      Ulnar Deviation 0-30* 20*  30*  30* 34*       R Hand ROM    AROM [x]? ???         PROM[]???? IE   10/22/2020 1/5/2021  2/24/2021      2nd Digit MCP Flexion/Extension */ 0-45 H TBA 46* /*23  70*/0* 77*/0   PIP Flexion/Extension 100*/ 0 57*/- 29* 65*/34*  95*/-22* 99*/-22*     DIP Flexion/Extension 70-90*/0 32*/- 23* 32* /0*  60*/0* 66*/0        L MF     AROM [x]? ???         PROM[]???? 10/12/20 10/15/20   10/22/2020 1/ 5 /2021  2021       MF Digit MCP Extension/Flexion   0-45 H /* -30*/50* -20*/47*  14*/46*  0*/64* 0/69*     PIP Extension/Flexion   0*/100* -55*/62* -50*/ 60* 51*/53*  -40*/ 84* -41/94*     DIP Extension/Flexion   0*/70-90* Wiggle  P - 40*  wiggle   0*/25*  0*/55* 0/73*      4th Digit MCP Flexion/Extension */ 0-45 H TBA  31*/0*    66*/0* 76*/0     PIP Flexion/Extension 100*/ 0 70*/- 30* 72* /0*   97*/0*  102*/-12     DIP Flexion/Extension 70-90*/0 41*/0* 60* /0*   63*/0*  80*/0         5th Digit MCP Flexion/Extension */ 0-45 H TBA 23*/ 0*   73* /0* 73*/0     PIP Flexion/Extension 100*/ 0 74*. +  /- 4* 80* /0*   95* /0* 93*/-5     DIP Flexion/Extension 70-90*/0 46*/0*  60*/0*   70* /0* 78*/0        Dynamometer (setting 2):                                 Left: 38#    51#                                            GRSI#                    Pinch Meter:              Lateral: Left=15# ,   20#                                                             Right=23#                Palmar 3 point: Left=9# ,  13# (pain ^6-7/10)                             Right=20#      9 hole peg test:  Assessed this date.         R  - :22        L   - :45  30 seconds    -Rehab Potential: Good  -Requires OT Follow Up: Yes    Time In: 10:05 am    Time Out: 11:00 am            Visit #: 12    Treatment Charges: Mins Time in - Time out  Units   Modalities- Paraffin/US 10/6 10:00 am - 10:16 am    Ther Exercise 22 10:16 am - 10:38 am 1   Manual Therapy 22 10:38 am - 11:00 am 1   Thera Activities      ADL/Home Mgt       Neuro Re-education      Gait Training      Group Therapy      Non-Billable Service Time MHP      Other splint lidia      Total Time/Units 60  4

## 2021-03-05 ENCOUNTER — TREATMENT (OUTPATIENT)
Dept: OCCUPATIONAL THERAPY | Age: 41
End: 2021-03-05
Payer: COMMERCIAL

## 2021-03-05 DIAGNOSIS — S62.613B DISPLACED FRACTURE OF PROXIMAL PHALANX OF LEFT MIDDLE FINGER, INITIAL ENCOUNTER FOR OPEN FRACTURE: Primary | ICD-10-CM

## 2021-03-05 PROCEDURE — 97018 PARAFFIN BATH THERAPY: CPT | Performed by: OCCUPATIONAL THERAPIST

## 2021-03-05 PROCEDURE — 97110 THERAPEUTIC EXERCISES: CPT | Performed by: OCCUPATIONAL THERAPIST

## 2021-03-05 PROCEDURE — 97035 APP MDLTY 1+ULTRASOUND EA 15: CPT | Performed by: OCCUPATIONAL THERAPIST

## 2021-03-05 PROCEDURE — 97140 MANUAL THERAPY 1/> REGIONS: CPT | Performed by: OCCUPATIONAL THERAPIST

## 2021-03-05 NOTE — PROGRESS NOTES
OCCUPATIONAL THERAPY PROGRESS NOTE    Date:  3/5/2021   Initial Evaluation Date: 2020                          Evaluating Therapist: Joanna Jean     Patient Name:  Eloy Cardona  \"Henok\"               :  1980     Restrictions/Precautions:  ROM as tolerated; modalities prn, low fall risk  Diagnosis:S62.613B (ICD-10-CM) - Displaced fracture of proximal phalanx of left middle finger, initial encounter for open fracture  Excisional debridement of left palm, index and middle finger with open reduction internal fixation of left middle finger proximal phalanx fracture with middle finger A2 pulley repair. Insurance/Certification information:[facility]  Claim # [id number]  Referring Practitioner: Dr. Natasha Sheehan  Date of Surgery/Injury: 2020  Plan of care signed (Y/N): Y    Visit# / total visits:  15 / 18-  3x/week for 6 weeks until to 3/19/2021. COVID-19 Screening completed upon entering facility and patient cleared for treatment today. Pt followed all protocols set forth by [address] [place] [address] for Outpatient services throughout therapy session. Patient has been made aware of all new hygiene protocols due to COVID-19 set forth by [address] [place] [address] Outpatient services and agrees to abide by all protocols. Pain Level: 3/10 occasionally sharp shooting nerve pain in the middle phalanx     Subjective: Pt reports \"I don't hurt at all today\"   Objective:  Updated POC to be completed at 10th session. INTERVENTION: COMPLETED: SPECIFICS/COMMENTS:   Modality:     MHP  Hand wrapped in during paraffin for soft tissue warm-up   US x Scar management and scar tissue management   Intensity: 0.8  Duration: 8 mins-  Duty Cycle: 100%  Depth: 3.3 MHz   fluidotherapy  For soft tissue warm-up while intermittently moving his wrist and fingers. For desensitization also. 15 mins   Paraffin x Completed for soft tissue warmup- 10 minutes.  Pt reports increased ROM after completion. Scar presses to follow. Ice   Completed middle of session 5 minutes to decrease pain. AROM:     Towel scrunches x 5 mins to LUE, digit walks, fist, individual fingers flexion and extension   Blocked PIP flex/ext  MF but other fingers can move with it if he wants 2 sets x10  And all fingers together. PIP ext MF with place and hold ext - to do frequently during the day. Blocked MP flex/ext  All fingers together. 1 set x10. Also did each IF and MF alone and RF and SF together. Wristisizer  5 mins with LUE with full grasp throughout fair tolerance   Red ball activity  Completed rolling of red ball up tall end of incline with arm in incline using tips of digits for emphasis on wrist flex/ext. Once to top of incline raise wrist into extension and then place ball into cup with radial deviation (increased difficulty). Then  cup and drop ball into hand with emphasis on pronation/supination. Use digit tips to roll ball back down tall end of incline. x10 reps with fair tolerance    Completed rolling of red ball up yellow web with tips of digits (emphasis on MF extension), through hole, and down other side with finger and wrist flexion. x20 reps with fair tolerance (fatigue noted)   Marker roll  Completed x15 marker rolls with coband from tip of fingers to palm with fair tolerance. Pt reports increased soreness in MF with this movement. Finger adduc/abduction  A to abduction RF - did with place and hold. IF alone. - Added yellow sponge to complete on either side of 3rd digit. x10   Wrist all planes  Stiff in wrist flexion    LUE  Completed flexion/extension of PIP/DIP 3  sets x10 within pt's tolerance. Focus on extension/flexion of 3rd digit. Completed with A2 pulley splint donned- or therapist supporting this area. Tendon glides  x10 completed with proper education provided.     Boading balls  5 mins clock-wiseand counter clock-wise (increased difficulty with clockwise)     Bean transfer  Completed bean transfer with elbow at side for wrist flex/ext and digit flex with fair tolerance   [place] activity  Completed marble transfer supinate-pronate to  as many marbles as possible and roll down first digit for transfer to container   MF extension flick and hold  x Completed pom pom flick using L MF with emphasis on extension pull. x10 reps with increased fatigue reported   Hook/fist and target guiding  x10 to complete hook/fist using a pen with pt educated on proper positioning throughout. Added to HEP   -Red foam tube: placing half tube into palm and completing flexion of all digits to touch tube. Pt educated to pull down with DIP to assist with improving flexion and touching tube. Paper Tasks  Paper placed between IF/MF and MF/RF, keep paper from being pulled away with flexed fingers x 8 reps ea then extended fingers x 8 reps ea. -Then crumple sheet of paper into a tight ball with no assist x 3.  -Tear sheet of paper into 4 sections, make small balls, then flick them away with MF focusing on extension pull. Extensor tendon digit holds x x10  therapist bringing 3rd digit into extension and pt was to hold for 3 seconds with fair tolerance, HEP within pain tolerance. AAROM:     Red web stretch x Wrist/digit extension and digit adduction with 3 second holds at end range-fair tolerance. Completed flexion, fist grasp, and twist x15 reps with 3 second holds at end range-fair tolerance. PROM/Stretching:     L MF PIP ext/Flexion x Gentle completed to digits into extension  More tension to PIP ext. Completed during activity rest breaks        Finger flexion IF, RF and SF x R and SF together, IF alone. To do at home so he can loosen fingers up without the confines of the MF   MP flexion PROM with IP flex/ext x Passively flex MP's while pt bends and straightens his IP's. HEP   L MF DIP flexion x Completed during massage. Added to HEP also   Wrist flex. Ext -  x Stiff end range. Completed during massage. Scar Mass/Edema Control:     Retrograde massage/Scar tissue massage x On palmar portion down digits and hand- 10 minutes  -Using small massager to break up scar tissue on dorsal MF and anterior palm at base of MF for 5 mins  -[place] tool implemented this date  - green therabar - roll onto scar   elastomere scar forms  Continues To wear with night splint. made a new form to wear at night with elastomere in his web spaces. .    Gel compression sheet  Med/lg, to wear and hr 1 -2 x's a day IF to decrease scar. Strengthenin# x Wrist flex, wrist ext, RD - UD . 2 sets x15.   1 set x15 of supination/pronation holds   Table top, bend at pip than extend digits while in table top and then full extend with slight resistance. x10 with fair tolerance and slight resistance provided for isometrics strengthening. Finger extension  2 sets x15 red  Completed in yellow web extender x15 with 3 second holds in extension   PIP Ext Strengthening  12 reps completed with orange t-band and MCPs blocked in 90*   Functional Work task  #10 weight in a bin and placing from surface to surface x10 at waist height with fair tolerance. Pt reported after 6 reps he felt that he could feel the weight more through his injured hand. Pt will complete exercise with 5# weight and more reps to work up to his 10 # limit with no pressure on injured hand. Paraffin wax ball x Manipulation (squeezing, pinching) of wax ball for 3 mins after paraffin. Fair tolerance   Yellow putty x Squeeze and manipulate using all fingers and thumb. -Added finger extension, scar tissues presses between digits and against palm, abduction of IF/MF, MF/RF.   -Completed with MF adduction into RF while extending (pulls MF further into extension) 3 mins  -IF/MF adduction with putty and pull, MF/RF adduction with putty and pull  -3-pnt pinch and pull  -Claw grasp and pull from underneath x 10 reps, then over top x 10 reps  -Twist putty into an ice cream cone with finger only, no wrist RD/UD   1.1# ^2.2# Ball   - Wrist flex/ext w/ elbow on table x 15 reps  - Toss back and forth  - OH release and quickly catch UH  - Pro/Sup with shoulder positioned at 90* flexion and elbow extended    Red Web  - Digit flexion with ring placed in web space of thumb - hold for 3 sec at 15 reps  - Grasp center of web into fist and supinate x 15 reps  - Grasp center of web and flex wrist down into web resistance x 15 reps  - Digit adduction in center of web x 15 reps. [employer]  25 reps - pressing both hands into ball with extended fingers, focus on driving PIPs into ball. Digi flex  5.0# red 2 sets x15 pronation, supination and neutral with pulling the fingers down to the bottom of the palm. BEU  10 mins at level 3 resistance completing 5 mins forward and 5 mins back. Weighted ball exercises  4# weighted ball throwing and catching in his LUE x20 with good tolerance. - rolling ball forward and back promoting digit extension   Hand gripper x On the second setting to  30 pom poms to place in a container.   -2x15 reps - round end pointed down at level 2 resistance with yellow spring.  - level 3 resistance (35reps ^d from 25 reps)    green therabar  30 rep's Pronation and supination, twisting, full composite fist   -Isometric shaking x 2 - as long as can  -Pull and twist against therapist resistance. Lumbrical Strengthening  Completed with red sponge, first intrinsic + x 15 reps followed by isolated MCP flexion at edge of table x 15 reps. Yellow putty with lg tool  Knob to encourage hook fist and put pressure on scar -6 min   [employer]  3# 4# dumbbell - arm resting at side - complete full fist to hook fist 2x20 reps   Red foam block   2 sets x10 : composite fist, claw, middle and ring finger press, IF & F/thumb press.     Other:     IF, MF ext splint  x Continues- Fabricated to wear at night with his elastomere gel pads to stretch scar tissue and PIP jt. . Made a new elastomere form leaving an open area for his wound to breathe. Pt can try wearing at night. To bring in next session to readjust to ^ ext pull. Completed for improved scar tissue management and MF extension. LMB   For IF and MF. Wear 10 min to 30 for a total of 1 hr a day. HEP  X  Cont with, added MP flexion while flex/ext IP's - can use frap strap also, changed PIP ext from ext tube to LMB splints. Yellow putty for gentle srengthening    frap strap  Can be used to work full finger flexion and or hold MP's down with IP flex. ext- pt to play around with   Splint adjustments      Re-assessment  Completed this date with review of HEP and use of modalities for pain/stiffness management. Assessment/Comments: Pt is making Fair progress toward stated plan of care and established goals. Pt tolerated session well with minimal increase in pain this date. Pt presented this date with low level pain-possibly due to not working the night before. Pt tolerated increased ROM exercises isolation for the IF and MF for flexion and extension. Will increase as tolerated. R Upper Extremity ROM       AROM [x]? ???     PROM[]???? IE  10/22/2020 1/5 /76122  2/24/2021       WRIST Flexion 0-70* 23*  70*  75*  75*     Extension 0-80* 34*  54*  60* 62*      Radial Deviation 0-20* 18*  20*  20* 21*      Ulnar Deviation 0-30* 20*  30*  30* 34*       R Hand ROM    AROM [x]? ???         PROM[]???? IE   10/22/2020 1/5/2021  2/24/2021      2nd Digit MCP Flexion/Extension */ 0-45 H TBA 46* /*23  70*/0* 77*/0     PIP Flexion/Extension 100*/ 0 57*/- 29* 65*/34*  95*/-22* 99*/-22*     DIP Flexion/Extension 70-90*/0 32*/- 23* 32* /0*  60*/0* 66*/0        L MF     AROM [x]? ???         PROM[]???? 10/12/20 10/15/20   10/22/2020 1/ 5 /2021  2/24/2021       MF Digit MCP Extension/Flexion   0-45 H /* -30*/50* -20*/47*  14*/46*  0*/64* 0/69*     PIP Extension/Flexion   0*/100* -55*/62* -50*/ 60* 51*/53*  range of motion of their LUE to [facility] for ADL/IADL completion. Goal making good gains-Pt demonstrates increased ROM compared to IE but continues to have limited flexion/extension of wrist and digits 2-5.  3) Patient will demonstrate increased /pinch strength of at least 10 / 5 pinch pounds of their L hand. Goal met and progressing for increased UE use in functional tasks. 4) Patient to report decreased pain in their affected L upper extremity from 8/10 to 2/10 or less with resistive functional use. Goal making good gains. Goal progressing-Pt reports pain with resistive tasks 3-5/10.  5) Increase in fine motor function as evidenced by decreased time to complete 9-hole peg test and/or MRMT test by at least 5-10 seconds. Goal met and progressing for increased UE use in functional tasks. 6) Patient to report 100% compliance with their splint wear, care, and precautions if needed. Progressing-Pt continues wear of splint at night for scar tissue management. 7) Patient to report a decrease in hypersensitivity from 80% to 20% or less in their LUE. Goal met with Pt report of mild/no hypersensitivity in LUE  8) Patient to demonstrate decreased guarding of their affected extremity from 100% to 20% or less. Goal making good gains. 9) Patient will be knowledgeable of edema control techniques as evident with decreases from severe to mild/none. Goal making good gains. 10) Patient will demonstrate a non-tender/non-adherent scar. Goal making fair gains-continued tightness around scar. 11) Patient will report ADL functions as Mod I/I using LUE. Goal met  12) Patient will demonstrate improved functional activity tolerance from poor to fair + for ADL/IADL completion. Goal making good gains. 13) Patient will decrease QuickDASH score by 50% for increased participation in daily functional activities. Goal making good gains. Plan:   [x]  Continues Plan of care: 3x/week for 6 weeks until 03/19/2021.   Treatment covered based on POC and graduated to patient's progress. Pt education continues at each visit to obtain maximum benefits from skilled OT intervention.   []  Alter Plan of care:   []  Discharge:    Sabi Muse S/OT  Julio Prabhakar 33, OTR/L #[id number]

## 2021-03-08 ENCOUNTER — TREATMENT (OUTPATIENT)
Dept: OCCUPATIONAL THERAPY | Age: 41
End: 2021-03-08
Payer: COMMERCIAL

## 2021-03-08 DIAGNOSIS — S62.613B DISPLACED FRACTURE OF PROXIMAL PHALANX OF LEFT MIDDLE FINGER, INITIAL ENCOUNTER FOR OPEN FRACTURE: Primary | ICD-10-CM

## 2021-03-08 PROCEDURE — 97018 PARAFFIN BATH THERAPY: CPT | Performed by: OCCUPATIONAL THERAPIST

## 2021-03-08 PROCEDURE — 97140 MANUAL THERAPY 1/> REGIONS: CPT | Performed by: OCCUPATIONAL THERAPIST

## 2021-03-08 PROCEDURE — 97110 THERAPEUTIC EXERCISES: CPT | Performed by: OCCUPATIONAL THERAPIST

## 2021-03-08 NOTE — PROGRESS NOTES
OCCUPATIONAL THERAPY PROGRESS NOTE    Date:  3/8/2021   Initial Evaluation Date: 2020                          Evaluating Therapist: Mayank Rosario     Patient Name:  Chirag Echeverria  \"Henok\"               :  1980     Restrictions/Precautions:  ROM as tolerated; modalities prn, low fall risk  Diagnosis:S62.613B (ICD-10-CM) - Displaced fracture of proximal phalanx of left middle finger, initial encounter for open fracture  Excisional debridement of left palm, index and middle finger with open reduction internal fixation of left middle finger proximal phalanx fracture with middle finger A2 pulley repair. Insurance/Certification information:[facility]  Claim # [id number]  Referring Practitioner: Dr. Michelle Alexis  Date of Surgery/Injury: 2020  Plan of care signed (Y/N): Y    Visit# / total visits:  -  3x/week for 6 weeks until to 3/19/2021. COVID-19 Screening completed upon entering facility and patient cleared for treatment today. Pt followed all protocols set forth by [address] [place] [address] for Outpatient services throughout therapy session. Patient has been made aware of all new hygiene protocols due to COVID-19 set forth by [address] [place] [address] Outpatient services and agrees to abide by all protocols. Pain Level: 3/10 occasionally sharp shooting nerve pain in the middle phalanx     Subjective: Pt reports \"I don't hurt at all today\" ***    Objective:  Updated POC to be completed at 10th session. INTERVENTION: COMPLETED: SPECIFICS/COMMENTS:   Modality:     MHP  Hand wrapped in during paraffin for soft tissue warm-up   US x Scar management and scar tissue management   Intensity: 0.8  Duration: 8 mins-  Duty Cycle: 100%  Depth: 3.3 MHz   fluidotherapy  For soft tissue warm-up while intermittently moving his wrist and fingers. For desensitization also. 15 mins   Paraffin x Completed for soft tissue warmup- 10 minutes.  Pt reports increased ROM after completion. Scar presses to follow. Ice   Completed middle of session 5 minutes to decrease pain. AROM:     Towel scrunches x 5 mins to LUE, digit walks, fist, individual fingers flexion and extension   Blocked PIP flex/ext  MF but other fingers can move with it if he wants 2 sets x10  And all fingers together. PIP ext MF with place and hold ext - to do frequently during the day. Blocked MP flex/ext  All fingers together. 1 set x10. Also did each IF and MF alone and RF and SF together. Wristisizer  5 mins with LUE with full grasp throughout fair tolerance   Red ball activity  Completed rolling of red ball up tall end of incline with arm in incline using tips of digits for emphasis on wrist flex/ext. Once to top of incline raise wrist into extension and then place ball into cup with radial deviation (increased difficulty). Then  cup and drop ball into hand with emphasis on pronation/supination. Use digit tips to roll ball back down tall end of incline. x10 reps with fair tolerance    Completed rolling of red ball up yellow web with tips of digits (emphasis on MF extension), through hole, and down other side with finger and wrist flexion. x20 reps with fair tolerance (fatigue noted)   Marker roll  Completed x15 marker rolls with coband from tip of fingers to palm with fair tolerance. Pt reports increased soreness in MF with this movement. Finger adduc/abduction  A to abduction RF - did with place and hold. IF alone. - Added yellow sponge to complete on either side of 3rd digit. x10   Wrist all planes  Stiff in wrist flexion    LUE  Completed flexion/extension of PIP/DIP 3  sets x10 within pt's tolerance. Focus on extension/flexion of 3rd digit. Completed with A2 pulley splint donned- or therapist supporting this area. Tendon glides  x10 completed with proper education provided.     Boading balls  5 mins clock-wiseand counter clock-wise (increased difficulty with clockwise) Bowers transfer  Completed bean transfer with elbow at side for wrist flex/ext and digit flex with fair tolerance   Loco activity  Completed marble transfer supinate-pronate to  as many marbles as possible and roll down first digit for transfer to container   MF extension flick and hold  x Completed pom pom flick using L MF with emphasis on extension pull. x10 reps with increased fatigue reported   Hook/fist and target guiding  x10 to complete hook/fist using a pen with pt educated on proper positioning throughout. Added to HEP   -Red foam tube: placing half tube into palm and completing flexion of all digits to touch tube. Pt educated to pull down with DIP to assist with improving flexion and touching tube. Paper Tasks  Paper placed between IF/MF and MF/RF, keep paper from being pulled away with flexed fingers x 8 reps ea then extended fingers x 8 reps ea. -Then crumple sheet of paper into a tight ball with no assist x 3.  -Tear sheet of paper into 4 sections, make small balls, then flick them away with MF focusing on extension pull. Extensor tendon digit holds x x10  therapist bringing 3rd digit into extension and pt was to hold for 3 seconds with fair tolerance, HEP within pain tolerance. AAROM:     Red web stretch x Wrist/digit extension and digit adduction with 3 second holds at end range-fair tolerance. Completed flexion, fist grasp, and twist x15 reps with 3 second holds at end range-fair tolerance. PROM/Stretching:     L MF PIP ext/Flexion x Gentle completed to digits into extension  More tension to PIP ext. Completed during activity rest breaks        Finger flexion IF, RF and SF x R and SF together, IF alone. To do at home so he can loosen fingers up without the confines of the MF   MP flexion PROM with IP flex/ext x Passively flex MP's while pt bends and straightens his IP's. HEP   L MF DIP flexion x Completed during massage. Added to HEP also   Wrist flex.  Ext -  x Stiff end range. Completed during massage. Scar Mass/Edema Control:     Retrograde massage/Scar tissue massage x On palmar portion down digits and hand- 10 minutes  -Using small massager to break up scar tissue on dorsal MF and anterior palm at base of MF for 5 mins  -[place] tool implemented this date  - green therabar - roll onto scar   elastomere scar forms  Continues To wear with night splint. made a new form to wear at night with elastomere in his web spaces. .    Gel compression sheet  Med/lg, to wear and hr 1 -2 x's a day IF to decrease scar. Strengthenin# x Wrist flex, wrist ext, RD - UD . 2 sets x15.   1 set x15 of supination/pronation holds   Table top, bend at pip than extend digits while in table top and then full extend with slight resistance. x10 with fair tolerance and slight resistance provided for isometrics strengthening. Finger extension  2 sets x15 red  Completed in yellow web extender x15 with 3 second holds in extension   PIP Ext Strengthening  12 reps completed with orange t-band and MCPs blocked in 90*   Functional Work task  #10 weight in a bin and placing from surface to surface x10 at waist height with fair tolerance. Pt reported after 6 reps he felt that he could feel the weight more through his injured hand. Pt will complete exercise with 5# weight and more reps to work up to his 10 # limit with no pressure on injured hand. Paraffin wax ball x Manipulation (squeezing, pinching) of wax ball for 3 mins after paraffin. Fair tolerance   Yellow putty x Squeeze and manipulate using all fingers and thumb. -Added finger extension, scar tissues presses between digits and against palm, abduction of IF/MF, MF/RF.   -Completed with MF adduction into RF while extending (pulls MF further into extension) 3 mins  -IF/MF adduction with putty and pull, MF/RF adduction with putty and pull  -3-pnt pinch and pull  -Claw grasp and pull from underneath x 10 reps, then over top x 10 reps  -Twist putty into an ice cream cone with finger only, no wrist RD/UD   1.1# ^2.2# Ball   - Wrist flex/ext w/ elbow on table x 15 reps  - Toss back and forth  - OH release and quickly catch UH  - Pro/Sup with shoulder positioned at 90* flexion and elbow extended    Red Web  - Digit flexion with ring placed in web space of thumb - hold for 3 sec at 15 reps  - Grasp center of web into fist and supinate x 15 reps  - Grasp center of web and flex wrist down into web resistance x 15 reps  - Digit adduction in center of web x 15 reps. [employer]  25 reps - pressing both hands into ball with extended fingers, focus on driving PIPs into ball. Digi flex  5.0# red 2 sets x15 pronation, supination and neutral with pulling the fingers down to the bottom of the palm. BEU  10 mins at level 3 resistance completing 5 mins forward and 5 mins back. Weighted ball exercises  4# weighted ball throwing and catching in his LUE x20 with good tolerance. - rolling ball forward and back promoting digit extension   Hand gripper x On the second setting to  30 pom poms to place in a container.   -2x15 reps - round end pointed down at level 2 resistance with yellow spring.  - level 3 resistance (35reps ^d from 25 reps)    green therabar  30 rep's Pronation and supination, twisting, full composite fist   -Isometric shaking x 2 - as long as can  -Pull and twist against therapist resistance. Lumbrical Strengthening  Completed with red sponge, first intrinsic + x 15 reps followed by isolated MCP flexion at edge of table x 15 reps. Yellow putty with lg tool  Knob to encourage hook fist and put pressure on scar -6 min   [employer]  3# 4# dumbbell - arm resting at side - complete full fist to hook fist 2x20 reps   Red foam block   2 sets x10 : composite fist, claw, middle and ring finger press, IF & F/thumb press.     Other:     IF, MF ext splint  x Continues- Fabricated to wear at night with his elastomere gel pads to stretch scar tissue and PIP jt. . Made a new elastomere form leaving an open area for his wound to breathe. Pt can try wearing at night. To bring in next session to readjust to ^ ext pull. Completed for improved scar tissue management and MF extension. LMB   For IF and MF. Wear 10 min to 30 for a total of 1 hr a day. HEP  X  Cont with, added MP flexion while flex/ext IP's - can use frap strap also, changed PIP ext from ext tube to LMB splints. Yellow putty for gentle srengthening    frap strap  Can be used to work full finger flexion and or hold MP's down with IP flex. ext- pt to play around with   Splint adjustments      Re-assessment  Completed this date with review of HEP and use of modalities for pain/stiffness management. Assessment/Comments: Pt is making Fair progress toward stated plan of care and established goals. Pt tolerated session well with minimal increase in pain this date. Pt presented this date with low level pain-possibly due to not working the night before. Pt tolerated increased ROM exercises isolation for the IF and MF for flexion and extension. Will increase as tolerated. R Upper Extremity ROM       AROM [x]? ???     PROM[]???? IE  10/22/2020 1/5 /71609  2/24/2021       WRIST Flexion 0-70* 23*  70*  75*  75*     Extension 0-80* 34*  54*  60* 62*      Radial Deviation 0-20* 18*  20*  20* 21*      Ulnar Deviation 0-30* 20*  30*  30* 34*       R Hand ROM    AROM [x]? ???         PROM[]???? IE   10/22/2020 1/5/2021  2/24/2021      2nd Digit MCP Flexion/Extension */ 0-45 H TBA 46* /*23  70*/0* 77*/0     PIP Flexion/Extension 100*/ 0 57*/- 29* 65*/34*  95*/-22* 99*/-22*     DIP Flexion/Extension 70-90*/0 32*/- 23* 32* /0*  60*/0* 66*/0        L MF     AROM [x]? ???         PROM[]???? 10/12/20 10/15/20   10/22/2020 1/ 5 /2021  2/24/2021       MF Digit MCP Extension/Flexion   0-45 H /* -30*/50* -20*/47*  14*/46*  0*/64* 0/69*     PIP Extension/Flexion   0*/100* -55*/62* -50*/ 60* 51*/53*  -40*/ 84* -41/94*     DIP Extension/Flexion   0*/70-90* Wiggle  P - 40*  wiggle   0*/25*  0*/55* 0/73*      4th Digit MCP Flexion/Extension */ 0-45 H TBA  31*/0*    66*/0* 76*/0     PIP Flexion/Extension 100*/ 0 70*/- 30* 72* /0*   97*/0*  102*/-12     DIP Flexion/Extension 70-90*/0 41*/0* 60* /0*   63*/0*  80*/0         5th Digit MCP Flexion/Extension */ 0-45 H TBA 23*/ 0*   73* /0* 73*/0     PIP Flexion/Extension 100*/ 0 74*. +  /- 4* 80* /0*   95* /0* 93*/-5     DIP Flexion/Extension 70-90*/0 46*/0*  60*/0*   70* /0* 78*/0        Dynamometer (setting 2):                                 Left: 38#    51#                                            YZYOR: 305#                    Pinch Meter:              Lateral: Left=15# ,   20#                                                             Right=23#                Palmar 3 point: Left=9# ,  13# (pain ^6-7/10)                             Right=20#      9 hole peg test:  Assessed this date. R  - :22        L   - :45  30 seconds    -Rehab Potential: Good  -Requires OT Follow Up: Yes    Time In: 3:00 pm    Time Out: 4:00 pm            Visit #: 14    Treatment Charges: Mins Time in - Time out  Units   Modalities- Paraffin/US 10/6 10:00 am - 10:16 am 1/1   Ther Exercise 22 10:16 am - 10:38 am 1   Manual Therapy 22 10:38 am - 11:00 am 1   Thera Activities      ADL/Home Mgt       Neuro Re-education      Gait Training      Group Therapy      Non-Billable Service Time MHP      Other splint lidia      Total Time/Units 60  4     -Response to Treatment: Pt responded well to treatment. Goals: Goals for pt can be see on initial eval occurring on 09/08/2020 and on 10/22/2020, 12/10/2020 and on 1/5/2021 and on 2/24/2021    GOALS (Long term same as Short term):  1) Patient will demonstrate good understanding of home program (exercises/activities/diagnosis/prognosis/goals) with good accuracy. Goal in progress.    2) Patient will demonstrate increased Treatment covered based on POC and graduated to patient's progress. Pt education continues at each visit to obtain maximum benefits from skilled OT intervention.   []  Alter Plan of care:   []  Discharge:    Ivis See S/OT  Lu Rouse [address] 86, OTR/L #[id number]

## 2021-03-08 NOTE — PROGRESS NOTES
OCCUPATIONAL THERAPY PROGRESS NOTE    Date:  3/8/2021   Initial Evaluation Date: 2020                          Evaluating Therapist: Vicki Briones     Patient Name:  Jorge Lopez  \"Henok\"               :  1980     Restrictions/Precautions:  ROM as tolerated; modalities prn, low fall risk  Diagnosis:S62.613B (ICD-10-CM) - Displaced fracture of proximal phalanx of left middle finger, initial encounter for open fracture  Excisional debridement of left palm, index and middle finger with open reduction internal fixation of left middle finger proximal phalanx fracture with middle finger A2 pulley repair. Insurance/Certification information:[employer]  Claim # [id number]  Referring Practitioner: Dr. Sharon Akers  Date of Surgery/Injury: 2020  Plan of care signed (Y/N): Y    Visit# / total visits:  -  3x/week for 6 weeks until to 3/19/2021. COVID-19 Screening completed upon entering facility and patient cleared for treatment today. Pt followed all protocols set forth by [facility] throughout therapy session. Patient has been made aware of all new hygiene protocols due to COVID-19 set forth by [facility] and agrees to abide by all protocols. Pain Level: 1-3/10 occasionally sharp shooting nerve pain in the middle phalanx     Subjective: Pt reports during graston scar tissue manipulation \"This actually doesn't bother me today, I don't feel sensitive at all. \"    Objective:  Updated POC to be completed at 10th session. INTERVENTION: COMPLETED: SPECIFICS/COMMENTS:   Modality:     MHP  Hand wrapped in during paraffin for soft tissue warm-up   US  Scar management and scar tissue management   Intensity: 0.8  Duration: 8 mins-  Duty Cycle: 100%  Depth: 3.3 MHz   fluidotherapy  For soft tissue warm-up while intermittently moving his wrist and fingers. For desensitization also.     15 mins   Paraffin x Completed for soft tissue warmup- 10 minutes. Pt reports increased ROM after completion. Scar presses to follow. Ice   Completed middle of session 5 minutes to decrease pain. AROM:     Towel scrunches  5 mins to LUE, digit walks, fist, individual fingers flexion and extension   Blocked PIP flex/ext  MF but other fingers can move with it if he wants 2 sets x10  And all fingers together. PIP ext MF with place and hold ext - to do frequently during the day. Blocked MP flex/ext  All fingers together. 1 set x10. Also did each IF and MF alone and RF and SF together. Wristisizer  5 mins with LUE with full grasp throughout fair tolerance   Red ball activity  Completed rolling of red ball up tall end of incline with arm in incline using tips of digits for emphasis on wrist flex/ext. Once to top of incline raise wrist into extension and then place ball into cup with radial deviation (increased difficulty). Then  cup and drop ball into hand with emphasis on pronation/supination. Use digit tips to roll ball back down tall end of incline. x10 reps with fair tolerance    Completed rolling of red ball up yellow web with tips of digits (emphasis on MF extension), through hole, and down other side with finger and wrist flexion. x20 reps with fair tolerance (fatigue noted)   Marker roll x Completed x15 marker rolls with coband from tip of fingers to palm with fair tolerance. Pt reports increased soreness in MF with this movement.   -Completed with 3/4# weight AG. Finger adduc/abduction  A to abduction RF - did with place and hold. IF alone. - Added yellow sponge to complete on either side of 3rd digit. x10   Wrist all planes  Stiff in wrist flexion    LUE  Completed flexion/extension of PIP/DIP 3  sets x10 within pt's tolerance. Focus on extension/flexion of 3rd digit. Completed with A2 pulley splint donned- or therapist supporting this area.    Tendon glides  x10 completed with proper education provided. Boading balls  5 mins clock-wiseand counter clock-wise (increased difficulty with clockwise)     Bean transfer  Completed bean transfer with elbow at side for wrist flex/ext and digit flex with fair tolerance   [place] activity  Completed marble transfer supinate-pronate to  as many marbles as possible and roll down first digit for transfer to container   MF extension flick and hold   Completed pom pom flick using L MF with emphasis on extension pull. x10 reps with increased fatigue reported   Hook/fist and target guiding  x10 to complete hook/fist using a pen with pt educated on proper positioning throughout. Added to HEP   -Red foam tube: placing half tube into palm and completing flexion of all digits to touch tube. Pt educated to pull down with DIP to assist with improving flexion and touching tube. Paper Tasks  Paper placed between IF/MF and MF/RF, keep paper from being pulled away with flexed fingers x 8 reps ea then extended fingers x 8 reps ea. -Then crumple sheet of paper into a tight ball with no assist x 3.  -Tear sheet of paper into 4 sections, make small balls, then flick them away with MF focusing on extension pull. Pom Pom Tasks x Small pom poms placed between PIPs of digits 2-5. Abduct IF fist to release pom pom, then SF, then MF/RF. Max difficulty with MF/RF. Extensor tendon digit holds  x10  therapist bringing 3rd digit into extension and pt was to hold for 3 seconds with fair tolerance, HEP within pain tolerance. AAROM:     Red web stretch  Wrist/digit extension and digit adduction with 3 second holds at end range-fair tolerance. Completed flexion, fist grasp, and twist x15 reps with 3 second holds at end range-fair tolerance. PROM/Stretching:     L MF PIP ext/Flexion x Gentle completed to digits into extension  More tension to PIP ext.  Completed during activity rest breaks   Yellow T-putty palm presses x Into large mass while standing for scar tissue -Added finger extension, scar tissues presses between digits and against palm, abduction of IF/MF, MF/RF. -Completed with MF adduction into RF while extending (pulls MF further into extension) 3 mins  -IF/MF adduction with putty and pull, MF/RF adduction with putty and pull  -3-pnt pinch and pull  -Claw grasp and pull from underneath x 10 reps, then over top x 10 reps  -Twist putty into an ice cream cone with finger only, no wrist RD/UD   1.1# ^2.2# Ball   - Wrist flex/ext w/ elbow on table x 15 reps  - Toss back and forth  - OH release and quickly catch UH  - Pro/Sup with shoulder positioned at 90* flexion and elbow extended    Gomez Johnson  - Digit flexion with ring placed in web space of thumb - hold for 3 sec at 15 reps  - Grasp center of web into fist and supinate x 15 reps  - Grasp center of web and flex wrist down into web resistance x 15 reps  - Digit adduction in center of web x 15 reps. [employer]  25 reps - pressing both hands into ball with extended fingers, focus on driving PIPs into ball. Digi flex  5.0# red 2 sets x15 pronation, supination and neutral with pulling the fingers down to the bottom of the palm. BEU  10 mins at level 3 resistance completing 5 mins forward and 5 mins back. Weighted ball exercises  4# weighted ball throwing and catching in his LUE x20 with good tolerance. - rolling ball forward and back promoting digit extension   Hand gripper  On the second setting to  30 pom poms to place in a container.   -2x15 reps - round end pointed down at level 2 resistance with yellow spring.  - level 3 resistance (35reps ^d from 25 reps)    green therabar x 30 rep's Pronation and supination, twisting, full composite fist   -Isometric shaking x 2 - as long as can  -Pull and twist against therapist resistance.    Rubber Band Strengthening x Light tan rubber rubbed wrapped x 2 around thumb and MF - pt to open pinch against resistance to  large pom poms with opp pinch. - Mod difficulty and fatigue by end of task. Lumbrical Strengthening  Completed with red sponge, first intrinsic + x 15 reps followed by isolated MCP flexion at edge of table x 15 reps. Yellow putty with lg tool  Knob to encourage hook fist and put pressure on scar -6 min   [employer]  3# 4# dumbbell - arm resting at side - complete full fist to hook fist 2x20 reps   Red foam block   2 sets x10 : composite fist, claw, middle and ring finger press, IF & F/thumb press. Other:     IF, MF ext splint  x Continues- Fabricated to wear at night with his elastomere gel pads to stretch scar tissue and PIP jt. . Made a new elastomere form leaving an open area for his wound to breathe. Pt can try wearing at night. To bring in next session to readjust to ^ ext pull. Completed for improved scar tissue management and MF extension. LMB   For IF and MF. Wear 10 min to 30 for a total of 1 hr a day. HEP  X  Cont with, added MP flexion while flex/ext IP's - can use frap strap also, changed PIP ext from ext tube to LMB splints. Yellow putty for gentle srengthening    frap strap  Can be used to work full finger flexion and or hold MP's down with IP flex. ext- pt to play around with   Splint adjustments      Re-assessment  Completed this date with review of HEP and use of modalities for pain/stiffness management. Assessment/Comments: Pt is making Fair progress toward stated plan of care and established goals. Min to max difficulty with abduction ex's with pom poms and opp pinching ex's with resistance against extension. Overall, tolerated all ex's well with no reports of ^ pain. Significantly improved tolerance for graston scar tissue manipulation. Will continue to advance as tolerated. R Upper Extremity ROM       AROM [x]? ???     PROM[]???? IE  10/22/2020 1/5 /[id number]  2/24/2021       WRIST Flexion 0-70* 23*  70*  75*  75*     Extension 0-80* 34*  54*  60* 62*      Radial Deviation 0-20* 18*  20*  20* 21*      Mgt       Neuro Re-education      Gait Training      Group Therapy      Non-Billable Service Time MHP      Other splint lidia      Total Time/Units 55  4     -Response to Treatment: Pt responded well to treatment. Goals: Goals for pt can be see on initial eval occurring on 09/08/2020 and on 10/22/2020, 12/10/2020 and on 1/5/2021 and on 2/24/2021    GOALS (Long term same as Short term):  1) Patient will demonstrate good understanding of home program (exercises/activities/diagnosis/prognosis/goals) with good accuracy. Goal in progress. 2) Patient will demonstrate increased active/passive range of motion of their LUE to [facility] for ADL/IADL completion. Goal making good gains-Pt demonstrates increased ROM compared to IE but continues to have limited flexion/extension of wrist and digits 2-5.  3) Patient will demonstrate increased /pinch strength of at least 10 / 5 pinch pounds of their L hand. Goal met and progressing for increased UE use in functional tasks. 4) Patient to report decreased pain in their affected L upper extremity from 8/10 to 2/10 or less with resistive functional use. Goal making good gains. Goal progressing-Pt reports pain with resistive tasks 3-5/10.  5) Increase in fine motor function as evidenced by decreased time to complete 9-hole peg test and/or MRMT test by at least 5-10 seconds. Goal met and progressing for increased UE use in functional tasks. 6) Patient to report 100% compliance with their splint wear, care, and precautions if needed. Progressing-Pt continues wear of splint at night for scar tissue management. 7) Patient to report a decrease in hypersensitivity from 80% to 20% or less in their LUE. Goal met with Pt report of mild/no hypersensitivity in LUE  8) Patient to demonstrate decreased guarding of their affected extremity from 100% to 20% or less. Goal making good gains.    9) Patient will be knowledgeable of edema control techniques as evident with decreases from severe to mild/none. Goal making good gains. 10) Patient will demonstrate a non-tender/non-adherent scar. Goal making fair gains-continued tightness around scar. 11) Patient will report ADL functions as Mod I/I using LUE. Goal met  12) Patient will demonstrate improved functional activity tolerance from poor to fair + for ADL/IADL completion. Goal making good gains. 13) Patient will decrease QuickDASH score by 50% for increased participation in daily functional activities. Goal making good gains. Plan:   [x]  Continues Plan of care: 3x/week for 6 weeks until 03/19/2021. Treatment covered based on POC and graduated to patient's progress. Pt education continues at each visit to obtain maximum benefits from skilled OT intervention.   []  Alter Plan of care:   []  Discharge:      Vijay Rouse Brad 86, OTR/L #[id number]

## 2021-03-11 ENCOUNTER — TREATMENT (OUTPATIENT)
Dept: OCCUPATIONAL THERAPY | Age: 41
End: 2021-03-11
Payer: COMMERCIAL

## 2021-03-11 DIAGNOSIS — S62.613B DISPLACED FRACTURE OF PROXIMAL PHALANX OF LEFT MIDDLE FINGER, INITIAL ENCOUNTER FOR OPEN FRACTURE: Primary | ICD-10-CM

## 2021-03-11 PROCEDURE — 97018 PARAFFIN BATH THERAPY: CPT | Performed by: OCCUPATIONAL THERAPIST

## 2021-03-11 PROCEDURE — 97140 MANUAL THERAPY 1/> REGIONS: CPT | Performed by: OCCUPATIONAL THERAPIST

## 2021-03-11 PROCEDURE — 97110 THERAPEUTIC EXERCISES: CPT | Performed by: OCCUPATIONAL THERAPIST

## 2021-03-11 NOTE — PROGRESS NOTES
15 mins   Paraffin x Completed for soft tissue warmup- 10 minutes. Pt reports increased ROM after completion. Scar presses to follow. Ice   Completed middle of session 5 minutes to decrease pain. AROM:     Towel scrunches x 5 mins to LUE, digit walks, fist, individual fingers flexion and extension   Blocked PIP flex/ext  MF but other fingers can move with it if he wants 2 sets x10  And all fingers together. PIP ext MF with place and hold ext - to do frequently during the day. Blocked MP flex/ext  All fingers together. 1 set x10. Also did each IF and MF alone and RF and SF together. Wristisizer  5 mins with LUE with full grasp throughout fair tolerance   Red ball activity  Completed rolling of red ball up tall end of incline with arm in incline using tips of digits for emphasis on wrist flex/ext. Once to top of incline raise wrist into extension and then place ball into cup with radial deviation (increased difficulty). Then  cup and drop ball into hand with emphasis on pronation/supination. Use digit tips to roll ball back down tall end of incline. x10 reps with fair tolerance    Completed rolling of red ball up yellow web with tips of digits (emphasis on MF extension), through hole, and down other side with finger and wrist flexion. x20 reps with fair tolerance (fatigue noted)   Ball on table x Completed rolling of ball on table with emphasis on MF extension (5 mins)   Marker roll  Completed x15 marker rolls with coband from tip of fingers to palm with fair tolerance. Pt reports increased soreness in MF with this movement.   -Completed with 3/4# weight AG. Finger adduc/abduction x A to abduction RF - did with place and hold. IF alone. - Added yellow sponge to complete on either side of 3rd digit. x10  -Completed with towel scrunches on table; 15x   Wrist all planes  Stiff in wrist flexion    LUE  Completed flexion/extension of PIP/DIP 3  sets x10 within pt's tolerance.  Focus on extension/flexion of 3rd digit. Completed with A2 pulley splint donned- or therapist supporting this area. Tendon glides  x10 completed with proper education provided. Boading balls  5 mins clock-wiseand counter clock-wise (increased difficulty with clockwise)     Bean transfer  Completed bean transfer with elbow at side for wrist flex/ext and digit flex with fair tolerance   [place] activity  Completed marble transfer supinate-pronate to  as many marbles as possible and roll down first digit for transfer to container    extension flick and hold   Completed pom pom flick using L MF with emphasis on extension pull. x10 reps with increased fatigue reported   Hook/fist and target guiding  x10 to complete hook/fist using a pen with pt educated on proper positioning throughout. Added to HEP   -Red foam tube: placing half tube into palm and completing flexion of all digits to touch tube. Pt educated to pull down with DIP to assist with improving flexion and touching tube. Paper Tasks  Paper placed between IF/MF and MF/RF, keep paper from being pulled away with flexed fingers x 8 reps ea then extended fingers x 8 reps ea. -Then crumple sheet of paper into a tight ball with no assist x 3.  -Tear sheet of paper into 4 sections, make small balls, then flick them away with MF focusing on extension pull. Pom Pom Tasks x -Small pom poms placed between PIPs of digits 2-5. Abduct IF fist to release pom pom, then SF, then MF/RF. Max difficulty with MF/RF. -Full fist transfer with RUE. Extensor tendon digit holds  x10  therapist bringing 3rd digit into extension and pt was to hold for 3 seconds with fair tolerance, HEP within pain tolerance. AAROM:     Red web stretch  Wrist/digit extension and digit adduction with 3 second holds at end range-fair tolerance. Completed flexion, fist grasp, and twist x15 reps with 3 second holds at end range-fair tolerance.          PROM/Stretching:     L MF PIP ext/Flexion Gentle completed to digits into extension  More tension to PIP ext. Completed during activity rest breaks   Yellow T-putty palm presses x 5 mins for scar tissue management and stretch of flexor tendons/joints. Finger flexion IF, RF and SF x R and SF together, IF alone. To do at home so he can loosen fingers up without the confines of the MF   MP flexion PROM with IP flex/ext x Passively flex MP's while pt bends and straightens his IP's. HEP   L MF DIP flexion  Completed during massage. Added to HEP also   Wrist flex. Ext -   Stiff end range. Completed during massage. Scar Mass/Edema Control:     Retrograde massage/Scar tissue massage  On palmar portion down digits and hand- 10 minutes  -Using small massager to break up scar tissue on dorsal MF and anterior palm at base of MF for 5 mins  -[employer] tool implemented this date  - green therabar - roll onto scar   elastomere scar forms  Continues To wear with night splint. made a new form to wear at night with elastomere in his web spaces. .    Gel compression sheet  Med/lg, to wear and hr 1 -2 x's a day IF to decrease scar. Strengthenin#  Wrist flex, wrist ext, RD - UD . 2 sets x15.   1 set x15 of supination/pronation holds   Table top, bend at pip than extend digits while in table top and then full extend with slight resistance. x10 with fair tolerance and slight resistance provided for isometrics strengthening. Finger extension  2 sets x15 red  Completed in yellow web extender x15 with 3 second holds in extension   PIP Ext Strengthening  12 reps completed with orange t-band and MCPs blocked in 90*   Functional Work task  #10 weight in a bin and placing from surface to surface x10 at waist height with fair tolerance. Pt reported after 6 reps he felt that he could feel the weight more through his injured hand. Pt will complete exercise with 5# weight and more reps to work up to his 10 # limit with no pressure on injured hand.     Paraffin wax ball Manipulation (squeezing, pinching) of wax ball for 3 mins after paraffin. Fair tolerance   Yellow putty  Squeeze and manipulate using all fingers and thumb. -Added finger extension, scar tissues presses between digits and against palm, abduction of IF/MF, MF/RF. -Completed with MF adduction into RF while extending (pulls MF further into extension) 3 mins  -IF/MF adduction with putty and pull, MF/RF adduction with putty and pull  -3-pnt pinch and pull  -Claw grasp and pull from underneath x 10 reps, then over top x 10 reps  -Twist putty into an ice cream cone with finger only, no wrist RD/UD   1.1# ^2.2# Ball   - Wrist flex/ext w/ elbow on table x 15 reps  - Toss back and forth  - OH release and quickly catch UH  - Pro/Sup with shoulder positioned at 90* flexion and elbow extended    Gomez Johnson  - Digit flexion with ring placed in web space of thumb - hold for 3 sec at 15 reps  - Grasp center of web into fist and supinate x 15 reps  - Grasp center of web and flex wrist down into web resistance x 15 reps  - Digit adduction in center of web x 15 reps. [employer]  25 reps - pressing both hands into ball with extended fingers, focus on driving PIPs into ball. Digi flex  5.0# red 2 sets x15 pronation, supination and neutral with pulling the fingers down to the bottom of the palm. BEU  10 mins at level 3 resistance completing 5 mins forward and 5 mins back. Weighted ball exercises  4# weighted ball throwing and catching in his LUE x20 with good tolerance. - rolling ball forward and back promoting digit extension   Hand gripper  On the second setting to  30 pom poms to place in a container.   -2x15 reps - round end pointed down at level 2 resistance with yellow spring.  - level 3 resistance (35reps ^d from 25 reps)    green therabar  30 rep's Pronation and supination, twisting, full composite fist   -Isometric shaking x 2 - as long as can  -Pull and twist against therapist resistance.    Rubber Band met with Pt report of mild/no hypersensitivity in LUE  8) Patient to demonstrate decreased guarding of their affected extremity from 100% to 20% or less. Goal making good gains. 9) Patient will be knowledgeable of edema control techniques as evident with decreases from severe to mild/none. Goal making good gains. 10) Patient will demonstrate a non-tender/non-adherent scar. Goal making fair gains-continued tightness around scar. 11) Patient will report ADL functions as Mod I/I using LUE. Goal met  12) Patient will demonstrate improved functional activity tolerance from poor to fair + for ADL/IADL completion. Goal making good gains. 13) Patient will decrease QuickDASH score by 50% for increased participation in daily functional activities. Goal making good gains. Plan:   [x]  Continues Plan of care: 3x/week for 6 weeks until 03/19/2021. Treatment covered based on POC and graduated to patient's progress. Pt education continues at each visit to obtain maximum benefits from skilled OT intervention.   []  Alter Plan of care:   []  Discharge:    Rose Marie Sanchez S/OT  Osbaldo Rouse Brad 86, OTR/L #[id number]

## 2021-03-15 ENCOUNTER — TREATMENT (OUTPATIENT)
Dept: OCCUPATIONAL THERAPY | Age: 41
End: 2021-03-15
Payer: COMMERCIAL

## 2021-03-15 DIAGNOSIS — S62.613B DISPLACED FRACTURE OF PROXIMAL PHALANX OF LEFT MIDDLE FINGER, INITIAL ENCOUNTER FOR OPEN FRACTURE: Primary | ICD-10-CM

## 2021-03-15 PROCEDURE — 97110 THERAPEUTIC EXERCISES: CPT | Performed by: OCCUPATIONAL THERAPIST

## 2021-03-15 PROCEDURE — 97140 MANUAL THERAPY 1/> REGIONS: CPT | Performed by: OCCUPATIONAL THERAPIST

## 2021-03-15 PROCEDURE — 97018 PARAFFIN BATH THERAPY: CPT | Performed by: OCCUPATIONAL THERAPIST

## 2021-03-15 NOTE — PROGRESS NOTES
Completed flexion/extension of PIP/DIP 3  sets x10 within pt's tolerance. Focus on extension/flexion of 3rd digit. Completed with A2 pulley splint donned- or therapist supporting this area. Tendon glides  x10 completed with proper education provided. Boading balls  5 mins clock-wiseand counter clock-wise (increased difficulty with clockwise)     Bean transfer  Completed bean transfer with elbow at side for wrist flex/ext and digit flex with fair tolerance   [place] activity  Completed marble transfer supinate-pronate to  as many marbles as possible and roll down first digit for transfer to container   MF extension flick and hold   Completed pom pom flick using L MF with emphasis on extension pull. x10 reps with increased fatigue reported   Hook/fist and target guiding  x10 to complete hook/fist using a pen with pt educated on proper positioning throughout. Added to HEP   -Red foam tube: placing half tube into palm and completing flexion of all digits to touch tube. Pt educated to pull down with DIP to assist with improving flexion and touching tube. Paper Tasks  Paper placed between IF/MF and MF/RF, keep paper from being pulled away with flexed fingers x 8 reps ea then extended fingers x 8 reps ea. -Then crumple sheet of paper into a tight ball with no assist x 3.  -Tear sheet of paper into 4 sections, make small balls, then flick them away with MF focusing on extension pull. Pom Pom Tasks  -Small pom poms placed between PIPs of digits 2-5. Abduct IF fist to release pom pom, then SF, then MF/RF. Max difficulty with MF/RF. -Full fist transfer with RUE. Extensor tendon digit holds  x10  therapist bringing 3rd digit into extension and pt was to hold for 3 seconds with fair tolerance, HEP within pain tolerance. AAROM:     Red web stretch  Wrist/digit extension and digit adduction with 3 second holds at end range-fair tolerance.    Completed flexion, fist grasp, and twist x15 reps with 3 second holds at end range-fair tolerance. PROM/Stretching:     L MF PIP ext/Flexion x Gentle completed to digits into extension  More tension to PIP ext. Completed during activity rest breaks   Yellow T-putty palm presses x 5 mins for scar tissue management and stretch of flexor tendons/joints. Finger flexion IF, RF and SF x R and SF together, IF alone. To do at home so he can loosen fingers up without the confines of the MF   MP flexion PROM with IP flex/ext x Passively flex MP's while pt bends and straightens his IP's. HEP   L MF DIP flexion  Completed during massage. Added to HEP also   Wrist flex. Ext -   Stiff end range. Completed during massage. Scar Mass/Edema Control:     Retrograde massage/Scar tissue massage  On palmar portion down digits and hand- 10 minutes  -Using small massager to break up scar tissue on dorsal MF and anterior palm at base of MF for 5 mins  -Airu tool implemented this date  - green therabar - roll onto scar   elastomere scar forms  Continues To wear with night splint. made a new form to wear at night with elastomere in his web spaces. .    Gel compression sheet  Med/lg, to wear and hr 1 -2 x's a day IF to decrease scar. Strengthenin#  Wrist flex, wrist ext, RD - UD . 2 sets x15.   1 set x15 of supination/pronation holds   Table top, bend at pip than extend digits while in table top and then full extend with slight resistance. x10 with fair tolerance and slight resistance provided for isometrics strengthening. Finger extension  2 sets x15 red  Completed in yellow web extender x15 with 3 second holds in extension   PIP Ext Strengthening  12 reps completed with orange t-band and MCPs blocked in 90*   Functional Work task  #10 weight in a bin and placing from surface to surface x10 at waist height with fair tolerance. Pt reported after 6 reps he felt that he could feel the weight more through his injured hand.  Pt will complete exercise with 5# weight and more reps to work up to his 10 # limit with no pressure on injured hand. Paraffin wax ball  Manipulation (squeezing, pinching) of wax ball for 3 mins after paraffin. Fair tolerance   Yellow putty x Squeeze and manipulate using all fingers and thumb. -Added finger extension, scar tissues presses between digits and against palm, abduction of IF/MF, MF/RF. -Completed with MF adduction into RF while extending (pulls MF further into extension) 3 mins  -IF/MF adduction with putty and pull, MF/RF adduction with putty and pull  -3-pnt pinch and pull  -Claw grasp and pull from underneath x 10 reps, then over top x 10 reps  -Twist putty into an ice cream cone with finger only, no wrist RD/UD   1.1# ^2.2# Ball  x - Wrist flex/ext w/ elbow on table x 15 reps  - Toss back and forth  - OH release and quickly catch UH  - Pro/Sup with shoulder positioned at 90* flexion and elbow extended    Red Web x - Digit flexion with ring placed in web space of thumb - hold for 3 sec at 15 reps  - Grasp center of web into fist and supinate x 15 reps  - Grasp center of web and flex wrist down into web resistance x 15 reps  - Digit adduction in center of web x 15 reps. [employer]  25 reps - pressing both hands into ball with extended fingers, focus on driving PIPs into ball. Digi flex  5.0# red 2 sets x15 pronation, supination and neutral with pulling the fingers down to the bottom of the palm. BEU  10 mins at level 3 resistance completing 5 mins forward and 5 mins back. Weighted ball exercises  4# weighted ball throwing and catching in his LUE x20 with good tolerance.    - rolling ball forward and back promoting digit extension   Hand gripper x On the second setting to  30 pom poms to place in a container.   -2x15 reps - round end pointed down at level 2 resistance with yellow spring.  - level 3 resistance (^'d to 40 from 35reps ^d from 25 reps)    green therabar  30 rep's Pronation and supination, twisting, full composite fist -Isometric shaking x 2 - as long as can  -Pull and twist against therapist resistance. Rubber Band Strengthening  Light tan rubber rubbed wrapped x 2 around thumb and MF - pt to open pinch against resistance to  large pom poms with opp pinch. - Mod difficulty and fatigue by end of task. Lumbrical Strengthening  Completed with red sponge, first intrinsic + x 15 reps followed by isolated MCP flexion at edge of table x 15 reps. Yellow putty with lg tool  Knob to encourage hook fist and put pressure on scar -6 min   [employer]  3# 4# dumbbell - arm resting at side - complete full fist to hook fist 2x20 reps   Red foam block   2 sets x10 : composite fist, claw, middle and ring finger press, IF & F/thumb press. Other:     IF, MF ext splint  x Continues- Fabricated to wear at night with his elastomere gel pads to stretch scar tissue and PIP jt. . Made a new elastomere form leaving an open area for his wound to breathe. Pt can try wearing at night. To bring in next session to readjust to ^ ext pull. Completed for improved scar tissue management and MF extension. LMB   For IF and MF. Wear 10 min to 30 for a total of 1 hr a day. HEP  X  Cont with, added MP flexion while flex/ext IP's - can use frap strap also, changed PIP ext from ext tube to LMB splints. Yellow putty for gentle srengthening    frap strap  Can be used to work full finger flexion and or hold MP's down with IP flex. ext- pt to play around with   Splint adjustments      Re-assessment  Completed this date with review of HEP and use of modalities for pain/stiffness management. Assessment/Comments: Pt is making Fair progress toward stated plan of care and established goals. Tolerated all strengthening well today. Endurance improving - wrist has not been bothersome. Did stub finger on side of table with ball exercises in which spiked his nerve pain - this was immediately relieved with ice.  On track for discharge next session - prepera    -Rehab Potential: Good      -Requires OT Follow Up: Yes    Time In: 3:00 pm    Time Out: 3:55 pm            Visit #: [id number]    Treatment Charges: Mins Time in - Time out  Units   Modalities- Paraffin/US 10 3:00 pm - 3:10 pm 1   Ther Exercise 30 3:25 pm - 3:55 pm 2   Manual Therapy 15 3:10 pm - 3:25 pm 1   Thera Activities      ADL/Home Mgt       Neuro Re-education      Gait Training      Group Therapy      Non-Billable Service Time MHP      Other splint lidia      Total Time/Units 55  4     -Response to Treatment: Pt responded well to treatment. Goals: Goals for pt can be see on initial eval occurring on 09/08/2020 and on 10/22/2020, 12/10/2020 and on 1/5/2021 and on 2/24/2021    GOALS (Long term same as Short term):  1) Patient will demonstrate good understanding of home program (exercises/activities/diagnosis/prognosis/goals) with good accuracy. Goal in progress. 2) Patient will demonstrate increased active/passive range of motion of their LUE to [facility] for ADL/IADL completion. Goal making good gains-Pt demonstrates increased ROM compared to IE but continues to have limited flexion/extension of wrist and digits 2-5.  3) Patient will demonstrate increased /pinch strength of at least 10 / 5 pinch pounds of their L hand. Goal met and progressing for increased UE use in functional tasks. 4) Patient to report decreased pain in their affected L upper extremity from 8/10 to 2/10 or less with resistive functional use. Goal making good gains. Goal progressing-Pt reports pain with resistive tasks 3-5/10.  5) Increase in fine motor function as evidenced by decreased time to complete 9-hole peg test and/or MRMT test by at least 5-10 seconds. Goal met and progressing for increased UE use in functional tasks. 6) Patient to report 100% compliance with their splint wear, care, and precautions if needed. Progressing-Pt continues wear of splint at night for scar tissue management.   7) Patient to report a

## 2021-03-17 ENCOUNTER — TREATMENT (OUTPATIENT)
Dept: OCCUPATIONAL THERAPY | Age: 41
End: 2021-03-17
Payer: COMMERCIAL

## 2021-03-17 DIAGNOSIS — S62.613B DISPLACED FRACTURE OF PROXIMAL PHALANX OF LEFT MIDDLE FINGER, INITIAL ENCOUNTER FOR OPEN FRACTURE: Primary | ICD-10-CM

## 2021-03-17 PROCEDURE — 97110 THERAPEUTIC EXERCISES: CPT | Performed by: OCCUPATIONAL THERAPIST

## 2021-03-17 PROCEDURE — 97530 THERAPEUTIC ACTIVITIES: CPT | Performed by: OCCUPATIONAL THERAPIST

## 2021-03-17 PROCEDURE — 97018 PARAFFIN BATH THERAPY: CPT | Performed by: OCCUPATIONAL THERAPIST

## 2021-03-17 NOTE — PROGRESS NOTES
OCCUPATIONAL THERAPY PROGRESS NOTE  DISCHARGE SUMMARY    Date:  3/17/2021   Initial Evaluation Date: 2020                          Evaluating Therapist: Jeremias Redmond     Patient Name:  Nargis Hernandez  \"Henok\"               :  1980     Restrictions/Precautions:  ROM as tolerated; modalities prn, low fall risk  Diagnosis:S62.613B (ICD-10-CM) - Displaced fracture of proximal phalanx of left middle finger, initial encounter for open fracture  Excisional debridement of left palm, index and middle finger with open reduction internal fixation of left middle finger proximal phalanx fracture with middle finger A2 pulley repair. Insurance/Certification information:[facility]  Claim # [id number]  Referring Practitioner: Dr. Linnea Pillai  Date of Surgery/Injury: 2020  Plan of care signed (Y/N): Y    Visit# / total visits:  -  3x/week for 6 weeks until to 3/19/2021. (Today was his last scheduled appt () - he cx'd an appt last week d/t pain in the hand)    COVID-19 Screening completed upon entering facility and patient cleared for treatment today. Pt followed all protocols set forth by [facility] for Outpatient services throughout therapy session. Patient has been made aware of all new hygiene protocols due to COVID-19 set forth by [facility] Outpatient services and agrees to abide by all protocols. Pain Level: Reports 5/10 on a typical basis - but reports minimal to no pain today with resistive exercises. Subjective: Pt reports \"My pains about a constant 5. I'm getting this nerve pain more now, It's really starting to come back now but the pain is unbearable sometimes. \"    Objective:  Updated POC to be completed at 10th session.     INTERVENTION: COMPLETED: SPECIFICS/COMMENTS:   Modality:     MHP  Hand wrapped in for 5 mins end of session for pain management   US  Scar management and scar tissue management   Intensity: 0.8 Duration: 8 mins-  Duty Cycle: 100%  Depth: 3.3 MHz   fluidotherapy  For soft tissue warm-up while intermittently moving his wrist and fingers. For desensitization also. 15 mins   Paraffin x Completed for soft tissue warmup- 10 minutes. Pt reports increased ROM after completion. Scar presses to follow. Ice   Completed middle of session 5 minutes to decrease pain. AROM:     Towel scrunches  5 mins to LUE, digit walks, fist, individual fingers flexion and extension   Blocked PIP flex/ext  MF but other fingers can move with it if he wants 2 sets x10  And all fingers together. PIP ext MF with place and hold ext - to do frequently during the day. Blocked MP flex/ext  All fingers together. 1 set x10. Also did each IF and MF alone and RF and SF together. Wristisizer  5 mins with LUE with full grasp throughout fair tolerance   Red ball activity  Completed rolling of red ball up tall end of incline with arm in incline using tips of digits for emphasis on wrist flex/ext. Once to top of incline raise wrist into extension and then place ball into cup with radial deviation (increased difficulty). Then  cup and drop ball into hand with emphasis on pronation/supination. Use digit tips to roll ball back down tall end of incline. x10 reps with fair tolerance    Completed rolling of red ball up yellow web with tips of digits (emphasis on MF extension), through hole, and down other side with finger and wrist flexion. x20 reps with fair tolerance (fatigue noted)   Ball on table  Completed rolling of ball on table with emphasis on MF extension (5 mins)   Marker roll  Completed x15 marker rolls with coband from tip of fingers to palm with fair tolerance. Pt reports increased soreness in MF with this movement.   -Completed with 3/4# weight AG. Finger adduc/abduction  A to abduction RF - did with place and hold. IF alone. - Added yellow sponge to complete on either side of 3rd digit.  x10  -Completed with towel scrunches on table; 15x   Wrist all planes  Stiff in wrist flexion    LUE  Completed flexion/extension of PIP/DIP 3  sets x10 within pt's tolerance. Focus on extension/flexion of 3rd digit. Completed with A2 pulley splint donned- or therapist supporting this area. Tendon glides  x10 completed with proper education provided. Boading balls  5 mins clock-wiseand counter clock-wise (increased difficulty with clockwise)     Bean transfer  Completed bean transfer with elbow at side for wrist flex/ext and digit flex with fair tolerance   Billingsley activity  Completed marble transfer supinate-pronate to  as many marbles as possible and roll down first digit for transfer to container   MF extension flick and hold   Completed pom pom flick using L MF with emphasis on extension pull. x10 reps with increased fatigue reported   Hook/fist and target guiding  x10 to complete hook/fist using a pen with pt educated on proper positioning throughout. Added to HEP   -Red foam tube: placing half tube into palm and completing flexion of all digits to touch tube. Pt educated to pull down with DIP to assist with improving flexion and touching tube. Paper Tasks  Paper placed between IF/MF and MF/RF, keep paper from being pulled away with flexed fingers x 8 reps ea then extended fingers x 8 reps ea. -Then crumple sheet of paper into a tight ball with no assist x 3.  -Tear sheet of paper into 4 sections, make small balls, then flick them away with MF focusing on extension pull. Pom Pom Tasks  -Small pom poms placed between PIPs of digits 2-5. Abduct IF fist to release pom pom, then SF, then MF/RF. Max difficulty with MF/RF. -Full fist transfer with RUE. Extensor tendon digit holds  x10  therapist bringing 3rd digit into extension and pt was to hold for 3 seconds with fair tolerance, HEP within pain tolerance.     AAROM:     Red web stretch  Wrist/digit extension and digit adduction with 3 second holds at end range-fair tolerance. Completed flexion, fist grasp, and twist x15 reps with 3 second holds at end range-fair tolerance. PROM/Stretching:     L MF PIP ext/Flexion  Gentle completed to digits into extension  More tension to PIP ext. Completed during activity rest breaks   Yellow T-putty palm presses  5 mins for scar tissue management and stretch of flexor tendons/joints. Finger flexion IF, RF and SF  R and SF together, IF alone. To do at home so he can loosen fingers up without the confines of the MF   MP flexion PROM with IP flex/ext  Passively flex MP's while pt bends and straightens his IP's. HEP   L MF DIP flexion  Completed during massage. Added to HEP also   Wrist flex. Ext -   Stiff end range. Completed during massage. Scar Mass/Edema Control:     Retrograde massage/Scar tissue massage  On palmar portion down digits and hand- 10 minutes  -Using small massager to break up scar tissue on dorsal MF and anterior palm at base of MF for 5 mins  -[employer] tool implemented this date  - green therabar - roll onto scar   elastomere scar forms  Continues To wear with night splint. made a new form to wear at night with elastomere in his web spaces. .    Gel compression sheet  Med/lg, to wear and hr 1 -2 x's a day IF to decrease scar. Strengthenin#  Wrist flex, wrist ext, RD - UD . 2 sets x15.   1 set x15 of supination/pronation holds   Table top, bend at pip than extend digits while in table top and then full extend with slight resistance. x10 with fair tolerance and slight resistance provided for isometrics strengthening. Finger extension  2 sets x15 red  Completed in yellow web extender x15 with 3 second holds in extension   PIP Ext Strengthening  12 reps completed with orange t-band and MCPs blocked in 90*   Functional Work task  #10 weight in a bin and placing from surface to surface x10 at waist height with fair tolerance.  Pt reported after 6 reps he felt that he could feel the weight more through his injured hand. Pt will complete exercise with 5# weight and more reps to work up to his 10 # limit with no pressure on injured hand. Paraffin wax ball x Manipulation (squeezing, pinching) of wax ball for 3 mins after paraffin. Fair tolerance   Yellow putty  Squeeze and manipulate using all fingers and thumb. -Added finger extension, scar tissues presses between digits and against palm, abduction of IF/MF, MF/RF. -Completed with MF adduction into RF while extending (pulls MF further into extension) 3 mins  -IF/MF adduction with putty and pull, MF/RF adduction with putty and pull  -3-pnt pinch and pull  -Claw grasp and pull from underneath x 10 reps, then over top x 10 reps  -Twist putty into an ice cream cone with finger only, no wrist RD/UD   1.1# ^2.2# Ball  x - Wrist flex/ext w/ elbow on table x 15 reps  - Toss back and forth  - OH release and quickly catch UH  - Pro/Sup with shoulder positioned at 90* flexion and elbow extended    Red Web x - Digit flexion with ring placed in web space of thumb - hold for 3 sec at 15 reps  - Grasp center of web into fist and supinate x 15 reps  - Grasp center of web and flex wrist down into web resistance x 15 reps  - Digit adduction in center of web x 15 reps. [employer]  25 reps - pressing both hands into ball with extended fingers, focus on driving PIPs into ball. Digi flex  5.0# red 2 sets x15 pronation, supination and neutral with pulling the fingers down to the bottom of the palm. BEU  10 mins at level 3 resistance completing 5 mins forward and 5 mins back. Weighted ball exercises  4# weighted ball throwing and catching in his LUE x20 with good tolerance.    - rolling ball forward and back promoting digit extension   Hand gripper x On the second setting to  30 pom poms to place in a container.   -2x15 reps - round end pointed down at level 2 resistance with yellow spring.  - level 3 resistance (^'d to 45 from 40 from 35reps ^d from 25 reps) green therabar x 30 rep's Pronation and supination, twisting, full composite fist   -Isometric shaking x 2 - as long as can  -Pull and twist against therapist resistance. Rubber Band Strengthening  Light tan rubber rubbed wrapped x 2 around thumb and MF - pt to open pinch against resistance to  large pom poms with opp pinch. - Mod difficulty and fatigue by end of task. Lumbrical Strengthening  Completed with red sponge, first intrinsic + x 15 reps followed by isolated MCP flexion at edge of table x 15 reps. Yellow putty with lg tool  Knob to encourage hook fist and put pressure on scar -6 min   [employer]  3# 4# dumbbell - arm resting at side - complete full fist to hook fist 2x20 reps   Red foam block   2 sets x10 : composite fist, claw, middle and ring finger press, IF & F/thumb press. Other:     IF, MF ext splint  Will continue with wear s/p d/c Continues- Fabricated to wear at night with his elastomere gel pads to stretch scar tissue and PIP jt. . Made a new elastomere form leaving an open area for his wound to breathe. Pt can try wearing at night. To bring in next session to readjust to ^ ext pull. Completed for improved scar tissue management and MF extension. LMB   For IF and MF. Wear 10 min to 30 for a total of 1 hr a day. HEP  X  Cont with, added MP flexion while flex/ext IP's - can use frap strap also, changed PIP ext from ext tube to LMB splints. Yellow putty for gentle srengthening    frap strap  Can be used to work full finger flexion and or hold MP's down with IP flex. ext- pt to play around with   Splint adjustments      Re-assessment x Completed this date with review of HEP and use of modalities for pain/stiffness management. Assessment/Comments: Pt is making Fair progress toward stated plan of care and established goals. Re-assessment/discharge completed and new measurements are shown below in Red BOLD.  Pt demonstrated excellent progress towards all goals and increase in AROM, strength, and decreased pain/soreness. He continues with mild to moderate difficulty with some resistive activities on a daily basis. Pt is limited with MF PIP extension at this time in which he will be completing a tenolysis for - date TBD by Dr. Kaur Mares. Scarring will continue to require management s/p d/c as reviewed with pt. He will also continue with his strengthening program. He has RTW on light duty with not functional limitations reported thus far. He has met his max potential at this time - please refer back to therapy following his next surgical procedure PRN (refer below for discharge summary).       L Upper Extremity ROM       AROM [x]?????     PROM[]????? IE  10/22/2020 1/5 /44821  2/24/2021  3/17/21      WRIST Flexion 0-70* 23*  70*  75*  75* 75*     Extension 0-80* 34*  54*  60* 62*  71*     Radial Deviation 0-20* 18*  20*  20* 21*  WFL     Ulnar Deviation 0-30* 20*  30*  30* 34*  38*       L Hand ROM    AROM [x]?????         PROM[]????? IE   10/22/2020 1/5/2021  2/24/2021 3/17/21      2nd Digit MCP Flexion/Extension */ 0-45 H TBA 46* /*23  70*/0* 77*/0 81*/0     PIP Flexion/Extension 100*/ 0 57*/- 29* 65*/34*  95*/-22* 99*/-22* 103*/-22*     DIP Flexion/Extension 70-90*/0 32*/- 23* 32* /0*  60*/0* 66*/0 WFLs        L MF     AROM [x]?????         PROM[]????? 10/12/20 10/15/20   10/22/2020 1/ 5 /2021  2/24/2021 3/17/21       MF Digit MCP Extension/Flexion   0-45 H /* -30*/50* -20*/47*  14*/46*  0*/64* 0/69* 0/73*     PIP Extension/Flexion   0*/100* -55*/62* -50*/ 60* 51*/53*  -40*/ 84* -41/94* -37/94*     DIP Extension/Flexion   0*/70-90* Wiggle  P - 40*  wiggle   0*/25*  0*/55* 0/73* Ls      4th Digit MCP Flexion/Extension */ 0-45 H TBA  31*/0*    66*/0* 76*/0 83*/0     PIP Flexion/Extension 100*/ 0 70*/- 30* 72* /0*   97*/0*  102*/-12 102*/0     DIP Flexion/Extension 70-90*/0 41*/0* 60* /0*   63*/0*  80*/0 WFLs         5th Digit MCP Flexion/Extension */ 0-45 H TBA 23*/ 0*   73* /0* 73*/0 85*/0     PIP Flexion/Extension 100*/ 0 74*. +  /- 4* 80* /0*   95* /0* 93*/-5 95*/0     DIP Flexion/Extension 70-90*/0 46*/0*  60*/0*   70* /0* 78*/0 WFLs         Dynamometer (setting 2):                                 Left: 38#    51#  to 78# with no pain                                           Right: 107#                    Pinch Meter:              Lateral: Left=15# ,   20#  to 21# with no pain                                                 Right=23#                Palmar 3 point: Left=9# ,  13# (pain ^6-7/10)  to 15# no pain                           Right=20#      9 hole peg test:  Assessed this date.        R  - :22        L   - :45  30 seconds   to 27 sec    QuickDASH Score: 75% disability to 27.3% disability.    -Rehab Potential: Good      -Requires OT Follow Up: Yes    Time In: 3:00 pm    Time Out: 3:55 pm            Visit #: 17    Treatment Charges: Mins Time in - Time out  Units   Modalities- Paraffin/US 10 3:00 pm - 3:10 pm 1   Ther Exercise 25 3:10 pm - 3:35 pm 2   Manual Therapy      Thera Activities 20 3:35 pm - 3:55 pm 1   ADL/Home Mgt       Neuro Re-education      Gait Training      Group Therapy      Non-Billable Service Time MHP      Other splint lidia      Total Time/Units 55  4     -Response to Treatment: Pt responded well to treatment. Goals: Goals for pt can be see on initial eval occurring on 09/08/2020 and on 10/22/2020, 12/10/2020 and on 1/5/2021 and on 2/24/2021    GOALS (Long term same as Short term):  1) Patient will demonstrate good understanding of home program (exercises/activities/diagnosis/prognosis/goals) with good accuracy. Goal met, extensive HEP in place with excellent review and understanding. Will continue daily until his next sx. 2) Patient will demonstrate increased active/passive range of motion of their LUE to [facility] for ADL/IADL completion. Goal made good progress.  At this time, pt is limited with active extension at the PIP of the with ADLs. 12) Patient will demonstrate improved functional activity tolerance from poor to fair + for ADL/IADL completion. Goal met, good- tolerance for all resistive tasks. 13) Patient will decrease QuickDASH score by 50% for increased participation in daily functional activities. Goal made good progress, 75% to 27.3% disability. Plan:   []  Continues Plan of care: 3x/week for 6 weeks until 03/19/2021. Treatment covered based on POC and graduated to patient's progress. Pt education continues at each visit to obtain maximum benefits from skilled OT intervention. []  Alter Plan of care:   [x]  Discharge: Pt has successfully met 9/13 goals set on his initial evaluation. Overall, he made excellent progress toward ROM of the wrist and digits, strength, edema, fine motor, activity tolerance, and improved functional use of the L UE. Pt is limited with MF PIP extension at this time in which he will be completing a tenolysis for - date TBD by Dr. Chelita Taveras. Scarring will continue to require management s/p d/c as reviewed with pt. Pain has improved, but fluctuates as nerves are regenerating and he is beginning to get variable intensities of nerve sensation/pain. He still reports some limitations with resistive tasks d/t continued weakness, limited endurance, and lack of use of the MF in the L hand. Overall he is pleased with his progress. At this time he has met his max potential with all goals and is no longer in need of skilled OT services. Pt will be referred back to therapy following his tenolysis in which we will prepare a new POC and goals. He is to maintain his current HEP until then. He may call with questions/concerns PRN.       Colleen Rouse [address], OTR/L #[id number]

## 2021-04-15 ENCOUNTER — OFFICE VISIT (OUTPATIENT)
Dept: ORTHOPEDIC SURGERY | Age: 41
End: 2021-04-15
Payer: COMMERCIAL

## 2021-04-15 VITALS — BODY MASS INDEX: 28.7 KG/M2 | HEIGHT: 71 IN | WEIGHT: 205 LBS | RESPIRATION RATE: 20 BRPM

## 2021-04-15 DIAGNOSIS — M77.9 EXTENSOR TENDON ADHESIONS: Primary | ICD-10-CM

## 2021-04-15 DIAGNOSIS — S62.613B DISPLACED FRACTURE OF PROXIMAL PHALANX OF LEFT MIDDLE FINGER, INITIAL ENCOUNTER FOR OPEN FRACTURE: ICD-10-CM

## 2021-04-15 PROCEDURE — 99212 OFFICE O/P EST SF 10 MIN: CPT | Performed by: ORTHOPAEDIC SURGERY

## 2021-04-15 NOTE — PROGRESS NOTES
Chief Complaint   Patient presents with    Follow Up After Procedure     8 months out Open reduction and K-wire fixation of comminuted highly displaced unstable left middle finger proximal phalanx fracture, Pulley repair in middle finger, A2 debby         Lisa Rivera is a 42y.o. year old  who presents for follow up of left hand crush injury with open fractures of middle finger. He is about 8 months out. He has completed therapy and relates he is able and has been working with light duty restrictions. He does not feel he can do regular duty and would light to continue with his current weight restrictions. . He still reports inability to fully extend his middle finger PIP joint. He states this is frustrating in regards to daily activities.      Past Medical History:   Diagnosis Date    Diabetes mellitus ([facility] [address]     Hypertension     Thyroid disease      Past Surgical History:   Procedure Laterality Date    APPENDECTOMY  3/23/2016    laparoscopic    FINGER SURGERY Left 8/24/2020    LEFT HAND I&D WITH PERCUTANEOUS PINNING OF THIRD FINGER performed by Callum Be MD at [address] [place] [address]       Current Outpatient Medications:     ibuprofen (ADVIL;MOTRIN) 600 MG tablet, Take 1 tablet by mouth 4 times daily as needed for Pain, Disp: 360 tablet, Rfl: 1    atorvastatin (LIPITOR) 10 MG tablet, TAKE ONE TABLET BY MOUTH EVERY DAY, Disp: , Rfl:     metFORMIN (GLUCOPHAGE) 500 MG tablet, Take 500 mg by mouth daily (with breakfast), Disp: , Rfl:     Levothyroxine Sodium (SYNTHROID PO), Take 125 mcg by mouth daily , Disp: , Rfl:   Allergies   Allergen Reactions    Norco [Hydrocodone-Acetaminophen] Shortness Of Breath     Social History     Socioeconomic History    Marital status:      Spouse name: Not on file    Number of children: Not on file    Years of education: Not on file    Highest education level: Not on file   Occupational History    Not on file   Social Needs    Financial resource strain: Not on file   Vikki Food insecurity     Worry: Not on file     Inability: Not on file    Transportation needs     Medical: Not on file     Non-medical: Not on file   Tobacco Use    Smoking status: Never Smoker    Smokeless tobacco: Never Used   Substance and Sexual Activity    Alcohol use: No    Drug use: No    Sexual activity: Not on file   Lifestyle    Physical activity     Days per week: Not on file     Minutes per session: Not on file    Stress: Not on file   Relationships    Social connections     Talks on phone: Not on file     Gets together: Not on file     Attends Gnosticism service: Not on file     Active member of club or organization: Not on file     Attends meetings of clubs or organizations: Not on file     Relationship status: Not on file    Intimate partner violence     Fear of current or ex partner: Not on file     Emotionally abused: Not on file     Physically abused: Not on file     Forced sexual activity: Not on file   Other Topics Concern    Not on file   Social History Narrative    Not on file     History reviewed. No pertinent family history. Skin: negative   Musculoskeletal: left middle finger pain  Neurologic: negative  Cardiovascular: negative    SUBJECTIVE:      Constitutional:    The patient is alert and oriented x 3, appears to be stated age and in no distress. LUNGS: CTA  CARDIOVASCULAR: 2+ radial pulses, extremities warm and well perfused  ABDOMEN: NTTP    Left hand: Scar mature. Complete healing over the palmar surface. He does have scar hypertrophy and tenderness palpation. This does limit his MCP and PIP joint extension. Active PIP extension lacks about 50 degrees, passively this can be improved to 10 degrees passively. Flexion of the middle finger PIP joint is 90 degrees actively and passively. He is nontender over his fracture. Radial, ulnar, median nerves are intact.   2+ brisk cap refill of the middle finger as well as all digits of the hand he is grossly neurovascular tact with diminished sensation of the middle finger    Xrays: X-rays of his left hand AP lateral obliques were reviewed. There is a healing fracture comminuted fracture of the middle finger proximal phalanx. There is persistent lucency. However there is evidence of callus formation. No significant interval change in alignment. Impression office x-rays: Healing complex intra-articular comminuted fracture of the left middle finger proximal phalanx  Radiographic findings reviewed with patient      Impression:   Crush injury left hand with comminuted fracture of the middle finger proximal phalanx  Extensor tendon adhesions resulting in a PIP joint extensor lag of middle finger  DM, HTN    Plan:   Discussed findings with patient. Discussed conservative and surgical management with patient. Discussed extensor tenolysis. Postoperative course explained to the patient. Patient will need immediate postoperative therapy. He does not wish to pursue intervention at this time. He will follow up in 3 months. All questions answered.

## 2021-07-08 ENCOUNTER — OFFICE VISIT (OUTPATIENT)
Dept: ORTHOPEDIC SURGERY | Age: 41
End: 2021-07-08
Payer: COMMERCIAL

## 2021-07-08 VITALS — RESPIRATION RATE: 20 BRPM | WEIGHT: 200 LBS | BODY MASS INDEX: 28 KG/M2 | HEIGHT: 71 IN

## 2021-07-08 DIAGNOSIS — S62.613B DISPLACED FRACTURE OF PROXIMAL PHALANX OF LEFT MIDDLE FINGER, INITIAL ENCOUNTER FOR OPEN FRACTURE: Primary | ICD-10-CM

## 2021-07-08 DIAGNOSIS — S62.613K OPEN DISPLACED FRACTURE OF PROXIMAL PHALANX OF LEFT MIDDLE FINGER WITH NONUNION, SUBSEQUENT ENCOUNTER: ICD-10-CM

## 2021-07-08 PROCEDURE — 99213 OFFICE O/P EST LOW 20 MIN: CPT | Performed by: ORTHOPAEDIC SURGERY

## 2021-07-08 NOTE — PROGRESS NOTES
Department of Orthopedic Surgery  [facility] Iman Roque MD  History and Physical      CHIEF COMPLAINT: continued pain middle finger    HISTORY OF PRESENT ILLNESS:                The patient is a 42 y.o. male who presents with continued pain in his left middle finger. He was initially doing well. However after returning to work he reports increasing pain and discomfort in the middle finger. Reports that he recently was on vacation and reports that the pain did not improve. Pain is localized directly at his open fracture site. Remains with significant stiffness about the PIP joint as well. No new injuries reported. .    Past Medical History:        Diagnosis Date    Diabetes mellitus ([address]     Hypertension     Thyroid disease      Past Surgical History:        Procedure Laterality Date    APPENDECTOMY  3/23/2016    laparoscopic    FINGER SURGERY Left 8/24/2020    LEFT HAND I&D WITH PERCUTANEOUS PINNING OF THIRD FINGER performed by Brandon Tran MD at [address] [place] [address]     Current Medications:   No current facility-administered medications for this visit. Allergies:  Norco [hydrocodone-acetaminophen]    Social History:   TOBACCO:   reports that he has never smoked. He has never used smokeless tobacco.  ETOH:   reports no history of alcohol use. DRUGS:   reports no history of drug use. ACTIVITIES OF DAILY LIVING:    OCCUPATION:    Family History:   History reviewed. No pertinent family history.     REVIEW OF SYSTEMS:  CONSTITUTIONAL:  negative  EYES:  negative  HEENT:  negative  RESPIRATORY:  negative  CARDIOVASCULAR:  negative  GASTROINTESTINAL:  negative  GENITOURINARY:  negative  INTEGUMENT/BREAST:  negative  HEMATOLOGIC/LYMPHATIC:  negative  ALLERGIC/IMMUNOLOGIC:  negative  ENDOCRINE:  negative  MUSCULOSKELETAL: Left middle finger pain and stiffness  NEUROLOGICAL:  negative  BEHAVIOR/PSYCH:  negative    PHYSICAL EXAM:    VITALS:  Resp 20   Ht 5' 11\" (1.803 m)   Wt 200 lb (90.7 kg)   BMI 27.89 kg/m² CONSTITUTIONAL:  awake, alert, cooperative, no apparent distress, and appears stated age  EYES:  Lids and lashes normal, pupils equal, round and reactive to light, extra ocular muscles intact, sclera clear, conjunctiva normal  ENT:  Normocephalic, without obvious abnormality, atraumatic, sinuses nontender on palpation, external ears without lesions, oral pharynx with moist mucus membranes, tonsils without erythema or exudates, gums normal and good dentition. NECK:  Supple, symmetrical, trachea midline, no adenopathy, thyroid symmetric, not enlarged and no tenderness, skin normal  NEUROLOGIC:  Awake, alert, oriented to name, place and time. Cranial nerves II-XII are grossly intact. Motor is 5 out of 5 bilaterally. Sensory is intact.   gait is normal.  MUSCULOSKELETAL:    Left hand: Scar mature. Complete healing over the palmar surface. He does have scar hypertrophy and tenderness palpation over the scar. He is point tender over the proximal aspect of the proximal phalanx both palmarly and dorsally. There is localized swelling. With stabilization of the MCP joint he does have crepitance and pain at his fracture site with increased mobility. There is limited MCP and PIP joint extension. Active PIP extension lacks about 50 degrees, passively this can be improved to 10 degrees passively. Flexion of the middle finger PIP joint is 90 degrees actively and passively. He is nontender over his fracture. Radial, ulnar, median nerves are intact.   2+ brisk cap refill of the middle finger as well as all digits of the hand he is grossly neurovascular tact with diminished sensation of the middle finger    DATA:    CBC:   Lab Results   Component Value Date    WBC 5.6 11/28/2020    RBC 4.80 11/28/2020    HGB 14.0 11/28/2020    HCT 40.6 11/28/2020    MCV 84.6 11/28/2020    MCH 29.2 11/28/2020    MCHC 34.5 11/28/2020    RDW 12.8 11/28/2020     11/28/2020    MPV 10.6 11/28/2020     PT/INR:    Lab Results   Component Value Date    PROTIME 11.1 08/24/2020    INR 1.0 08/24/2020       Radiology Review: X-rays of his left hand were obtained today in the office. AP lateral obliques demonstrate a middle finger fracture with lucency over the proximal aspect of the proximal phalanx and the oblique plane. There is callus formation centrally. However there is persistent lucency over the radial and ulnar cortices. There is good alignment on the lateral view. However no bridging cortices were discernible. This is consistent with nonunion of the proximal phalanx fracture. Impression office x-rays: Nonunion proximal phalanx fracture with persistent lucency. IMPRESSION:  Crush injury left hand with comminuted fracture of the middle finger proximal phalanx  Clinical and radiographic findings suggestive of a nonunion of the proximal phalanx fracture with pain and discomfort  Extensor tendon adhesions resulting in a PIP joint extensor lag of middle finger  DM, HTN    PLAN:  Today's findings were explained the patient. Despite sequential x-rays demonstrating excellent alignment on the lateral view today's findings are consistent with probable nonunion of the proximal phalanx fracture and persistent lucency. The previous central calcification is not likely bridging based on instability findings at today's exam as well as patient's complaints of pain and discomfort. Recommended a CT scan of the middle finger to confirm nonunion. We have discussed bone grafting and open reduction with internal fixation. He voiced understanding. He will follow-up after CT scan is complete.

## 2021-07-16 DIAGNOSIS — S62.613B DISPLACED FRACTURE OF PROXIMAL PHALANX OF LEFT MIDDLE FINGER, INITIAL ENCOUNTER FOR OPEN FRACTURE: Primary | ICD-10-CM

## 2021-07-29 ENCOUNTER — HOSPITAL ENCOUNTER (OUTPATIENT)
Dept: CT IMAGING | Age: 41
Discharge: HOME | End: 2021-07-31
Payer: COMMERCIAL

## 2021-07-29 DIAGNOSIS — S62.613B DISPLACED FRACTURE OF PROXIMAL PHALANX OF LEFT MIDDLE FINGER, INITIAL ENCOUNTER FOR OPEN FRACTURE: ICD-10-CM

## 2021-07-29 PROCEDURE — 73200 CT UPPER EXTREMITY W/O DYE: CPT

## 2021-08-12 ENCOUNTER — OFFICE VISIT (OUTPATIENT)
Dept: ORTHOPEDIC SURGERY | Age: 41
End: 2021-08-12
Payer: COMMERCIAL

## 2021-08-12 VITALS — BODY MASS INDEX: 27.3 KG/M2 | WEIGHT: 195 LBS | HEIGHT: 71 IN | RESPIRATION RATE: 18 BRPM

## 2021-08-12 DIAGNOSIS — S62.613K: Primary | ICD-10-CM

## 2021-08-12 DIAGNOSIS — M24.542 CONTRACTURE OF JOINT OF FINGER OF LEFT HAND: ICD-10-CM

## 2021-08-12 DIAGNOSIS — M77.9 EXTENSOR TENDON ADHESIONS: ICD-10-CM

## 2021-08-12 PROCEDURE — 99214 OFFICE O/P EST MOD 30 MIN: CPT | Performed by: ORTHOPAEDIC SURGERY

## 2021-08-12 NOTE — LETTER
[facility].  [address] [address]  Phone: [phone]  Fax: [phone]    Sharmin Gilliland MD        August 12, 2021     Patient: Rosy Sterling   YOB: 1980   Date of Visit: 8/12/2021       To Whom It May Concern: It is my medical opinion that Angely Arciniega may return to work on 8-13-21 with the following restrictions: return to light duty for left middle finger non-union, no lifting/pushing/pulling with left hand. If you have any questions or concerns, please don't hesitate to call.     Sincerely,        Sharmin Gilliland MD

## 2021-08-12 NOTE — PROGRESS NOTES
Department of Orthopedic Surgery  Resident History and Physical      CHIEF COMPLAINT: continued pain middle finger    HISTORY OF PRESENT ILLNESS:                The patient is a 42 y.o. male who presents with continued pain in his left middle finger. He was initially doing well. However continues to report increasing pain and discomfort in the middle finger. The pain is localized directly over the open fracture site. Remains with significant stiffness about the PIP joint as well. No new injuries reported. He did obtain a CT of the left hand prior to this visit to assess for nonunion. Past Medical History:        Diagnosis Date    Diabetes mellitus ([address]     Hypertension     Thyroid disease      Past Surgical History:        Procedure Laterality Date    APPENDECTOMY  3/23/2016    laparoscopic    FINGER SURGERY Left 8/24/2020    LEFT HAND I&D WITH PERCUTANEOUS PINNING OF THIRD FINGER performed by Evelyn Cabrales MD at [address] [place] [address]     Current Medications:   No current facility-administered medications for this visit. Allergies:  Norco [hydrocodone-acetaminophen]    Social History:   TOBACCO:   reports that he has never smoked. He has never used smokeless tobacco.  ETOH:   reports no history of alcohol use. DRUGS:   reports no history of drug use. ACTIVITIES OF DAILY LIVING:    OCCUPATION:    Family History:   No family history on file.     REVIEW OF SYSTEMS:  CONSTITUTIONAL:  negative  EYES:  negative  HEENT:  negative  RESPIRATORY:  negative  CARDIOVASCULAR:  negative  GASTROINTESTINAL:  negative  GENITOURINARY:  negative  INTEGUMENT/BREAST:  negative  HEMATOLOGIC/LYMPHATIC:  negative  ALLERGIC/IMMUNOLOGIC:  negative  ENDOCRINE:  negative  MUSCULOSKELETAL: Left middle finger pain and stiffness  NEUROLOGICAL:  negative  BEHAVIOR/PSYCH:  negative    PHYSICAL EXAM:    VITALS:  Resp 18   Ht 5' 11\" (1.803 m)   Wt 195 lb (88.5 kg)   BMI 27.20 kg/m²   CONSTITUTIONAL:  awake, alert, cooperative, no apparent distress, and appears stated age  EYES:  Lids and lashes normal, pupils equal, round   ENT:  Normocephalic, without obvious abnormality, atraumatic  NECK:  Supple, symmetrical, trachea midline  NEUROLOGIC:  Awake, alert, oriented to name, place and time. Cranial nerves II-XII are grossly intact. Motor is 5 out of 5 bilaterally. Sensory is intact.   gait is normal.  MUSCULOSKELETAL:    Left hand: Scar mature. Complete healing over the palmar surface. He does have scar hypertrophy and to tenderness palpation over the scar. He is point tender over the proximal aspect of the proximal phalanx both palmarly and dorsally. There is localized swelling. With stabilization of the MCP joint he does have crepitance and pain at his fracture site with increased mobility. There is limited MCP and PIP joint extension. Active PIP extension lacks about 50 degrees, passively this can be improved to 10 degrees passively. Flexion of the middle finger PIP joint is 90 degrees actively and passively. Radial, ulnar, median nerves are intact. 2+ brisk cap refill of the middle finger as well as all digits of the hand he is grossly neurovascular tact with diminished sensation of the middle finger    DATA:    CBC:   Lab Results   Component Value Date    WBC 5.6 11/28/2020    RBC 4.80 11/28/2020    HGB 14.0 11/28/2020    HCT 40.6 11/28/2020    MCV 84.6 11/28/2020    MCH 29.2 11/28/2020    MCHC 34.5 11/28/2020    RDW 12.8 11/28/2020     11/28/2020    MPV 10.6 11/28/2020     PT/INR:    Lab Results   Component Value Date    PROTIME 11.1 08/24/2020    INR 1.0 08/24/2020       Radiology Review: CT of his left hand was reviewed. This demonstrates the oblique and intra-articular fracture of the proximal phalanx of the middle finger. There is sclerosis at the fracture site with nonunion.   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 Patient is having significant pain to the finger and is making work difficult. Patient would like to pursue surgery for ORIF with bone graft of right middle finger proximal phalanx nonunion. Risks and benefits and expected postoperative course discussed with the patient. He is agreeable to the plan for surgery. All questions answered. Surgery scheduled today. He will call with any concerns    I have seen and evaluated the patient and agree with the above assessment and plan on today's visit. I have performed the key components of the history and physical examination with significant findings of nonunion left middle finger proximal phalanx fracture with extension contracture and extensor tendon adhesions. CT scan was reviewed with the patient confirming a nonunion of the proximal phalanx fracture. Plan for nonunion repair with debridement and bone grafting using distal radius autograft as well as internal fixation. Postoperative course was explained. Concern for donor site morbidity was explained. Light duty restrictions explained. Patient will require therapy as well as possible additional surgery. Contracture release and extensor tenolysis could be performed same setting followed by immediate therapy depending on fracture stabilization. He voiced understanding. All questions answered. I concur with the findings and plan as documented.       I explained the risks, benefits, alternatives and complications of surgery with the patient including but not limited to the risks of death, possible damage to nerves, vessels, or tendons, possible infection, possible nonunion, possible malunion, possible hardware failure, possible need for hardware removal, stiffness, as well as the possible need further surgery and unanticipated complications. The patient voiced understanding and all questions were answered. The patient elected to proceed with surgical intervention.      Tova Shahid MD  8/12/2021

## 2021-09-16 ENCOUNTER — PREP FOR PROCEDURE (OUTPATIENT)
Dept: ORTHOPEDIC SURGERY | Age: 41
End: 2021-09-16

## 2021-09-16 RX ORDER — SODIUM CHLORIDE 9 MG/ML
INJECTION, SOLUTION INTRAVENOUS CONTINUOUS
Status: CANCELLED | OUTPATIENT
Start: 2021-09-16

## 2021-09-16 RX ORDER — SODIUM CHLORIDE 0.9 % (FLUSH) 0.9 %
5-40 SYRINGE (ML) INJECTION EVERY 12 HOURS SCHEDULED
Status: CANCELLED | OUTPATIENT
Start: 2021-09-16

## 2021-09-16 RX ORDER — SODIUM CHLORIDE 0.9 % (FLUSH) 0.9 %
5-40 SYRINGE (ML) INJECTION PRN
Status: CANCELLED | OUTPATIENT
Start: 2021-09-16

## 2021-09-16 RX ORDER — SODIUM CHLORIDE 9 MG/ML
25 INJECTION, SOLUTION INTRAVENOUS PRN
Status: CANCELLED | OUTPATIENT
Start: 2021-09-16

## 2021-09-17 ENCOUNTER — HOSPITAL ENCOUNTER (OUTPATIENT)
Age: 41
Discharge: HOME | End: 2021-09-19
Payer: COMMERCIAL

## 2021-09-17 DIAGNOSIS — Z01.818 PREOP TESTING: ICD-10-CM

## 2021-09-17 PROCEDURE — U0003 INFECTIOUS AGENT DETECTION BY NUCLEIC ACID (DNA OR RNA); SEVERE ACUTE RESPIRATORY SYNDROME CORONAVIRUS 2 (SARS-COV-2) (CORONAVIRUS DISEASE [COVID-19]), AMPLIFIED PROBE TECHNIQUE, MAKING USE OF HIGH THROUGHPUT TECHNOLOGIES AS DESCRIBED BY CMS-2020-01-R: HCPCS

## 2021-09-17 PROCEDURE — U0005 INFEC AGEN DETEC AMPLI PROBE: HCPCS

## 2021-09-17 NOTE — PROGRESS NOTES
[facility] PRE-ADMISSION TESTING INSTRUCTIONS    The Preadmission Testing patient is instructed accordingly using the following criteria (check applicable):    ARRIVAL INSTRUCTIONS:  [x] Parking the day of Surgery is located in the Main Entrance lot. Upon entering the door, make an immediate right to the surgery reception desk    [x] Bring photo ID and insurance card    [] Bring in a copy of Living will or Durable Power of  papers. [x] Please be sure to arrange for responsible adult to provide transportation to and from the hospital    [x] Please arrange for responsible adult to be with you for the 24 hour period post procedure due to having anesthesia      GENERAL INSTRUCTIONS:    [x] Nothing by mouth after midnight, including gum, candy, mints or water    [] You may brush your teeth, but do not swallow any water    [] Take medications as instructed with 1-2 oz of water None    [x] Stop herbal supplements and vitamins 5 days prior to procedure    [] Follow preop dosing of blood thinners per physician instructions    [] Take 1/2 dose of evening insulin, but no insulin after midnight    [x] No oral diabetic medications after midnight    [x] If diabetic and have low blood sugar or feel symptomatic, take 1-2oz apple juice only    [] Bring inhalers day of surgery    [] Bring C-PAP/ Bi-Pap day of surgery    [] Bring urine specimen day of surgery    [x] Shower or bath with soap, lather and rinse well, AM of Surgery, no lotion, powders or creams to surgical site    [] Follow bowel prep as instructed per surgeon    [] No tobacco products within 24 hours of surgery     [x] No alcohol or illegal drug use within 24 hours of surgery.     [x] Jewelry, body piercing's, eyeglasses, contact lenses and dentures are not permitted into surgery (bring cases)      [] Please do not wear any nail polish, make up or hair products on the day of surgery    [] You can expect a call the business day prior to procedure to notify you if your arrival time changes    [] If you receive a survey after surgery we would greatly appreciate your comments    [] Parent/guardian of a minor must accompany their child and remain on the premises  the entire time they are under our care     [] Pediatric patients may bring favorite toy, blanket or comfort item with them    [] A caregiver or family member must remain with the patient during their stay if they are mentally handicapped, have dementia, disoriented or unable to use a call light or would be a safety concern if left unattended    [x] Please notify surgeon if you develop any illness between now and time of surgery (cold, cough, sore throat, fever, nausea, vomiting) or any signs of infections  including skin, wounds, and dental.    [x]  The Outpatient Pharmacy is available to fill your prescription here on your day of surgery, ask your preop nurse for details    [] Other instructions    EDUCATIONAL MATERIALS PROVIDED:    [] PAT Preoperative Education Packet/Booklet     [] Medication List    [] Transfusion bracelet applied with instructions    [] Shower with soap, lather and rinse well, and use CHG wipes provided the evening before surgery as instructed    [] Incentive spirometer with instructions        Have you been tested for COVID  Yes           Have you been told you were positive for COVID Yes   Have you had any known exposure to someone that is positive for COVID No  Do you have a cough                   No              Do you have shortness of breath No                 Do you have a sore throat            No                Are you having chills                    No                Are you having muscle aches. No                    Please come to the hospital wearing a mask and have your significant other wear a mask as well.   Both of you should check your temperature before leaving to come here,  if it is 100 or higher please call [phone] for instruction.

## 2021-09-18 LAB
SARS-COV-2: NOT DETECTED
SOURCE: NORMAL

## 2021-09-22 ENCOUNTER — APPOINTMENT (OUTPATIENT)
Dept: GENERAL RADIOLOGY | Age: 41
End: 2021-09-22
Attending: ORTHOPAEDIC SURGERY
Payer: COMMERCIAL

## 2021-09-22 ENCOUNTER — HOSPITAL ENCOUNTER (OUTPATIENT)
Age: 41
Setting detail: OUTPATIENT SURGERY
Discharge: HOME | End: 2021-09-22
Attending: ORTHOPAEDIC SURGERY | Admitting: ORTHOPAEDIC SURGERY
Payer: COMMERCIAL

## 2021-09-22 ENCOUNTER — ANESTHESIA (OUTPATIENT)
Dept: OPERATING ROOM | Age: 41
End: 2021-09-22
Payer: COMMERCIAL

## 2021-09-22 ENCOUNTER — ANESTHESIA EVENT (OUTPATIENT)
Dept: OPERATING ROOM | Age: 41
End: 2021-09-22
Payer: COMMERCIAL

## 2021-09-22 VITALS
RESPIRATION RATE: 16 BRPM | BODY MASS INDEX: 27.3 KG/M2 | WEIGHT: 195 LBS | SYSTOLIC BLOOD PRESSURE: 139 MMHG | OXYGEN SATURATION: 98 % | DIASTOLIC BLOOD PRESSURE: 79 MMHG | HEIGHT: 71 IN | TEMPERATURE: 97.2 F | HEART RATE: 84 BPM

## 2021-09-22 VITALS — DIASTOLIC BLOOD PRESSURE: 61 MMHG | TEMPERATURE: 77.9 F | OXYGEN SATURATION: 100 % | SYSTOLIC BLOOD PRESSURE: 100 MMHG

## 2021-09-22 DIAGNOSIS — G89.18 POST-OPERATIVE PAIN: ICD-10-CM

## 2021-09-22 DIAGNOSIS — Z01.818 PREOP TESTING: Primary | ICD-10-CM

## 2021-09-22 LAB
ANION GAP SERPL CALCULATED.3IONS-SCNC: 9 MMOL/L (ref 7–16)
BUN BLDV-MCNC: 11 MG/DL (ref 6–20)
CALCIUM SERPL-MCNC: 9.5 MG/DL (ref 8.6–10.2)
CHLORIDE BLD-SCNC: 102 MMOL/L (ref 98–107)
CO2: 26 MMOL/L (ref 22–29)
CREAT SERPL-MCNC: 0.9 MG/DL (ref 0.7–1.2)
EKG ATRIAL RATE: 73 BPM
EKG P AXIS: 66 DEGREES
EKG P-R INTERVAL: 176 MS
EKG Q-T INTERVAL: 384 MS
EKG QRS DURATION: 98 MS
EKG QTC CALCULATION (BAZETT): 423 MS
EKG R AXIS: 45 DEGREES
EKG T AXIS: 46 DEGREES
EKG VENTRICULAR RATE: 73 BPM
GFR AFRICAN AMERICAN: >60
GFR NON-AFRICAN AMERICAN: >60 ML/MIN/1.73
GLUCOSE BLD-MCNC: 395 MG/DL (ref 74–99)
HCT VFR BLD CALC: 45 % (ref 37–54)
HEMOGLOBIN: 15.6 G/DL (ref 12.5–16.5)
MCH RBC QN AUTO: 29.1 PG (ref 26–35)
MCHC RBC AUTO-ENTMCNC: 34.7 % (ref 32–34.5)
MCV RBC AUTO: 83.8 FL (ref 80–99.9)
METER GLUCOSE: 293 MG/DL (ref 74–99)
METER GLUCOSE: 334 MG/DL (ref 74–99)
PDW BLD-RTO: 12.5 FL (ref 11.5–15)
PLATELET # BLD: 259 E9/L (ref 130–450)
PMV BLD AUTO: 10.4 FL (ref 7–12)
POTASSIUM SERPL-SCNC: 4.3 MMOL/L (ref 3.5–5)
RBC # BLD: 5.37 E12/L (ref 3.8–5.8)
SODIUM BLD-SCNC: 137 MMOL/L (ref 132–146)
WBC # BLD: 6.2 E9/L (ref 4.5–11.5)

## 2021-09-22 PROCEDURE — 7100000010 HC PHASE II RECOVERY - FIRST 15 MIN: Performed by: ORTHOPAEDIC SURGERY

## 2021-09-22 PROCEDURE — 87102 FUNGUS ISOLATION CULTURE: CPT

## 2021-09-22 PROCEDURE — C1713 ANCHOR/SCREW BN/BN,TIS/BN: HCPCS | Performed by: ORTHOPAEDIC SURGERY

## 2021-09-22 PROCEDURE — 26445 RELEASE HAND/FINGER TENDON: CPT | Performed by: ORTHOPAEDIC SURGERY

## 2021-09-22 PROCEDURE — 93005 ELECTROCARDIOGRAM TRACING: CPT | Performed by: PHYSICIAN ASSISTANT

## 2021-09-22 PROCEDURE — 36415 COLL VENOUS BLD VENIPUNCTURE: CPT

## 2021-09-22 PROCEDURE — 3600000012 HC SURGERY LEVEL 2 ADDTL 15MIN: Performed by: ORTHOPAEDIC SURGERY

## 2021-09-22 PROCEDURE — 3700000001 HC ADD 15 MINUTES (ANESTHESIA): Performed by: ORTHOPAEDIC SURGERY

## 2021-09-22 PROCEDURE — 87205 SMEAR GRAM STAIN: CPT

## 2021-09-22 PROCEDURE — 2709999900 HC NON-CHARGEABLE SUPPLY: Performed by: ORTHOPAEDIC SURGERY

## 2021-09-22 PROCEDURE — 87070 CULTURE OTHR SPECIMN AEROBIC: CPT

## 2021-09-22 PROCEDURE — 7100000001 HC PACU RECOVERY - ADDTL 15 MIN: Performed by: ORTHOPAEDIC SURGERY

## 2021-09-22 PROCEDURE — 2580000003 HC RX 258: Performed by: PHYSICIAN ASSISTANT

## 2021-09-22 PROCEDURE — 2720000010 HC SURG SUPPLY STERILE: Performed by: ORTHOPAEDIC SURGERY

## 2021-09-22 PROCEDURE — 26546 REPAIR NONUNION HAND: CPT | Performed by: ORTHOPAEDIC SURGERY

## 2021-09-22 PROCEDURE — 80048 BASIC METABOLIC PNL TOTAL CA: CPT

## 2021-09-22 PROCEDURE — 85027 COMPLETE CBC AUTOMATED: CPT

## 2021-09-22 PROCEDURE — 3600000002 HC SURGERY LEVEL 2 BASE: Performed by: ORTHOPAEDIC SURGERY

## 2021-09-22 PROCEDURE — 3700000000 HC ANESTHESIA ATTENDED CARE: Performed by: ORTHOPAEDIC SURGERY

## 2021-09-22 PROCEDURE — 82962 GLUCOSE BLOOD TEST: CPT

## 2021-09-22 PROCEDURE — 87075 CULTR BACTERIA EXCEPT BLOOD: CPT

## 2021-09-22 PROCEDURE — 6360000002 HC RX W HCPCS: Performed by: ANESTHESIOLOGY

## 2021-09-22 PROCEDURE — 87206 SMEAR FLUORESCENT/ACID STAI: CPT

## 2021-09-22 PROCEDURE — 7100000000 HC PACU RECOVERY - FIRST 15 MIN: Performed by: ORTHOPAEDIC SURGERY

## 2021-09-22 PROCEDURE — 2580000003 HC RX 258: Performed by: NURSE ANESTHETIST, CERTIFIED REGISTERED

## 2021-09-22 PROCEDURE — 3209999900 FLUORO FOR SURGICAL PROCEDURES

## 2021-09-22 PROCEDURE — 7100000011 HC PHASE II RECOVERY - ADDTL 15 MIN: Performed by: ORTHOPAEDIC SURGERY

## 2021-09-22 PROCEDURE — 6360000002 HC RX W HCPCS: Performed by: NURSE ANESTHETIST, CERTIFIED REGISTERED

## 2021-09-22 PROCEDURE — 2500000003 HC RX 250 WO HCPCS: Performed by: NURSE ANESTHETIST, CERTIFIED REGISTERED

## 2021-09-22 PROCEDURE — 87015 SPECIMEN INFECT AGNT CONCNTJ: CPT

## 2021-09-22 PROCEDURE — 2500000003 HC RX 250 WO HCPCS: Performed by: ORTHOPAEDIC SURGERY

## 2021-09-22 PROCEDURE — 6360000002 HC RX W HCPCS: Performed by: PHYSICIAN ASSISTANT

## 2021-09-22 PROCEDURE — 6370000000 HC RX 637 (ALT 250 FOR IP)

## 2021-09-22 PROCEDURE — 87116 MYCOBACTERIA CULTURE: CPT

## 2021-09-22 DEVICE — PLATE BNE L40MM THK1MM 3X7 H NONSTERILE HND TI T SHP LOK VAR: Type: IMPLANTABLE DEVICE | Site: MIDDLE FINGER | Status: FUNCTIONAL

## 2021-09-22 DEVICE — SCREW BNE L9MM DIA1.5MM CORT TI ST FULL THRD COMPR T4: Type: IMPLANTABLE DEVICE | Site: MIDDLE FINGER | Status: FUNCTIONAL

## 2021-09-22 DEVICE — IMPLANTABLE DEVICE: Type: IMPLANTABLE DEVICE | Site: MIDDLE FINGER | Status: FUNCTIONAL

## 2021-09-22 DEVICE — SCREW BNE L10MM DIA1.5MM CORT TI ST FULL THRD COMPR T4: Type: IMPLANTABLE DEVICE | Site: MIDDLE FINGER | Status: FUNCTIONAL

## 2021-09-22 DEVICE — SCREW BNE L13MM DIA1.5MM CORT TI ST FULL THRD COMPR T4: Type: IMPLANTABLE DEVICE | Site: MIDDLE FINGER | Status: FUNCTIONAL

## 2021-09-22 RX ORDER — CEPHALEXIN 500 MG/1
500 CAPSULE ORAL 4 TIMES DAILY
Qty: 20 CAPSULE | Refills: 0 | Status: SHIPPED | OUTPATIENT
Start: 2021-09-22 | End: 2021-09-27

## 2021-09-22 RX ORDER — SODIUM CHLORIDE 9 MG/ML
25 INJECTION, SOLUTION INTRAVENOUS PRN
Status: DISCONTINUED | OUTPATIENT
Start: 2021-09-22 | End: 2021-09-22 | Stop reason: HOSPADM

## 2021-09-22 RX ORDER — SODIUM CHLORIDE 0.9 % (FLUSH) 0.9 %
5-40 SYRINGE (ML) INJECTION PRN
Status: DISCONTINUED | OUTPATIENT
Start: 2021-09-22 | End: 2021-09-22 | Stop reason: HOSPADM

## 2021-09-22 RX ORDER — TRAMADOL HYDROCHLORIDE 50 MG/1
50 TABLET ORAL EVERY 6 HOURS PRN
Qty: 28 TABLET | Refills: 0 | Status: SHIPPED | OUTPATIENT
Start: 2021-09-22 | End: 2021-09-29

## 2021-09-22 RX ORDER — MEPERIDINE HYDROCHLORIDE 25 MG/ML
12.5 INJECTION INTRAMUSCULAR; INTRAVENOUS; SUBCUTANEOUS EVERY 5 MIN PRN
Status: DISCONTINUED | OUTPATIENT
Start: 2021-09-22 | End: 2021-09-22 | Stop reason: HOSPADM

## 2021-09-22 RX ORDER — GLYCOPYRROLATE 1 MG/5 ML
SYRINGE (ML) INTRAVENOUS PRN
Status: DISCONTINUED | OUTPATIENT
Start: 2021-09-22 | End: 2021-09-22 | Stop reason: SDUPTHER

## 2021-09-22 RX ORDER — FENTANYL CITRATE 50 UG/ML
25 INJECTION, SOLUTION INTRAMUSCULAR; INTRAVENOUS EVERY 5 MIN PRN
Status: DISCONTINUED | OUTPATIENT
Start: 2021-09-22 | End: 2021-09-22 | Stop reason: HOSPADM

## 2021-09-22 RX ORDER — FENTANYL CITRATE 50 UG/ML
INJECTION, SOLUTION INTRAMUSCULAR; INTRAVENOUS PRN
Status: DISCONTINUED | OUTPATIENT
Start: 2021-09-22 | End: 2021-09-22 | Stop reason: SDUPTHER

## 2021-09-22 RX ORDER — MIDAZOLAM HYDROCHLORIDE 1 MG/ML
INJECTION INTRAMUSCULAR; INTRAVENOUS PRN
Status: DISCONTINUED | OUTPATIENT
Start: 2021-09-22 | End: 2021-09-22 | Stop reason: SDUPTHER

## 2021-09-22 RX ORDER — SODIUM CHLORIDE 0.9 % (FLUSH) 0.9 %
5-40 SYRINGE (ML) INJECTION EVERY 12 HOURS SCHEDULED
Status: DISCONTINUED | OUTPATIENT
Start: 2021-09-22 | End: 2021-09-22 | Stop reason: HOSPADM

## 2021-09-22 RX ORDER — PROPOFOL 10 MG/ML
INJECTION, EMULSION INTRAVENOUS PRN
Status: DISCONTINUED | OUTPATIENT
Start: 2021-09-22 | End: 2021-09-22 | Stop reason: SDUPTHER

## 2021-09-22 RX ORDER — ONDANSETRON 2 MG/ML
INJECTION INTRAMUSCULAR; INTRAVENOUS PRN
Status: DISCONTINUED | OUTPATIENT
Start: 2021-09-22 | End: 2021-09-22 | Stop reason: SDUPTHER

## 2021-09-22 RX ORDER — LIDOCAINE HYDROCHLORIDE 20 MG/ML
INJECTION, SOLUTION EPIDURAL; INFILTRATION; INTRACAUDAL; PERINEURAL PRN
Status: DISCONTINUED | OUTPATIENT
Start: 2021-09-22 | End: 2021-09-22 | Stop reason: SDUPTHER

## 2021-09-22 RX ORDER — FENTANYL CITRATE 50 UG/ML
50 INJECTION, SOLUTION INTRAMUSCULAR; INTRAVENOUS EVERY 5 MIN PRN
Status: DISCONTINUED | OUTPATIENT
Start: 2021-09-22 | End: 2021-09-22 | Stop reason: HOSPADM

## 2021-09-22 RX ORDER — SODIUM CHLORIDE 9 MG/ML
INJECTION, SOLUTION INTRAVENOUS CONTINUOUS
Status: DISCONTINUED | OUTPATIENT
Start: 2021-09-22 | End: 2021-09-22 | Stop reason: HOSPADM

## 2021-09-22 RX ORDER — ONDANSETRON 2 MG/ML
4 INJECTION INTRAMUSCULAR; INTRAVENOUS
Status: COMPLETED | OUTPATIENT
Start: 2021-09-22 | End: 2021-09-22

## 2021-09-22 RX ORDER — BUPIVACAINE HYDROCHLORIDE 5 MG/ML
INJECTION, SOLUTION EPIDURAL; INTRACAUDAL PRN
Status: DISCONTINUED | OUTPATIENT
Start: 2021-09-22 | End: 2021-09-22 | Stop reason: ALTCHOICE

## 2021-09-22 RX ORDER — FENTANYL CITRATE 50 UG/ML
INJECTION, SOLUTION INTRAMUSCULAR; INTRAVENOUS PRN
Status: DISCONTINUED | OUTPATIENT
Start: 2021-09-22 | End: 2021-09-22

## 2021-09-22 RX ORDER — ACETAMINOPHEN AND CODEINE PHOSPHATE 300; 30 MG/1; MG/1
1 TABLET ORAL EVERY 4 HOURS PRN
Qty: 42 TABLET | Refills: 0 | Status: SHIPPED | OUTPATIENT
Start: 2021-09-22 | End: 2021-09-22 | Stop reason: HOSPADM

## 2021-09-22 RX ORDER — SODIUM CHLORIDE 9 MG/ML
INJECTION, SOLUTION INTRAVENOUS CONTINUOUS PRN
Status: DISCONTINUED | OUTPATIENT
Start: 2021-09-22 | End: 2021-09-22 | Stop reason: SDUPTHER

## 2021-09-22 RX ADMIN — Medication 0.2 MG: at 09:10

## 2021-09-22 RX ADMIN — ONDANSETRON 4 MG: 2 INJECTION INTRAMUSCULAR; INTRAVENOUS at 11:52

## 2021-09-22 RX ADMIN — FENTANYL CITRATE 25 MCG: 50 INJECTION, SOLUTION INTRAMUSCULAR; INTRAVENOUS at 09:54

## 2021-09-22 RX ADMIN — INSULIN HUMAN 5 UNITS: 100 INJECTION, SOLUTION PARENTERAL at 12:06

## 2021-09-22 RX ADMIN — FENTANYL CITRATE 25 MCG: 50 INJECTION, SOLUTION INTRAMUSCULAR; INTRAVENOUS at 10:37

## 2021-09-22 RX ADMIN — FENTANYL CITRATE 100 MCG: 50 INJECTION, SOLUTION INTRAMUSCULAR; INTRAVENOUS at 09:10

## 2021-09-22 RX ADMIN — SODIUM CHLORIDE: 9 INJECTION, SOLUTION INTRAVENOUS at 09:04

## 2021-09-22 RX ADMIN — SODIUM CHLORIDE: 9 INJECTION, SOLUTION INTRAVENOUS at 10:12

## 2021-09-22 RX ADMIN — ONDANSETRON 4 MG: 2 INJECTION INTRAMUSCULAR; INTRAVENOUS at 09:25

## 2021-09-22 RX ADMIN — SODIUM CHLORIDE: 9 INJECTION, SOLUTION INTRAVENOUS at 11:24

## 2021-09-22 RX ADMIN — Medication 2000 MG: at 09:17

## 2021-09-22 RX ADMIN — MIDAZOLAM 2 MG: 1 INJECTION INTRAMUSCULAR; INTRAVENOUS at 09:04

## 2021-09-22 RX ADMIN — LIDOCAINE HYDROCHLORIDE 100 MG: 20 INJECTION, SOLUTION EPIDURAL; INFILTRATION; INTRACAUDAL; PERINEURAL at 09:10

## 2021-09-22 RX ADMIN — PROPOFOL 200 MG: 10 INJECTION, EMULSION INTRAVENOUS at 09:10

## 2021-09-22 ASSESSMENT — PULMONARY FUNCTION TESTS
PIF_VALUE: 3
PIF_VALUE: 2
PIF_VALUE: 3
PIF_VALUE: 3
PIF_VALUE: 4
PIF_VALUE: 3
PIF_VALUE: 4
PIF_VALUE: 4
PIF_VALUE: 3
PIF_VALUE: 2
PIF_VALUE: 4
PIF_VALUE: 2
PIF_VALUE: 19
PIF_VALUE: 1
PIF_VALUE: 4
PIF_VALUE: 2
PIF_VALUE: 0
PIF_VALUE: 4
PIF_VALUE: 3
PIF_VALUE: 4
PIF_VALUE: 3
PIF_VALUE: 4
PIF_VALUE: 2
PIF_VALUE: 4
PIF_VALUE: 3
PIF_VALUE: 3
PIF_VALUE: 2
PIF_VALUE: 4
PIF_VALUE: 3
PIF_VALUE: 4
PIF_VALUE: 2
PIF_VALUE: 3
PIF_VALUE: 4
PIF_VALUE: 3
PIF_VALUE: 3
PIF_VALUE: 20
PIF_VALUE: 3
PIF_VALUE: 2
PIF_VALUE: 4
PIF_VALUE: 3
PIF_VALUE: 3
PIF_VALUE: 4
PIF_VALUE: 3
PIF_VALUE: 3
PIF_VALUE: 0
PIF_VALUE: 4
PIF_VALUE: 3
PIF_VALUE: 3
PIF_VALUE: 2
PIF_VALUE: 3
PIF_VALUE: 2
PIF_VALUE: 2
PIF_VALUE: 3
PIF_VALUE: 4
PIF_VALUE: 3
PIF_VALUE: 2
PIF_VALUE: 2
PIF_VALUE: 4
PIF_VALUE: 4
PIF_VALUE: 3
PIF_VALUE: 3
PIF_VALUE: 4
PIF_VALUE: 3
PIF_VALUE: 9
PIF_VALUE: 2
PIF_VALUE: 4
PIF_VALUE: 19
PIF_VALUE: 15
PIF_VALUE: 3
PIF_VALUE: 2
PIF_VALUE: 3
PIF_VALUE: 1
PIF_VALUE: 3
PIF_VALUE: 3
PIF_VALUE: 4
PIF_VALUE: 3
PIF_VALUE: 2
PIF_VALUE: 4
PIF_VALUE: 2
PIF_VALUE: 18
PIF_VALUE: 3
PIF_VALUE: 4
PIF_VALUE: 2
PIF_VALUE: 3
PIF_VALUE: 3
PIF_VALUE: 4
PIF_VALUE: 3
PIF_VALUE: 4
PIF_VALUE: 3
PIF_VALUE: 0
PIF_VALUE: 3
PIF_VALUE: 4
PIF_VALUE: 3
PIF_VALUE: 4
PIF_VALUE: 4
PIF_VALUE: 3

## 2021-09-22 ASSESSMENT — PAIN SCALES - GENERAL
PAINLEVEL_OUTOF10: 0
PAINLEVEL_OUTOF10: 2

## 2021-09-22 ASSESSMENT — PAIN DESCRIPTION - ORIENTATION: ORIENTATION: RIGHT

## 2021-09-22 ASSESSMENT — PAIN DESCRIPTION - LOCATION: LOCATION: FINGER (COMMENT WHICH ONE)

## 2021-09-22 ASSESSMENT — PAIN DESCRIPTION - PAIN TYPE
TYPE: SURGICAL PAIN
TYPE: SURGICAL PAIN

## 2021-09-22 NOTE — BRIEF OP NOTE
FULL THRD COMPR T4  SCREW BNE L18MM DIA1.5MM MATIAS TI ST FULL THRD COMPR T4  [employer] Machine [employer]  Left 1 Implanted         Drains: * No LDAs found *    Findings: see op note    Electronically signed by KIKE Manuel on 9/22/2021 at 11:01 AM

## 2021-09-22 NOTE — ANESTHESIA PRE PROCEDURE
Department of Anesthesiology  Preprocedure Note       Name:  Mitchell Reece   Age:  42 y.o.  :  1980                                          MRN:  [id number]         Date:  2021      Surgeon: Samm Ravi):  Harman Walters MD    Procedure: Procedure(s): FINGER OPEN REDUCTION INTERNAL FIXATION  HAND LIGAMENT TENDON REPAIR    Medications prior to admission:   Prior to Admission medications    Medication Sig Start Date End Date Taking? Authorizing Provider   acetaminophen-codeine (TYLENOL/CODEINE #3) 300-30 MG per tablet Take 1 tablet by mouth every 4 hours as needed for Pain for up to 7 days. 21  KIKE Mccarty   atorvastatin (LIPITOR) 10 MG tablet TAKE ONE TABLET BY MOUTH EVERY DAY 20   Historical Provider, MD   metFORMIN (GLUCOPHAGE) 500 MG tablet Take 500 mg by mouth daily (with breakfast)    Historical Provider, MD   Levothyroxine Sodium (SYNTHROID PO) Take 125 mcg by mouth daily     Historical Provider, MD       Current medications:    No current facility-administered medications for this visit. Current Outpatient Medications   Medication Sig Dispense Refill    acetaminophen-codeine (TYLENOL/CODEINE #3) 300-30 MG per tablet Take 1 tablet by mouth every 4 hours as needed for Pain for up to 7 days. 42 tablet 0     Facility-Administered Medications Ordered in Other Visits   Medication Dose Route Frequency Provider Last Rate Last Admin    0.9 % sodium chloride infusion   IntraVENous Continuous KIKE Mccarty        0.9 % sodium chloride infusion  25 mL IntraVENous PRN KIKE Mccarty        ceFAZolin (ANCEF) 2000 mg in sterile water 20 mL IV syringe  2,000 mg IntraVENous On Call to [address] KI[address]        sodium chloride flush 0.9 % injection 5-40 mL  5-40 mL IntraVENous 2 times per day KIKE Mccarty        sodium chloride flush 0.9 % injection 5-40 mL  5-40 mL IntraVENous PRN KIKE Mccarty           Allergies:     Allergies   Allergen Reactions    LABGLOM >60 11/28/2020    GLUCOSE 476 11/28/2020    PROT 7.0 11/28/2020    CALCIUM 8.8 11/28/2020    BILITOT 0.6 11/28/2020    ALKPHOS 81 11/28/2020    AST 19 11/28/2020    ALT 19 11/28/2020       POC Tests: No results for input(s): POCGLU, POCNA, POCK, POCCL, POCBUN, POCHEMO, POCHCT in the last 72 hours. Coags:   Lab Results   Component Value Date    PROTIME 11.1 08/24/2020    INR 1.0 08/24/2020    APTT 26.5 08/24/2020       HCG (If Applicable): No results found for: PREGTESTUR, PREGSERUM, HCG, HCGQUANT     ABGs: No results found for: PHART, PO2ART, HOU0VFE, ZRO5XIK, BEART, H6FMFESJ     Type & Screen (If Applicable):  No results found for: LABABO, LABRH    Drug/Infectious Status (If Applicable):  No results found for: HIV, HEPCAB    COVID-19 Screening (If Applicable):   Lab Results   Component Value Date    COVID19 Not Detected 09/17/2021         Anesthesia Evaluation  Patient summary reviewed no history of anesthetic complications:   Airway: Mallampati: II  TM distance: <3 FB   Neck ROM: full  Mouth opening: > = 3 FB Dental:          Pulmonary:Negative Pulmonary ROS breath sounds clear to auscultation                             Cardiovascular:    (+) hypertension (patient denies HTN):, hyperlipidemia      ECG reviewed  Rhythm: regular  Rate: normal                    Neuro/Psych:   Negative Neuro/Psych ROS              GI/Hepatic/Renal: Neg GI/Hepatic/Renal ROS            Endo/Other:    (+) DiabetesType II DM, , hypothyroidism::., .                  ROS comment: left hand pain after his hand got caught in a press at work Abdominal:         (-) obese       Vascular: negative vascular ROS. Other Findings:               Anesthesia Plan      general     ASA 3       Induction: intravenous. BIS  MIPS: Postoperative opioids intended and Prophylactic antiemetics administered. Anesthetic plan and risks discussed with patient and spouse. Plan discussed with CRNA.                   Vignesh Guillory MD 9/22/2021

## 2021-09-22 NOTE — H&P
Department of Orthopedic Surgery  Resident History and Physical      CHIEF COMPLAINT: continued pain middle finger    HISTORY OF PRESENT ILLNESS:                The patient is a 42 y.o. male who presents with continued pain in his left middle finger. He was initially doing well. However continues to report increasing pain and discomfort in the middle finger. The pain is localized directly over the open fracture site. Remains with significant stiffness about the PIP joint as well. No new injuries reported. He did obtain a CT of the left hand prior to this visit to assess for nonunion. Past Medical History:        Diagnosis Date    Diabetes mellitus ([address]     Hypertension     Thyroid disease      Past Surgical History:        Procedure Laterality Date    APPENDECTOMY  3/23/2016    laparoscopic    FINGER SURGERY Left 8/24/2020    LEFT HAND I&D WITH PERCUTANEOUS PINNING OF THIRD FINGER performed by Jimbo Womack MD at [address] [place] [address]     Current Medications:   Current Facility-Administered Medications: 0.9 % sodium chloride infusion, , IntraVENous, Continuous  0.9 % sodium chloride infusion, 25 mL, IntraVENous, PRN  ceFAZolin (ANCEF) 2000 mg in sterile water 20 mL IV syringe, 2,000 mg, IntraVENous, On Call to OR  sodium chloride flush 0.9 % injection 5-40 mL, 5-40 mL, IntraVENous, 2 times per day  sodium chloride flush 0.9 % injection 5-40 mL, 5-40 mL, IntraVENous, PRN  Allergies:  Norco [hydrocodone-acetaminophen]    Social History:   TOBACCO:   reports that he has never smoked. He has never used smokeless tobacco.  ETOH:   reports no history of alcohol use. DRUGS:   reports no history of drug use. ACTIVITIES OF DAILY LIVING:    OCCUPATION:    Family History:   History reviewed. No pertinent family history.     REVIEW OF SYSTEMS:  CONSTITUTIONAL:  negative  EYES:  negative  HEENT:  negative  RESPIRATORY:  negative  CARDIOVASCULAR:  negative  GASTROINTESTINAL:  negative  GENITOURINARY: negative  INTEGUMENT/BREAST:  negative  HEMATOLOGIC/LYMPHATIC:  negative  ALLERGIC/IMMUNOLOGIC:  negative  ENDOCRINE:  negative  MUSCULOSKELETAL: Left middle finger pain and stiffness  NEUROLOGICAL:  negative  BEHAVIOR/PSYCH:  negative    PHYSICAL EXAM:    VITALS:  /79   Pulse 90   Temp 97.1 °F (36.2 °C) (Temporal)   Resp 16   Ht 5' 11\" (1.803 m)   Wt 195 lb (88.5 kg)   SpO2 95%   BMI 27.20 kg/m²   CONSTITUTIONAL:  awake, alert, cooperative, no apparent distress, and appears stated age  EYES:  Lids and lashes normal, pupils equal, round   ENT:  Normocephalic, without obvious abnormality, atraumatic  NECK:  Supple, symmetrical, trachea midline  NEUROLOGIC:  Awake, alert, oriented to name, place and time. Cranial nerves II-XII are grossly intact. Motor is 5 out of 5 bilaterally. Sensory is intact.   gait is normal.  MUSCULOSKELETAL:    Left hand: Scar mature. Complete healing over the palmar surface. He does have scar hypertrophy and to tenderness palpation over the scar. He is point tender over the proximal aspect of the proximal phalanx both palmarly and dorsally. There is localized swelling. With stabilization of the MCP joint he does have crepitance and pain at his fracture site with increased mobility. There is limited MCP and PIP joint extension. Active PIP extension lacks about 50 degrees, passively this can be improved to 10 degrees passively. Flexion of the middle finger PIP joint is 90 degrees actively and passively. Radial, ulnar, median nerves are intact.   2+ brisk cap refill of the middle finger as well as all digits of the hand he is grossly neurovascular tact with diminished sensation of the middle finger    DATA:    CBC:   Lab Results   Component Value Date    WBC 5.6 11/28/2020    RBC 4.80 11/28/2020    HGB 14.0 11/28/2020    HCT 40.6 11/28/2020    MCV 84.6 11/28/2020    MCH 29.2 11/28/2020    MCHC 34.5 11/28/2020    RDW 12.8 11/28/2020     11/28/2020    MPV 10.6 11/28/2020     PT/INR:    Lab Results   Component Value Date    PROTIME 11.1 08/24/2020    INR 1.0 08/24/2020       Radiology Review: CT of his left hand was reviewed. This demonstrates the oblique and intra-articular fracture of the proximal phalanx of the middle finger. There is sclerosis at the fracture site with nonunion. 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 Patient is having significant pain to the finger and is making work difficult. Patient would like to pursue surgery for ORIF with bone graft of right middle finger proximal phalanx nonunion. Risks and benefits and expected postoperative course discussed with the patient. He is agreeable to the plan for surgery. All questions answered. Surgery scheduled today. He will call with any concerns    I have seen and evaluated the patient and agree with the above assessment and plan on today's visit. I have performed the key components of the history and physical examination with significant findings of nonunion left middle finger proximal phalanx fracture with extension contracture and extensor tendon adhesions. CT scan was reviewed with the patient confirming a nonunion of the proximal phalanx fracture. Plan for nonunion repair with debridement and bone grafting using distal radius autograft as well as internal fixation. Postoperative course was explained. Concern for donor site morbidity was explained. Light duty restrictions explained. Patient will require therapy as well as possible additional surgery.   Contracture release and extensor tenolysis could be performed same setting followed by immediate therapy depending on fracture stabilization. He voiced understanding. All questions answered. I concur with the findings and plan as documented. I explained the risks, benefits, alternatives and complications of surgery with the patient including but not limited to the risks of death, possible damage to nerves, vessels, or tendons, possible infection, possible nonunion, possible malunion, possible hardware failure, possible need for hardware removal, stiffness, as well as the possible need further surgery and unanticipated complications. The patient voiced understanding and all questions were answered. The patient elected to proceed with surgical intervention.

## 2021-09-22 NOTE — PROGRESS NOTES
CLINICAL PHARMACY NOTE: MEDS TO BEDS    Total # of Prescriptions Filled: 2   The following medications were delivered to the patient:  · Tramadol 50 mg  · Cephalexin 500 mg    Additional Documentation:

## 2021-09-23 LAB — GRAM STAIN ORDERABLE: NORMAL

## 2021-09-23 NOTE — OP NOTE
[id number] [address]                                OPERATIVE REPORT    PATIENT NAME: Dilip Gutierrez                    :        1980  MED REC NO:   [id number]                            ROOM:  ACCOUNT NO:   [de-identified]                           ADMIT DATE: 2021  PROVIDER:     Julianna Juarez MD    DATE OF PROCEDURE:  2021    PREOPERATIVE DIAGNOSIS:  Left middle finger proximal phalanx fracture  nonunion. POSTOPERATIVE DIAGNOSES:  1. Left middle finger proximal phalanx fracture nonunion. 2.  Extensor tendon adhesions. PROCEDURES PERFORMED:  1. Left middle finger proximal phalanx fracture nonunion open reduction  and internal fixation using the Synthes variable angle 1.5-mm locking  plates and screws augmented with distal radius autograft. 2.  [place] of distal radius autograft. 3.  Tenolysis of extensor tendon over proximal phalanx. SURGEON:  Julianna Juarez M.D. ANESTHESIA:  1. General.  2.  Local anesthetic by surgeon consisting of approximately 10 mL of  0.25% Marcaine plain for a digital block and 10 mL of 0.25% Marcaine  with epinephrine at the distal radius autograft site. ESTIMATED BLOOD LOSS:  Minimal.    SPECIMENS:  The excised tissue from the fibrous nonunion was sent for  culture. ASSISTANT:  Rc Jensen, Physician Assistant Certified. She was  present throughout the procedure. Her assistance was used for  preoperative positioning, intraoperative retraction, closure, and  dressing application. Her assistance expedited the case and decreased  the surgical time. TOTAL TOURNIQUET TIME:  Approximately 73 minutes at 250 mmHg of brachial  tourniquet. FINDINGS:  1. Intraoperative fluoroscopy was used to confirm a fibrous nonunion of  the small finger proximal phalanx.   2.  Status post debridement and bone grafting and fixation with plates  and screws, there was 73  minutes. Fluoroscopy was then brought in and used to confirm a nonunion site of  the middle finger. A dorsal incision centered over the PIP joint proximally over the MCP  joint was then created followed by flap elevation. There was  significant scarring of the skin to the underlying extensor mechanism. The extensor tendon was tenolysed mobilizing the flaps radially and  ulnarly. To identify the fracture, a longitudinal split was made in the  extensor tendon. There was significant scarring of the underlying  cancellous bone. This was mobilized meticulously and tenolysed the  tendon proximally and distally. Status post tenolysis, there was full  MCP and PIP joint flexion and extension restored. There was instability  with fibrous nonunion of the proximal phalanx. This was identified  under fluoroscopy using K-wires followed by combination of curettes and  rongeur to remove the fibrous tissue. No overt signs of infection were  present. The tissue was sent for culture. The medullary canal  proximally and distally was reestablished using multiple K-wires to  expose the underlying subchondral bone and medullary canal, which was  gently debrided until healthy bone was present. This was then followed  by copious irrigation of the wound. The fracture was then realigned  under fluoroscopic imaging and held with a small pointed reduction bone  forceps. With this in good alignment, a K-wire was placed obliquely  across the fracture with preliminarily holding the reduction. Distal radius autograft was then harvested from Dasha's tubercle. A  15-blade scalpel was used to incise skin over the Dasha's tubercle  followed by blunt dissection, identification, and preservation of the  sensory nerve fibers. The extensor pollicis longus tendon was then  mobilized from around the Dasha's tubercle and retracted.   The extensor  retinaculum was elevated from ulnar to radial to reveal the second  extensor compartment. The osteotomes were then used to create an  osteotomy of the Dasha's tubercle, which was then mobilized and  preserved. Curettes were then used to harvest bone graft from the  distal radius and preserved on the back table for bone grafting. The  wound was copiously irrigated out and Gelfoam was placed into the bone  graft donor site. The Dasha's tubercle was then used to place back  down over the harvest site. The EPL tendon was left transposed. An 0  Vicryl suture was then used to repair the extensor retinaculum back  down. The bone graft was then packed into the proximal distal aspect of the  nonunion site. The fracture was re-reduced taking care to assess  pavithra. The pavithra was found to be appropriate. A T-type Synthes  1.5-mm variable angle locking plate was then trimmed to achieve distal  cortical purchases and place this proximally as possible to achieve  subchondral bone placement of the proximal screws. With the T-plate in  appropriate position, this was then lagged into position proximally. With the fracture maintaining reduction, the plate was then brought down  distally, trimmed to meet the patient's anatomy, and a lag screw was  used to lag the plate and bone distally. After lag fixation of the  plate, the fracture slightly displaced and there was slight overlapping  of the middle finger relative to the adjacent ring finger, but less than  50% and given the severity of his injury and nonunion site, this was  deemed acceptable. The remaining screws proximally were placed in a  lock-type fashion under meticulous fluoroscopic examination and measured  length screws, which were extraarticular appropriate in length followed  by additional screws distally. Eight cortical purchases were achieved  and additional fixation was achieved maintaining plate reduction  distally to prevent bowstringing of the plate.   With all screws  inserted, final imaging confirmed excellent reduction and compression of  the fractures nonunion site. A lag screw was then placed across this  for additional stability at the nonunion site. With this in position,  additional bone graft was placed around the nonunion site and the  extensor mechanism repaired with inverted 4-0 FiberWire sutures. Status  post suture placement, the finger was able to flexed and extended  without any gapping of the repaired tendon. The tourniquet was  deflated. The wounds were copiously irrigated out. Skin over the  middle finger was repaired with nylon suture followed by layered closure  of the distal radius. Bulky sterile dressing was applied. The patient  remained stable throughout the procedure.         Malika Padgett MD    D: 09/22/2021 13:13:14       T: 09/22/2021 13:19:10     [id number]  Job#: [id number]     Doc#: [id number]    CC:

## 2021-09-24 LAB — CULTURE SURGICAL: NORMAL

## 2021-09-27 LAB — ANAEROBIC CULTURE: NORMAL

## 2021-09-29 ENCOUNTER — TELEPHONE (OUTPATIENT)
Dept: ORTHOPEDIC SURGERY | Age: 41
End: 2021-09-29

## 2021-09-29 DIAGNOSIS — S62.613K: Primary | ICD-10-CM

## 2021-10-04 ENCOUNTER — OFFICE VISIT (OUTPATIENT)
Dept: ORTHOPEDIC SURGERY | Age: 41
End: 2021-10-04
Payer: COMMERCIAL

## 2021-10-04 VITALS — HEIGHT: 71 IN | RESPIRATION RATE: 20 BRPM | WEIGHT: 195 LBS | BODY MASS INDEX: 27.3 KG/M2

## 2021-10-04 DIAGNOSIS — S62.613K: Primary | ICD-10-CM

## 2021-10-04 PROCEDURE — 99024 POSTOP FOLLOW-UP VISIT: CPT | Performed by: ORTHOPAEDIC SURGERY

## 2021-10-04 NOTE — PROGRESS NOTES
10 days out left middle finger proximal phalanx nonunion ORIF with distal radius bone graft. Overall is doing well. Physical exam: Incision healing nicely. No signs of infection. He does have a crossover deformity of the middle onto the ring finger less than 50%. Limited range of motion. Neurovascular intact. X-rays the office today: AP lateral obliques of his left hand focus on the middle finger demonstrate stable dorsal plate and screw fixation of the previous nonunion of the proximal phalanx of the middle finger. Stable alignment compared to intraoperative fluoroscopy films and images. Impression office x-rays: Stable fixation left middle finger proximal phalanx fracture    Assessment 10 days postop    Plan    Today's find were explained. I did explain to him he does have a crossover deformity less than 50%. He was referred to therapy. Intraoperatively he had full passive range of motion to the digit. May progress with active and passive range of motion to the hand. Splint fabrication. Modalities as needed. Follow-up in 4 to 6 weeks with x-rays. May return to work light duty. Wear the brace. No use of his left hand.

## 2021-10-05 ENCOUNTER — EVALUATION (OUTPATIENT)
Dept: OCCUPATIONAL THERAPY | Age: 41
End: 2021-10-05
Payer: COMMERCIAL

## 2021-10-05 DIAGNOSIS — S62.613B DISPLACED FRACTURE OF PROXIMAL PHALANX OF LEFT MIDDLE FINGER, INITIAL ENCOUNTER FOR OPEN FRACTURE: Primary | ICD-10-CM

## 2021-10-05 PROCEDURE — 97760 ORTHOTIC MGMT&TRAING 1ST ENC: CPT | Performed by: OCCUPATIONAL THERAPIST

## 2021-10-05 PROCEDURE — 97110 THERAPEUTIC EXERCISES: CPT | Performed by: OCCUPATIONAL THERAPIST

## 2021-10-05 PROCEDURE — 97022 WHIRLPOOL THERAPY: CPT | Performed by: OCCUPATIONAL THERAPIST

## 2021-10-05 PROCEDURE — 97140 MANUAL THERAPY 1/> REGIONS: CPT | Performed by: OCCUPATIONAL THERAPIST

## 2021-10-05 NOTE — PROGRESS NOTES
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 Shani Marmolejo  Specific Practitioner Orders: May progress with active and passive range of motion to the hand. Splint fabrication.   Modalities as needed    Assessment of current deficits   [] Functional mobility   [x] ADLs  [x] Strength   [] Cognition   [] Functional transfers   [x] IADLs  [] Safety Awareness  [x] Endurance   [x] Fine Motor Coordination  [] Balance  [] Vision/perception  [x] Sensation    [x] Gross Motor Coordination [x] ROM  [x] Pain   [x] Edema    [x] Scar Adhesion/Skin Integrity     OT PLAN OF CARE   OT POC based on physician orders, patient diagnosis and results of clinical assessment    Frequency/Duration:1-2x/week for 12-18 visits ( pending C9 approval)   Specific OT Treatment to include:     [x] Instruction in HEP                   Modalities:  [x] Therapeutic Exercise        [x] Ultrasound               [x] Electrical Stimulation/Attended  [x] PROM/Stretching                    [x] Fluidotherapy          [x]  Paraffin                   [x] AAROM  [x] AROM                 [x] Iontophoresis:   [x] Tendon Glides                                               [] Neuromuscular Re-Ed            [x] ADL/IADL re-training    [x] Therapeutic Activity       [x] Pain Management with/without modalities PRN                 [x] Manual Therapy                      [x] Splinting                      [x] Scar Management                   []Joint Protection/Training  []Ergonomics                             [x] Joint Mobilization        [x] Adaptive EquipmentAssessment/Training                             [x] Manual Edema Mobilization   [x] Myofascial Release                 [] Energy Conservation/Work Simplification  [x] GM/FM Coordination       [x] Safety retraining/education per  individual diagnosis/goals  [x] Desensitization       Patient Specific Goal: Pt would like to return to making a full fist.                              GOALS (Long term same as Short term):  1) Patient will demonstrate good understanding of home program (exercises/activities/diagnosis/prognosis/goals) with good accuracy. 2) Patient will demonstrate increased active/passive range of motion of their LUE to [facility] for ADL/IADL completion. 3) Patient will demonstrate increased /pinch strength of at least 10 / 3-5 pinch pounds of their L hand. 4) Patient to report decreased pain in their affected L upper extremity from 5/10 to 2/10 or less with resistive functional use. 5) Increase in fine motor function as evidenced by decreased time to complete 9-hole peg test and/or MRMT test by at least 5-10 seconds. 6) Patient to report 100% compliance with their splint wear, care, and precautions if needed. 7) Patient will be knowledgeable of edema control techniques as evident with decreases from 8 to mild/none. 9) Patient will demonstrate a non-tender/non-adherent scar. 10) Patient will report ADL functions as Mod I/I using LUE. 11) Patient will demonstrate improved functional activity tolerance from poor to fair for ADL/IADL completion. 12) Patient will decrease QuickDASH score to 40% or less for increased participation in daily functional activities. Past Medical History:   Past Medical History:   Diagnosis Date    Diabetes mellitus ([facility] [address]     Hypertension     Thyroid disease      Past Surgical History:   Past Surgical History:   Procedure Laterality Date    APPENDECTOMY  3/23/2016    laparoscopic    FINGER SURGERY Left 8/24/2020    LEFT HAND I&D WITH PERCUTANEOUS PINNING OF THIRD FINGER performed by Trish Martinez MD at [facility] Left 9/22/2021    LEFT MIDDLE FINGER PROXIMAL PHALANX OPEN REDUCTION INTERNAL FIXATION WITH BONE GRAFT FROM DISTAL RADIUS performed by Trsih Martinez MD at [address]       Reason for Referral: Pt is a 42 y.o. male who underwent a harvest of distal radius autograft, L middle finger proximal phalanx fx nonunion ORIF with distal radius autograft and a Tenolysis of extensor tendon over proximal phalanx by Dr. Sander Haley on 09/22/2021. Pt is referred to outpatient Occupational Therapy for eval & treat. *Of note* Pt sustained a crushed his L hand in an industrial machine at work. Pt underwent surgery by Dr. Sander Haley on 08/25/2020 on his left hand. The following was completed:  excisional debridement of left palm, index and middle finger with open reduction internal fixation of left middle finger proximal phalanx fracture with middle finger A2 pulley repair. Home Living: Pt lives with wife  Prior Level of Function: Independent     Cognition:   Alert/Oriented x3      IADL STATUS:                                                  Ind                   Mod I               Min A               Mod A              Max A                     Dep                  N/A                    Homemaking Responsibility:  []?                    []?                    []?                    []?                    [x]? []?                    []?  Shopping Responsibility:        []?                    []?                    [x]?                     []?                    []?                        []? []?  Mode of Transportation:         []?                    []?                    []?                    []?                    []?                        [x]?                    []?  Leisure & Hobbies:                 []?                    []?                    []?                    []?                    []?                        []?                    [x]? Work:                                       []?                    []?                    []?                    []?                    []?                        [x]? []?  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 []?                    []?                    []?                    []?                    []?                        []?  Grooming:       [x]? []?                    []?                    []?                    []?                    []?                        []?  Bathing:           []?                    []?                    []?                    [x]? []?                    []?                        []?  UE Dressing:   [x]? []?                    []?                    []?                    []?                    []?                        []?  LE Dressing:   [x]? []?                    []?                    []?                    []?                    []?                        []?  Toileting:         [x]? []?                    []?                    []?                    []?                    []?                        []?  Transfer:          [x]?                     []?                    []?                    []?                    []? []?                        []?  Comments: Avoiding using the L hand at this time, unable to open containers or use L hand at all at this time.      Pain Level: 5 on scale of 1-10 in the wrist and 4/10 in the digit, aching and tight (pulling) with tingling on the finger. UE Assessment:  Pt presents with fiberglass forearm based splint with slight MCP flexion involving 2nd, 3rd and 4th digits. Incision sites clean with slight clear drainage and steri strips on the forearm. Pt reports tingling on the finger tip of the L 3rd digit. Pt demonstrates moderate edema throughout MCP joint and slight edema noted on the mid forearm around the incision site. Pt demonstrated pain at the forearm with motion, moderate edema, decreased AROM of the wrist and 3rd digit, poor activity tolerance and is limited with functional use due to rehab protocol. Pt would benefit from skilled OT services to improve functional independent use of LUE by increasing AROM/PROM, strength, endurance, and decreasing pain/edema. Pt requires assistance for following hand therapy protocol to reach maximum rehabilitation potential.    L Upper Extremity ROM       AROM [x]     PROM[] IE        FOREARM Pronation 80-90* TBA       Supination 80-90* TBA        WRIST Flexion 0-80* 5*- pain reported       Extension 0-70* 45*       Radial Deviation 0-20* 3*- pain reported       Ulnar Deviation 0-30* 22*        L Hand ROM    AROM [x]         PROM[] IE        3rd Digit MCP Extension/Flexion   0-45 H /* 10*/44*       PIP Extension/Flexion   0*/100* 46*/65*       DIP Extension/Flexion   0*/70-90* 0*/23*        Comment: Hand Dominance is R     Edema Description/Circumferential Measurements:    L forearm: 21 cm   R forearm: 21 cm  Dynamometer (setting 2):  TBA per protocol      9 Hole Peg Test: TBA       QuickDASH Score: 100% disability reported    Intervention: Pt educated and issued the following exercises: ( MCP, PIP and DIP jt blocking within pain tolerance x10). Deferred wrist AROM exercises until next session due to pain reported. Pt educated on modalities for edema management and retrograde massage. Fabricated a volar forearm based safe position splint (mild MCP flexion with PIP extension). Eval Complexity: low  Profile and History- Chart reviewed  Assessment of Occupational Performance and Identification of Deficits- 11   Clinical Decision Making- no additional modifications required    Rehab Potential:                                 [x] Good  [] Fair  [] Poor        Suggested Professional Referral:       [x] No  [] Yes:  Barriers to Goal Achievement[de-identified]          [x] No  [] Yes:  Domestic Concerns:                           [x] No  [] Yes:       Patient. Education:  [x] Plans/Goals, Risks/Benefits discussed  [x] Home exercise program  Method of Education: [x] Verbal  [x] Demo  [x] Written  Comprehension of Education:  [x] Verbalizes understanding. [x] Demonstrates understanding. [x] Needs Review. [x] Demonstrates/verbalizes understanding of HEP/Ed previously given.         Patient understands diagnosis/prognosis and consents to treatment, plan and goals: [x] Yes    [] No     Goal Formulation: Patient  Time In: 1:00 pm            Time Out: 2:10 pm                      Timed Code Treatment Minutes: 60 minutes      CODE  Minutes Time  Units   11860 OT Eval Low 20 1:00 pm - 1:20pm 1   56773 OT Eval Medium      72059 OT Eval High      71361 Fluidotherapy      24645 Manual 10 1:20 pm - 1:30 pm 1   42431 Therapeutic Ex 20 1:30 pm - 1:50 pm 1   56183 Therapeutic Activity      73168 ADL/COMP Tech Train      17728 Neuromuscular Re-Ed      17540 OrthoManagementTraining 20 1:50 pm -2:10 pm 2   80555 Paraffin      15391 Electrical Stim - Attended      [id number] Iontophoresis      11491 Ultrasound       Other        70  5        Electronically signed by: Darin Ware OT R/L, MS #[id number]     WHITNEYQW'Z Certification / Comments      Frequency/Duration 1-2x / week for 12-18 ( pending C9 approval) visits. Certification period From: 10/05/2021  To: 01/05/2022     I have reviewed the Plan of Care established for skilled therapy services and certify that the services are required and that they will be provided while the patient is under my care. Physician's Comments/Revisions:                   Physicians's Printed Name:  Yana Tanner                                   Physician's Signature:                                                               Date:      Please review Patient's OT evaluation and if you agree sign/date and fax back to us at our [facility] OT Fax: [phone].  Thank you for your referral!

## 2021-10-06 DIAGNOSIS — S62.613K: Primary | ICD-10-CM

## 2021-10-07 ENCOUNTER — TREATMENT (OUTPATIENT)
Dept: OCCUPATIONAL THERAPY | Age: 41
End: 2021-10-07
Payer: COMMERCIAL

## 2021-10-07 DIAGNOSIS — S62.613B DISPLACED FRACTURE OF PROXIMAL PHALANX OF LEFT MIDDLE FINGER, INITIAL ENCOUNTER FOR OPEN FRACTURE: Primary | ICD-10-CM

## 2021-10-07 PROCEDURE — 97140 MANUAL THERAPY 1/> REGIONS: CPT | Performed by: OCCUPATIONAL THERAPIST

## 2021-10-07 PROCEDURE — 97110 THERAPEUTIC EXERCISES: CPT | Performed by: OCCUPATIONAL THERAPIST

## 2021-10-07 NOTE — PROGRESS NOTES
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 Roro Gómez  Specific Practitioner Orders: May progress with active and passive range of motion to the hand.  Splint fabrication.  Modalities as needed     Assessment of current deficits   []? Functional mobility                         [x]? ADLs          [x]? Strength                  []? Cognition   []? Functional transfers           [x]? IADLs         []? Safety Awareness  [x]? Endurance   [x]? Fine Motor Coordination    []? Balance      []? Vision/perception   [x]? Sensation     [x]? Gross Motor Coordination [x]? ROM           [x]? Pain                        [x]? Edema          [x]? Scar Adhesion/Skin Integrity      OT PLAN OF CARE   OT POC based on physician orders, patient diagnosis and results of clinical assessment     Frequency/Duration:1-2x/week for 12-18 visits ( pending C9 approval)   Specific OT Treatment to include:      [x]? Instruction in HEP                   Modalities:  [x]?  Therapeutic Exercise                 [x]? Ultrasound               [x]? Electrical Stimulation/Attended  [x]? PROM/Stretching                    [x]? Fluidotherapy          [x]?   Paraffin                   [x]? AAROM  [x]?  AROM                 [x]? Iontophoresis:   [x]? Jerone Garrett                                       []? Neuromuscular Re-Ed            [x]? ADL/IADL re-training    [x]?  Therapeutic Activity [x]? Pain Management with/without modalities PRN                 [x]?  Manual Therapy                      [x]? Splinting                                   [x]? Scar Management                   []?Joint Protection/Training  []? Ergonomics                             [x]?  Joint Mobilization                      [x]? Adaptive EquipmentAssessment/Training                             [x]?  Manual Edema Mobilization   [x]?  Myofascial Release                 []? Energy Conservation/Work Simplification  [x]? GM/FM Coordination                [x]? Safety retraining/education per  individual diagnosis/goals  [x]? Desensitization        Patient Specific Goal: Pt would like to return to making a full fist.                              GOALS (Long term same as Short term):  1) Patient will demonstrate good understanding of home program (exercises/activities/diagnosis/prognosis/goals) with good accuracy. 2) Patient will demonstrate increased active/passive range of motion of their LUE to [facility] for ADL/IADL completion. 3) Patient will demonstrate increased /pinch strength of at least 10 / 3-5 pinch pounds of their L hand. 4) Patient to report decreased pain in their affected L upper extremity from 5/10 to 2/10 or less with resistive functional use. 5) Increase in fine motor function as evidenced by decreased time to complete 9-hole peg test and/or MRMT test by at least 5-10 seconds. 6) Patient to report 100% compliance with their splint wear, care, and precautions if needed. 7) Patient will be knowledgeable of edema control techniques as evident with decreases from 8 to mild/none. 9) Patient will demonstrate a non-tender/non-adherent scar. 10) Patient will report ADL functions as Mod I/I using LUE. 11) Patient will demonstrate improved functional activity tolerance from poor to fair for ADL/IADL completion.   12) Patient will decrease QuickDASH score to 40% or less for increased participation in daily functional activities.      Pain Level: moderate at the wrist, minimal pain at the digit, tugging/pulling     Subjective: \"the pain in my wrist is worse than my finger. \"     Objective:  Updated POC to be completed by 10th visit or 11/05/2021. INTERVENTION: COMPLETED: SPECIFICS/COMMENTS:   Modality:     MHP x Applied to the wrist during soft tissue massage and scar massage of the digits        AROM:     Wrist x Flexion, extension, radial and ulnar deviation   Forearm  x Supination, pronation   Digital X  X  X  x -composite fist, flexion  -extension, all digits  -hook fist  -MCP table top   L MF x -Place and holds in extension   Thumb X  x -opposition to SF-moderate difficulty   -radial and palmar abduction-unable to radially abduction this date   AAROM:     Digital x -extension, all digits   L MF x -Place and holds in extension   PROM/Stretching:     Wrist x Flexion, extension, radial and ulnar deviation   Digital X  x -composite fist, flexion  -MCP table top   Thumb x -radial and palmar abduction        Scar Mass/Edema Control:     Retrograde massage x To decrease swelling throughout the wrist and digits   Soft tissue massage/scar massage x To decrease rigidity of the scar and decrease rigidity of the soft tissue to increase ROM and decrease pain   Strengthening:               Other:     HEP x Reviewed HEP and splinting instructions   Steri strip removal  x Steri strips removed no sign of drainage      Assessment/Comments: Pt is making Fair + progress toward stated plan of care. Pt tolerated session well, presents this date with increased pain at the wrist-after steri strips were removed patient felt instant relief of pain. Tolerated all AROM and PROM exercises well with minimal increase in pain. Pt able to make a full composite fist by the end of the session. Will increase as tolerated.        -Rehab Potential: Good  -Requires OT Follow Up: Yes    Time In:4:00            Time Out: 5:00             Visit #: 2           Treatment Charges: Mins Time  Units   Ther Exercise 25 4:35-5:00 2   Manual Therapy 30 4:05-4:35 2   Thera Activities      ADL/Home Mgt       Neuro Re-education      Gait Training      Group Therapy      Orthotic fit/train      Other  5  4:00-4:05  0   Total Time/Units 55   4     -Response to Treatment: tolerated well   Goals: Goals for pt can be see on initial eval occurring on IE    Plan:   [x]  Continues Plan of care: Treatment covered based on POC and graduated to patient's progress. Pt education continues at each visit to obtain maximum benefits from skilled OT intervention. []  Alter Plan of care:   []  Discharge:       Edyta Delgado OTR/L #[id number]

## 2021-10-08 ENCOUNTER — TREATMENT (OUTPATIENT)
Dept: OCCUPATIONAL THERAPY | Age: 41
End: 2021-10-08
Payer: COMMERCIAL

## 2021-10-08 DIAGNOSIS — S62.613B DISPLACED FRACTURE OF PROXIMAL PHALANX OF LEFT MIDDLE FINGER, INITIAL ENCOUNTER FOR OPEN FRACTURE: Primary | ICD-10-CM

## 2021-10-08 PROCEDURE — 97110 THERAPEUTIC EXERCISES: CPT | Performed by: OCCUPATIONAL THERAPIST

## 2021-10-08 PROCEDURE — 97140 MANUAL THERAPY 1/> REGIONS: CPT | Performed by: OCCUPATIONAL THERAPIST

## 2021-10-08 NOTE — PROGRESS NOTES
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 Thang Dalton  Specific Practitioner Orders: May progress with active and passive range of motion to the hand.  Splint fabrication.  Modalities as needed     Assessment of current deficits   []? Functional mobility                         [x]? ADLs          [x]? Strength                  []? Cognition   []? Functional transfers           [x]? IADLs         []? Safety Awareness  [x]? Endurance   [x]? Fine Motor Coordination    []? Balance      []? Vision/perception   [x]? Sensation     [x]? Gross Motor Coordination [x]? ROM           [x]? Pain                        [x]? Edema          [x]? Scar Adhesion/Skin Integrity      OT PLAN OF CARE   OT POC based on physician orders, patient diagnosis and results of clinical assessment     Frequency/Duration:1-2x/week for 12-18 visits ( pending C9 approval)   Specific OT Treatment to include:      [x]? Instruction in HEP                   Modalities:  [x]?  Therapeutic Exercise                 [x]? Ultrasound               [x]? Electrical Stimulation/Attended  [x]? PROM/Stretching                    [x]? Fluidotherapy          [x]?   Paraffin                   [x]? AAROM  [x]?  AROM                 [x]? Iontophoresis:   [x]? Arvcaleb Sorensen                                       []? Neuromuscular Re-Ed            [x]? ADL/IADL re-training    [x]?  Therapeutic Activity [x]? Pain Management with/without modalities PRN                 [x]?  Manual Therapy                      [x]? Splinting                                   [x]? Scar Management                   []?Joint Protection/Training  []? Ergonomics                             [x]?  Joint Mobilization                      [x]? Adaptive EquipmentAssessment/Training                             [x]?  Manual Edema Mobilization   [x]?  Myofascial Release                 []? Energy Conservation/Work Simplification  [x]? GM/FM Coordination                [x]? Safety retraining/education per  individual diagnosis/goals  [x]? Desensitization        Patient Specific Goal: Pt would like to return to making a full fist.                              GOALS (Long term same as Short term):  1) Patient will demonstrate good understanding of home program (exercises/activities/diagnosis/prognosis/goals) with good accuracy. 2) Patient will demonstrate increased active/passive range of motion of their LUE to [facility] for ADL/IADL completion. 3) Patient will demonstrate increased /pinch strength of at least 10 / 3-5 pinch pounds of their L hand. 4) Patient to report decreased pain in their affected L upper extremity from 5/10 to 2/10 or less with resistive functional use. 5) Increase in fine motor function as evidenced by decreased time to complete 9-hole peg test and/or MRMT test by at least 5-10 seconds. 6) Patient to report 100% compliance with their splint wear, care, and precautions if needed. 7) Patient will be knowledgeable of edema control techniques as evident with decreases from 8 to mild/none. 9) Patient will demonstrate a non-tender/non-adherent scar. 10) Patient will report ADL functions as Mod I/I using LUE. 11) Patient will demonstrate improved functional activity tolerance from poor to fair for ADL/IADL completion.   12) Patient will decrease QuickDASH score to 40% or less for increased participation in daily functional activities.      Pain Level:  moderate at the wrist 5-6/10 , minimal pain at the digit, tugging/pulling     Subjective:  Pt states : \"The pain in my wrist is worse than my finger. \"     Objective:  Updated POC to be completed by 10th visit or 11/05/2021. INTERVENTION: COMPLETED: SPECIFICS/COMMENTS:   Modality:     MHP  Applied to the wrist during soft tissue massage and scar massage of the digits        AROM:     Wrist x Flexion, extension, radial and ulnar deviation   Forearm  x Supination, pronation   Digital X    X  X  x -composite fist, flexion- done after doing hook and MP flex/ext 2 x's 5 rep's   -extension, all digits  -hook fist 2 sets of 10  -MCP table top 2 sets of 10   L MF x -Place and holds in extension   Thumb X  x -opposition to SF   -radial and palmar abduction-unable to radially abduction this date   AAROM:     Digital x -extension, all digits   L MF x -Place and holds in extension   PROM/Stretching:     Wrist x  Gentle -Flexion, extension, radial and ulnar deviation   Digital   x -composite fist, flexion  -Gentle -MCP table top and hook fist   Thumb x -radial and palmar abduction        Scar Mass/Edema Control:     Retrograde massage X    x To decrease swelling throughout the wrist and digits  -mesh sleeve MF to wear as needed   Soft tissue massage/scar massage x To decrease rigidity of the scar and decrease rigidity of the soft tissue to increase ROM and decrease pain   Strengthening:               Other:     HEP x Reviewed HEP and splinting instructions. Splint  x Changed straps this date over wrist and finger incision area with soft strap to ^ comfort. 2 blue sponges under MF PIP jt to ^ PIP ext. Steri strip removal  x Steri strips removed no sign of drainage      Assessment/Comments: Pt is making Fair + progress toward stated plan of care.  Pt tolerated session well - he was told not to do wrist or digital exercises with ^'ed pain - do gently to not put undue force on the bone graft sites. . Tolerated all AROM and PROM exercises well with minimal increase in pain. Pt  Instructed to do more of the digital hook fist and MP flex/ ext exercises and less of the composite fist 2* to ^'ed pian and tension on bone graft during composite fisting. Adjusted splint - see above. Will increase as tolerated. -Rehab Potential: Good  -Requires OT Follow Up: Yes    Time In:  10:55 am            Time Out:  11:55 am             Visit #: 3           Treatment Charges: Mins Time  Units   Ther Exercise 30 11:25 - 11:55  2   Manual Therapy 30 10:55 - 11:25 2   Thera Activities      ADL/Home Mgt       Neuro Re-education      Gait Training      Group Therapy      Orthotic fit/train      Other        Total Time/Units 60   4     -Response to Treatment: tolerated well   Goals: Goals for pt can be see on initial eval occurring on IE    Plan:   [x]  Continues Plan of care: Treatment covered based on POC and graduated to patient's progress. Pt education continues at each visit to obtain maximum benefits from skilled OT intervention.   []  Alter Plan of care:   []  Discharge:      Jitendra Ingram OT/L, CHT

## 2021-10-12 ENCOUNTER — TREATMENT (OUTPATIENT)
Dept: OCCUPATIONAL THERAPY | Age: 41
End: 2021-10-12
Payer: COMMERCIAL

## 2021-10-12 DIAGNOSIS — S62.613B DISPLACED FRACTURE OF PROXIMAL PHALANX OF LEFT MIDDLE FINGER, INITIAL ENCOUNTER FOR OPEN FRACTURE: Primary | ICD-10-CM

## 2021-10-12 PROCEDURE — 97018 PARAFFIN BATH THERAPY: CPT | Performed by: OCCUPATIONAL THERAPIST

## 2021-10-12 PROCEDURE — 97140 MANUAL THERAPY 1/> REGIONS: CPT | Performed by: OCCUPATIONAL THERAPIST

## 2021-10-12 PROCEDURE — 97110 THERAPEUTIC EXERCISES: CPT | Performed by: OCCUPATIONAL THERAPIST

## 2021-10-12 NOTE — PROGRESS NOTES
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 [employer]  Specific Practitioner Orders: May progress with active and passive range of motion to the hand.  Splint fabrication.  Modalities as needed     Assessment of current deficits   []? Functional mobility                         [x]? ADLs          [x]? Strength                  []? Cognition   []? Functional transfers           [x]? IADLs         []? Safety Awareness  [x]? Endurance   [x]? Fine Motor Coordination    []? Balance      []? Vision/perception   [x]? Sensation     [x]? Gross Motor Coordination [x]? ROM           [x]? Pain                        [x]? Edema          [x]? Scar Adhesion/Skin Integrity      OT PLAN OF CARE   OT POC based on physician orders, patient diagnosis and results of clinical assessment     Frequency/Duration:1-2x/week for 12-18 visits ( pending C9 approval)   Specific OT Treatment to include:      [x]? Instruction in HEP                   Modalities:  [x]?  Therapeutic Exercise                 [x]? Ultrasound               [x]? Electrical Stimulation/Attended  [x]? PROM/Stretching                    [x]? Fluidotherapy          [x]?   Paraffin                   [x]? AAROM  [x]?  AROM                 [x]? Iontophoresis:   [x]? Justus Encarnacion                                       []? Neuromuscular Re-Ed            [x]? ADL/IADL re-training    [x]? Therapeutic Activity                  [x]? Pain Management with/without modalities PRN                 [x]?  Manual Therapy                      [x]? Splinting                                   [x]? Scar Management                   []?Joint Protection/Training  []? Ergonomics                             [x]?  Joint Mobilization                      [x]? Adaptive EquipmentAssessment/Training                             [x]?  Manual Edema Mobilization   [x]?  Myofascial Release                 []? Energy Conservation/Work Simplification  [x]? GM/FM Coordination                [x]? Safety retraining/education per  individual diagnosis/goals  [x]? Desensitization        Patient Specific Goal: Pt would like to return to making a full fist.                              GOALS (Long term same as Short term):  1) Patient will demonstrate good understanding of home program (exercises/activities/diagnosis/prognosis/goals) with good accuracy. 2) Patient will demonstrate increased active/passive range of motion of their LUE to [facility] for ADL/IADL completion. 3) Patient will demonstrate increased /pinch strength of at least 10 / 3-5 pinch pounds of their L hand. 4) Patient to report decreased pain in their affected L upper extremity from 5/10 to 2/10 or less with resistive functional use. 5) Increase in fine motor function as evidenced by decreased time to complete 9-hole peg test and/or MRMT test by at least 5-10 seconds. 6) Patient to report 100% compliance with their splint wear, care, and precautions if needed. 7) Patient will be knowledgeable of edema control techniques as evident with decreases from 8 to mild/none. 9) Patient will demonstrate a non-tender/non-adherent scar. 10) Patient will report ADL functions as Mod I/I using LUE. 11) Patient will demonstrate improved functional activity tolerance from poor to fair for ADL/IADL completion.   12) Patient will decrease QuickDASH score to 40% or less for increased participation in daily functional activities.      Pain Level:  5/10 at both incision sites ( finger and wrist), pulling pain and tingling. Subjective:  Pt states no new changes. Objective:  Updated POC to be completed by 10th visit or 11/05/2021. INTERVENTION: COMPLETED: SPECIFICS/COMMENTS:   Modality:     MHP  Applied to the wrist during soft tissue massage and scar massage of the digits   Paraffin + MHP x x15 mins with increasing stretch to increase soft tissue elasticity. AROM:     Wrist x Flexion, extension, radial and ulnar deviation x15   Forearm  x Supination, pronation 2 x10   Digital x         -composite fist, flexion- done after doing hook and MP flex/ext 2 x's 10 rep's   -extension, all digits  -hook fist 2 sets of 10  -MCP table top 2 sets of 10   L MF x -Place and holds in extension x10   Thumb    -opposition to SF   -radial and palmar abduction-unable to radially abduction this date   AAROM:     Digital x -extension, all digits   L MF x -Place and holds in extension 2 sets x 10   PROM/Stretching:     Wrist x  Gentle -Flexion, extension, radial and ulnar deviation   Digital   x -composite fist, flexion  -Gentle -MCP table top x15 & x3 hook fist   Thumb  radial and palmar abduction        Scar Mass/Edema Control:     Retrograde massage X     To decrease swelling throughout the wrist and digits  -mesh sleeve MF to wear as needed   Soft tissue massage/scar massage x To decrease rigidity of the scar and decrease rigidity of the soft tissue to increase ROM and decrease pain   Strengthening:               Other:     HEP x Reviewed HEP and splinting instructions. Splint  x Changed straps this date over wrist and finger incision area with soft strap to ^ comfort. 2 blue sponges under MF PIP jt to ^ PIP ext. Steri strip removal  x Steri strips removed no sign of drainage      Assessment/Comments: Pt is making Fair + progress toward stated plan of care.  Pt tolerated session well and table top to flat fist added this date and was to be completed per pain tolerance. Pt continues to avoid composite fisting at home and only completed limited within session. Modified splint for improved extension of the MF with good results. Continue to focus on scar tissue management, AROM and extension place/holds.      -Rehab Potential: Good  -Requires OT Follow Up: Yes    Time In: 4:00 pm           Time Out:  5:00 pm             Visit #: 4          CODE  Minutes Time  Units   72613 Fluidotherapy      34806 Manual 20 4:40 pm - 5:00 pm 1   63248 Therapeutic Ex 25 4:15 pm - 4:40 pm 2   73122 Therapeutic Activity      46347 ADL/COMP Tech Train      47479 Neuromuscular Re-Ed      21421 OrthoManagementTraining      07784 Paraffin 15 4:00 pm - 4:15 pm 1   75019 Electrical Stim - Attended      [id number] Iontophoresis      90911 Ultrasound       Other        60  4       -Response to Treatment: tolerated well   Goals: Goals for pt can be see on initial eval occurring on IE    Plan:   [x]  Continues Plan of care: Treatment covered based on POC and graduated to patient's progress. Pt education continues at each visit to obtain maximum benefits from skilled OT intervention.   []  Alter Plan of care:   []  Discharge:      Traci Hogue/RAJAN, Padma Brad 87 #[id number]

## 2021-10-13 ENCOUNTER — TREATMENT (OUTPATIENT)
Dept: OCCUPATIONAL THERAPY | Age: 41
End: 2021-10-13
Payer: COMMERCIAL

## 2021-10-13 DIAGNOSIS — S62.613B DISPLACED FRACTURE OF PROXIMAL PHALANX OF LEFT MIDDLE FINGER, INITIAL ENCOUNTER FOR OPEN FRACTURE: Primary | ICD-10-CM

## 2021-10-13 PROCEDURE — 97018 PARAFFIN BATH THERAPY: CPT | Performed by: OCCUPATIONAL THERAPIST

## 2021-10-13 PROCEDURE — 97110 THERAPEUTIC EXERCISES: CPT | Performed by: OCCUPATIONAL THERAPIST

## 2021-10-13 PROCEDURE — 97140 MANUAL THERAPY 1/> REGIONS: CPT | Performed by: OCCUPATIONAL THERAPIST

## 2021-10-13 NOTE — PROGRESS NOTES
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 Jose Francisco Anthony  Specific Practitioner Orders: May progress with active and passive range of motion to the hand.  Splint fabrication.  Modalities as needed     Assessment of current deficits   []? Functional mobility                         [x]? ADLs          [x]? Strength                  []? Cognition   []? Functional transfers           [x]? IADLs         []? Safety Awareness  [x]? Endurance   [x]? Fine Motor Coordination    []? Balance      []? Vision/perception   [x]? Sensation     [x]? Gross Motor Coordination [x]? ROM           [x]? Pain                        [x]? Edema          [x]? Scar Adhesion/Skin Integrity      OT PLAN OF CARE   OT POC based on physician orders, patient diagnosis and results of clinical assessment     Frequency/Duration:1-2x/week for 12-18 visits ( pending C9 approval)   Specific OT Treatment to include:      [x]? Instruction in HEP                   Modalities:  [x]?  Therapeutic Exercise                 [x]? Ultrasound               [x]? Electrical Stimulation/Attended  [x]? PROM/Stretching                    [x]? Fluidotherapy          [x]?   Paraffin                   [x]? AAROM  [x]?  AROM                 [x]? Iontophoresis:   [x]? Jeni Gonzáles                                       []? Neuromuscular Re-Ed            [x]? ADL/IADL re-training less for increased participation in daily functional activities.      Pain Level:  5/10 at both incision sites ( finger and wrist), pulling pain and tingling. Subjective:  Pt states no new changes. Objective:  Updated POC to be completed by 10th visit or 11/05/2021. INTERVENTION: COMPLETED: SPECIFICS/COMMENTS:   Modality:     MHP  Applied to the wrist during soft tissue massage and scar massage of the digits   Paraffin + MHP x x15 mins with increasing stretch to increase soft tissue elasticity. AROM:     Wrist x Flexion, extension, radial and ulnar deviation x15   Forearm  x Supination, pronation 2 x10   Digital x         -composite fist, flexion- done after doing hook and MP flex/ext 2 x's 10 rep's   -extension, all digits  -hook fist 2 sets of 10  -MCP table top 2 sets of 10   L MF x -Place and holds in extension x10   Thumb    -opposition to SF   -radial and palmar abduction-unable to radially abduction this date   AAROM:     Digital x -extension, all digits   L MF x -Place and holds in extension 2 sets x 10   PROM/Stretching:     Wrist x  Gentle -Flexion, extension, radial and ulnar deviation   Digital   x -composite fist, flexion  -Gentle -MCP table top x15 & x3 hook fist   Thumb  radial and palmar abduction        Scar Mass/Edema Control:     Retrograde massage X     To decrease swelling throughout the wrist and digits  -mesh sleeve MF to wear as needed   Soft tissue massage/scar massage x To decrease rigidity of the scar and decrease rigidity of the soft tissue to increase ROM and decrease pain   Strengthening:               Other:     HEP x Reviewed HEP and splinting instructions. Splint  x Changed straps this date over wrist and finger incision area with soft strap to ^ comfort. 2 blue sponges under MF PIP jt to ^ PIP ext. Steri strip removal  x Steri strips removed no sign of drainage      Assessment/Comments: Pt is making Fair + progress toward stated plan of care.  Pt tolerated session well and continues to progress with ROM in flexion of the RF and the wrist. Pt reports compliance with HEP and splinting instructions. Minimal pain increased throughout the session. WRIST Flexion 0-80* 5*- pain reported  35*         Extension 0-70* 45*  50*         Radial Deviation 0-20* 3*- pain reported  10*         Ulnar Deviation 0-30* 22*  25*            L Hand ROM     AROM [x]? PROM[]? IE  10/13          3rd Digit MCP Extension/Flexion   0-45 H /* 10*/44* 0/60          PIP Extension/Flexion   0*/100* 46*/65*  -45/75         DIP Extension/Flexion   0*/70-90* 0*/23* 0/68             -Rehab Potential: Good  -Requires OT Follow Up: Yes    Time In: 3:00 pm           Time Out:  4:00 pm             Visit #: 5         CODE  Minutes Time  Units   48678 Fluidotherapy      18066 Manual 20 3:40 pm - 4:00 pm 1   94924 Therapeutic Ex 25 3:15 pm - 3:40 pm 2   49025 Therapeutic Activity      91035 ADL/COMP Tech Train      [phone] Neuromuscular Re-Ed      64063 OrthoManagementTraining      75408 Paraffin 15 3:00 pm - 3:15 pm 1   36772 Electrical Stim - Attended      05564 Iontophoresis      12253 Ultrasound       Other        60  4       -Response to Treatment: tolerated well   Goals: Goals for pt can be see on initial eval occurring on IE    Plan:   [x]  Continues Plan of care: Treatment covered based on POC and graduated to patient's progress. Pt education continues at each visit to obtain maximum benefits from skilled OT intervention. []  Alter Plan of care:   []  Discharge:       Traci Chin/RAJAN, Padma Brad 87 #[id number]

## 2021-10-14 ENCOUNTER — TREATMENT (OUTPATIENT)
Dept: OCCUPATIONAL THERAPY | Age: 41
End: 2021-10-14
Payer: COMMERCIAL

## 2021-10-14 DIAGNOSIS — S62.613B DISPLACED FRACTURE OF PROXIMAL PHALANX OF LEFT MIDDLE FINGER, INITIAL ENCOUNTER FOR OPEN FRACTURE: Primary | ICD-10-CM

## 2021-10-14 PROCEDURE — 97110 THERAPEUTIC EXERCISES: CPT | Performed by: OCCUPATIONAL THERAPIST

## 2021-10-14 PROCEDURE — 97018 PARAFFIN BATH THERAPY: CPT | Performed by: OCCUPATIONAL THERAPIST

## 2021-10-14 PROCEDURE — 97140 MANUAL THERAPY 1/> REGIONS: CPT | Performed by: OCCUPATIONAL THERAPIST

## 2021-10-14 NOTE — PROGRESS NOTES
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 Tamiko Avila  Specific Practitioner Orders: May progress with active and passive range of motion to the hand.  Splint fabrication.  Modalities as needed     Assessment of current deficits   []? Functional mobility                         [x]? ADLs          [x]? Strength                  []? Cognition   []? Functional transfers           [x]? IADLs         []? Safety Awareness  [x]? Endurance   [x]? Fine Motor Coordination    []? Balance      []? Vision/perception   [x]? Sensation     [x]? Gross Motor Coordination [x]? ROM           [x]? Pain                        [x]? Edema          [x]? Scar Adhesion/Skin Integrity      OT PLAN OF CARE   OT POC based on physician orders, patient diagnosis and results of clinical assessment     Frequency/Duration:1-2x/week for 12-18 visits ( pending C9 approval)   Specific OT Treatment to include:      [x]? Instruction in HEP                   Modalities:  [x]?  Therapeutic Exercise                 [x]? Ultrasound               [x]? Electrical Stimulation/Attended  [x]? PROM/Stretching                    [x]? Fluidotherapy          [x]?   Paraffin                   [x]? AAROM  [x]?  AROM                 [x]? Iontophoresis:   [x]? Fabricio Massing                                       []? Neuromuscular Re-Ed            [x]? ADL/IADL re-training    [x]?  Therapeutic Activity                  [x]? Pain Management with/without modalities PRN                 [x]?  Manual Therapy                      [x]? Splinting                                   [x]? Scar Management                   []?Joint Protection/Training  []? Ergonomics                             [x]?  Joint Mobilization                      [x]? Adaptive EquipmentAssessment/Training                             [x]?  Manual Edema Mobilization   [x]?  Myofascial Release                 []? Energy Conservation/Work Simplification  [x]? GM/FM Coordination                [x]? Safety retraining/education per  individual diagnosis/goals  [x]? Desensitization        Patient Specific Goal: Pt would like to return to making a full fist.                              GOALS (Long term same as Short term):  1) Patient will demonstrate good understanding of home program (exercises/activities/diagnosis/prognosis/goals) with good accuracy. 2) Patient will demonstrate increased active/passive range of motion of their LUE to [facility] for ADL/IADL completion. 3) Patient will demonstrate increased /pinch strength of at least 10 / 3-5 pinch pounds of their L hand. 4) Patient to report decreased pain in their affected L upper extremity from 5/10 to 2/10 or less with resistive functional use. 5) Increase in fine motor function as evidenced by decreased time to complete 9-hole peg test and/or MRMT test by at least 5-10 seconds. 6) Patient to report 100% compliance with their splint wear, care, and precautions if needed. 7) Patient will be knowledgeable of edema control techniques as evident with decreases from 8 to mild/none. 9) Patient will demonstrate a non-tender/non-adherent scar. 10) Patient will report ADL functions as Mod I/I using LUE. 11) Patient will demonstrate improved functional activity tolerance from poor to fair for ADL/IADL completion.   12) Patient will decrease QuickDASH score strip removal  x Steri strips removed no sign of drainage      Assessment/Comments: Pt is making Fair + progress toward stated plan of care. Pt tolerated session well and continues to progress with ROM in flexion of the RF and the wrist. Pt reports compliance with HEP and splinting instructions. Minimal pain increased throughout the session. Added extension tube to assist with extensor lag and focused on improving wrist and MCP motion with extension place and holds. Continue to focus on progression without increased pain. IE  10/13  WRIST Flexion 0-80* 5*- pain reported  35*         Extension 0-70* 45*  50*         Radial Deviation 0-20* 3*- pain reported  10*         Ulnar Deviation 0-30* 22*  25*            L Hand ROM     AROM [x]? PROM[]? IE  10/13          3rd Digit MCP Extension/Flexion   0-45 H /* 10*/44* 0/60          PIP Extension/Flexion   0*/100* 46*/65*  -45/75         DIP Extension/Flexion   0*/70-90* 0*/23* 0/68             -Rehab Potential: Good  -Requires OT Follow Up: Yes    Time In: 3:00 pm           Time Out:  4:00 pm             Visit #: 6         CODE  Minutes Time  Units   00764 Fluidotherapy      07410 Manual 20 3:40 pm - 4:00 pm 1   53147 Therapeutic Ex 25 3:15 pm - 3:40 pm 2   40510 Therapeutic Activity      27789 ADL/COMP Tech Train      [id number] Neuromuscular Re-Ed      84711 OrthoManagementTraining      43633 Paraffin 15 3:00 pm - 3:15 pm 1   80151 Electrical Stim - Attended      82202 Iontophoresis      81696 Ultrasound       Other        60  4       -Response to Treatment: tolerated well   Goals: Goals for pt can be see on initial eval occurring on IE    Plan:   [x]  Continues Plan of care: Treatment covered based on POC and graduated to patient's progress. Pt education continues at each visit to obtain maximum benefits from skilled OT intervention.   []  Alter Plan of care:   []  Discharge:      Twanna Leyden, [place] R/L, Padma Brad [address] #[id number]

## 2021-10-18 ENCOUNTER — TREATMENT (OUTPATIENT)
Dept: OCCUPATIONAL THERAPY | Age: 41
End: 2021-10-18
Payer: COMMERCIAL

## 2021-10-18 DIAGNOSIS — S62.613B DISPLACED FRACTURE OF PROXIMAL PHALANX OF LEFT MIDDLE FINGER, INITIAL ENCOUNTER FOR OPEN FRACTURE: Primary | ICD-10-CM

## 2021-10-18 PROCEDURE — 97140 MANUAL THERAPY 1/> REGIONS: CPT | Performed by: OCCUPATIONAL THERAPIST

## 2021-10-18 PROCEDURE — 97110 THERAPEUTIC EXERCISES: CPT | Performed by: OCCUPATIONAL THERAPIST

## 2021-10-18 PROCEDURE — 97018 PARAFFIN BATH THERAPY: CPT | Performed by: OCCUPATIONAL THERAPIST

## 2021-10-18 NOTE — PROGRESS NOTES
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 Caesar Aviles  Specific Practitioner Orders: May progress with active and passive range of motion to the hand.  Splint fabrication.  Modalities as needed     Assessment of current deficits   []? Functional mobility                         [x]? ADLs          [x]? Strength                  []? Cognition   []? Functional transfers           [x]? IADLs         []? Safety Awareness  [x]? Endurance   [x]? Fine Motor Coordination    []? Balance      []? Vision/perception   [x]? Sensation     [x]? Gross Motor Coordination [x]? ROM           [x]? Pain                        [x]? Edema          [x]? Scar Adhesion/Skin Integrity      OT PLAN OF CARE   OT POC based on physician orders, patient diagnosis and results of clinical assessment     Frequency/Duration:1-2x/week for 12-18 visits ( pending C9 approval)   Specific OT Treatment to include:      [x]? Instruction in HEP                   Modalities:  [x]?  Therapeutic Exercise                 [x]? Ultrasound               [x]? Electrical Stimulation/Attended  [x]? PROM/Stretching                    [x]? Fluidotherapy          [x]?   Paraffin                   [x]? AAROM  [x]?  AROM                 [x]? Iontophoresis:   [x]? Tra James                                       []? Neuromuscular Re-Ed            [x]? ADL/IADL to 40% or less for increased participation in daily functional activities.      Pain Level: 5/10 down the middle finger incision site, tingling      Subjective:  Pt states his finger has been hurting him since last night. Objective:  Updated POC to be completed by 10th visit or 11/05/2021. INTERVENTION: COMPLETED: SPECIFICS/COMMENTS:   Modality:     MHP  Applied to the wrist during soft tissue massage and scar massage of the digits   Paraffin + MHP x x15 mins with increasing stretch to increase soft tissue elasticity. AROM:     Wrist x Flexion, extension, radial and ulnar deviation x15  - Jux a cizer x10 with elbow on table and full grasp. Forearm   Supination, pronation 2 x10   Digital x         -composite fist, flexion- done after doing hook and MP flex/ext 2 x's 10 rep's   -extension, all digits  -hook fist 2 sets of 10  -MCP table top 2 sets of 10   L MF x -Place and holds in extension x10  Functional grasp/release of pom poms to facilitate more MCP flexion. Thumb    -opposition to SF   -radial and palmar abduction-unable to radially abduction this date   AAROM:     Digital x -extension, all digits   L MF x -Place and holds in extension 2 sets x 10   PROM/Stretching:     Wrist x  Gentle -Flexion, extension, radial and ulnar deviation   Digital   x -composite fist, flexion  -Gentle -MCP table top x15 & x3 hook fist   Thumb  radial and palmar abduction        Scar Mass/Edema Control:     Retrograde massage X     To decrease swelling throughout the wrist and digits  -mesh sleeve MF to wear as needed   Soft tissue massage/scar massage x To decrease rigidity of the scar and decrease rigidity of the soft tissue to increase ROM and decrease pain   Strengthening:               Other:     HEP x Reviewed HEP and splinting instructions. Splint  x Changed straps this date over wrist and finger incision area with soft strap to ^ comfort. 2 blue sponges under MF PIP jt to ^ PIP ext.     - Issued extension tube to decrease extensor lag- wear on/off throughout the day & at night. Steri strip removal  x Steri strips removed no sign of drainage      Assessment/Comments: Pt is making Fair + progress toward stated plan of care. Pt tolerated session well and continues to progress with ROM in flexion of the RF and the wrist. Pt reports compliance with HEP and splinting instructions. Minimal pain increased throughout the session and does demonstrate better activation of the extensor tendon this date with continued extension lag. Pt continues to demonstrate good improvement with ROM and was able to complete x4 hook/fist this date without increased pain. Continue to progress towards established goals. IE  10/13  WRIST Flexion 0-80* 5*- pain reported  35*         Extension 0-70* 45*  50*         Radial Deviation 0-20* 3*- pain reported  10*         Ulnar Deviation 0-30* 22*  25*            L Hand ROM     AROM [x]? PROM[]? IE  10/13          3rd Digit MCP Extension/Flexion   0-45 H /* 10*/44* 0/60          PIP Extension/Flexion   0*/100* 46*/65*  -45/75         DIP Extension/Flexion   0*/70-90* 0*/23* 0/68             -Rehab Potential: Good  -Requires OT Follow Up: Yes    Time In: 4:00 pm           Time Out:  5:00 pm             Visit #: 7         CODE  Minutes Time  Units   46621 Fluidotherapy      69766 Manual 20 4:40 pm - 5:00 pm 1   27507 Therapeutic Ex 25 4:15 pm - 4:40 pm 2   17743 Therapeutic Activity      15929 ADL/COMP Tech Train      [id number] Neuromuscular Re-Ed      52136 OrthoManagementTraining      53300 Paraffin 15 4:00 pm - 4:15 pm 1   22835 Electrical Stim - Attended      95544 Iontophoresis      95133 Ultrasound       Other        60  4       -Response to Treatment: tolerated well   Goals: Goals for pt can be see on initial eval occurring on IE    Plan:   [x]  Continues Plan of care: Treatment covered based on POC and graduated to patient's progress.  Pt education continues at each visit to obtain maximum benefits from skilled OT intervention.   []  Alter Plan of care:   []  Discharge:      Bernard Elizabeth/RAJAN, Padma Brad [address] #[id number]

## 2021-10-20 ENCOUNTER — TREATMENT (OUTPATIENT)
Dept: OCCUPATIONAL THERAPY | Age: 41
End: 2021-10-20
Payer: COMMERCIAL

## 2021-10-20 DIAGNOSIS — S62.613B DISPLACED FRACTURE OF PROXIMAL PHALANX OF LEFT MIDDLE FINGER, INITIAL ENCOUNTER FOR OPEN FRACTURE: Primary | ICD-10-CM

## 2021-10-20 PROCEDURE — 97140 MANUAL THERAPY 1/> REGIONS: CPT | Performed by: OCCUPATIONAL THERAPIST

## 2021-10-20 PROCEDURE — 97110 THERAPEUTIC EXERCISES: CPT | Performed by: OCCUPATIONAL THERAPIST

## 2021-10-20 PROCEDURE — 97018 PARAFFIN BATH THERAPY: CPT | Performed by: OCCUPATIONAL THERAPIST

## 2021-10-20 NOTE — PROGRESS NOTES
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 Thang Dalton  Specific Practitioner Orders: May progress with active and passive range of motion to the hand.  Splint fabrication.  Modalities as needed     Assessment of current deficits   []? Functional mobility                         [x]? ADLs          [x]? Strength                  []? Cognition   []? Functional transfers           [x]? IADLs         []? Safety Awareness  [x]? Endurance   [x]? Fine Motor Coordination    []? Balance      []? Vision/perception   [x]? Sensation     [x]? Gross Motor Coordination [x]? ROM           [x]? Pain                        [x]? Edema          [x]? Scar Adhesion/Skin Integrity      OT PLAN OF CARE   OT POC based on physician orders, patient diagnosis and results of clinical assessment     Frequency/Duration:1-2x/week for 12-18 visits ( pending C9 approval)   Specific OT Treatment to include:      [x]? Instruction in HEP                   Modalities:  [x]?  Therapeutic Exercise                 [x]? Ultrasound               [x]? Electrical Stimulation/Attended  [x]? PROM/Stretching                    [x]? Fluidotherapy          [x]?   Paraffin                   [x]? AAROM  [x]?  AROM                 [x]? Iontophoresis:   [x]? Kathy oSrensen                                       []? Neuromuscular Re-Ed            [x]? ADL/IADL re-training    [x]?  Therapeutic Activity                  [x]? Pain Management with/without modalities PRN                 [x]?  Manual Therapy                      [x]? Splinting                                   [x]? Scar Management                   []?Joint Protection/Training  []? Ergonomics                             [x]?  Joint Mobilization                      [x]? Adaptive EquipmentAssessment/Training                             [x]?  Manual Edema Mobilization   [x]?  Myofascial Release                 []? Energy Conservation/Work Simplification  [x]? GM/FM Coordination                [x]? Safety retraining/education per  individual diagnosis/goals  [x]? Desensitization        Patient Specific Goal: Pt would like to return to making a full fist.                              GOALS (Long term same as Short term):  1) Patient will demonstrate good understanding of home program (exercises/activities/diagnosis/prognosis/goals) with good accuracy. 2) Patient will demonstrate increased active/passive range of motion of their LUE to [facility] for ADL/IADL completion. 3) Patient will demonstrate increased /pinch strength of at least 10 / 3-5 pinch pounds of their L hand. 4) Patient to report decreased pain in their affected L upper extremity from 5/10 to 2/10 or less with resistive functional use. 5) Increase in fine motor function as evidenced by decreased time to complete 9-hole peg test and/or MRMT test by at least 5-10 seconds. 6) Patient to report 100% compliance with their splint wear, care, and precautions if needed. 7) Patient will be knowledgeable of edema control techniques as evident with decreases from 8 to mild/none. 9) Patient will demonstrate a non-tender/non-adherent scar. 10) Patient will report ADL functions as Mod I/I using LUE. 11) Patient will demonstrate improved functional activity tolerance from poor to fair for ADL/IADL completion.   12) Patient will decrease QuickDASH score to 40% or less for increased participation in daily functional activities.      Pain Level: 3-4/10 down the middle finger incision site, tingling      Subjective:  Pt states he feels his finger is already much better since before surgery. Objective:  Updated POC to be completed by 10th visit or 11/05/2021. INTERVENTION: COMPLETED: SPECIFICS/COMMENTS:   Modality:     MHP  Applied to the wrist during soft tissue massage and scar massage of the digits   Paraffin + MHP x x15 mins with increasing stretch to increase soft tissue elasticity. AROM:     Wrist X        X  Flexion, extension, radial and ulnar deviation x15  -Jux a cizer x10 with elbow on table and full grasp.   -Ball Wrist ROM bilaterally: wrist flexion/extension/RD/UD x 15 reps ea   Forearm   Supination, pronation 2 x10   Digital X  X  X  X   X  X    X  -composite fist, flexion- done after doing -hook and MP flex/ext 2 x's 10 rep's   -Hook to intrinsic plus, back to hook x 15 reps  -PIP/DIP Isolated MF x 15 reps ea  -extension, all digits  -hook fist 2 sets of 10  -MCP table top 2 sets of 10   L MF x -Place and holds in extension x10  Functional grasp/release of pom poms to facilitate more MCP flexion.     Thumb    -opposition to SF   -radial and palmar abduction   AAROM:     Digital x -extension, all digits   L MF x -Place and holds in extension 2 sets x 10   PROM/Stretching:     Wrist x  Gentle -Flexion, extension, radial and ulnar deviation   Digital X  X  X  -composite fist, flexion  -Isolated MF ext/flex  -Gentle -MCP table top x15 & x3 hook fist   Thumb  radial and palmar abduction   Self Stretching of wrist x -[employer] for Wrist extension: x 12 reps with 10 sec holds   Scar Mass/Edema Control:     Retrograde massage x     To decrease swelling throughout the wrist and digits  -mesh sleeve MF to wear as needed   Soft tissue massage/scar massage x To decrease rigidity of the scar and decrease rigidity of the soft tissue to increase ROM and decrease pain   Strengthening:               Other:     HEP x Reviewed HEP and splinting instructions. Splint   Changed straps this date over wrist and finger incision area with soft strap to ^ comfort. 2 blue sponges under MF PIP jt to ^ PIP ext. - Issued extension tube to decrease extensor lag- wear on/off throughout the day & at night. Steri strip removal  x Steri strips removed no sign of drainage      Assessment/Comments: Pt is making Fair + progress toward stated plan of care. Pt tolerated session well with improved PIP/DIP flexion noted of the MF. Fairly slight DIP/PIP hold with ext place and holds noted, not much improved since prior to surgery. Moderate edema remains over the proximal phalanx of the MF. Moderate stiffness in the wrist - enhanced wrist active ROM and stretching to increase wrist mobility with flex/ext. Will continue progressing toward enhancing function of the L hand through addressing MF and wrist ROM. -Rehab Potential: Good  -Requires OT Follow Up: Yes    Time In: 10:00 am           Time Out:  11:00 am             Visit #: 8         CODE  Minutes Time  Units   99730 Fluidotherapy      88382 Manual 20 10:10 am - 10:30 am 1   80943 Therapeutic Ex 30 10:30 am - 11:00 am 2   06866 Therapeutic Activity      92905 ADL/COMP Tech Train      37225 Neuromuscular Re-Ed      41891 OrthoManagementTraining      18743 Paraffin 10 10:00 am - 10:10 am 1   14258 Electrical Stim - Attended      90815 Iontophoresis      98060 Ultrasound       Other        60  4        -Response to Treatment: Improved tolerance for PROM and AROM exercises. Plan:   [x]  Continues Plan of care: Treatment covered based on POC and graduated to patient's progress. Pt education continues at each visit to obtain maximum benefits from skilled OT intervention.   []  Alter Plan of care:   []  Discharge:      Yady Coon OT R/L, Padma Brad [address], #[id number]

## 2021-10-22 ENCOUNTER — TREATMENT (OUTPATIENT)
Dept: OCCUPATIONAL THERAPY | Age: 41
End: 2021-10-22
Payer: COMMERCIAL

## 2021-10-22 DIAGNOSIS — S62.613B DISPLACED FRACTURE OF PROXIMAL PHALANX OF LEFT MIDDLE FINGER, INITIAL ENCOUNTER FOR OPEN FRACTURE: Primary | ICD-10-CM

## 2021-10-22 PROCEDURE — 97035 APP MDLTY 1+ULTRASOUND EA 15: CPT | Performed by: OCCUPATIONAL THERAPIST

## 2021-10-22 PROCEDURE — 97140 MANUAL THERAPY 1/> REGIONS: CPT | Performed by: OCCUPATIONAL THERAPIST

## 2021-10-22 PROCEDURE — 97018 PARAFFIN BATH THERAPY: CPT | Performed by: OCCUPATIONAL THERAPIST

## 2021-10-22 PROCEDURE — 97110 THERAPEUTIC EXERCISES: CPT | Performed by: OCCUPATIONAL THERAPIST

## 2021-10-22 NOTE — PROGRESS NOTES
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 Claim # [id number]  Plan of care signed (Y/N): Y  Visit# / total visits: -18 ( 3x/week for 6 weeks for 18 visits: 10/06/2021 - 21)     Referring Practitioner: Dr. Rudolph Blakely  Specific Practitioner Orders: May progress with active and passive range of motion to the hand.  Splint fabrication.  Modalities as needed     Assessment of current deficits   []? Functional mobility                         [x]? ADLs          [x]? Strength                  []? Cognition   []? Functional transfers           [x]? IADLs         []? Safety Awareness  [x]? Endurance   [x]? Fine Motor Coordination    []? Balance      []? Vision/perception   [x]? Sensation     [x]? Gross Motor Coordination [x]? ROM           [x]? Pain                        [x]? Edema          [x]? Scar Adhesion/Skin Integrity      OT PLAN OF CARE   OT POC based on physician orders, patient diagnosis and results of clinical assessment     Frequency/Duration:1-2x/week for 12-18 visits ( pending C9 approval)   Specific OT Treatment to include:      [x]? Instruction in HEP                   Modalities:  [x]?  Therapeutic Exercise                 [x]? Ultrasound               [x]? Electrical Stimulation/Attended  [x]? PROM/Stretching                    [x]? Fluidotherapy          [x]?   Paraffin                   [x]? AAROM  [x]?  AROM                 [x]? Iontophoresis:   [x]? Kendy Cadena                                       []? Neuromuscular Re-Ed            [x]? ADL/IADL re-training    [x]?  Therapeutic Activity                  [x]? Pain Management with/without modalities PRN                 [x]?  Manual Therapy                      [x]? Splinting                                   [x]? Scar Management                   []?Joint Protection/Training  []? Ergonomics                             [x]?  Joint Mobilization                      [x]? Adaptive EquipmentAssessment/Training                             [x]?  Manual Edema Mobilization   [x]?  Myofascial Release                 []? Energy Conservation/Work Simplification  [x]? GM/FM Coordination                [x]? Safety retraining/education per  individual diagnosis/goals  [x]? Desensitization        Patient Specific Goal: Pt would like to return to making a full fist.                              GOALS (Long term same as Short term):  1) Patient will demonstrate good understanding of home program (exercises/activities/diagnosis/prognosis/goals) with good accuracy. 2) Patient will demonstrate increased active/passive range of motion of their [facility] to [facility] for ADL/IADL completion. 3) Patient will demonstrate increased /pinch strength of at least 10 / 3-5 pinch pounds of their L hand. 4) Patient to report decreased pain in their affected L upper extremity from 5/10 to 2/10 or less with resistive functional use. 5) Increase in fine motor function as evidenced by decreased time to complete 9-hole peg test and/or MRMT test by at least 5-10 seconds. 6) Patient to report 100% compliance with their splint wear, care, and precautions if needed. 7) Patient will be knowledgeable of edema control techniques as evident with decreases from 8 to mild/none. 9) Patient will demonstrate a non-tender/non-adherent scar. 10) Patient will report ADL functions as Mod I/I using LUE. 11) Patient will demonstrate improved functional activity tolerance from poor to fair for ADL/IADL completion.   12) Patient will decrease QuickDASH score to 40% or less for increased participation in daily functional activities.      Pain Level: no pain reported during this session but did experience some shooting pain along middle finger last night     Subjective:  Pt states, \"I'm seeing a little more movement in that middle finger. \"     Objective:  Updated POC to be completed by 10th visit or 11/05/2021. INTERVENTION: COMPLETED: SPECIFICS/COMMENTS:   Modality:     MHP  Applied to the wrist during soft tissue massage and scar massage of the digits   Ultrasound x Ultrasound to scars along dorsal hand and wrist to reduce scar tissue and pain  Duration: 8 minutes  Intensity: 0.7  Frequency: 3.3Mhz  Duty cycle: continuous   Paraffin + MHP x x15 mins with increasing stretch to increase soft tissue elasticity. AROM:     Wrist          Flexion, extension, radial and ulnar deviation x15  -Jux a cizer x10 with elbow on table and full grasp.   -Ball Wrist ROM bilaterally: wrist flexion/extension/RD/UD x 15 reps ea   Forearm   Supination, pronation 2 x10   Digital X               -composite fist, flexion x5  -hook and MP flex/ext 2 x's 10 rep's   -Hook to intrinsic plus, back to hook x 15 reps  -PIP/DIP Isolated MF x 15 reps ea  -extension, all digits  -hook fist 2 sets of 10  -MCP table top 2 sets of 10   L MF x -Place and holds in extension x10  Functional grasp/release of pom poms to facilitate more MCP flexion.     Thumb    -opposition to SF   -radial and palmar abduction   AAROM:     Digital x -extension, all digits   L MF x -Place and holds in extension 2 sets x 10   PROM/Stretching:     Wrist x  Gentle -Flexion, extension, radial and ulnar deviation, pronation/supination with 2-3 sec holds   Digital X     -composite fist, flexion x10 middle and index finger  -place and holds index and middle finger flexion x5  -Isolated MF ext/flex  -Gentle -MCP table top x15 & x3 hook fist   Thumb  radial and palmar abduction   Self Stretching of wrist  -Power Web [employer] for Wrist extension: x 12 reps with 10 sec holds   Scar Mass/Edema Control:     Retrograde massage      To decrease swelling throughout the wrist and digits  -mesh sleeve MF to wear as needed   Soft tissue massage/scar massage x To decrease rigidity of the scar and decrease rigidity of the soft tissue to increase ROM and decrease pain   Strengthening:               Other:     HEP x Reviewed HEP and splinting instructions. Splint   Changed straps this date over wrist and finger incision area with soft strap to ^ comfort. 2 blue sponges under MF PIP jt to ^ PIP ext. - Issued extension tube to decrease extensor lag- wear on/off throughout the day & at night. Steri strip removal  x Steri strips removed no sign of drainage      Assessment/Comments: Pt is making Fair + progress toward stated plan of care. Pt presented to session with splint and compression sleeve donned. Pt tolerated session well and noted more movement in the middle finger during exercises. Stiffness and swelling still present around MCP. Extension lag noted in middle finger while completing exercises. -Rehab Potential: Good  -Requires OT Follow Up: Yes    Time In: 10:00 am           Time Out:  11:00 am             Visit #: 8         CODE  Minutes Time  Units   43680 Fluidotherapy      73605 Manual 17 10:23-10:40 1   08811 Therapeutic Ex 20 10:40-11 1   85558 Therapeutic Activity      01502 ADL/COMP Tech Train      60580 Neuromuscular Re-Ed      56481 OrthoManagementTraining      99121 Paraffin 15 10:00 am - 10:15 am 1   14212 Electrical Stim - Attended      64589 Iontophoresis      00159 Ultrasound 8 10:15-10:23 1    Other        60  4        -Response to Treatment: Improved tolerance for PROM and AROM exercises. Plan:   [x]  Continues Plan of care: Treatment covered based on POC and graduated to patient's progress. Pt education continues at each visit to obtain maximum benefits from skilled OT intervention.   []  Alter Plan of care:

## 2021-10-25 ENCOUNTER — TREATMENT (OUTPATIENT)
Dept: OCCUPATIONAL THERAPY | Age: 41
End: 2021-10-25
Payer: COMMERCIAL

## 2021-10-25 DIAGNOSIS — S62.613B DISPLACED FRACTURE OF PROXIMAL PHALANX OF LEFT MIDDLE FINGER, INITIAL ENCOUNTER FOR OPEN FRACTURE: Primary | ICD-10-CM

## 2021-10-25 LAB
FUNGUS (MYCOLOGY) CULTURE: NORMAL
FUNGUS STAIN: NORMAL

## 2021-10-25 PROCEDURE — 97140 MANUAL THERAPY 1/> REGIONS: CPT | Performed by: OCCUPATIONAL THERAPIST

## 2021-10-25 PROCEDURE — 97018 PARAFFIN BATH THERAPY: CPT | Performed by: OCCUPATIONAL THERAPIST

## 2021-10-25 PROCEDURE — 97035 APP MDLTY 1+ULTRASOUND EA 15: CPT | Performed by: OCCUPATIONAL THERAPIST

## 2021-10-25 PROCEDURE — 97110 THERAPEUTIC EXERCISES: CPT | Performed by: OCCUPATIONAL THERAPIST

## 2021-10-25 NOTE — PROGRESS NOTES
[x]? ADL/IADL re-training    [x]?  Therapeutic Activity                  [x]? Pain Management with/without modalities PRN                 [x]?  Manual Therapy                      [x]? Splinting                                   [x]? Scar Management                   []?Joint Protection/Training  []? Ergonomics                             [x]?  Joint Mobilization                      [x]? Adaptive EquipmentAssessment/Training                             [x]?  Manual Edema Mobilization   [x]?  Myofascial Release                 []? Energy Conservation/Work Simplification  [x]? GM/FM Coordination                [x]? Safety retraining/education per  individual diagnosis/goals  [x]? Desensitization        Patient Specific Goal: Pt would like to return to making a full fist.                              GOALS (Long term same as Short term):  1) Patient will demonstrate good understanding of home program (exercises/activities/diagnosis/prognosis/goals) with good accuracy. Goal met/ progressing with new HEP. 2) Patient will demonstrate increased active/passive range of motion of their LUE to [facility] for ADL/IADL completion. Goal  progressing  3) Patient will demonstrate increased /pinch strength of at least 10 / 3-5 pinch pounds of their L hand. Goal not assessed   4) Patient to report decreased pain in their affected L upper extremity from 5/10 to 2/10 or less with resistive functional use. Goal met. 5) Increase in fine motor function as evidenced by decreased time to complete 9-hole peg test and/or MRMT test by at least 5-10 seconds. Goal not assessed  6) Patient to report 100% compliance with their splint wear, care, and precautions if needed. Goal met and progresses with new splints. 7) Patient will be knowledgeable of edema control techniques as evident with decreases from 8 to mild/none. Goal  progressing  9) Patient will demonstrate a non-tender/non-adherent scar. Goal progressing slowly.    10) Patient will report ADL functions as Mod I/I using LUE. Goal progressing  11) Patient will demonstrate improved functional activity tolerance from poor to fair for ADL/IADL completion. Goal progressing  12) Patient will decrease QuickDASH score to 40% or less for increased participation in daily functional activities. Goal  Progressing well.     Pain Level: no pain reported during this session but did experience some shooting pain along middle finger last night     Subjective:  Pt states, \"This splint is causing me pain at the bottom of my finger (extension tube)\"     Objective:  Updated POC to be completed by 10th visit or 11/05/2021. INTERVENTION: COMPLETED: SPECIFICS/COMMENTS:   Modality:     MHP  Applied to the wrist during soft tissue massage and scar massage of the digits   Ultrasound x Ultrasound to scars along dorsal hand and wrist to reduce scar tissue and pain  Duration: 8 minutes  Intensity: 0.7  Frequency: 3.3Mhz  Duty cycle: continuous   Paraffin + MHP x x15 mins with increasing stretch to increase soft tissue elasticity. AROM:     Wrist x    x     Flexion, extension, radial and ulnar deviation x15  -Jux a cizer x10 with elbow on table and full grasp.   -Ball Wrist ROM bilaterally: wrist flexion/extension/RD/UD x 15 reps ea   Forearm   Supination, pronation 2 x10   Digital X               -composite fist, flexion x5  -hook and MP flex/ext 2 x's 10 rep's   -Hook to intrinsic plus, back to hook x 15 reps  -PIP/DIP Isolated MF x 15 reps ea  -extension, all digits  -hook fist 2 sets of 10  -MCP table top 2 sets of 10   L MF x -Place and holds in extension x10  Functional grasp/release of pom poms to facilitate more MCP flexion.     Thumb x   -opposition to MF  -radial and palmar abduction   AAROM:     Digital x -extension, all digits   L MF x -Place and holds in extension 2 sets x 10   PROM/Stretching:     Wrist x  Gentle -Flexion, extension, radial and ulnar deviation, pronation/supination with 2-3 sec holds Digital X     -composite fist, flexion x10 middle and index finger  -place and holds index and middle finger flexion x5  -Isolated MF ext/flex  -Gentle -MCP table top x15 & x3 hook fist   Thumb  radial and palmar abduction   Self Stretching of wrist  -Power Web [employer] for Wrist extension: x 12 reps with 10 sec holds   Scar Mass/Edema Control:     Retrograde massage x     To decrease swelling throughout the wrist and digits  -mesh sleeve MF to wear as needed   Soft tissue massage/scar massage x To decrease rigidity of the scar and decrease rigidity of the soft tissue to increase ROM and decrease pain   Strengthening:               Other:     HEP x Reviewed HEP and splinting instructions. Splint   Changed straps this date over wrist and finger incision area with soft strap to ^ comfort. 2 blue sponges under MF PIP jt to ^ PIP ext. - Issued extension tube to decrease extensor lag- wear on/off throughout the day & at night. Steri strip removal  x Steri strips removed no sign of drainage      Assessment/Comments: Pt is making Fair + progress toward stated plan of care. Pt tolerated session well with reassessment completed this date. Pt progressing well with increase in flexion measures with all joints. Extension lag of the PIP joint continues to remain 45*. Discussed splinting modifications for the extension tube that is causing increased irritation to the palm of the hand at the base of the MF. Session ended prior to splint being modified, will modify in following sessions. L Upper Extremity ROM       AROM [x]? PROM[]? IE  10/25          FOREARM Pronation 80-90* TBA full          Supination 80-90* TBA full           WRIST Flexion 0-80* 5*- pain reported 40           Extension 0-70* 45*  45          Radial Deviation 0-20* 3*- pain reported  10          Ulnar Deviation 0-30* 22*  25           L Hand ROM     AROM [x]? PROM[]?  IE   10/25          3rd Digit MCP Extension/Flexion 0-45 H /* 10*/44*  0/60          PIP Extension/Flexion   0*/100* 46*/65*  -45/85          DIP Extension/Flexion   0*/70-90* 0*/23*  0/75             -Rehab Potential: Good  -Requires OT Follow Up: Yes    Time In: 3:00 pm           Time Out:  4:00 pm             Visit #: 10      CODE  Minutes Time  Units   91429 Fluidotherapy      01097 Manual 17 3:23-3:40 1   98581 Therapeutic Ex 20 3:40-4 1   14041 Therapeutic Activity      04647 ADL/COMP Tech Train      72518 Neuromuscular Re-Ed      19186 OrthoManagementTraining      47003 Paraffin 15 3:00 pm - 3:15 pm 1   86762 Electrical Stim - Attended      79741 Iontophoresis      75122 Ultrasound 8 3:15-3:23 1    Other        60  4        -Response to Treatment: Improved tolerance for PROM and AROM exercises. Plan:   [x]  Continues Plan of care: Treatment covered based on POC and graduated to patient's progress. Pt education continues at each visit to obtain maximum benefits from skilled OT intervention. []  Alter Plan of care:   []  Discharge:       [address] [facility], OT R/L, Padma Brad 87, #[id number]

## 2021-10-27 ENCOUNTER — TREATMENT (OUTPATIENT)
Dept: OCCUPATIONAL THERAPY | Age: 41
End: 2021-10-27
Payer: COMMERCIAL

## 2021-10-27 DIAGNOSIS — S62.613B DISPLACED FRACTURE OF PROXIMAL PHALANX OF LEFT MIDDLE FINGER, INITIAL ENCOUNTER FOR OPEN FRACTURE: Primary | ICD-10-CM

## 2021-10-27 PROCEDURE — 97140 MANUAL THERAPY 1/> REGIONS: CPT | Performed by: OCCUPATIONAL THERAPIST

## 2021-10-27 PROCEDURE — 97018 PARAFFIN BATH THERAPY: CPT | Performed by: OCCUPATIONAL THERAPIST

## 2021-10-27 PROCEDURE — 97110 THERAPEUTIC EXERCISES: CPT | Performed by: OCCUPATIONAL THERAPIST

## 2021-10-27 NOTE — PROGRESS NOTES
re-training    [x]?  Therapeutic Activity                  [x]? Pain Management with/without modalities PRN                 [x]?  Manual Therapy                      [x]? Splinting                                   [x]? Scar Management                   []?Joint Protection/Training  []? Ergonomics                             [x]?  Joint Mobilization                      [x]? Adaptive EquipmentAssessment/Training                             [x]?  Manual Edema Mobilization   [x]?  Myofascial Release                 []? Energy Conservation/Work Simplification  [x]? GM/FM Coordination                [x]? Safety retraining/education per  individual diagnosis/goals  [x]? Desensitization        Patient Specific Goal: Pt would like to return to making a full fist.                              GOALS (Long term same as Short term):  1) Patient will demonstrate good understanding of home program (exercises/activities/diagnosis/prognosis/goals) with good accuracy. Goal met/ progressing with new HEP. 2) Patient will demonstrate increased active/passive range of motion of their LUE to [facility] for ADL/IADL completion. Goal  progressing  3) Patient will demonstrate increased /pinch strength of at least 10 / 3-5 pinch pounds of their L hand. Goal not assessed   4) Patient to report decreased pain in their affected L upper extremity from 5/10 to 2/10 or less with resistive functional use. Goal met. 5) Increase in fine motor function as evidenced by decreased time to complete 9-hole peg test and/or MRMT test by at least 5-10 seconds. Goal not assessed  6) Patient to report 100% compliance with their splint wear, care, and precautions if needed. Goal met and progresses with new splints. 7) Patient will be knowledgeable of edema control techniques as evident with decreases from 8 to mild/none. Goal  progressing  9) Patient will demonstrate a non-tender/non-adherent scar. Goal progressing slowly.    10) Patient will report ADL functions as Mod I/I using LUE. Goal progressing  11) Patient will demonstrate improved functional activity tolerance from poor to fair for ADL/IADL completion. Goal progressing  12) Patient will decrease QuickDASH score to 40% or less for increased participation in daily functional activities. Goal  Progressing well.     Pain Level: none     Subjective:  Pt states no new changes     Objective:  Updated POC to be completed by 10th visit or 11/05/2021. INTERVENTION: COMPLETED: SPECIFICS/COMMENTS:   Modality:     MHP  Applied to the wrist during soft tissue massage and scar massage of the digits   Ultrasound  Ultrasound to scars along dorsal hand and wrist to reduce scar tissue and pain  Duration: 8 minutes  Intensity: 0.7  Frequency: 3.3Mhz  Duty cycle: continuous   Paraffin + MHP x x15 mins with increasing stretch to increase soft tissue elasticity. AROM:     Wrist x         Flexion, extension, radial and ulnar deviation x15  -Jux a cizer x10 with elbow on table and full grasp.   -Ball Wrist ROM: wrist flexion extension, side to side, clockwise and counter clockwise   Forearm   Supination, pronation 2 x10   Digital X               -composite fist, flexion x5  -hook and MP flex/ext 2 x's 10 rep's   -Hook to intrinsic plus, back to hook x 15 reps  -PIP/DIP Isolated MF x 15 reps ea  -extension, all digits  -hook fist 2 sets of 10  -MCP table top 2 sets of 10   L MF x -Place and holds in extension x10  Functional grasp/release of pom poms to facilitate more MCP flexion.     Thumb    -opposition to MF  -radial and palmar abduction   AAROM:     Digital x -extension, all digits   L MF x -Place and holds in extension 2 sets x 10   PROM/Stretching:     Wrist x  Gentle -Flexion, extension, radial and ulnar deviation, pronation/supination with 2-3 sec holds   Digital X     -composite fist, flexion x10 middle and index finger  -place and holds index and middle finger flexion x5  -Isolated MF ext/flex  -Gentle -MCP table top x15 & x3 hook fist   Thumb  radial and palmar abduction   Self Stretching of wrist  -Power Web [employer] for Wrist extension: x 12 reps with 10 sec holds   Scar Mass/Edema Control:     Retrograde massage x     To decrease swelling throughout the wrist and digits  -mesh sleeve MF to wear as needed   Soft tissue massage/scar massage x To decrease rigidity of the scar and decrease rigidity of the soft tissue to increase ROM and decrease pain   Strengthening:               Other:     HEP x Reviewed HEP and splinting instructions. Splint   Changed straps this date over wrist and finger incision area with soft strap to ^ comfort. 2 blue sponges under MF PIP jt to ^ PIP ext. - Issued extension tube to decrease extensor lag- wear on/off throughout the day & at night. Steri strip removal  x Steri strips removed no sign of drainage      Assessment/Comments: Pt is making Fair + progress toward stated plan of care. Pt tolerated session well with good improvement of AROM with continued extension lag. Modified splint for more MCP flexion and PIP extension with good results. Pt is to bring in extension tube next session to modify due to irritation noted on the volar portion of the finger. Continue with AROM as tolerated and attempting to improve extension.     -Rehab Potential: Good  -Requires OT Follow Up: Yes    Time In: 3:00 pm           Time Out:  4:00 pm             Visit #: 11      CODE  Minutes Time  Units   38745 Fluidotherapy      14261 Manual 20 3:40 p - 4:00 p 1   80476 Therapeutic Ex 25 3:15 p - 3:40 p 2   18103 Therapeutic Activity      84285 ADL/COMP Tech Train      51667 Neuromuscular Re-Ed      22002 OrthoManagementTraining      69978 Paraffin 15 3:00 pm - 3:15 pm 1   18423 Electrical Stim - Attended      47333 Iontophoresis      41062 Ultrasound       Other        60  4        -Response to Treatment: Improved tolerance for PROM and AROM exercises.     Plan:   [x]  Continues Plan of care: Treatment covered based on POC and graduated to patient's progress. Pt education continues at each visit to obtain maximum benefits from skilled OT intervention.   []  Alter Plan of care:   []  Discharge:      Caroline Gardiner, Traci CHILEL/RAJAN, Padma Brad [address] #[id number]

## 2021-10-28 ENCOUNTER — TREATMENT (OUTPATIENT)
Dept: OCCUPATIONAL THERAPY | Age: 41
End: 2021-10-28
Payer: COMMERCIAL

## 2021-10-28 DIAGNOSIS — S62.613B DISPLACED FRACTURE OF PROXIMAL PHALANX OF LEFT MIDDLE FINGER, INITIAL ENCOUNTER FOR OPEN FRACTURE: Primary | ICD-10-CM

## 2021-10-28 PROCEDURE — 97110 THERAPEUTIC EXERCISES: CPT | Performed by: OCCUPATIONAL THERAPIST

## 2021-10-28 PROCEDURE — 97140 MANUAL THERAPY 1/> REGIONS: CPT | Performed by: OCCUPATIONAL THERAPIST

## 2021-10-28 PROCEDURE — 97018 PARAFFIN BATH THERAPY: CPT | Performed by: OCCUPATIONAL THERAPIST

## 2021-10-28 NOTE — PROGRESS NOTES
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 Pauline Mandujano  Specific Practitioner Orders: May progress with active and passive range of motion to the hand.  Splint fabrication.  Modalities as needed     Assessment of current deficits   []? Functional mobility                         [x]? ADLs          [x]? Strength                  []? Cognition   []? Functional transfers           [x]? IADLs         []? Safety Awareness  [x]? Endurance   [x]? Fine Motor Coordination    []? Balance      []? Vision/perception   [x]? Sensation     [x]? Gross Motor Coordination [x]? ROM           [x]? Pain                        [x]? Edema          [x]? Scar Adhesion/Skin Integrity      OT PLAN OF CARE   OT POC based on physician orders, patient diagnosis and results of clinical assessment     Frequency/Duration:1-2x/week for 12-18 visits ( pending C9 approval)   Specific OT Treatment to include:      [x]? Instruction in HEP                   Modalities:  [x]?  Therapeutic Exercise                 [x]? Ultrasound               [x]? Electrical Stimulation/Attended  [x]? PROM/Stretching                    [x]? Fluidotherapy          [x]?   Paraffin                   [x]? AAROM  [x]?  AROM                 [x]? Iontophoresis:   [x]? Ortega Gins                                       []? Neuromuscular Re-Ed            [x]? ADL/IADL re-training    [x]?  Therapeutic Activity                  [x]? Pain Management with/without modalities PRN                 [x]?  Manual Therapy                      [x]? Splinting                                   [x]? Scar Management                   []?Joint Protection/Training  []? Ergonomics                             [x]?  Joint Mobilization                      [x]? Adaptive EquipmentAssessment/Training                             [x]?  Manual Edema Mobilization   [x]?  Myofascial Release                 []? Energy Conservation/Work Simplification  [x]? GM/FM Coordination                [x]? Safety retraining/education per  individual diagnosis/goals  [x]? Desensitization        Patient Specific Goal: Pt would like to return to making a full fist.                              GOALS (Long term same as Short term):  1) Patient will demonstrate good understanding of home program (exercises/activities/diagnosis/prognosis/goals) with good accuracy. Goal met/ progressing with new HEP. 2) Patient will demonstrate increased active/passive range of motion of their LUE to [facility] for ADL/IADL completion. Goal  progressing  3) Patient will demonstrate increased /pinch strength of at least 10 / 3-5 pinch pounds of their L hand. Goal not assessed   4) Patient to report decreased pain in their affected L upper extremity from 5/10 to 2/10 or less with resistive functional use. Goal met. 5) Increase in fine motor function as evidenced by decreased time to complete 9-hole peg test and/or MRMT test by at least 5-10 seconds. Goal not assessed  6) Patient to report 100% compliance with their splint wear, care, and precautions if needed. Goal met and progresses with new splints. 7) Patient will be knowledgeable of edema control techniques as evident with decreases from 8 to mild/none. Goal  progressing  9) Patient will demonstrate a non-tender/non-adherent scar. Goal progressing slowly.    10) Patient will report ADL functions as Mod I/I using LUE. Goal progressing  11) Patient will demonstrate improved functional activity tolerance from poor to fair for ADL/IADL completion. Goal progressing  12) Patient will decrease QuickDASH score to 40% or less for increased participation in daily functional activities. Goal  Progressing well.     Pain Level: no pain     Subjective:  No new changes reorted     Objective:  Updated POC to be completed by 10th visit or 11/05/2021. INTERVENTION: COMPLETED: SPECIFICS/COMMENTS:   Modality:     MHP  Applied to the wrist during soft tissue massage and scar massage of the digits   Ultrasound  Ultrasound to scars along dorsal hand and wrist to reduce scar tissue and pain  Duration: 8 minutes  Intensity: 0.7  Frequency: 3.3Mhz  Duty cycle: continuous   Paraffin + MHP x x15 mins with increasing stretch to increase soft tissue elasticity. AROM:     Wrist x         Flexion, extension, radial and ulnar deviation x15  -Jux a cizer x10 with elbow on table and full grasp.   -Ball Wrist ROM: wrist flexion extension, side to side, clockwise and counter clockwise   Forearm   Supination, pronation 2 x10   Digital X               -composite fist, flexion x5  -hook and MP flex/ext 2 x's 10 rep's   -Hook to intrinsic plus, back to hook x 15 reps  -PIP/DIP Isolated MF x 15 reps ea  -extension, all digits  Bean transfer with good composite fist.   -hook fist 2 sets of 10  -MCP table top 2 sets of 10   L MF x -Place and holds in extension x10  Functional grasp/release of pom poms to facilitate more MCP flexion.     Thumb    -opposition to MF  -radial and palmar abduction   AAROM:     Digital x -extension, all digits   L MF x -Place and holds in extension 2 sets x 10   PROM/Stretching:     Wrist x  Gentle -Flexion, extension, radial and ulnar deviation, pronation/supination with 2-3 sec holds   Digital X     -composite fist, flexion x10 middle and index finger  -place and holds index and middle finger flexion x5  -Isolated MF ext/flex  -Gentle -MCP table top x15 & x3 hook fist   Thumb  radial and palmar abduction   Self Stretching of wrist  -Power Web [employer] for Wrist extension: x 12 reps with 10 sec holds   Scar Mass/Edema Control:     Retrograde massage x     To decrease swelling throughout the wrist and digits  -mesh sleeve MF to wear as needed   Soft tissue massage/scar massage x To decrease rigidity of the scar and decrease rigidity of the soft tissue to increase ROM and decrease pain   Strengthening:               Other:     HEP x Reviewed HEP and splinting instructions. Splint   Changed straps this date over wrist and finger incision area with soft strap to ^ comfort. 2 blue sponges under MF PIP jt to ^ PIP ext. - Issued extension tube to decrease extensor lag- wear on/off throughout the day & at night. Steri strip removal  x Steri strips removed no sign of drainage      Assessment/Comments: Pt is making Fair + progress toward stated plan of care. Pt tolerated session well with good tolerance of MCP flexion and improved MCP flexion noted. Pt was able to complete flexion/extension without difficulty however extension lag noted at the PIP jt. Will modify extension tube next session and continue to work on extension lag at the [employer] as tolerated. -Rehab Potential: Good  -Requires OT Follow Up: Yes    Time In: 3:00 pm           Time Out:  4:00 pm             Visit #: 12      CODE  Minutes Time  Units   68620 Fluidotherapy      40150 Manual 20 3:40 p - 4:00 p 1   03426 Therapeutic Ex 25 3:15 p - 3:40 p 2   00442 Therapeutic Activity      52177 ADL/COMP Tech Train      43429 Neuromuscular Re-Ed      73449 OrthoManagementTraining      65350 Paraffin 15 3:00 pm - 3:15 pm 1   06445 Electrical Stim - Attended      45125 Iontophoresis      38906 Ultrasound       Other        60  4        -Response to Treatment: Improved tolerance for PROM and AROM exercises.     Plan:   [x]  Continues Plan of care: Treatment covered based on POC and graduated to patient's progress. Pt education continues at each visit to obtain maximum benefits from skilled OT intervention.   []  Alter Plan of care:   []  Discharge:      Traci Vogt, Padma Brad [address] #[id number]

## 2021-11-02 ENCOUNTER — TREATMENT (OUTPATIENT)
Dept: OCCUPATIONAL THERAPY | Age: 41
End: 2021-11-02
Payer: COMMERCIAL

## 2021-11-02 DIAGNOSIS — S62.613B DISPLACED FRACTURE OF PROXIMAL PHALANX OF LEFT MIDDLE FINGER, INITIAL ENCOUNTER FOR OPEN FRACTURE: Primary | ICD-10-CM

## 2021-11-02 PROCEDURE — 97035 APP MDLTY 1+ULTRASOUND EA 15: CPT | Performed by: OCCUPATIONAL THERAPIST

## 2021-11-02 PROCEDURE — 97110 THERAPEUTIC EXERCISES: CPT | Performed by: OCCUPATIONAL THERAPIST

## 2021-11-02 PROCEDURE — 97140 MANUAL THERAPY 1/> REGIONS: CPT | Performed by: OCCUPATIONAL THERAPIST

## 2021-11-02 NOTE — PROGRESS NOTES
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 Alecia Cowart  Specific Practitioner Orders: May progress with active and passive range of motion to the hand.  Splint fabrication.  Modalities as needed     Assessment of current deficits   []? Functional mobility                         [x]? ADLs          [x]? Strength                  []? Cognition   []? Functional transfers           [x]? IADLs         []? Safety Awareness  [x]? Endurance   [x]? Fine Motor Coordination    []? Balance      []? Vision/perception   [x]? Sensation     [x]? Gross Motor Coordination [x]? ROM           [x]? Pain                        [x]? Edema          [x]? Scar Adhesion/Skin Integrity      OT PLAN OF CARE   OT POC based on physician orders, patient diagnosis and results of clinical assessment     Frequency/Duration:1-2x/week for 12-18 visits ( pending C9 approval)   Specific OT Treatment to include:      [x]? Instruction in HEP                   Modalities:  [x]?  Therapeutic Exercise                 [x]? Ultrasound               [x]? Electrical Stimulation/Attended  [x]? PROM/Stretching                    [x]? Fluidotherapy          [x]?   Paraffin                   [x]? AAROM  [x]?  AROM                 [x]? Iontophoresis:   [x]? Maykel Pablo                                       []? Neuromuscular Re-Ed            [x]? ADL/IADL re-training    [x]?  Therapeutic Activity                  [x]? Pain Management with/without modalities PRN                 [x]?  Manual Therapy                      [x]? Splinting                                   [x]? Scar Management                   []?Joint Protection/Training  []? Ergonomics                             [x]?  Joint Mobilization                      [x]? Adaptive EquipmentAssessment/Training                             [x]?  Manual Edema Mobilization   [x]?  Myofascial Release                 []? Energy Conservation/Work Simplification  [x]? GM/FM Coordination                [x]? Safety retraining/education per  individual diagnosis/goals  [x]? Desensitization        Patient Specific Goal: Pt would like to return to making a full fist.                              GOALS (Long term same as Short term):  1) Patient will demonstrate good understanding of home program (exercises/activities/diagnosis/prognosis/goals) with good accuracy. Goal met/ progressing with new HEP. 2) Patient will demonstrate increased active/passive range of motion of their LUE to [facility] for ADL/IADL completion. Goal  progressing  3) Patient will demonstrate increased /pinch strength of at least 10 / 3-5 pinch pounds of their L hand. Goal not assessed   4) Patient to report decreased pain in their affected L upper extremity from 5/10 to 2/10 or less with resistive functional use. Goal met. 5) Increase in fine motor function as evidenced by decreased time to complete 9-hole peg test and/or MRMT test by at least 5-10 seconds. Goal not assessed  6) Patient to report 100% compliance with their splint wear, care, and precautions if needed. Goal met and progresses with new splints. 7) Patient will be knowledgeable of edema control techniques as evident with decreases from 8 to mild/none. Goal  progressing  9) Patient will demonstrate a non-tender/non-adherent scar. Goal progressing slowly.    10) Patient will report ADL functions as Mod I/I using LUE. Goal progressing  11) Patient will demonstrate improved functional activity tolerance from poor to fair for ADL/IADL completion. Goal progressing  12) Patient will decrease QuickDASH score to 40% or less for increased participation in daily functional activities. Goal  Progressing well.     Pain Level: no pain     Subjective:  Pt states \"This extension tube is cutting into my MF here ' I can't wear it. \"     Objective:  Updated POC to be completed by 10th visit or 11/05/2021. INTERVENTION: COMPLETED: SPECIFICS/COMMENTS:   Modality:     MHP  Applied to the wrist during soft tissue massage and scar massage of the digits   Ultrasound x Ultrasound to scars along dorsal hand and wrist to reduce scar tissue and pain  Duration: 8 minutes  Intensity: 0.7  Frequency: 3.3Mhz  Duty cycle: continuous   Paraffin + MHP x x10 mins with increasing stretch to increase soft tissue elasticity. AROM:     Wrist X        x         Flexion, extension, radial and ulnar deviation x15  -Jux a cizer x10 with elbow on table and full grasp.   -Ball Wrist ROM: wrist flexion extension, side to side, clockwise and counter clockwise     Forearm   Supination, pronation 2 x10   Digital X  x      X    x       -composite fist, flexion x5  -hook and MP flex/ext 2 x's 10 rep's   -Hook to intrinsic plus, back to hook x 15 reps  -PIP/DIP Isolated MF x 15 reps ea  -extension, all digits-Bean transfer with good composite fist.   -hook fist 2 sets of 10  -MCP table top 2 sets of 10   L MF x -Place and holds in extension x10  Functional grasp/release of pom poms to facilitate more MCP flexion.     Thumb x   -opposition to MF with beans.   -radial and palmar abduction   AAROM:     Digital x -extension, all digits   L MF x -Place and holds in extension 2 sets x 10   PROM/Stretching:     Wrist x  Gentle -Flexion, extension, radial and ulnar deviation, pronation/supination with 2-3 sec holds   Digital X     -composite fist, Activity      17585 ADL/COMP Tech Train      [phone] Neuromuscular Re-Ed      03350 OrthoManagementTraining      28834 Paraffin 10 4:00 pm - 4:10 pm 0   42847 Electrical Stim - Attended      56304 Iontophoresis      75761 Ultrasound 10 4:10 pm- 4:20 pm 1    Other        60  4        -Response to Treatment: Improved tolerance for PROM and AROM exercises. Plan:   [x]  Continues Plan of care: Treatment covered based on POC and graduated to patient's progress. Pt education continues at each visit to obtain maximum benefits from skilled OT intervention.   []  Alter Plan of care:   []  Discharge:      Arianne Murray OT/L, CHT

## 2021-11-04 ENCOUNTER — TREATMENT (OUTPATIENT)
Dept: OCCUPATIONAL THERAPY | Age: 41
End: 2021-11-04
Payer: COMMERCIAL

## 2021-11-04 ENCOUNTER — OFFICE VISIT (OUTPATIENT)
Dept: ORTHOPEDIC SURGERY | Age: 41
End: 2021-11-04

## 2021-11-04 VITALS — HEIGHT: 71 IN | RESPIRATION RATE: 18 BRPM | WEIGHT: 195 LBS | BODY MASS INDEX: 27.3 KG/M2

## 2021-11-04 DIAGNOSIS — S62.613B DISPLACED FRACTURE OF PROXIMAL PHALANX OF LEFT MIDDLE FINGER, INITIAL ENCOUNTER FOR OPEN FRACTURE: Primary | ICD-10-CM

## 2021-11-04 DIAGNOSIS — S62.613K: Primary | ICD-10-CM

## 2021-11-04 PROCEDURE — 97110 THERAPEUTIC EXERCISES: CPT | Performed by: OCCUPATIONAL THERAPIST

## 2021-11-04 PROCEDURE — 97035 APP MDLTY 1+ULTRASOUND EA 15: CPT | Performed by: OCCUPATIONAL THERAPIST

## 2021-11-04 PROCEDURE — 99024 POSTOP FOLLOW-UP VISIT: CPT | Performed by: ORTHOPAEDIC SURGERY

## 2021-11-04 PROCEDURE — 97140 MANUAL THERAPY 1/> REGIONS: CPT | Performed by: OCCUPATIONAL THERAPIST

## 2021-11-04 NOTE — PROGRESS NOTES
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 Byron Hernandes  Specific Practitioner Orders: May progress with active and passive range of motion to the hand.  Splint fabrication.  Modalities as needed     Assessment of current deficits   []? Functional mobility                         [x]? ADLs          [x]? Strength                  []? Cognition   []? Functional transfers           [x]? IADLs         []? Safety Awareness  [x]? Endurance   [x]? Fine Motor Coordination    []? Balance      []? Vision/perception   [x]? Sensation     [x]? Gross Motor Coordination [x]? ROM           [x]? Pain                        [x]? Edema          [x]? Scar Adhesion/Skin Integrity      OT PLAN OF CARE   OT POC based on physician orders, patient diagnosis and results of clinical assessment     Frequency/Duration:1-2x/week for 12-18 visits ( pending C9 approval)   Specific OT Treatment to include:      [x]? Instruction in HEP                   Modalities:  [x]?  Therapeutic Exercise                 [x]? Ultrasound               [x]? Electrical Stimulation/Attended  [x]? PROM/Stretching                    [x]? Fluidotherapy          [x]?   Paraffin                   [x]? AAROM  [x]?  AROM                 [x]? Iontophoresis:   [x]? Lydia Pradhan                                       []? Neuromuscular Re-Ed            [x]? ADL/IADL re-training    [x]?  Therapeutic Activity                  [x]? Pain Management with/without modalities PRN                 [x]?  Manual Therapy                      [x]? Splinting                                   [x]? Scar Management                   []?Joint Protection/Training  []? Ergonomics                             [x]?  Joint Mobilization                      [x]? Adaptive EquipmentAssessment/Training                             [x]?  Manual Edema Mobilization   [x]?  Myofascial Release                 []? Energy Conservation/Work Simplification  [x]? GM/FM Coordination                [x]? Safety retraining/education per  individual diagnosis/goals  [x]? Desensitization        Patient Specific Goal: Pt would like to return to making a full fist.                              GOALS (Long term same as Short term):  1) Patient will demonstrate good understanding of home program (exercises/activities/diagnosis/prognosis/goals) with good accuracy. Goal met/ progressing with new HEP. 2) Patient will demonstrate increased active/passive range of motion of their LUE to [facility] for ADL/IADL completion. Goal  progressing  3) Patient will demonstrate increased /pinch strength of at least 10 / 3-5 pinch pounds of their L hand. Goal not assessed   4) Patient to report decreased pain in their affected L upper extremity from 5/10 to 2/10 or less with resistive functional use. Goal met. 5) Increase in fine motor function as evidenced by decreased time to complete 9-hole peg test and/or MRMT test by at least 5-10 seconds. Goal not assessed  6) Patient to report 100% compliance with their splint wear, care, and precautions if needed. Goal met and progresses with new splints. 7) Patient will be knowledgeable of edema control techniques as evident with decreases from 8 to mild/none. Goal  progressing  9) Patient will demonstrate a non-tender/non-adherent scar. Goal progressing slowly.    10) Patient will report ADL flexion x10 middle and index finger, MP and PIP flexion. -place and holds index and middle finger flexion x5  -Isolated MF ext/flex  -Gentle -MCP table top x15 & x3 hook fist   Thumb  radial and palmar abduction   Self Stretching of wrist  -Power Web [employer] for Wrist extension: x 12 reps with 10 sec holds   Scar Mass/Edema Control:     Retrograde massage x     To decrease swelling throughout the wrist and digits  -mesh sleeve MF to wear as needed   Soft tissue massage/scar massage x To decrease rigidity of the scar and decrease rigidity of the soft tissue to increase ROM and decrease pain   Strengthening:               Other:     HEP x Reviewed HEP and splinting instructions. Splint                x Changed straps this date over wrist and finger incision area with soft strap to ^ comfort. 2 blue sponges under MF PIP jt to ^ PIP ext. - Issued extension tube to decrease extensor lag- wear on/off throughout the day & at night.   - fabricated a dorsal extension gutter splint to wear a few hours in the evening or night to keep PIP ext jt loose - tube was rubbing at the MP ant. Steri strip removal   Steri strips removed no sign of drainage      Assessment/Comments: Pt is making Fair + progress toward stated plan of care. Pt tolerated session well with increased duration of ROM activities this date. Pt states that he sees the referring physician this date in hopes to discontinue splint. Pt sore at the base of the MF continues to be present and causing irritation-pt states it looks better than previous session.   Will progress as tolerated and as Dr progresses after his appointment this coming Thursday.      -Rehab Potential: Good  -Requires OT Follow Up: Yes    Time In: 1:45 pm           Time Out:  2:45 pm             Visit #: 14    CODE  Minutes Time  Units   01494 Fluidotherapy      47019 Manual 15 1:55-2:10 1   80456 Therapeutic Ex 25 2:10-2:35 2   13569 Therapeutic Activity      66962 ADL/COMP Tech Train      94220 Neuromuscular Re-Ed      [id number] OrthoManagementTraining      [id number] Paraffin 10 1:45-1:55 0   [id number] Electrical Stim - Attended      [id number] Iontophoresis      14884 Ultrasound 10 2:35-2:45 1    Other        60  4        -Response to Treatment: Improved tolerance for PROM and AROM exercises. Plan:   [x]  Continues Plan of care: Treatment covered based on POC and graduated to patient's progress. Pt education continues at each visit to obtain maximum benefits from skilled OT intervention. []  Alter Plan of care:   []  Discharge:       [facility], OTR/L #[id number]

## 2021-11-09 ENCOUNTER — TREATMENT (OUTPATIENT)
Dept: OCCUPATIONAL THERAPY | Age: 41
End: 2021-11-09
Payer: COMMERCIAL

## 2021-11-09 DIAGNOSIS — S62.613B DISPLACED FRACTURE OF PROXIMAL PHALANX OF LEFT MIDDLE FINGER, INITIAL ENCOUNTER FOR OPEN FRACTURE: Primary | ICD-10-CM

## 2021-11-09 LAB
AFB CULTURE (MYCOBACTERIA): NORMAL
AFB SMEAR: NORMAL

## 2021-11-09 PROCEDURE — 97110 THERAPEUTIC EXERCISES: CPT | Performed by: OCCUPATIONAL THERAPIST

## 2021-11-09 PROCEDURE — 97035 APP MDLTY 1+ULTRASOUND EA 15: CPT | Performed by: OCCUPATIONAL THERAPIST

## 2021-11-09 PROCEDURE — 97140 MANUAL THERAPY 1/> REGIONS: CPT | Performed by: OCCUPATIONAL THERAPIST

## 2021-11-09 NOTE — PROGRESS NOTES
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 Byron Hernandes  Specific Practitioner Orders: May progress with active and passive range of motion to the hand.  Splint fabrication.  Modalities as needed     Assessment of current deficits   []? Functional mobility                         [x]? ADLs          [x]? Strength                  []? Cognition   []? Functional transfers           [x]? IADLs         []? Safety Awareness  [x]? Endurance   [x]? Fine Motor Coordination    []? Balance      []? Vision/perception   [x]? Sensation     [x]? Gross Motor Coordination [x]? ROM           [x]? Pain                        [x]? Edema          [x]? Scar Adhesion/Skin Integrity      OT PLAN OF CARE   OT POC based on physician orders, patient diagnosis and results of clinical assessment     Frequency/Duration:1-2x/week for 12-18 visits ( pending C9 approval)   Specific OT Treatment to include:      [x]? Instruction in HEP                   Modalities:  [x]?  Therapeutic Exercise                 [x]? Ultrasound               [x]? Electrical Stimulation/Attended  [x]? PROM/Stretching                    [x]? Fluidotherapy          [x]?   Paraffin                   [x]? AAROM  [x]?  AROM                 [x]? Iontophoresis:   [x]? Lydia Pradhan                                       []? Neuromuscular Re-Ed   report ADL functions as Mod I/I using LUE. Goal progressing  11) Patient will demonstrate improved functional activity tolerance from poor to fair for ADL/IADL completion. Goal progressing  12) Patient will decrease QuickDASH score to 40% or less for increased participation in daily functional activities. Goal  Progressing well.     Pain Level: no pain     Subjective:  Pt states \"I can start strengthening\"     Objective:  Updated POC to be completed by 10th visit or 11/05/2021. INTERVENTION: COMPLETED: SPECIFICS/COMMENTS:   Modality:     MHP  Applied to the wrist during soft tissue massage and scar massage of the digits   Ultrasound x Ultrasound to scars along dorsal hand and wrist to reduce scar tissue and pain  Duration: 8 minutes  Intensity: 0.7  Frequency: 3.3Mhz  Duty cycle: continuous   Paraffin + MHP x x10 mins with increasing stretch to increase soft tissue elasticity. AROM:     Wrist X    x             Flexion, extension, radial and ulnar deviation x15  -Jux a cizer x10 with elbow on table and full grasp.   -Ball Wrist ROM: wrist flexion extension, side to side, clockwise and counter clockwise     Forearm   Supination, pronation 2 x10   Digital X  x  x    X    x    x   -composite fist, flexion x5  -hook and MP flex/ext 2 x's 10 rep's   -Hook to intrinsic plus, back to hook x 15 reps  -PIP/DIP Isolated MF x 15 reps ea  -extension, all digits-Bean transfer with good composite fist.   -hook fist 2 sets of 10  -MCP table top 2 sets of 10   L MF x -Place and holds in extension x10  Functional grasp/release of pom poms to facilitate more MCP flexion.     Thumb x   -opposition to MF with beans.   -radial and palmar abduction   AAROM:     Digital  -extension, all digits   L MF x -Place and holds in extension 2 sets x 10   PROM/Stretching:     Wrist x  Gentle -Flexion, extension, radial and ulnar deviation, pronation/supination with 2-3 sec holds   Digital X     -composite fist, flexion x10 middle and index finger, MP and PIP flexion. -place and holds index and middle finger flexion x5  -Isolated MF ext/flex  -Gentle -MCP table top x15 & x3 hook fist   Thumb  radial and palmar abduction   Self Stretching of wrist  -Power Web [employer] for Wrist extension: x 12 reps with 10 sec holds   Scar Mass/Edema Control:     Retrograde massage x     To decrease swelling throughout the wrist and digits  -mesh sleeve MF to wear as needed   Soft tissue massage/scar massage x To decrease rigidity of the scar and decrease rigidity of the soft tissue to increase ROM and decrease pain   Strengthening:     digital x Light resistive putty, manipulation, 3 point pinch and pulling for 8 mins        Other:     HEP x Reviewed HEP and splinting instructions. Splint                x Changed straps this date over wrist and finger incision area with soft strap to ^ comfort. 2 blue sponges under MF PIP jt to ^ PIP ext. - Issued extension tube to decrease extensor lag- wear on/off throughout the day & at night.   - fabricated a dorsal extension gutter splint to wear a few hours in the evening or night to keep PIP ext jt loose - tube was rubbing at the MP ant. Steri strip removal   Steri strips removed no sign of drainage      Assessment/Comments: Pt is making Fair + progress toward stated plan of care. Pt tolerated session well with light strengthening initiated per physician. Pt stated that strengthening felt good and was a good workout for the finger and made it feel looser. Will increase as tolerated.  released patient for light strengthening-initiated this date with light theraputty-pt tolerated well.      Dynamometer (setting 2):     Left: 41.8      Right: 98.9       Pinch Meter:   Lateral: Left= 18, Right= 21    Palmar 3 point: Left= 12, Right= 24    -Rehab Potential: Good  -Requires OT Follow Up: Yes    Time In: 2:00 pm           Time Out:  3:00 pm             Visit #: 15    CODE  Minutes Time  Units   05162 Fluidotherapy      87828 Manual 15 2:10-2:25 1   54873 Therapeutic Ex 25 2:25-2:50 2   06849 Therapeutic Activity      27047 ADL/COMP Tech Train      [id number] Neuromuscular Re-Ed      69403 OrthoManagementTraining      89470 Paraffin 10 2:00-2:10 0   07958 Electrical Stim - Attended      [id number] Iontophoresis      90872 Ultrasound 10 2:50-3:00 1    Other        60  4        -Response to Treatment: Improved tolerance for PROM and AROM exercises. Plan:   [x]  Continues Plan of care: Treatment covered based on POC and graduated to patient's progress. Pt education continues at each visit to obtain maximum benefits from skilled OT intervention. []  Alter Plan of care:   []  Discharge:       Corin Hernandez OTR/L #[id number]

## 2021-11-10 ENCOUNTER — TREATMENT (OUTPATIENT)
Dept: OCCUPATIONAL THERAPY | Age: 41
End: 2021-11-10
Payer: COMMERCIAL

## 2021-11-10 DIAGNOSIS — S62.613B DISPLACED FRACTURE OF PROXIMAL PHALANX OF LEFT MIDDLE FINGER, INITIAL ENCOUNTER FOR OPEN FRACTURE: Primary | ICD-10-CM

## 2021-11-10 PROCEDURE — 97110 THERAPEUTIC EXERCISES: CPT | Performed by: OCCUPATIONAL THERAPIST

## 2021-11-10 PROCEDURE — 97018 PARAFFIN BATH THERAPY: CPT | Performed by: OCCUPATIONAL THERAPIST

## 2021-11-10 PROCEDURE — 97140 MANUAL THERAPY 1/> REGIONS: CPT | Performed by: OCCUPATIONAL THERAPIST

## 2021-11-10 NOTE — PROGRESS NOTES
re-training    [x]?  Therapeutic Activity                  [x]? Pain Management with/without modalities PRN                 [x]?  Manual Therapy                      [x]? Splinting                                   [x]? Scar Management                   []?Joint Protection/Training  []? Ergonomics                             [x]?  Joint Mobilization                      [x]? Adaptive EquipmentAssessment/Training                             [x]?  Manual Edema Mobilization   [x]?  Myofascial Release                 []? Energy Conservation/Work Simplification  [x]? GM/FM Coordination                [x]? Safety retraining/education per  individual diagnosis/goals  [x]? Desensitization        Patient Specific Goal: Pt would like to return to making a full fist.                              GOALS (Long term same as Short term):  1) Patient will demonstrate good understanding of home program (exercises/activities/diagnosis/prognosis/goals) with good accuracy. Goal met/ progressing with new HEP. 2) Patient will demonstrate increased active/passive range of motion of their LUE to [facility] for ADL/IADL completion. Goal  progressing  3) Patient will demonstrate increased /pinch strength of at least 10 / 3-5 pinch pounds of their L hand. Goal not assessed   4) Patient to report decreased pain in their affected L upper extremity from 5/10 to 2/10 or less with resistive functional use. Goal met. 5) Increase in fine motor function as evidenced by decreased time to complete 9-hole peg test and/or MRMT test by at least 5-10 seconds. Goal not assessed  6) Patient to report 100% compliance with their splint wear, care, and precautions if needed. Goal met and progresses with new splints. 7) Patient will be knowledgeable of edema control techniques as evident with decreases from 8 to mild/none. Goal  progressing  9) Patient will demonstrate a non-tender/non-adherent scar. Goal progressing slowly.    10) Patient will report ADL functions as Mod I/I using LUE. Goal progressing  11) Patient will demonstrate improved functional activity tolerance from poor to fair for ADL/IADL completion. Goal progressing  12) Patient will decrease QuickDASH score to 40% or less for increased participation in daily functional activities. Goal  Progressing well.     Pain Level: no pain     Subjective:  Pt states \"I wasn't sore after yesterday's session. \"     Objective:  Updated POC to be completed by 10th visit or 11/05/2021. INTERVENTION: COMPLETED: SPECIFICS/COMMENTS:   Modality:     MHP  Applied to the wrist during soft tissue massage and scar massage of the digits   Ultrasound  Ultrasound to scars along dorsal hand and wrist to reduce scar tissue and pain  Duration: 8 minutes  Intensity: 0.7  Frequency: 3.3Mhz  Duty cycle: continuous   Paraffin + MHP x x10 mins with increasing stretch to increase soft tissue elasticity. AROM:     Wrist  Flexion, extension, radial and ulnar deviation x15  -Jux a cizer x10 with elbow on table and full grasp.   -Ball Wrist ROM: wrist flexion extension, side to side, clockwise and counter clockwise   Digital X  X        X      X   x -composite fist, flexion x5  -hook and MP flex/ext 2 x's 10 rep's   -Hook to intrinsic plus, back to hook x 15 reps  -PIP/DIP Isolated MF x 15 reps ea  -extension, all digits-Bean transfer with good composite fist.   -hook fist 2 sets of 10  -MCP table top 2 sets of 10   L MF X   -Place and holds in extension x10  Functional grasp/release of pom poms to facilitate more MCP flexion. AAROM:     Digital  -extension, all digits   L MF x -Place and holds in extension 2 sets x 10   PROM/Stretching:     Wrist x  Gentle -Flexion, extension, radial and ulnar deviation, pronation/supination with 2-3 sec holds   Digital X    X    X    -composite fist, flexion x10 middle and index finger, MP and PIP flexion.    -place and holds index and middle finger flexion x5  -Isolated MF ext/flex  -Gentle -MCP table top x15 & x3 hook fist   Self Stretching of wrist  -Power Web [employer] for Wrist extension: x 12 reps with 10 sec holds   Scar Mass/Edema Control:     Retrograde massage x     To decrease swelling throughout the wrist and digits  -mesh sleeve MF to wear as needed   Soft tissue massage/scar massage x To decrease rigidity of the scar and decrease rigidity of the soft tissue to increase ROM and decrease pain   Strengthening:     L hand/digital X    X     X  -Light resistive putty, manipulation, 3 point pinch and pulling for 8 mins. -MF opp pinch paper clip and thread thru medium resistant sponge x 15 reps  -Prolonged flat fist grasp with edge of wrist weight 3x 45 sec holds. L Forearm/Wrist X    X  -10# Flexbar: pronation, supination, and twisting x 15 reps  -2.2# Dyer Sachs Grasp: w/ wrist flex and ext 2x15 reps   Other:     HEP x Reviewed HEP and splinting instructions. Splint                x Changed straps this date over wrist and finger incision area with soft strap to ^ comfort. 2 blue sponges under MF PIP jt to ^ PIP ext. - Issued extension tube to decrease extensor lag- wear on/off throughout the day & at night.   -dorsal extension gutter splint to wear a few hours in the evening or night to keep PIP ext jt loose - tube was rubbing at the MP ant. Steri strip removal   Steri strips removed no sign of drainage      Assessment/Comments: Pt is making Fair + progress toward stated plan of care. Advanced with light strengthening this date with good tolerance, increased challenge with the resistive flat fist hold activity. Pt demonstrating no pain during all resistive activities. Reviewed putty exercises and provided with new light to medium resistance t-putty for HEP as he has lost his previous putty. To complete 1x/day, 10 reps per exercise.     Dynamometer (setting 2):     Left: 41.8      Right: 98.9       Pinch Meter:   Lateral: Left= 18, Right= 21    Palmar 3 point: Left= 12, Right= 24    -Rehab Potential: Good  -Requires OT Follow Up: Yes    Time In: 2:00 pm           Time Out:  3:00 pm             Visit #: 16  CODE  Minutes Time  Units   64604 Fluidotherapy      55588 Manual 15 2:10-2:25 1   80562 Therapeutic Ex 35 2:25-3:00 2   66000 Therapeutic Activity      02454 ADL/COMP Tech Train      16048 Neuromuscular Re-Ed      63188 OrthoManagementTraining      84083 Paraffin 10 2:00-2:10 1   29555 Electrical Stim - Attended      [id number] Iontophoresis      88182 Ultrasound       Other        60  4      -Response to Treatment: Good tolerance for all light strengthening. Plan:   [x]  Continues Plan of care: Treatment covered based on POC and graduated to patient's progress. Pt education continues at each visit to obtain maximum benefits from skilled OT intervention.   []  Alter Plan of care:   []  Discharge:      La Foster OT R/L, Padma Brad [address], #[id number]

## 2021-11-12 ENCOUNTER — TREATMENT (OUTPATIENT)
Dept: OCCUPATIONAL THERAPY | Age: 41
End: 2021-11-12

## 2021-11-12 DIAGNOSIS — S62.613B DISPLACED FRACTURE OF PROXIMAL PHALANX OF LEFT MIDDLE FINGER, INITIAL ENCOUNTER FOR OPEN FRACTURE: Primary | ICD-10-CM

## 2021-11-12 NOTE — PROGRESS NOTES
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 Jason Don  Specific Practitioner Orders: May progress with active and passive range of motion to the hand.  Splint fabrication.  Modalities as needed     Assessment of current deficits   []? Functional mobility                         [x]? ADLs          [x]? Strength                  []? Cognition   []? Functional transfers           [x]? IADLs         []? Safety Awareness  [x]? Endurance   [x]? Fine Motor Coordination    []? Balance      []? Vision/perception   [x]? Sensation     [x]? Gross Motor Coordination [x]? ROM           [x]? Pain                        [x]? Edema          [x]? Scar Adhesion/Skin Integrity      OT PLAN OF CARE   OT POC based on physician orders, patient diagnosis and results of clinical assessment     Frequency/Duration:1-2x/week for 12-18 visits ( pending C9 approval)   Specific OT Treatment to include:      [x]? Instruction in HEP                   Modalities:  [x]?  Therapeutic Exercise                 [x]? Ultrasound               [x]? Electrical Stimulation/Attended  [x]? PROM/Stretching                    [x]? Fluidotherapy          [x]?   Paraffin                   [x]? AAROM  [x]?  AROM                 [x]? Iontophoresis:   [x]? Rhodia Savoy                                       []? Neuromuscular Re-Ed            [x]? ADL/IADL re-training    [x]?  Therapeutic Activity                  [x]? Pain Management with/without modalities PRN                 [x]?  Manual Therapy                      [x]? Splinting                                   [x]? Scar Management                   []?Joint Protection/Training  []? Ergonomics                             [x]?  Joint Mobilization                      [x]? Adaptive EquipmentAssessment/Training                             [x]?  Manual Edema Mobilization   [x]?  Myofascial Release                 []? Energy Conservation/Work Simplification  [x]? GM/FM Coordination                [x]? Safety retraining/education per  individual diagnosis/goals  [x]? Desensitization        Patient Specific Goal: Pt would like to return to making a full fist.                              GOALS (Long term same as Short term):  1) Patient will demonstrate good understanding of home program (exercises/activities/diagnosis/prognosis/goals) with good accuracy. Goal met/ progressing with new HEP. 2) Patient will demonstrate increased active/passive range of motion of their LUE to [facility] for ADL/IADL completion. Goal  progressing  3) Patient will demonstrate increased /pinch strength of at least 10 / 3-5 pinch pounds of their L hand. Goal not assessed   4) Patient to report decreased pain in their affected L upper extremity from 5/10 to 2/10 or less with resistive functional use. Goal met. 5) Increase in fine motor function as evidenced by decreased time to complete 9-hole peg test and/or MRMT test by at least 5-10 seconds. Goal not assessed  6) Patient to report 100% compliance with their splint wear, care, and precautions if needed. Goal met and progresses with new splints. 7) Patient will be knowledgeable of edema control techniques as evident with decreases from 8 to mild/none. Goal  progressing  9) Patient will demonstrate a non-tender/non-adherent scar. Goal progressing slowly.    10) Patient will report ADL table top x15 & x3 hook fist   Self Stretching of wrist  -Power Web [employer] for Wrist extension: x 12 reps with 10 sec holds   Scar Mass/Edema Control:     Retrograde massage x     To decrease swelling throughout the wrist and digits  -mesh sleeve MF to wear as needed   Soft tissue massage/scar massage x To decrease rigidity of the scar and decrease rigidity of the soft tissue to increase ROM and decrease pain   Strengthening:     L hand/digital X    X     X    x  -Light resistive putty, manipulation, 3 point pinch and pulling for 8 mins. -MF opp pinch paper clip and thread thru medium resistant sponge x 15 reps  -Prolonged flat fist grasp with edge of wrist weight 3x 45 sec holds.  -resistive clips heavy blue picking up 12 pegs and supinating hand to place in cone   L Forearm/Wrist X          x -10#^d to 15# Flexbar: pronation, supination, and twisting x 15 reps ^d to 2 sets  -2.2# Dyer Sachs Grasp: w/ wrist flex and ext 2x15 reps  -rubber band hand gripper with 2 moderate resistance bands 15 reps into full composite fist   Other:     HEP x Reviewed HEP and splinting instructions. Splint                x Changed straps this date over wrist and finger incision area with soft strap to ^ comfort. 2 blue sponges under MF PIP jt to ^ PIP ext. - Issued extension tube to decrease extensor lag- wear on/off throughout the day & at night.   -dorsal extension gutter splint to wear a few hours in the evening or night to keep PIP ext jt loose - tube was rubbing at the MP ant. Steri strip removal   Steri strips removed no sign of drainage      Assessment/Comments: Pt is making Fair + progress toward stated plan of care. Increased strengthening this date with increased resistance, novel activities and increase repetitions to increase endurance and overall hand strength.  Ultrasound performed to the dorsal aspect of the MF to decrease pain and scar tissue this date.    -Rehab Potential: Good  -Requires OT Follow Up: Yes    Time In: 11:00 m           Time Out:  12:00m             Visit #: 17  CODE  Minutes Time  Units   96440 Fluidotherapy      55273 Manual 10 11:10-11:20 1   33322 Therapeutic Ex 30 11:20-11:50 2   61234 Therapeutic Activity      19930 ADL/COMP Tech Train      11619 Neuromuscular Re-Ed      31900 OrthoManagementTraining      54366 Paraffin 10 11:00-11:10 1   03476 Electrical Stim - Attended      65867 Iontophoresis      78716 Ultrasound 8 11:50-11:58 0    Other        58  4      -Response to Treatment: Good tolerance for all light strengthening. Plan:   [x]  Continues Plan of care: Treatment covered based on POC and graduated to patient's progress. Pt education continues at each visit to obtain maximum benefits from skilled OT intervention. []  Alter Plan of care:   []  Discharge:       [facility], OT R/L, Padma Brad [address], #[id number]

## 2021-11-23 DIAGNOSIS — S62.613K: Primary | ICD-10-CM

## 2021-11-24 ENCOUNTER — TREATMENT (OUTPATIENT)
Dept: OCCUPATIONAL THERAPY | Age: 41
End: 2021-11-24
Payer: COMMERCIAL

## 2021-11-24 DIAGNOSIS — S62.613B DISPLACED FRACTURE OF PROXIMAL PHALANX OF LEFT MIDDLE FINGER, INITIAL ENCOUNTER FOR OPEN FRACTURE: Primary | ICD-10-CM

## 2021-11-24 PROCEDURE — 97140 MANUAL THERAPY 1/> REGIONS: CPT | Performed by: OCCUPATIONAL THERAPIST

## 2021-11-24 PROCEDURE — 97530 THERAPEUTIC ACTIVITIES: CPT | Performed by: OCCUPATIONAL THERAPIST

## 2021-11-24 PROCEDURE — 97110 THERAPEUTIC EXERCISES: CPT | Performed by: OCCUPATIONAL THERAPIST

## 2021-11-24 PROCEDURE — 97035 APP MDLTY 1+ULTRASOUND EA 15: CPT | Performed by: OCCUPATIONAL THERAPIST

## 2021-11-24 NOTE — PROGRESS NOTES
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 Ajit Carter  Specific Practitioner Orders: May progress with active and passive range of motion to the hand.  Splint fabrication.  Modalities as needed     Assessment of current deficits   [x]? ADLs          [x]? Strength     [x]? IADLs     [x]? Endurance  [x]? Scar Adhesion/Skin Integrity   [x]? FM Coordination    [x]? Sensation   [x]? ROM           [x]? Pain         [x]? Edema             OT PLAN OF CARE   OT POC based on physician orders, patient diagnosis and results of clinical assessment     Frequency/Duration:1-2x/week for 12-18 visits ( pending C9 approval)   Specific OT Treatment to include:      [x]? Instruction in HEP                   Modalities:  [x]?  Therapeutic Exercise                 [x]? Ultrasound               [x]? Electrical Stimulation/Attended  [x]? PROM/Stretching                    [x]? Fluidotherapy          [x]?   Paraffin                   [x]? AAROM  [x]?  AROM                 [x]? Iontophoresis:   [x]? David Mckeon                                       []? Neuromuscular Re-Ed            [x]? ADL/IADL re-training    [x]?  Therapeutic Activity                  [x]? Pain Management with/without modalities PRN                 [x]?  Manual Therapy                      [x]? Splinting                                   [x]?  Scar Management                   []?Joint Protection/Training  []? Ergonomics                             [x]?  Joint Mobilization                      [x]? Adaptive EquipmentAssessment/Training                             [x]?  Manual Edema Mobilization   [x]?  Myofascial Release                 []? Energy Conservation/Work Simplification  [x]? GM/FM Coordination                [x]? Safety retraining/education per  individual diagnosis/goals  [x]? Desensitization        Patient Specific Goal: Pt would like to return to making a full fist.                              GOALS (Long term same as Short term):  1) Patient will demonstrate good understanding of home program (exercises/activities/diagnosis/prognosis/goals) with good accuracy. Goal met and ongoing, in-depth review of HEP and progress with good compliance and understanding. 2) Patient will demonstrate increased active/passive range of motion of their LUE to [facility] for ADL/IADL completion. Progressing slowly at the PIP of RF, all other affected areas progressing well. 3) Patient will demonstrate increased /pinch strength of at least 10 / 3-5 pinch pounds of their L hand. Progressing well, refer to measurements. 4) Patient to report decreased pain in their affected L upper extremity from 5/10 to 2/10 or less with resistive functional use. Goal met. 5) Increase in fine motor function as evidenced by decreased time to complete 9-hole peg test and/or MRMT test by at least 5-10 seconds. Assessed 11/24. 6) Patient to report 100% compliance with their splint wear, care, and precautions if needed. All splints discharged. 7) Patient will be knowledgeable of edema control techniques as evident with decreases from 8 to mild/none. Progressing slowly, moderate edema remains surrounding proximal phalanx. 9) Patient will demonstrate a non-tender/non-adherent scar. Goal progressing well, continues to benefit from scar tissue management.    10) Patient will report ADL functions as Mod I/I using LUE. Goal met, no limitations with ADLs. 11) Patient will demonstrate improved functional activity tolerance from poor to fair for ADL/IADL completion. Goal met and progressing, good- tolerance for all activities. 12) Patient will decrease QuickDASH score to 40% or less for increased participation in daily functional activities. Goal met, 100% to 22% disability.     TODAY'S TREATMENT     Pain Level: no pain     Subjective:  Pt states \"I feel like I need a lot more  strength in this hand. \"     Objective:  Updated POC completed on 11/24/2021. INTERVENTION: COMPLETED: SPECIFICS/COMMENTS:   Modality:     MHP  Applied to the wrist during soft tissue massage and scar massage of the digits   Ultrasound x Ultrasound to scars along dorsal hand/MF to reduce scar tissue and pain  Duration: 8 minutes  Intensity: 0.8  Frequency: 3.3Mhz  Duty cycle: continuous   Paraffin + MHP x x10 mins with increasing stretch to increase soft tissue elasticity. AROM:     Wrist  Flexion, extension, radial and ulnar deviation x15  -Jux a cizer x10 with elbow on table and full grasp.   -Ball Wrist ROM: wrist flexion extension, side to side, clockwise and counter clockwise   Digital X  X        X       -composite fist, flexion x5  -hook and MP flex/ext 2 x's 10 rep's   -Hook to intrinsic plus, back to hook x 15 reps  -PIP/DIP Isolated MF x 15 reps ea  -extension, all digits-Bean transfer with good composite fist.   -hook fist 2 sets of 10  -MCP table top 2 sets of 10   L MF X   -Place and holds in extension x10  Functional grasp/release of pom poms to facilitate more MCP flexion. AAROM:     Digital  -extension, all digits   L MF  -Place and holds in extension 2 sets x 10   PROM/Stretching:     Wrist x  Gentle -Flexion, extension, radial and ulnar deviation, pronation/supination with 2-3 sec holds   Digital X    X    X    -composite fist, flexion x10 middle and index finger, MP and PIP flexion.    -place and holds index and middle finger flexion x5  -Isolated MF ext/flex  -Gentle -MCP table top x15 & x3 hook fist   Self Stretching of wrist X  -Power Web Stretches for Wrist extension and flexion: x 12 reps ea with 10 sec holds   Scar Mass/Edema Control:     Retrograde massage x     To decrease swelling throughout the wrist and digits  -mesh sleeve MF to wear as needed   Soft tissue massage/scar massage x To decrease rigidity of the scar and decrease rigidity of the soft tissue to increase ROM and decrease pain   Strengthening:     L hand/digital                  X     X  -Light resistive putty, manipulation, 3 point pinch and pulling for 8 mins. -MF opp pinch paper clip and thread thru medium resistant sponge x 15 reps  -Prolonged flat fist grasp with edge of wrist weight 3x 45 sec holds.  -resistive clips heavy blue picking up 12 pegs and supinating hand to place in cone  -Hand Gripper: 2x15 w/ 3 sec holds at level 3 resistance w/ black coil  -Red Power Web: digital flexion x 20 reps   L Forearm/Wrist     X  -10#^d to 15# Flexbar: pronation, supination, and twisting x 15 reps ^d to 2 sets  -Advanced to 3.3# Dyer Sachs Grasp: w/ wrist flex and ext 2x15 reps  -rubber band hand gripper with 2 moderate resistance bands 15 reps into full composite fist   Other:     HEP x In-depth review of HEP for re-assessment. Assessment/Comments: Pt is making Fair + progress toward stated plan of care. First C-9 following extensor tenolysis has been completed. OT received new C-9 for continuation of OT services for an additional 18 visits to begin at next scheduled appt. Re-assessment completed today with all new measurements shown below and assessment of 3-pnt pinch and fine motor. ROM overall improving with very slow gains in the MF PIP. Active extension of the PIP continues to be of severe difficulty to achieve.  Overall strength and functional use of the L UE improving well as evident through measurements and decrease in score of the QuickDASH. Pt reports now being able to carry grocery bags, carry a case of water, hold a fork while cutting food, and support jars to be open all the the L hand. He would like to focus heavily on continued scar management and further enhancing  and pinch strength in the L hand. We will continue progressing toward above goals under new C-9 to enhance overall function and safety with use of the L UE. RE-ASSESSMENT: 11/24/2021  L Upper Extremity ROM       AROM [x]? ?     PROM[]?? IE  10/25 11/24         WRIST Flexion 0-80* 5*- pain reported 40    54*       Extension 0-70* 45*  45   68*       Radial Deviation 0-20* 3*- pain reported  10   20*       Ulnar Deviation 0-30* 22*  25  30*         L Hand ROM     AROM [x]? ?         PROM[]?? IE   10/25  11/24        3rd Digit MCP Extension/Flexion   0-45 H /* 10*/44*  0/60   0/70*       PIP Extension/Flexion   0*/100* 46*/65*  -45/85  -45*/ 87*       DIP Extension/Flexion   0*/70-90* 0*/23*  0/75  WFLs         Dynamometer (setting 2):                              Left: 41.8  To 63#                                            Right: 98.9  To 108#                                        Pinch Meter:              Lateral: Left= 18 to 21#,        Right= 21 to 27.5#   3-point: Left = 14#,     Right= 21#   9 Hole Peg Test:    Left: 35 sec   Right: 20 sec                QuickDASH Score: 100% disability to 22.7% disability    -Rehab Potential: Good  -Requires OT Follow Up: Yes    Time In: 2:00 pm           Time Out:  3:00 pm           Visit #: 18  CODE  Minutes Time  Units   26799 Fluidotherapy      88566 Manual 15 2:10-2:25 1   25776 Therapeutic Ex 10 2:50-3:00 1   97715 Therapeutic Activity 17 2:33-2:50 1   67425 ADL/COMP Tech Train      34586 Neuromuscular Re-Ed      96392 OrthoManagementTraining      49327 Paraffin 10 2:00-2:10 0   91544 Electrical Stim - Attended      79110 Iontophoresis      23990 Ultrasound 8 2:25-2:33 1    Other        58  4

## 2021-12-01 ENCOUNTER — TREATMENT (OUTPATIENT)
Dept: OCCUPATIONAL THERAPY | Age: 41
End: 2021-12-01
Payer: COMMERCIAL

## 2021-12-01 DIAGNOSIS — S62.613B DISPLACED FRACTURE OF PROXIMAL PHALANX OF LEFT MIDDLE FINGER, INITIAL ENCOUNTER FOR OPEN FRACTURE: Primary | ICD-10-CM

## 2021-12-01 PROCEDURE — 97110 THERAPEUTIC EXERCISES: CPT | Performed by: OCCUPATIONAL THERAPIST

## 2021-12-01 PROCEDURE — 97140 MANUAL THERAPY 1/> REGIONS: CPT | Performed by: OCCUPATIONAL THERAPIST

## 2021-12-01 PROCEDURE — 97018 PARAFFIN BATH THERAPY: CPT | Performed by: OCCUPATIONAL THERAPIST

## 2021-12-01 NOTE — PROGRESS NOTES
Management                   []?Joint Protection/Training  []? Ergonomics                             [x]?  Joint Mobilization                      [x]? Adaptive EquipmentAssessment/Training                             [x]?  Manual Edema Mobilization   [x]?  Myofascial Release                 []? Energy Conservation/Work Simplification  [x]? GM/FM Coordination                [x]? Safety retraining/education per  individual diagnosis/goals  [x]? Desensitization        Patient Specific Goal: Pt would like to return to making a full fist.                              GOALS (Long term same as Short term):  1) Patient will demonstrate good understanding of home program (exercises/activities/diagnosis/prognosis/goals) with good accuracy. Goal met and ongoing, in-depth review of HEP and progress with good compliance and understanding. 2) Patient will demonstrate increased active/passive range of motion of their LUE to [facility] for ADL/IADL completion. Progressing slowly at the PIP of RF, all other affected areas progressing well. 3) Patient will demonstrate increased /pinch strength of at least 10 / 3-5 pinch pounds of their L hand. Progressing well, refer to measurements. 4) Patient to report decreased pain in their affected L upper extremity from 5/10 to 2/10 or less with resistive functional use. Goal met. 5) Increase in fine motor function as evidenced by decreased time to complete 9-hole peg test and/or MRMT test by at least 5-10 seconds. Assessed 11/24. 6) Patient to report 100% compliance with their splint wear, care, and precautions if needed. All splints discharged. 7) Patient will be knowledgeable of edema control techniques as evident with decreases from 8 to mild/none. Progressing slowly, moderate edema remains surrounding proximal phalanx. 9) Patient will demonstrate a non-tender/non-adherent scar. Goal progressing well, continues to benefit from scar tissue management.    10) Patient will report ADL functions as Mod I/I using LUE. Goal met, no limitations with ADLs. 11) Patient will demonstrate improved functional activity tolerance from poor to fair for ADL/IADL completion. Goal met and progressing, good- tolerance for all activities. 12) Patient will decrease QuickDASH score to 40% or less for increased participation in daily functional activities. Goal met, 100% to 22% disability.     TODAY'S TREATMENT     Pain Level: no pain reported     Subjective:  Pt states no new changes. Objective:  Updated POC completed on 11/24/2021. INTERVENTION: COMPLETED: SPECIFICS/COMMENTS:   Modality:     MHP  Applied to the wrist during soft tissue massage and scar massage of the digits   Ultrasound x Ultrasound to scars along dorsal hand/MF to reduce scar tissue and pain  Duration: 8 minutes  Intensity: 0.8  Frequency: 3.3Mhz  Duty cycle: continuous   Paraffin + MHP x x10 mins with increasing stretch to increase soft tissue elasticity. AROM:     Wrist  Flexion, extension, radial and ulnar deviation x15  -Jux a cizer x10 with elbow on table and full grasp.   -Ball Wrist ROM: wrist flexion extension, side to side, clockwise and counter clockwise   Digital X                -composite fist, flexion x5  -hook and MP flex/ext 2 x's 10 rep's   -Hook to intrinsic plus, back to hook x 15 reps  -PIP/DIP Isolated MF x 15 reps ea  -extension, all digits-Bean transfer with good composite fist.   -hook fist 2 sets of 10  -MCP table top 2 sets of 10   L MF X   -Place and holds in extension x10  Functional grasp/release of pom poms to facilitate more MCP flexion. AAROM:     Digital  -extension, all digits   L MF  -Place and holds in extension 2 sets x 10   PROM/Stretching:     Wrist x  Gentle -Flexion, extension, radial and ulnar deviation, pronation/supination with 2-3 sec holds   Digital X    X    X    -composite fist, flexion x10 middle and index finger, MP and PIP flexion.    -place and holds index and middle finger flexion x5  -Isolated MF ext/flex  -Gentle -MCP table top x15 & x3 hook fist   Self Stretching of wrist X  -Power Web Stretches for Wrist extension and flexion: x 12 reps ea with 10 sec holds   Scar Mass/Edema Control:     Retrograde massage x     To decrease swelling throughout the wrist and digits  -mesh sleeve MF to wear as needed   Soft tissue massage/scar massage x To decrease rigidity of the scar and decrease rigidity of the soft tissue to increase ROM and decrease pain   Strengthening:     L hand/digital              x           -Light resistive putty, manipulation, 3 point pinch and pulling for 8 mins. -MF opp pinch paper clip and thread thru medium resistant sponge x 15 reps  -Prolonged flat fist grasp with edge of wrist weight 3x 45 sec holds.  -resistive clips moderate and heavy blue picking up marbles and placing into container with 3 pt pinch. - Ball toss/catch x10 each: 1.1#, 2.2#, 3.3#  -Hand Gripper: 2x15 w/ 3 sec holds at level 3 resistance w/ black coil  -Red Power Web: digital flexion x 20 reps   L Forearm/Wrist     X  15# Flexbar: pronation, supination, and twisting x 15 reps  2 sets  -Advanced to 3.3# Dyer Sachs Grasp: w/ wrist flex and ext 2x15 reps  - isometric exercises- shake 3 rounds 1 min each. Light dull ache at end of each round.   -rubber band hand gripper with 2 moderate resistance bands 15 reps into full composite fist   Other:     HEP x In-depth review of HEP for re-assessment. Assessment/Comments: Pt is making Fair + progress toward stated plan of care. New C9 approval begun this session with continued strengthening and stressing of the MCP joints and the wrist. Pt tolerated isometric strengthening with slight pain at the end of each round. Pt was able to complete ball toss without pain with increased weight each set of 10. Pt feels he is gaining strength and will soon be able to return to work. Continue to focus on resistance & strengthening with isometrics as tolerated. -Rehab Potential: Good  -Requires OT Follow Up: Yes    Time In: 3:00 pm           Time Out:  4:00 pm           Visit #: 1/18  CODE  Minutes Time  Units   53997 Fluidotherapy      57515 Manual 10 3:15 p - 3:25 p 1   29437 Therapeutic Ex 35 3:25 p - 4:00 p 2   49123 Therapeutic Activity      04953 ADL/COMP Tech Train      11989 Neuromuscular Re-Ed      11513 OrthoManagementTraining      01030 Paraffin 15 3:00 p - 3:15 p 1   47069 Electrical Stim - Attended      18890 Iontophoresis      37476 Ultrasound       Other        60  4      -Response to Treatment: Good tolerance for all light strengthening. Plan:   [x]  Continues Plan of care: Continue 2-3x/wk with heavy focus on improving strength and functional use of the L hand with continued scar tissue management. Treatment covered based on POC and graduated to patient's progress. Pt education continues at each visit to obtain maximum benefits from skilled OT intervention.   []  Alter Plan of care:   []  Discharge:      Traci Juarez/RAJAN, Padma Brad 87 #[phone]

## 2021-12-02 ENCOUNTER — OFFICE VISIT (OUTPATIENT)
Dept: ORTHOPEDIC SURGERY | Age: 41
End: 2021-12-02

## 2021-12-02 VITALS — RESPIRATION RATE: 18 BRPM | HEIGHT: 71 IN | WEIGHT: 190 LBS | BODY MASS INDEX: 26.6 KG/M2

## 2021-12-02 DIAGNOSIS — S62.613K: Primary | ICD-10-CM

## 2021-12-02 PROCEDURE — 99024 POSTOP FOLLOW-UP VISIT: CPT | Performed by: ORTHOPAEDIC SURGERY

## 2021-12-02 NOTE — PROGRESS NOTES
10 weeks out from left middle finger proximal phalanx nonunion open reduction internal fixation with distal radius autograft. Overall he is very pleased with the results. He has minimal discomfort in the finger. His biggest complaint is loss of PIP joint active extension. Otherwise he has nearly fully regained digital flexion. He feels he can safely return to light duty restrictions of no lifting greater than 10 pounds with his left hand. Physical exam: Nontender to palpation over his middle finger proximal phalanx. Scars mature. He does have some thickening and adhesions over the dorsal plates and screws. PIP motion is limited to 55 degrees of active extension which is equal to his passive extension. Digital flexion is down to about 85 degrees active equal to passive. He has appropriate pavithra and alignment. He is otherwise neurovascular intact. X-rays the office today: AP lateral obliques of his left hand were obtained and compared his images from November 4 as well as October 4, 2021. There is continued consolidation and callus formation across his nonunion site. He has maintained dorsal plate and screw fixation. Impression office x-rays: Healing and consolidating left middle finger proximal phalanx fracture with stable orthopedic hardware    Assessment 10 weeks postop    Plan    I have returned him to work limited duty of no lifting greater than 10 pounds with his left hand. He will follow-up in 3 or 4 months for reevaluation and x-rays. Discussed possible hardware removal with extensor tenolysis versus reconstruction as needed to try to restore active PIP joint extension. He voiced understanding.   All questions answered

## 2021-12-02 NOTE — LETTER
[address] [facility].  [address] [place] [address]  Phone: [phone]  Fax: 32 [facility], [place]        December 2, 2021     Patient: Ira Cottrell   YOB: 1980   Date of Visit: 12/2/2021       To Whom It May Concern: It is my medical opinion that Justyn Segura may return to work on 12/6/21 with the following restrictions: lifting/carrying not to exceed 10 lbs. , pushing/pulling not to exceed 10 lbs. with left hand. If you have any questions or concerns, please don't hesitate to call.     Sincerely,        Trish Martinez MD

## 2021-12-09 ENCOUNTER — TREATMENT (OUTPATIENT)
Dept: OCCUPATIONAL THERAPY | Age: 41
End: 2021-12-09
Payer: COMMERCIAL

## 2021-12-09 DIAGNOSIS — S62.613B DISPLACED FRACTURE OF PROXIMAL PHALANX OF LEFT MIDDLE FINGER, INITIAL ENCOUNTER FOR OPEN FRACTURE: Primary | ICD-10-CM

## 2021-12-09 PROCEDURE — 97018 PARAFFIN BATH THERAPY: CPT | Performed by: OCCUPATIONAL THERAPIST

## 2021-12-09 PROCEDURE — 97110 THERAPEUTIC EXERCISES: CPT | Performed by: OCCUPATIONAL THERAPIST

## 2021-12-09 PROCEDURE — 97140 MANUAL THERAPY 1/> REGIONS: CPT | Performed by: OCCUPATIONAL THERAPIST

## 2021-12-09 NOTE — PROGRESS NOTES
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 Jose Francisco Anthony  Specific Practitioner Orders: May progress with active and passive range of motion to the hand.  Splint fabrication.  Modalities as needed     Assessment of current deficits   [x]? ADLs          [x]? Strength     [x]? IADLs     [x]? Endurance  [x]? Scar Adhesion/Skin Integrity   [x]? FM Coordination    [x]? Sensation   [x]? ROM           [x]? Pain         [x]? Edema             OT PLAN OF CARE   OT POC based on physician orders, patient diagnosis and results of clinical assessment     Frequency/Duration:1-2x/week for 12-18 visits ( pending C9 approval)   Specific OT Treatment to include:      [x]? Instruction in HEP                   Modalities:  [x]?  Therapeutic Exercise                 [x]? Ultrasound               [x]? Electrical Stimulation/Attended  [x]? PROM/Stretching                    [x]? Fluidotherapy          [x]?   Paraffin                   [x]? AAROM  [x]?  AROM                 [x]? Iontophoresis:   [x]? Jeni Pound                                       []? Neuromuscular Re-Ed            [x]? ADL/IADL re-training    [x]?  Therapeutic Activity                  [x]? Pain Management with/without modalities PRN                 [x]?  Manual Therapy                      [x]? Splinting                                   [x]?  Scar Management                   []?Joint Protection/Training  []? Ergonomics                             [x]?  Joint Mobilization                      [x]? Adaptive EquipmentAssessment/Training                             [x]?  Manual Edema Mobilization   [x]?  Myofascial Release                 []? Energy Conservation/Work Simplification  [x]? GM/FM Coordination                [x]? Safety retraining/education per  individual diagnosis/goals  [x]? Desensitization        Patient Specific Goal: Pt would like to return to making a full fist.                              GOALS (Long term same as Short term):  1) Patient will demonstrate good understanding of home program (exercises/activities/diagnosis/prognosis/goals) with good accuracy. Goal met and ongoing, in-depth review of HEP and progress with good compliance and understanding. 2) Patient will demonstrate increased active/passive range of motion of their LUE to [facility] for ADL/IADL completion. Progressing slowly at the PIP of RF, all other affected areas progressing well. 3) Patient will demonstrate increased /pinch strength of at least 10 / 3-5 pinch pounds of their L hand. Progressing well, refer to measurements. 4) Patient to report decreased pain in their affected L upper extremity from 5/10 to 2/10 or less with resistive functional use. Goal met. 5) Increase in fine motor function as evidenced by decreased time to complete 9-hole peg test and/or MRMT test by at least 5-10 seconds. Assessed 11/24. 6) Patient to report 100% compliance with their splint wear, care, and precautions if needed. All splints discharged. 7) Patient will be knowledgeable of edema control techniques as evident with decreases from 8 to mild/none. Progressing slowly, moderate edema remains surrounding proximal phalanx. 9) Patient will demonstrate a non-tender/non-adherent scar. Goal progressing well, continues to benefit from scar tissue management.    10) Patient will report ADL functions as Mod I/I using LUE. Goal met, no limitations with ADLs. 11) Patient will demonstrate improved functional activity tolerance from poor to fair for ADL/IADL completion. Goal met and progressing, good- tolerance for all activities. 12) Patient will decrease QuickDASH score to 40% or less for increased participation in daily functional activities. Goal met, 100% to 22% disability.     TODAY'S TREATMENT     Pain Level: no pain reported     Subjective:  Pt states no new changes. Objective:  Updated POC completed on 11/24/2021. INTERVENTION: COMPLETED: SPECIFICS/COMMENTS:   Modality:     MHP  Applied to the wrist during soft tissue massage and scar massage of the digits   Ultrasound x Ultrasound to scars along dorsal hand/MF to reduce scar tissue and pain  Duration: 8 minutes  Intensity: 0.8  Frequency: 3.3Mhz  Duty cycle: continuous   Paraffin + MHP x x10 mins with increasing stretch to increase soft tissue elasticity. AROM:     Wrist  Flexion, extension, radial and ulnar deviation x15  -Jux a cizer x10 with elbow on table and full grasp.   -Ball Wrist ROM: wrist flexion extension, side to side, clockwise and counter clockwise   Digital                 -composite fist, flexion x5  -hook and MP flex/ext 2 x's 10 rep's   -Hook to intrinsic plus, back to hook x 15 reps  -PIP/DIP Isolated MF x 15 reps ea  -extension, all digits-Bean transfer with good composite fist.   -hook fist 2 sets of 10  -MCP table top 2 sets of 10   L MF X   -Place and holds in extension x10  Functional grasp/release of pom poms to facilitate more MCP flexion. AAROM:     Digital  -extension, all digits   L MF  -Place and holds in extension 2 sets x 10   PROM/Stretching:     Wrist x  Gentle -Flexion, extension, radial and ulnar deviation, pronation/supination with 2-3 sec holds   Digital   x          -composite fist, flexion x10 middle and index finger, MP and PIP flexion.    -place and holds index and middle finger flexion x5  -Isolated MF ext/flex  -Gentle -MCP table top x15 & x3 hook fist   Self Stretching of wrist X  -Power Web Stretches for Wrist extension and flexion: x 12 reps ea with 10 sec holds   Scar Mass/Edema Control:     Retrograde massage x     To decrease swelling throughout the wrist and digits  -mesh sleeve MF to wear as needed   Soft tissue massage/scar massage x To decrease rigidity of the scar and decrease rigidity of the soft tissue to increase ROM and decrease pain   Strengthening:     L hand/digital              x           -Light resistive putty, manipulation, 3 point pinch and pulling for 8 mins. -MF opp pinch paper clip and thread thru medium resistant sponge x 15 reps  -Prolonged flat fist grasp with edge of wrist weight 3x 45 sec holds.  -resistive clips moderate and heavy blue picking up marbles and placing into container with 3 pt pinch. 5.0# digi flex- 2 sets x15 pronation, supination and neutral.   - Ball toss/catch 2x20: 3.3#  -Hand Gripper: 2x15 w/ 3 sec holds at 50# resistance w/ silver coil to  pom poms and place into container.   -Red Power Web: digital flexion x 20 reps   L Forearm/Wrist     X  15# Flexbar: pronation, supination, and twisting x 15 reps  2 sets  -Advanced to 3.3# Dyer Sachs Grasp: w/ wrist flex and ext 2x15 reps  - isometric exercises- shake 3 rounds 1 min each. Light dull ache at end of each round. er 2 # free weight x5 with fair tolerance.   -rubber band hand gripper with 2 moderate resistance bands 15 reps into full composite fist   Other:     HEP x In-depth review of HEP for re-assessment. Assessment/Comments: Pt is making Fair + progress toward stated plan of care. Pt tolerated increased resistance during strengthening with no difficulty. Fair endurance completing Mr. Claudeen Gros. Continue to progress as tolerated.      -Rehab Potential: Good  -Requires OT Follow Up: Yes    Time In: 1:00 pm           Time Out:  2:00 pm           Visit #: 1/18  CODE Minutes Time  Units   30492 Fluidotherapy      91603 Manual 10 1:15 p - 1:25 p 1   90077 Therapeutic Ex 35 1:25 p - 2:00 p 2   34478 Therapeutic Activity      01288 ADL/COMP Tech Train      93403 Neuromuscular Re-Ed      99966 OrthoManagementTraining      86962 Paraffin 15 1:00 p - 1:15 p 1   50273 Electrical Stim - Attended      02135 Iontophoresis      31345 Ultrasound       Other        60  4      -Response to Treatment: Good tolerance for all light strengthening. Plan:   [x]  Continues Plan of care: Continue 2-3x/wk with heavy focus on improving strength and functional use of the L hand with continued scar tissue management. Treatment covered based on POC and graduated to patient's progress. Pt education continues at each visit to obtain maximum benefits from skilled OT intervention.   []  Alter Plan of care:   []  Discharge:      Traci Cabrera, Padma [address] #[phone]

## 2021-12-10 ENCOUNTER — TREATMENT (OUTPATIENT)
Dept: OCCUPATIONAL THERAPY | Age: 41
End: 2021-12-10
Payer: COMMERCIAL

## 2021-12-10 DIAGNOSIS — S62.613B DISPLACED FRACTURE OF PROXIMAL PHALANX OF LEFT MIDDLE FINGER, INITIAL ENCOUNTER FOR OPEN FRACTURE: Primary | ICD-10-CM

## 2021-12-10 PROCEDURE — 97110 THERAPEUTIC EXERCISES: CPT | Performed by: OCCUPATIONAL THERAPIST

## 2021-12-10 PROCEDURE — 97140 MANUAL THERAPY 1/> REGIONS: CPT | Performed by: OCCUPATIONAL THERAPIST

## 2021-12-10 PROCEDURE — 97018 PARAFFIN BATH THERAPY: CPT | Performed by: OCCUPATIONAL THERAPIST

## 2021-12-10 NOTE — PROGRESS NOTES
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 Alecia Cowart  Specific Practitioner Orders: May progress with active and passive range of motion to the hand.  Splint fabrication.  Modalities as needed     Assessment of current deficits   [x]? ADLs          [x]? Strength     [x]? IADLs     [x]? Endurance  [x]? Scar Adhesion/Skin Integrity   [x]? FM Coordination    [x]? Sensation   [x]? ROM           [x]? Pain         [x]? Edema             OT PLAN OF CARE   OT POC based on physician orders, patient diagnosis and results of clinical assessment     Frequency/Duration:1-2x/week for 12-18 visits ( pending C9 approval)   Specific OT Treatment to include:      [x]? Instruction in HEP                   Modalities:  [x]?  Therapeutic Exercise                 [x]? Ultrasound               [x]? Electrical Stimulation/Attended  [x]? PROM/Stretching                    [x]? Fluidotherapy          [x]?   Paraffin                   [x]? AAROM  [x]?  AROM                 [x]? Iontophoresis:   [x]? Maykel Pablo                                       []? Neuromuscular Re-Ed            [x]? ADL/IADL re-training    [x]?  Therapeutic Activity                  [x]? Pain Management with/without modalities PRN                 [x]?  Manual Therapy                      [x]? Splinting                                   [x]?  Scar Management                   []?Joint Protection/Training  []? Ergonomics                             [x]?  Joint Mobilization                      [x]? Adaptive EquipmentAssessment/Training                             [x]?  Manual Edema Mobilization   [x]?  Myofascial Release                 []? Energy Conservation/Work Simplification  [x]? GM/FM Coordination                [x]? Safety retraining/education per  individual diagnosis/goals  [x]? Desensitization        Patient Specific Goal: Pt would like to return to making a full fist.                              GOALS (Long term same as Short term):  1) Patient will demonstrate good understanding of home program (exercises/activities/diagnosis/prognosis/goals) with good accuracy. Goal met and ongoing, in-depth review of HEP and progress with good compliance and understanding. 2) Patient will demonstrate increased active/passive range of motion of their LUE to [facility] for ADL/IADL completion. Progressing slowly at the PIP of RF, all other affected areas progressing well. 3) Patient will demonstrate increased /pinch strength of at least 10 / 3-5 pinch pounds of their L hand. Progressing well, refer to measurements. 4) Patient to report decreased pain in their affected L upper extremity from 5/10 to 2/10 or less with resistive functional use. Goal met. 5) Increase in fine motor function as evidenced by decreased time to complete 9-hole peg test and/or MRMT test by at least 5-10 seconds. Assessed 11/24. 6) Patient to report 100% compliance with their splint wear, care, and precautions if needed. All splints discharged. 7) Patient will be knowledgeable of edema control techniques as evident with decreases from 8 to mild/none. Progressing slowly, moderate edema remains surrounding proximal phalanx. 9) Patient will demonstrate a non-tender/non-adherent scar. Goal progressing well, continues to benefit from scar tissue management.    10) Patient will report ADL functions as Mod I/I using LUE. Goal met, no limitations with ADLs. 11) Patient will demonstrate improved functional activity tolerance from poor to fair for ADL/IADL completion. Goal met and progressing, good- tolerance for all activities. 12) Patient will decrease QuickDASH score to 40% or less for increased participation in daily functional activities. Goal met, 100% to 22% disability.     TODAY'S TREATMENT     Pain Level: no pain reported     Subjective:  Pt states no new changes. Objective:  Updated POC completed on 11/24/2021. INTERVENTION: COMPLETED: SPECIFICS/COMMENTS:   Modality:     MHP  Applied to the wrist during soft tissue massage and scar massage of the digits   Ultrasound x Ultrasound to scars along dorsal hand/MF to reduce scar tissue and pain  Duration: 8 minutes  Intensity: 0.8  Frequency: 3.3Mhz  Duty cycle: continuous   Paraffin + MHP x x10 mins with increasing stretch to increase soft tissue elasticity. AROM:     Wrist  Flexion, extension, radial and ulnar deviation x15  -Jux a cizer x10 with elbow on table and full grasp.   -Ball Wrist ROM: wrist flexion extension, side to side, clockwise and counter clockwise   Digital                 -composite fist, flexion x5  -hook and MP flex/ext 2 x's 10 rep's   -Hook to intrinsic plus, back to hook x 15 reps  -PIP/DIP Isolated MF x 15 reps ea  -extension, all digits-Bean transfer with good composite fist.   -hook fist 2 sets of 10  -MCP table top 2 sets of 10   L MF X   -Place and holds in extension x10  Functional grasp/release of pom poms to facilitate more MCP flexion. AAROM:     Digital  -extension, all digits   L MF  -Place and holds in extension 2 sets x 10   PROM/Stretching:     Wrist x  Gentle -Flexion, extension, radial and ulnar deviation, pronation/supination with 2-3 sec holds   Digital x  x          -composite fist, flexion x10 middle and index finger, MP and PIP flexion.    -place and holds index and middle finger flexion x5  -Isolated MF ext/flex  -Gentle -MCP table top x15 & x3 hook fist   Self Stretching of wrist X  -Power Web Stretches for Wrist extension and flexion: x 12 reps ea with 10 sec holds   Scar Mass/Edema Control:     Retrograde massage x     To decrease swelling throughout the wrist and digits  -mesh sleeve MF to wear as needed   Soft tissue massage/scar massage x To decrease rigidity of the scar and decrease rigidity of the soft tissue to increase ROM and decrease pain   Strengthening:     L hand/digital x             x       x       x -Light resistive putty, manipulation, 3 point pinch and pulling for 8 mins. -MF opp pinch paper clip and thread thru medium resistant sponge x 15 reps  -Prolonged flat fist grasp with edge of wrist weight 3x 45 sec holds.  -resistive clips moderate and heavy blue picking up marbles and placing into container with 3 pt pinch. 5.0# digi flex- 2 sets x15 pronation, supination and neutral.   - Ball toss/catch 2x20: 3.3#  -Hand Gripper: 2x15 w/ 3 sec holds at 50# resistance w/ silver coil to  pom poms and place into container.   -Red Power Web: digital flexion x 20 reps   L Forearm/Wrist x    X  15# Flexbar: pronation, supination, and twisting x 15 reps  2 sets  -Advanced to 3.3# Dyer Sachs Grasp: w/ wrist flex and ext 2x15 reps  - isometric exercises- shake 3 rounds 1 min each. Light dull ache at end of each round.  2 # free weight x5 with fair tolerance.   -rubber band hand gripper with 2 moderate resistance bands 15 reps into full composite fist   Other:     HEP x In-depth review of HEP for re-assessment. Assessment/Comments: Pt is making Fair + progress toward stated plan of care. Primary focus this date with AROM and strengthening as well as decreasing scar rigidity along to dorsal aspect of the IF.  Pt tolerated all strengthening activities well.       -Rehab Potential: Good  -Requires OT Follow Up: Yes    Time In: 11:00 m           Time Out:  12:00 pm Visit #: 1/18  CODE  Minutes Time  Units   19567 Fluidotherapy      03758 Manual 10 11:15 - 11:25 p 1   84513 Therapeutic Ex 35 11:25 - 12:00 p 2   47742 Therapeutic Activity      64913 ADL/COMP Tech Train      54392 Neuromuscular Re-Ed      37134 OrthoManagementTraining      25365 Paraffin 15 11:00  - 11:15  1   73159 Electrical Stim - Attended      17436 Iontophoresis      73228 Ultrasound       Other        60  4      -Response to Treatment: Good tolerance for all light strengthening. Plan:   [x]  Continues Plan of care: Continue 2-3x/wk with heavy focus on improving strength and functional use of the L hand with continued scar tissue management. Treatment covered based on POC and graduated to patient's progress. Pt education continues at each visit to obtain maximum benefits from skilled OT intervention. []  Alter Plan of care:   []  Discharge:       [address] [place] [address] R/L, [place] #[id number]

## 2021-12-13 ENCOUNTER — TREATMENT (OUTPATIENT)
Dept: OCCUPATIONAL THERAPY | Age: 41
End: 2021-12-13
Payer: COMMERCIAL

## 2021-12-13 DIAGNOSIS — S62.613B DISPLACED FRACTURE OF PROXIMAL PHALANX OF LEFT MIDDLE FINGER, INITIAL ENCOUNTER FOR OPEN FRACTURE: Primary | ICD-10-CM

## 2021-12-13 PROCEDURE — 97140 MANUAL THERAPY 1/> REGIONS: CPT | Performed by: OCCUPATIONAL THERAPIST

## 2021-12-13 PROCEDURE — 97035 APP MDLTY 1+ULTRASOUND EA 15: CPT | Performed by: OCCUPATIONAL THERAPIST

## 2021-12-13 PROCEDURE — 97110 THERAPEUTIC EXERCISES: CPT | Performed by: OCCUPATIONAL THERAPIST

## 2021-12-13 NOTE — PROGRESS NOTES
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 Lorenzo Kaur  Specific Practitioner Orders: May progress with active and passive range of motion to the hand. Splint fabrication.  Modalities as needed     Assessment of current deficits   [x]? ADLs          [x]? Strength     [x]? IADLs     [x]? Endurance  [x]? Scar Adhesion/Skin Integrity   [x]? FM Coordination    [x]? Sensation   [x]? ROM           [x]? Pain         [x]? Edema             OT PLAN OF CARE   OT POC based on physician orders, patient diagnosis and results of clinical assessment     Frequency/Duration:1-2x/week for 12-18 visits ( pending C9 approval)   Specific OT Treatment to include:      [x]? Instruction in HEP                   Modalities:  [x]?  Therapeutic Exercise                 [x]? Ultrasound               [x]? Electrical Stimulation/Attended  [x]? PROM/Stretching                    [x]? Fluidotherapy          [x]?   Paraffin                   [x]? AAROM  [x]?  AROM                 [x]? Iontophoresis:   [x]? [place] [employer]                                       []? Neuromuscular Re-Ed            [x]? ADL/IADL re-training    [x]?  Therapeutic Activity                  [x]? Pain Management with/without modalities PRN                 [x]?  Manual Therapy                      [x]? Splinting                                   [x]?  Scar Management                   []?Joint Protection/Training  []? Ergonomics                             [x]?  Joint Mobilization                      [x]? Adaptive EquipmentAssessment/Training                             [x]?  Manual Edema Mobilization   [x]?  Myofascial Release                 []? Energy Conservation/Work Simplification  [x]? GM/FM Coordination                [x]? Safety retraining/education per  individual diagnosis/goals  [x]? Desensitization        Patient Specific Goal: Pt would like to return to making a full fist.                              GOALS (Long term same as Short term):  1) Patient will demonstrate good understanding of home program (exercises/activities/diagnosis/prognosis/goals) with good accuracy. Goal met and ongoing, in-depth review of HEP and progress with good compliance and understanding. 2) Patient will demonstrate increased active/passive range of motion of their LUE to [facility] for ADL/IADL completion. Progressing slowly at the PIP of RF, all other affected areas progressing well. 3) Patient will demonstrate increased /pinch strength of at least 10 / 3-5 pinch pounds of their L hand. Progressing well, refer to measurements. 4) Patient to report decreased pain in their affected L upper extremity from 5/10 to 2/10 or less with resistive functional use. Goal met. 5) Increase in fine motor function as evidenced by decreased time to complete 9-hole peg test and/or MRMT test by at least 5-10 seconds. Assessed 11/24. 6) Patient to report 100% compliance with their splint wear, care, and precautions if needed. All splints discharged. 7) Patient will be knowledgeable of edema control techniques as evident with decreases from 8 to mild/none. Progressing slowly, moderate edema remains surrounding proximal phalanx. 9) Patient will demonstrate a non-tender/non-adherent scar. Goal progressing well, continues to benefit from scar tissue management.    10) Patient will report ADL functions as Mod I/I using LUE. Goal met, no limitations with ADLs. 11) Patient will demonstrate improved functional activity tolerance from poor to fair for ADL/IADL completion. Goal met and progressing, good- tolerance for all activities. 12) Patient will decrease QuickDASH score to 40% or less for increased participation in daily functional activities. Goal met, 100% to 22% disability.     TODAY'S TREATMENT     Pain Level: no pain reported     Subjective:  Pt reports \"My  is feeling better. I can grab my coffee cup again. \" When performing UE re-conditioning, pt reports \"I haven't worked on these muscles much in 2 years, the burn feels good. I need more of this. \"     Objective:  Updated POC completed on 11/24/2021. INTERVENTION: COMPLETED: SPECIFICS/COMMENTS:   Modality:     MHP  Applied to the wrist during soft tissue massage and scar massage of the digits   Ultrasound x Ultrasound to scars along dorsal hand/MF to reduce scar tissue and pain  Duration: 8 minutes  Intensity: 0.8  Frequency: 3.3Mhz  Duty cycle: continuous   Paraffin + MHP x x10 mins with increasing stretch to increase soft tissue elasticity. AROM:     Wrist  Flexion, extension, radial and ulnar deviation x15  -Jux a cizer x10 with elbow on table and full grasp.   -Ball Wrist ROM: wrist flexion extension, side to side, clockwise and counter clockwise   Digital                 -composite fist, flexion x5  -hook and MP flex/ext 2 x's 10 rep's   -Hook to intrinsic plus, back to hook x 15 reps  -PIP/DIP Isolated MF x 15 reps ea  -extension, all digits-Bean transfer with good composite fist.   -hook fist 2 sets of 10  -MCP table top 2 sets of 10   L MF    -Place and holds in extension x10  Functional grasp/release of pom poms to facilitate more MCP flexion.     AAROM:     Digital  -extension, all digits   L MF  -Place and holds in extension 2 sets x 10   PROM/Stretching:     Wrist   Gentle -Flexion, extension, radial and ulnar deviation, pronation/supination with 2-3 sec holds   Digital   X      X  -composite fist, flexion x10 middle and index finger, MP and PIP flexion. -place and holds index and middle finger flexion x5  -Isolated MF ext/flex  -Gentle -MCP table top x15 & x3 hook fist   Self Stretching of wrist  -Power Web Stretches for Wrist extension and flexion: x 12 reps ea with 10 sec holds   Scar Mass/Edema Control:     Retrograde massage      To decrease swelling throughout the wrist and digits  -mesh sleeve MF to wear as needed   Soft tissue massage/scar massage x To decrease rigidity of the scar and decrease rigidity of the soft tissue to increase ROM and decrease pain   Strengthening:     L UE Re-Conditioning X  X  X  X    X  -4# OH and [facility] Bicep Curls: 2x15  -4# Overhead Triceps: 2x15  -4#  Press: 2x15  -2.2# [employer] Grasp: shoulder flexed to 90*, then pronate supinate 3x15  -Putty Tools: against medium resistance putty - doorknob, lat pinch, L bar x 10 reps ea in RD/UD   L hand/digital  -Light resistive putty, manipulation, 3 point pinch and pulling for 8 mins. -MF opp pinch paper clip and thread thru medium resistant sponge x 15 reps  -Prolonged flat fist grasp with edge of wrist weight 3x 45 sec holds.  -resistive clips moderate and heavy blue picking up marbles and placing into container with 3 pt pinch. 5.0# digi flex- 2 sets x15 pronation, supination and neutral.   - Ball toss/catch 2x20: 3.3#  -Hand Gripper: 2x15 w/ 3 sec holds at 50# resistance w/ silver coil to  pom poms and place into container.   -Red Power Web: digital flexion x 20 reps   L Forearm/Wrist  15# Flexbar: pronation, supination, and twisting x 15 reps  2 sets  -Advanced to 3.3# Dyer Sachs Grasp: w/ wrist flex and ext 2x15 reps  - isometric exercises- shake 3 rounds 1 min each. Light dull ache at end of each round.     2 # free weight x5 with fair tolerance.   -rubber band hand gripper with 2 moderate resistance bands 15 reps into full composite fist   Other:     HEP x In-depth review of HEP for re-assessment. Assessment/Comments: Pt is making Fair + progress toward stated plan of care. Today's session focused on overall UE re-conditioning with moderate difficulty and max fatigue throughout the L UE by end of the session. Pt educated to keep UE moving through full range throughout the day to reduce soreness - completed UBE at end of session to reduce lactic acid build-up. Pt felt some much needed areas of the UE were address as far as weakness goes this date - he was pleased with session. Further sessions need to focus on advancing with UE re-conditioning and endurance re-building.     -Rehab Potential: Good  -Requires OT Follow Up: Yes    Time In: 11:00 am           Time Out:  12:00 pm           Visit #: 4/18  CODE  Minutes Time  Units   59954 Fluidotherapy      47069 Manual 10 11:10 - 11:20 a 1   38205 Therapeutic Ex 32 11:28 - 12:00 p 2   32081 Therapeutic Activity      28586 ADL/COMP Tech Train      60288 Neuromuscular Re-Ed      36024 OrthoManagementTraining      84625 Paraffin 10 11:00-11:10 0   97615 Electrical Stim - Attended      02684 Iontophoresis      01804 Ultrasound 8 11:20-11:28 a 1    Other        60  4      -Response to Treatment: Max fatigue throughout UE following today's session. Plan:   [x]  Continues Plan of care: Continue 2-3x/wk with heavy focus on improving strength and functional use of the L hand with continued scar tissue management. Treatment covered based on POC and graduated to patient's progress. Pt education continues at each visit to obtain maximum benefits from skilled OT intervention.   []  Alter Plan of care:   []  Discharge:      Annia Nguyen, OT R/L, [place], #[id number]

## 2021-12-15 ENCOUNTER — TREATMENT (OUTPATIENT)
Dept: OCCUPATIONAL THERAPY | Age: 41
End: 2021-12-15
Payer: COMMERCIAL

## 2021-12-15 DIAGNOSIS — S62.613B DISPLACED FRACTURE OF PROXIMAL PHALANX OF LEFT MIDDLE FINGER, INITIAL ENCOUNTER FOR OPEN FRACTURE: Primary | ICD-10-CM

## 2021-12-15 PROCEDURE — 97110 THERAPEUTIC EXERCISES: CPT | Performed by: OCCUPATIONAL THERAPIST

## 2021-12-15 PROCEDURE — 97035 APP MDLTY 1+ULTRASOUND EA 15: CPT | Performed by: OCCUPATIONAL THERAPIST

## 2021-12-15 PROCEDURE — 97140 MANUAL THERAPY 1/> REGIONS: CPT | Performed by: OCCUPATIONAL THERAPIST

## 2021-12-15 NOTE — PROGRESS NOTES
Management                   []?Joint Protection/Training  []? Ergonomics                             [x]?  Joint Mobilization                      [x]? Adaptive EquipmentAssessment/Training                             [x]?  Manual Edema Mobilization   [x]?  Myofascial Release                 []? Energy Conservation/Work Simplification  [x]? GM/FM Coordination                [x]? Safety retraining/education per  individual diagnosis/goals  [x]? Desensitization        Patient Specific Goal: Pt would like to return to making a full fist.                              GOALS (Long term same as Short term):  1) Patient will demonstrate good understanding of home program (exercises/activities/diagnosis/prognosis/goals) with good accuracy. Goal met and ongoing, in-depth review of HEP and progress with good compliance and understanding. 2) Patient will demonstrate increased active/passive range of motion of their LUE to [facility] for ADL/IADL completion. Progressing slowly at the PIP of RF, all other affected areas progressing well. 3) Patient will demonstrate increased /pinch strength of at least 10 / 3-5 pinch pounds of their L hand. Progressing well, refer to measurements. 4) Patient to report decreased pain in their affected L upper extremity from 5/10 to 2/10 or less with resistive functional use. Goal met. 5) Increase in fine motor function as evidenced by decreased time to complete 9-hole peg test and/or MRMT test by at least 5-10 seconds. Assessed 11/24. 6) Patient to report 100% compliance with their splint wear, care, and precautions if needed. All splints discharged. 7) Patient will be knowledgeable of edema control techniques as evident with decreases from 8 to mild/none. Progressing slowly, moderate edema remains surrounding proximal phalanx. 9) Patient will demonstrate a non-tender/non-adherent scar. Goal progressing well, continues to benefit from scar tissue management.    10) Patient will report ADL functions as Mod I/I using LUE. Goal met, no limitations with ADLs. 11) Patient will demonstrate improved functional activity tolerance from poor to fair for ADL/IADL completion. Goal met and progressing, good- tolerance for all activities. 12) Patient will decrease QuickDASH score to 40% or less for increased participation in daily functional activities. Goal met, 100% to 22% disability.     TODAY'S TREATMENT     Pain Level: no pain reported     Subjective:  Pt reports \"I was sore after less session, I realized how little i've been using these other muscles. \"     Objective:  Updated POC completed on 11/24/2021. INTERVENTION: COMPLETED: SPECIFICS/COMMENTS:   Modality:     MHP  Applied to the wrist during soft tissue massage and scar massage of the digits   Ultrasound x Ultrasound to scars along dorsal hand/MF to reduce scar tissue and pain  Duration: 8 minutes  Intensity: 0.8  Frequency: 3.3Mhz  Duty cycle: continuous   Paraffin + MHP x x10 mins with increasing stretch to increase soft tissue elasticity. AROM:     Wrist  Flexion, extension, radial and ulnar deviation x15  -Jux a cizer x10 with elbow on table and full grasp.   -Ball Wrist ROM: wrist flexion extension, side to side, clockwise and counter clockwise   Digital                 -composite fist, flexion x5  -hook and MP flex/ext 2 x's 10 rep's   -Hook to intrinsic plus, back to hook x 15 reps  -PIP/DIP Isolated MF x 15 reps ea  -extension, all digits-Bean transfer with good composite fist.   -hook fist 2 sets of 10  -MCP table top 2 sets of 10   L MF    -Place and holds in extension x10  Functional grasp/release of pom poms to facilitate more MCP flexion.     AAROM:     Digital  -extension, all digits   L MF  -Place and holds in extension 2 sets x 10   PROM/Stretching:     Wrist   Gentle -Flexion, extension, radial and ulnar deviation, pronation/supination with 2-3 sec holds   Digital   X      X  -composite fist, flexion x10 middle and index finger, MP and PIP flexion. -place and holds index and middle finger flexion x5  -Isolated MF ext/flex  -Gentle -MCP table top x15 & x3 hook fist   Self Stretching of wrist  -Power Web Stretches for Wrist extension and flexion: x 12 reps ea with 10 sec holds   Scar Mass/Edema Control:     Retrograde massage      To decrease swelling throughout the wrist and digits  -mesh sleeve MF to wear as needed   Soft tissue massage/scar massage x To decrease rigidity of the scar and decrease rigidity of the soft tissue to increase ROM and decrease pain   Strengthening:     L UE Re-Conditioning X  X  X  X  X     X           X  -4# OH and [facility] Bicep Curls: 2x15  -4# Overhead Triceps: 2x15  -4#  Press: 2x15  -4# Chest Press: 2x15  -2.2# Darroll [employer]ita Grasp: shoulder flexed to 90*, then pronate supinate 3x15  -Isometric elbow flex/ext x 10 reps ea with 10 sec holds against therapist resistance. -Putty Tools: against medium resistance putty - doorknob, lat pinch, L bar x 10 reps ea in RD/UD  -UBE: 8 mins at end of session for gentle ROM at light resistance for muscular endurance re-training and to prevent soreness   L hand/digital  -Light resistive putty, manipulation, 3 point pinch and pulling for 8 mins. -MF opp pinch paper clip and thread thru medium resistant sponge x 15 reps  -Prolonged flat fist grasp with edge of wrist weight 3x 45 sec holds.  -resistive clips moderate and heavy blue picking up marbles and placing into container with 3 pt pinch. 5.0# digi flex- 2 sets x15 pronation, supination and neutral.   - Ball toss/catch 2x20: 3.3#  -Hand Gripper: 2x15 w/ 3 sec holds at 50# resistance w/ silver coil to  pom poms and place into container.   -Red Power Web: digital flexion x 20 reps   L Forearm/Wrist  15# Flexbar: pronation, supination, and twisting x 15 reps  2 sets  -Advanced to 3.3# Dyer Sachs Grasp: w/ wrist flex and ext 2x15 reps  - isometric exercises- shake 3 rounds 1 min each.  Light dull ache at end of each uriah.   -  2 # free weight x5 with fair tolerance.   -rubber band hand gripper with 2 moderate resistance bands 15 reps into full composite fist   Other:     HEP x In-depth review of HEP for re-assessment. Assessment/Comments: Pt is making Fair + progress toward stated plan of care. Today's session continued focus on overall UE re-conditioning with moderate difficulty and slightly lessened fatigue throughout the L UE by end of the session. Remained at same activities as pt was sore following last session - need to work on building endurance and tolerance with current exercises and maintained grasping. Will continue focusing further sessions on advancing with UE re-conditioning and endurance re-building.     -Rehab Potential: Good  -Requires OT Follow Up: Yes    Time In: 11:00 am           Time Out:  12:00 pm           Visit #: 5/18  CODE  Minutes Time  Units   30381 Fluidotherapy      23940 Manual 10 11:10- 11:20 a 1   14235 Therapeutic Ex 32 11:28 - 12:00 p 2   64957 Therapeutic Activity      40177 ADL/COMP Tech Train      98563 Neuromuscular Re-Ed      95792 OrthoManagementTraining      45694 Paraffin 10 11:00-11:10 0   92808 Electrical Stim - Attended      46144 Iontophoresis      78126 Ultrasound 8 11:20-11:28 a 1    Other        60  4      -Response to Treatment: Continues with UE fatigue and soreness by end of session. Plan:   [x]  Continues Plan of care: Continue 2-3x/wk with heavy focus on improving strength and functional use of the L hand with continued scar tissue management. Treatment covered based on POC and graduated to patient's progress. Pt education continues at each visit to obtain maximum benefits from skilled OT intervention.   []  Alter Plan of care:   []  Discharge:      Lise Martinez, OT R/L, [facility] [address], #[id number]

## 2021-12-17 ENCOUNTER — TREATMENT (OUTPATIENT)
Dept: OCCUPATIONAL THERAPY | Age: 41
End: 2021-12-17
Payer: COMMERCIAL

## 2021-12-17 DIAGNOSIS — S62.613B DISPLACED FRACTURE OF PROXIMAL PHALANX OF LEFT MIDDLE FINGER, INITIAL ENCOUNTER FOR OPEN FRACTURE: Primary | ICD-10-CM

## 2021-12-17 PROCEDURE — 97018 PARAFFIN BATH THERAPY: CPT | Performed by: OCCUPATIONAL THERAPIST

## 2021-12-17 PROCEDURE — 97140 MANUAL THERAPY 1/> REGIONS: CPT | Performed by: OCCUPATIONAL THERAPIST

## 2021-12-17 PROCEDURE — 97110 THERAPEUTIC EXERCISES: CPT | Performed by: OCCUPATIONAL THERAPIST

## 2021-12-17 NOTE — PROGRESS NOTES
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 Caesar Aviles  Specific Practitioner Orders: May progress with active and passive range of motion to the hand. Splint fabrication.  Modalities as needed     Assessment of current deficits   [x]? ADLs          [x]? Strength     [x]? IADLs     [x]? Endurance  [x]? Scar Adhesion/Skin Integrity   [x]? FM Coordination    [x]? Sensation   [x]? ROM           [x]? Pain         [x]? Edema             OT PLAN OF CARE   OT POC based on physician orders, patient diagnosis and results of clinical assessment     Frequency/Duration:1-2x/week for 12-18 visits ( pending C9 approval)   Specific OT Treatment to include:      [x]? Instruction in HEP                   Modalities:  [x]?  Therapeutic Exercise                 [x]? Ultrasound               [x]? Electrical Stimulation/Attended  [x]? PROM/Stretching                    [x]? Fluidotherapy          [x]?   Paraffin                   [x]? AAROM  [x]?  AROM                 [x]? Iontophoresis:   [x]? Tra James                                       []? Neuromuscular Re-Ed            [x]? ADL/IADL re-training    [x]?  Therapeutic Activity                  [x]? Pain Management with/without modalities PRN                 [x]?  Manual Therapy                      [x]? Splinting                                   [x]?  Scar Management                   []?Joint Protection/Training  []? Ergonomics                             [x]?  Joint Mobilization                      [x]? Adaptive EquipmentAssessment/Training                             [x]?  Manual Edema Mobilization   [x]?  Myofascial Release                 []? Energy Conservation/Work Simplification  [x]? GM/FM Coordination                [x]? Safety retraining/education per  individual diagnosis/goals  [x]? Desensitization        Patient Specific Goal: Pt would like to return to making a full fist.                              GOALS (Long term same as Short term):  1) Patient will demonstrate good understanding of home program (exercises/activities/diagnosis/prognosis/goals) with good accuracy. Goal met and ongoing, in-depth review of HEP and progress with good compliance and understanding. 2) Patient will demonstrate increased active/passive range of motion of their LUE to [facility] for ADL/IADL completion. Progressing slowly at the PIP of RF, all other affected areas progressing well. 3) Patient will demonstrate increased /pinch strength of at least 10 / 3-5 pinch pounds of their L hand. Progressing well, refer to measurements. 4) Patient to report decreased pain in their affected L upper extremity from 5/10 to 2/10 or less with resistive functional use. Goal met. 5) Increase in fine motor function as evidenced by decreased time to complete 9-hole peg test and/or MRMT test by at least 5-10 seconds. Assessed 11/24. 6) Patient to report 100% compliance with their splint wear, care, and precautions if needed. All splints discharged. 7) Patient will be knowledgeable of edema control techniques as evident with decreases from 8 to mild/none. Progressing slowly, moderate edema remains surrounding proximal phalanx. 9) Patient will demonstrate a non-tender/non-adherent scar. Goal progressing well, continues to benefit from scar tissue management.    10) Patient will report ADL functions as Mod I/I using LUE. Goal met, no limitations with ADLs. 11) Patient will demonstrate improved functional activity tolerance from poor to fair for ADL/IADL completion. Goal met and progressing, good- tolerance for all activities. 12) Patient will decrease QuickDASH score to 40% or less for increased participation in daily functional activities. Goal met, 100% to 22% disability.     TODAY'S TREATMENT     Pain Level: no pain reported     Subjective:  Pt reports no new changes. Objective:  Updated POC completed on 11/24/2021. INTERVENTION: COMPLETED: SPECIFICS/COMMENTS:   Modality:     MHP  Applied to the wrist during soft tissue massage and scar massage of the digits   Ultrasound  Ultrasound to scars along dorsal hand/MF to reduce scar tissue and pain  Duration: 8 minutes  Intensity: 0.8  Frequency: 3.3Mhz  Duty cycle: continuous   Paraffin + MHP x x10 mins with increasing stretch to increase soft tissue elasticity. AROM:     Wrist  Flexion, extension, radial and ulnar deviation x15  -Jux a cizer x10 with elbow on table and full grasp.   -Ball Wrist ROM: wrist flexion extension, side to side, clockwise and counter clockwise   Digital        -composite fist, flexion x5  -hook and MP flex/ext 2 x's 10 rep's   -Hook to intrinsic plus, back to hook x 15 reps  -PIP/DIP Isolated MF x 15 reps ea  -extension, all digits-Bean transfer with good composite fist.   -hook fist 2 sets of 10  -MCP table top 2 sets of 10   L MF    -Place and holds in extension x10  Functional grasp/release of pom poms to facilitate more MCP flexion. AAROM:     Digital  -extension, all digits   L MF  -Place and holds in extension 2 sets x 10   PROM/Stretching:     Wrist   Gentle -Flexion, extension, radial and ulnar deviation, pronation/supination with 2-3 sec holds   Digital   X      X  -composite fist, flexion x10 middle and index finger, MP and PIP flexion.    -place and holds index and middle finger flexion x5  -Isolated MF exercises- shake 3 rounds 1 min each. Light dull ache at end of each round.   -Mr.  2 # free weight x5 with fair tolerance.   -rubber band hand gripper with 2 moderate resistance bands 15 reps into full composite fist   Other:     HEP x In-depth review of HEP for re-assessment. Assessment/Comments: Pt is making Fair + progress toward stated plan of care. Increased strengthening this date with overhead press and functional reaching tasks with 1.5# wrist weights. Will continue focusing further sessions on advancing with UE re-conditioning and endurance re-building.     -Rehab Potential: Good  -Requires OT Follow Up: Yes    Time In: 10:00 am           Time Out:  11:00 pm           Visit #: 6/18  CODE  Minutes Time  Units   02718 Fluidotherapy      21386 Manual 10 10:10- 10:20 a 1   06647 Therapeutic Ex 40 10:20 - 11:00 a 2   98261 Therapeutic Activity      90109 ADL/COMP Tech Train      81572 Neuromuscular Re-Ed      83465 OrthoManagementTraining      18565 Paraffin 10 10:00-10:10 1   41191 Electrical Stim - Attended      [id number] Iontophoresis      19923 Ultrasound       Other        60  4      -Response to Treatment: Continues with UE fatigue and soreness by end of session. Plan:   [x]  Continues Plan of care: Continue 2-3x/wk with heavy focus on improving strength and functional use of the L hand with continued scar tissue management. Treatment covered based on POC and graduated to patient's progress. Pt education continues at each visit to obtain maximum benefits from skilled OT intervention. []  Alter Plan of care:   []  Discharge:       [address] [facility], OT R/L, Padma Brad 87, #[id number]

## 2021-12-21 ENCOUNTER — TREATMENT (OUTPATIENT)
Dept: OCCUPATIONAL THERAPY | Age: 41
End: 2021-12-21
Payer: COMMERCIAL

## 2021-12-21 DIAGNOSIS — S62.613B DISPLACED FRACTURE OF PROXIMAL PHALANX OF LEFT MIDDLE FINGER, INITIAL ENCOUNTER FOR OPEN FRACTURE: Primary | ICD-10-CM

## 2021-12-21 PROCEDURE — 97018 PARAFFIN BATH THERAPY: CPT | Performed by: OCCUPATIONAL THERAPIST

## 2021-12-21 PROCEDURE — 97140 MANUAL THERAPY 1/> REGIONS: CPT | Performed by: OCCUPATIONAL THERAPIST

## 2021-12-21 PROCEDURE — 97110 THERAPEUTIC EXERCISES: CPT | Performed by: OCCUPATIONAL THERAPIST

## 2021-12-21 NOTE — PROGRESS NOTES
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 Arlyn Ivory  Specific Practitioner Orders: May progress with active and passive range of motion to the hand. Splint fabrication.  Modalities as needed     Assessment of current deficits   [x]? ADLs          [x]? Strength     [x]? IADLs     [x]? Endurance  [x]? Scar Adhesion/Skin Integrity   [x]? FM Coordination    [x]? Sensation   [x]? ROM           [x]? Pain         [x]? Edema             OT PLAN OF CARE   OT POC based on physician orders, patient diagnosis and results of clinical assessment     Frequency/Duration:1-2x/week for 12-18 visits ( pending C9 approval)   Specific OT Treatment to include:      [x]? Instruction in HEP                   Modalities:  [x]?  Therapeutic Exercise                 [x]? Ultrasound               [x]? Electrical Stimulation/Attended  [x]? PROM/Stretching                    [x]? Fluidotherapy          [x]?   Paraffin                   [x]? AAROM  [x]?  AROM                 [x]? Iontophoresis:   [x]? Janine Locust                                       []? Neuromuscular Re-Ed            [x]? ADL/IADL re-training    [x]?  Therapeutic Activity                  [x]? Pain Management with/without modalities PRN                 [x]?  Manual Therapy                      [x]? Splinting                                   [x]?  Scar Management                   []?Joint Protection/Training  []? Ergonomics                             [x]?  Joint Mobilization                      [x]? Adaptive EquipmentAssessment/Training                             [x]?  Manual Edema Mobilization   [x]?  Myofascial Release                 []? Energy Conservation/Work Simplification  [x]? GM/FM Coordination                [x]? Safety retraining/education per  individual diagnosis/goals  [x]? Desensitization        Patient Specific Goal: Pt would like to return to making a full fist.                              GOALS (Long term same as Short term):  1) Patient will demonstrate good understanding of home program (exercises/activities/diagnosis/prognosis/goals) with good accuracy. Goal met and ongoing, in-depth review of HEP and progress with good compliance and understanding. 2) Patient will demonstrate increased active/passive range of motion of their LUE to [facility] for ADL/IADL completion. Progressing slowly at the PIP of RF, all other affected areas progressing well. 3) Patient will demonstrate increased /pinch strength of at least 10 / 3-5 pinch pounds of their L hand. Progressing well, refer to measurements. 4) Patient to report decreased pain in their affected L upper extremity from 5/10 to 2/10 or less with resistive functional use. Goal met. 5) Increase in fine motor function as evidenced by decreased time to complete 9-hole peg test and/or MRMT test by at least 5-10 seconds. Assessed 11/24. 6) Patient to report 100% compliance with their splint wear, care, and precautions if needed. All splints discharged. 7) Patient will be knowledgeable of edema control techniques as evident with decreases from 8 to mild/none. Progressing slowly, moderate edema remains surrounding proximal phalanx. 9) Patient will demonstrate a non-tender/non-adherent scar. Goal progressing well, continues to benefit from scar tissue management.    10) Patient will report ADL functions as Mod I/I using LUE. Goal met, no limitations with ADLs. 11) Patient will demonstrate improved functional activity tolerance from poor to fair for ADL/IADL completion. Goal met and progressing, good- tolerance for all activities. 12) Patient will decrease QuickDASH score to 40% or less for increased participation in daily functional activities. Goal met, 100% to 22% disability.     TODAY'S TREATMENT     Pain Level: no pain reported     Subjective:  Pt reports no new changes. Objective:  Updated POC completed on 11/24/2021. INTERVENTION: COMPLETED: SPECIFICS/COMMENTS:   Modality:     MHP  Applied to the wrist during soft tissue massage and scar massage of the digits   Ultrasound  Ultrasound to scars along dorsal hand/MF to reduce scar tissue and pain  Duration: 8 minutes  Intensity: 0.8  Frequency: 3.3Mhz  Duty cycle: continuous   Paraffin + MHP x x10 mins with increasing stretch to increase soft tissue elasticity. AROM:     Wrist  Flexion, extension, radial and ulnar deviation x15  -Jux a cizer x10 with elbow on table and full grasp.   -Ball Wrist ROM: wrist flexion extension, side to side, clockwise and counter clockwise   Digital        -composite fist, flexion x5  -hook and MP flex/ext 2 x's 10 rep's   -Hook to intrinsic plus, back to hook x 15 reps  -PIP/DIP Isolated MF x 15 reps ea  -extension, all digits-Bean transfer with good composite fist.   -hook fist 2 sets of 10  -MCP table top 2 sets of 10   L MF    -Place and holds in extension x10  Functional grasp/release of pom poms to facilitate more MCP flexion. AAROM:     Digital  -extension, all digits   L MF  -Place and holds in extension 2 sets x 10   PROM/Stretching:     Wrist x  Gentle -Flexion, extension, radial and ulnar deviation, pronation/supination with 2-3 sec holds   Digital        -composite fist, flexion x10 middle and index finger, MP and PIP flexion.    -place and holds index and middle finger flexion x5  -Isolated MF ext/flex  -Gentle -MCP table top x15 & x3 hook fist   Self Stretching of wrist  -Power Web Stretches for Wrist extension and flexion: x 12 reps ea with 10 sec holds   Scar Mass/Edema Control:     Retrograde massage x     To decrease swelling throughout the wrist and digits  -mesh sleeve MF to wear as needed   Soft tissue massage/scar massage x To decrease rigidity of the scar and decrease rigidity of the soft tissue to increase ROM and decrease pain   Strengthening:     L UE Re-Conditioning X                        -4# [facility] and [facility] Bicep Curls: 2x15  -4# Overhead Triceps: 2x15  -4#  Press: 2x15  -4# Chest Press: 2x15, side to side  -3.3# [employer] Grasp: shoulder flexed to 90*, then pronate supinate 3x15  -Isometric elbow flex/ext x 10 reps ea with 10 sec holds against therapist resistance. -Putty Tools: against medium resistance putty - doorknob, lat pinch, L bar x 10 reps ea in RD/UD  -UBE: 8 mins at end of session for gentle ROM at light resistance for muscular endurance re-training and to prevent soreness  -overhead press with 4# bar 15 res  -overhead shoulder reaching 25 reps 2 sets  With post it notes on the wall with 1.5# wrist weight   L hand/digital x -Light resistive putty, manipulation, 3 point pinch and pulling for 8 mins. -MF opp pinch paper clip and thread thru medium resistant sponge x 15 reps  -Prolonged flat fist grasp with edge of wrist weight 3x 45 sec holds.  -resistive clips moderate and heavy blue picking up marbles and placing into container with 3 pt pinch.    5.0# digi flex- 2 sets x15 pronation, supination and neutral.   - Ball toss/catch back and forth between hands 2x20: 3.3#  -Hand Gripper: 2x15 w/ 3 sec holds at 50# resistance w/ silver coil to  pom poms and place into container.   -[employer] Web: digital flexion x 20 reps   L Forearm/Wrist x 15# Flexbar: pronation, supination, and twisting x 15 reps  2 sets  -Advanced to 3.3# Dyer Sachs Grasp: w/ wrist flex and ext 2x15 reps  - isometric exercises- shake 2 rounds 1 min each. Light dull ache at end of each round.   -Mr.  3 # free weight x5 with fair tolerance.   -rubber band hand gripper with 2 moderate resistance bands 15 reps into full composite fist   Other:     HEP x In-depth review of HEP for re-assessment. Assessment/Comments: Pt is making Fair + progress toward stated plan of care. Pt tolerated session well with increased resistance/weight without difficulty. Pt feels he is able to complete exercises at home and will continue to add to HEP . Continue to focus on reconditioning and strengthening of the LUE with focus on the wrist.      -Rehab Potential: Good  -Requires OT Follow Up: Yes    Time In: 3:00 pm           Time Out:  4:00 pm           Visit #: 7/18  CODE  Minutes Time  Units   24533 Fluidotherapy      87656 Manual 10 3:10- 3:20 p 1   53835 Therapeutic Ex 40 3:20 - 4:00 p 2   18492 Therapeutic Activity      76689 ADL/COMP Tech Train      60002 Neuromuscular Re-Ed      17152 OrthoManagementTraining      18820 Paraffin 10 3:00-3:10 p 1   85138 Electrical Stim - Attended      91678 Iontophoresis      51796 Ultrasound       Other        60  4      -Response to Treatment: Continues with UE fatigue and soreness by end of session. Plan:   [x]  Continues Plan of care: Continue 2-3x/wk with heavy focus on improving strength and functional use of the L hand with continued scar tissue management. Treatment covered based on POC and graduated to patient's progress. Pt education continues at each visit to obtain maximum benefits from skilled OT intervention.   []  Alter Plan of care:   []  Discharge:      Bernard Isbell R/RAJAN, Padma Brad 87 #[id number]

## 2021-12-22 ENCOUNTER — TREATMENT (OUTPATIENT)
Dept: OCCUPATIONAL THERAPY | Age: 41
End: 2021-12-22

## 2021-12-22 NOTE — PROGRESS NOTES
OCCUPATIONAL THERAPY DEPARTMENT    LETTER OF DISCHARGE NOTIFICATION    12/22/2021    Dear Dr. Angélica Riley MD :    This is to inform you that, as per [facility] Occupational Therapy department policy, your patient, Manuel Arreola, [id number],   is as of todays date being discharged from Occupational Therapy secondary to the following reasons:                  Pt walked into scheduled therapy appt this date requesting self-discharge. He has discussed this with his workman's comp. He feels he has reached his max potential with OT services at this time. He was pleased with therapy but feels he can continue on his own. He had completed a total of  25 visits. Focus was on hand and UE re-conditioning to improve current work performance. He has RTW full-duty. He is functional at this time. No final measurements obtained. Pleased refer to re-assessment on 11/24/21 for most recent measurements. Pt may call with questions/concerns PRN. Self-discharge completed with HEP in place. If you have any questions, feel free to call us at [address] [place] [address], [phone]. Thank you     Haritha Boo OT, R/L, MS, #[id number]  Occupational Therapy Department  [address] [facility]  Outpatient Rehab Services  O: [phone]  F: [phone]      By co-signing this document as the referring physician, I demonstrate that I have read the above notification and understand this discharge of POC.

## 2022-03-02 ENCOUNTER — TELEPHONE (OUTPATIENT)
Dept: ORTHOPEDIC SURGERY | Age: 42
End: 2022-03-02

## 2022-03-02 DIAGNOSIS — S62.613K: Primary | ICD-10-CM

## 2022-03-03 ENCOUNTER — OFFICE VISIT (OUTPATIENT)
Dept: ORTHOPEDIC SURGERY | Age: 42
End: 2022-03-03
Payer: COMMERCIAL

## 2022-03-03 VITALS — BODY MASS INDEX: 27.44 KG/M2 | WEIGHT: 196 LBS | HEIGHT: 71 IN

## 2022-03-03 DIAGNOSIS — S62.613K: Primary | ICD-10-CM

## 2022-03-03 PROCEDURE — 99213 OFFICE O/P EST LOW 20 MIN: CPT | Performed by: ORTHOPAEDIC SURGERY

## 2022-03-03 NOTE — PROGRESS NOTES
Chief Complaint   Patient presents with    Follow Up After Procedure     5 Months out, Lt middle finger proximal phalanx fracture nonunion ORIF         Giana Hastings is a 36y.o. 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 He completed therapy. He is overall happy with his results. His biggest complaint is inability to full extend the middle finger PIP joint. He has been back to work light duty with no problems.        Past Medical History:   Diagnosis Date    Diabetes mellitus ([facility] [address]     Hypertension     Thyroid disease      Past Surgical History:   Procedure Laterality Date    APPENDECTOMY  3/23/2016    laparoscopic    FINGER SURGERY Left 8/24/2020    LEFT HAND I&D WITH PERCUTANEOUS PINNING OF THIRD FINGER performed by Shukri Bobby MD at [facility] Left 9/22/2021    LEFT MIDDLE FINGER PROXIMAL PHALANX OPEN REDUCTION INTERNAL FIXATION WITH BONE GRAFT FROM DISTAL RADIUS performed by Shukri Bobby MD at [address]       Current Outpatient Medications:     atorvastatin (LIPITOR) 10 MG tablet, TAKE ONE TABLET BY MOUTH EVERY DAY, Disp: , Rfl:     metFORMIN (GLUCOPHAGE) 500 MG tablet, Take 500 mg by mouth daily (with breakfast), Disp: , Rfl:     Levothyroxine Sodium (SYNTHROID PO), Take 125 mcg by mouth daily , Disp: , Rfl:   Allergies   Allergen Reactions    Norco [Hydrocodone-Acetaminophen] Shortness Of Breath     Social History     Socioeconomic History    Marital status:      Spouse name: Not on file    Number of children: Not on file    Years of education: Not on file    Highest education level: Not on file   Occupational History    Not on file   Tobacco Use    Smoking status: Never Smoker    Smokeless tobacco: Never Used   Substance and Sexual Activity    Alcohol use: No    Drug use: No    Sexual activity: Not on file   Other Topics Concern    Not on file Social History Narrative    Not on file     Social Determinants of Health     Financial Resource Strain:     Difficulty of Paying Living Expenses: Not on file   Food Insecurity:     Worried About Running Out of Food in the Last Year: Not on file    Nannette of Food in the Last Year: Not on file   Transportation Needs:     Lack of Transportation (Medical): Not on file    Lack of Transportation (Non-Medical): Not on file   Physical Activity:     Days of Exercise per Week: Not on file    Minutes of Exercise per Session: Not on file   Stress:     Feeling of Stress : Not on file   Social Connections:     Frequency of Communication with Friends and Family: Not on file    Frequency of Social Gatherings with Friends and Family: Not on file    Attends Sabianist Services: Not on file    Active Member of [address] [place] [address] [employer] or Organizations: Not on file    Attends Club or Organization Meetings: Not on file    Marital Status: Not on file   Intimate Partner Violence:     Fear of Current or Ex-Partner: Not on file    Emotionally Abused: Not on file    Physically Abused: Not on file    Sexually Abused: Not on file   Housing Stability:     Unable to Pay for Housing in the Last Year: Not on file    Number of [place] in the Last Year: Not on file    Unstable Housing in the Last Year: Not on file     No family history on file. Skin: (-) rash,(-) psoriasis,(-) eczema, (-)skin cancer. Musculoskeletal: left middle finger decreased ROM  Neurologic: (-) epilepsy, (-)seizures,(-) brain tumor,(-) TIA, (-)stroke, (-)headaches, (-)Parkinson disease,(-) memory loss, (-) LOC. Cardiovascular: (-) Chest pain, (-) swelling in legs/feet, (-) SOB, (-) cramping in legs/feet with walking. SUBJECTIVE:      Constitutional:    The patient is alert and oriented x 3, appears to be stated age and in no distress. Left upper extremity: Nontender to palpation over his middle finger proximal phalanx.   He does have some thickening and adhesions over the dorsal plates and screws. PIP motion is limited to 55 degrees of active extension. Passively this can be improved to 15 degrees. Digital flexion is down to about 85 degrees active equal to passive. He has appropriate pavithra and alignment. He is otherwise neurovascular intact. Xrays:    X-rays the office today: AP lateral obliques of his left hand were obtained and compared his images from 12/2/21 and November 4  He has maintained dorsal plate and screw fixation. No interval changes in alignment. 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 No restrictions. Did discuss in the future we may consider hardware removal with extensor tenolysis to improve PIP joint extension. It is too soon to consider this at this point. Patient will be released back to full duty. He will follow-up in 3 months for repeat evaluation. All questions answered. I have seen and evaluated the patient and agree with the above assessment and plan on today's visit. I have performed the key components of the history and physical examination with significant findings of 6 months postop doing very well. He has united his previous nonunion. Biggest complaint is loss of active PIP joint extension secondary to tendon adhesions. He does have good passive range of motion. Discussed possible extensor tenolysis surgery in the future. He like to follow-up in 3 months for reevaluation. At this time with released him to unrestricted work activities. . I concur with the findings and plan as documented.     Loren Marley MD  3/3/2022

## 2022-06-03 ENCOUNTER — TELEPHONE (OUTPATIENT)
Dept: ORTHOPEDIC SURGERY | Age: 42
End: 2022-06-03

## 2022-06-03 DIAGNOSIS — S62.613K: Primary | ICD-10-CM

## 2022-06-06 ENCOUNTER — OFFICE VISIT (OUTPATIENT)
Dept: ORTHOPEDIC SURGERY | Age: 42
End: 2022-06-06
Payer: COMMERCIAL

## 2022-06-06 VITALS — HEIGHT: 71 IN | WEIGHT: 195 LBS | BODY MASS INDEX: 27.3 KG/M2

## 2022-06-06 DIAGNOSIS — M77.9 EXTENSOR TENDON ADHESIONS: Primary | ICD-10-CM

## 2022-06-06 DIAGNOSIS — M24.542 CONTRACTURE OF JOINT OF FINGER OF LEFT HAND: ICD-10-CM

## 2022-06-06 DIAGNOSIS — S62.613K: ICD-10-CM

## 2022-06-06 PROCEDURE — 99214 OFFICE O/P EST MOD 30 MIN: CPT | Performed by: ORTHOPAEDIC SURGERY

## 2022-06-06 NOTE — PROGRESS NOTES
Department of Orthopedic Surgery  [facility] Ghazala Forrester MD  History and Physical      CHIEF COMPLAINT: Continued left middle finger stiffness    HISTORY OF PRESENT ILLNESS:                The patient is a 36 y.o. male who presents with continued left middle finger stiffness with loss of active PIP joint extension and scar adhesions. Reports he does not have any significant pain at his previous nonunion site. However his biggest complaint is loss of active PIP joint extension. He reports continued good flexion. He does have paresthesias in the tip of the middle finger. No new injuries. .    Past Medical History:        Diagnosis Date    Diabetes mellitus ([facility] [address]     Hypertension     Thyroid disease      Past Surgical History:        Procedure Laterality Date    APPENDECTOMY  3/23/2016    laparoscopic    FINGER SURGERY Left 8/24/2020    LEFT HAND I&D WITH PERCUTANEOUS PINNING OF THIRD FINGER performed by Regan Terry MD at [address] [facility] Left 9/22/2021    LEFT MIDDLE FINGER PROXIMAL PHALANX OPEN REDUCTION INTERNAL FIXATION WITH BONE GRAFT FROM DISTAL RADIUS performed by Regan Terry MD at [address]     Current Medications:   No current facility-administered medications for this visit. Allergies:  Norco [hydrocodone-acetaminophen]    Social History:   TOBACCO:   reports that he has never smoked. He has never used smokeless tobacco.  ETOH:   reports no history of alcohol use. DRUGS:   reports no history of drug use. ACTIVITIES OF DAILY LIVING:    OCCUPATION:    Family History:   History reviewed. No pertinent family history.     REVIEW OF SYSTEMS:  CONSTITUTIONAL:  negative  EYES:  negative  HEENT:  negative  RESPIRATORY:  negative  CARDIOVASCULAR:  negative  GASTROINTESTINAL:  negative  GENITOURINARY:  negative  INTEGUMENT/BREAST:  negative  HEMATOLOGIC/LYMPHATIC:  negative  ALLERGIC/IMMUNOLOGIC:  negative  ENDOCRINE: Diabetes  MUSCULOSKELETAL: Left middle finger stiffness  NEUROLOGICAL: negative  BEHAVIOR/PSYCH:  negative    PHYSICAL EXAM:    VITALS:  Ht 5' 11\" (1.803 m)   Wt 195 lb (88.5 kg)   BMI 27.20 kg/m²   CONSTITUTIONAL:  awake, alert, cooperative, no apparent distress, and appears stated age  EYES:  Lids and lashes normal, pupils equal, round and reactive to light, extra ocular muscles intact, sclera clear, conjunctiva normal  ENT:  Normocephalic, without obvious abnormality, atraumatic, sinuses nontender on palpation, external ears without lesions, oral pharynx with moist mucus membranes, tonsils without erythema or exudates, gums normal and good dentition. NECK:  Supple, symmetrical, trachea midline, no adenopathy, thyroid symmetric, not enlarged and no tenderness, skin normal  LUNGS:  CTA  CARDIOVASCULAR:  RRR  ABDOMEN:  Soft,nttp  CHEST/BREASTS:  deferred  GENITAL/URINARY:  deferred  NEUROLOGIC:  Awake, alert, oriented to name, place and time. Cranial nerves II-XII are grossly intact. Motor is 5 out of 5 bilaterally. Sensory is intact. gait is normal.  MUSCULOSKELETAL:    Left upper extremity: Nontender about shoulder elbow and wrist region. Well-healed scar over the dorsum of the wrist and dorsum of the middle finger. Middle finger nontender to palpation over his previous nonunion site. He does have active pull-through of the MCP PIP and DIP joints with composite flexion down to within 1/2 cm distal palmar crease. Active PIP joint extension lags 55 degrees. Passively this can be improved to -15 degrees. Positive tenderness and scar adhesions dorsally over the proximal phalanx. Brisk cap refill of the middle finger. Brisk cap refill all digits. Sensation intact but does report subjectively decreased sensation and paresthesias to the tip of the middle finger.     DATA:    CBC:   Lab Results   Component Value Date    WBC 6.2 09/22/2021    RBC 5.37 09/22/2021    HGB 15.6 09/22/2021    HCT 45.0 09/22/2021    MCV 83.8 09/22/2021    MCH 29.1 09/22/2021    MCHC 34.7 09/22/2021 RDW 12.5 09/22/2021     09/22/2021    MPV 10.4 09/22/2021     PT/INR:    Lab Results   Component Value Date    PROTIME 11.1 08/24/2020    INR 1.0 08/24/2020       Radiology Review: X-rays in the office today: AP lateral obliques of the left hand were obtained demonstrating healed middle finger proximal phalanx nonunion with dorsal plate and screw fixation. There remains a partially united volar radial fragment consistent with probable radial collateral ligament. Impression on office x-rays: Healing nonunion middle finger proximal phalanx fracture with a persistent nonunion of the radial collateral ligament avulsion fracture. IMPRESSION:  · Left middle finger scar adhesions of the extensor mechanism limiting PIP joint extension versus rupture of extensor mechanism  · Retained hardware middle finger proximal phalanx  · Scar adhesions  · Diabetes    PLAN:  Today's findings were explained the patient. He does have extensor adhesions limiting active PIP joint extension and his previous nonunion has healed. He does have retained hardware and scar adhesions. Surgical options were explained including removal of hardware and extensor tenolysis. Depending on the status of his extensor mechanism reconstruction may be necessary. He voiced understanding. He like to proceed. Postoperative course including compliance with skilled therapy daily postop day #1 were explained. I have further explained to him increased risk of complications secondary to his diabetes. All questions answered    I explained the risks, benefits, alternatives and complications of surgery with the patient including but not limited to the risks of infection, possible damage to nerves, vessels, or tendons, stiffness, refracture, loss of range of motion, scar sensitivity, wound healing complications, worsening symptoms, possible need for therapy, as well as the possible need further surgery and unanticipated complications.   The patient voiced understanding and all questions were answered. The patient elected to proceed with surgical intervention.      Colt Veliz MD  6/6/2022

## 2022-08-03 DIAGNOSIS — S62.613K: Primary | ICD-10-CM

## 2022-08-03 DIAGNOSIS — M77.9 EXTENSOR TENDON ADHESIONS: ICD-10-CM

## 2022-08-03 DIAGNOSIS — M24.542 CONTRACTURE OF JOINT OF FINGER OF LEFT HAND: ICD-10-CM

## 2022-10-20 ENCOUNTER — PREP FOR PROCEDURE (OUTPATIENT)
Dept: ORTHOPEDIC SURGERY | Age: 42
End: 2022-10-20

## 2022-10-20 RX ORDER — SODIUM CHLORIDE 9 MG/ML
INJECTION, SOLUTION INTRAVENOUS CONTINUOUS
OUTPATIENT
Start: 2022-10-20

## 2022-10-20 RX ORDER — SODIUM CHLORIDE 0.9 % (FLUSH) 0.9 %
5-40 SYRINGE (ML) INJECTION PRN
OUTPATIENT
Start: 2022-10-20

## 2022-10-20 RX ORDER — SODIUM CHLORIDE 9 MG/ML
INJECTION, SOLUTION INTRAVENOUS PRN
OUTPATIENT
Start: 2022-10-20

## 2022-10-20 RX ORDER — SODIUM CHLORIDE 0.9 % (FLUSH) 0.9 %
5-40 SYRINGE (ML) INJECTION EVERY 12 HOURS SCHEDULED
OUTPATIENT
Start: 2022-10-20

## 2022-11-03 RX ORDER — ORAL SEMAGLUTIDE 14 MG/1
14 TABLET ORAL DAILY
COMMUNITY

## 2022-11-03 NOTE — PROGRESS NOTES
[facility] PRE-ADMISSION TESTING INSTRUCTIONS    The Preadmission Testing patient is instructed accordingly using the following criteria (check applicable):    ARRIVAL INSTRUCTIONS:  [x] Parking the day of Surgery is located in the Main Entrance lot. Upon entering the door, make an immediate right to the surgery reception desk    [x] Bring photo ID and insurance card    [] Bring in a copy of Living will or Durable Power of  papers. [x] Please be sure to arrange for responsible adult to provide transportation to and from the hospital    [x] Please arrange for responsible adult to be with you for the 24 hour period post procedure due to having anesthesia    [x] If you awake am of surgery not feeling well or have temperature >100 please call [phone]    GENERAL INSTRUCTIONS:    [x] Nothing by mouth after midnight, including gum, candy, mints or water    [x] You may brush your teeth, but do not swallow any water    [] Take medications as instructed with 1-2 oz of water    [] Stop herbal supplements and vitamins 5 days prior to procedure    [] Follow preop dosing of blood thinners per physician instructions    [] Take 1/2 dose of evening insulin, but no insulin after midnight    [x] No oral diabetic medications after midnight    [x] If diabetic and have low blood sugar or feel symptomatic, take 1-2oz apple juice only    [] Bring inhalers day of surgery    [] Bring C-PAP/ Bi-Pap day of surgery    [] Bring urine specimen day of surgery    [x] Shower or bath with soap, lather and rinse well, AM of Surgery, no lotion, powders or creams to surgical site    [] Follow bowel prep as instructed per surgeon    [x] No tobacco products within 24 hours of surgery     [x] No alcohol or illegal drug use within 24 hours of surgery.     [x] Jewelry, body piercing's, eyeglasses, contact lenses and dentures are not permitted into surgery (bring cases)      [] Please do not wear any nail polish, make up or hair products on the day of surgery    [x] You can expect a call the business day prior to procedure to notify you if your arrival time changes    [x] If you receive a survey after surgery we would greatly appreciate your comments    [] Parent/guardian of a minor must accompany their child and remain on the premises  the entire time they are under our care     [] Pediatric patients may bring favorite toy, blanket or comfort item with them    [] A caregiver or family member must remain with the patient during their stay if they are mentally handicapped, have dementia, disoriented or unable to use a call light or would be a safety concern if left unattended    [x] Please notify surgeon if you develop any illness between now and time of surgery (cold, cough, sore throat, fever, nausea, vomiting) or any signs of infections  including skin, wounds, and dental.    [x]  The Outpatient Pharmacy is available to fill your prescription here on your day of surgery, ask your preop nurse for details    [] Other instructions    EDUCATIONAL MATERIALS PROVIDED:    [] PAT Preoperative Education Packet/Booklet     [] Medication List    [] Transfusion bracelet applied with instructions    [] Shower with soap, lather and rinse well, and use CHG wipes provided the evening before surgery as instructed    [] Incentive spirometer with instructions

## 2022-11-08 ENCOUNTER — ANESTHESIA EVENT (OUTPATIENT)
Dept: OPERATING ROOM | Age: 42
End: 2022-11-08
Payer: COMMERCIAL

## 2022-11-08 NOTE — DISCHARGE INSTRUCTIONS
DISCHARGE INSTRUCTIONS TO HOME FOLLOWING SURGERY    DO NOT FORGET ABOUT YOUR THERAPY APPOINTMENT. YOU NEED TO BE IN DAILY THERAPY FOR 2 WEEKS    ACTIVITY INSTRUCTIONS:    Elevate extremity to help reduce swelling. No heavy lifting, pushing, pulling or strenuous activity    WOUND/DRESSING INSTRUCTIONS:  Always ensure you and your care giver clean hands before and after caring for the wound. May change dressing after 24-48 hours  OK to shower if wound is clean and dry. Do not soak surgical area. Reapply new clean dressing/BandAid. Can ice surgical region to help reduce swelling, Do not apply ice to fingers or toes       MEDICATION INSTRUCTIONS:  Take pain medicine as directed. When taking pain medications, you may experience dizziness or drowsiness. Do not drink alcohol or drive when taking these medications. Do not take any other medication containing acetaminophen (Tylenol) while taking the prescribed pain medication. Ibuprofen, Aleve, Motrin, or Naproxen are okay but should not be taken on an empty stomach. *Watch for these significant complications:  Call physician if these or any other problems occur:  Fever over 101°, redness, swelling or warmth at the operative site  Unrelieved nausea    Foul smelling or cloudy drainage at the operative site   Unrelieved pain  Breathing issues such as shortness of breath or difficulty breathing    Blood soaked dressing. (Some oozing may be normal)     Numb, pale, blue, cold or tingling extremity       Make an appointment to be seen 8-10 days after surgery  FOLLOW-UP CARE:    Dr. Josiah Jules.  [facility] [address] [place] [address]  [phone]

## 2022-11-09 ENCOUNTER — HOSPITAL ENCOUNTER (OUTPATIENT)
Age: 42
Setting detail: OUTPATIENT SURGERY
Discharge: HOME | End: 2022-11-09
Attending: ORTHOPAEDIC SURGERY | Admitting: ORTHOPAEDIC SURGERY
Payer: COMMERCIAL

## 2022-11-09 ENCOUNTER — APPOINTMENT (OUTPATIENT)
Dept: GENERAL RADIOLOGY | Age: 42
End: 2022-11-09
Attending: ORTHOPAEDIC SURGERY
Payer: COMMERCIAL

## 2022-11-09 ENCOUNTER — ANESTHESIA (OUTPATIENT)
Dept: OPERATING ROOM | Age: 42
End: 2022-11-09
Payer: COMMERCIAL

## 2022-11-09 VITALS
HEART RATE: 89 BPM | WEIGHT: 185 LBS | HEIGHT: 71 IN | RESPIRATION RATE: 18 BRPM | BODY MASS INDEX: 25.9 KG/M2 | SYSTOLIC BLOOD PRESSURE: 128 MMHG | OXYGEN SATURATION: 98 % | DIASTOLIC BLOOD PRESSURE: 80 MMHG | TEMPERATURE: 97.6 F

## 2022-11-09 DIAGNOSIS — M77.9 ACUTE CALCIFIC PERIARTHRITIS: ICD-10-CM

## 2022-11-09 DIAGNOSIS — M24.542 CONTRACTURE OF JOINT OF LEFT HAND: ICD-10-CM

## 2022-11-09 LAB — METER GLUCOSE: 211 MG/DL (ref 74–99)

## 2022-11-09 PROCEDURE — 3700000000 HC ANESTHESIA ATTENDED CARE: Performed by: ORTHOPAEDIC SURGERY

## 2022-11-09 PROCEDURE — 6370000000 HC RX 637 (ALT 250 FOR IP)

## 2022-11-09 PROCEDURE — 2500000003 HC RX 250 WO HCPCS: Performed by: ORTHOPAEDIC SURGERY

## 2022-11-09 PROCEDURE — 3209999900 FLUORO FOR SURGICAL PROCEDURES

## 2022-11-09 PROCEDURE — 6360000002 HC RX W HCPCS: Performed by: NURSE ANESTHETIST, CERTIFIED REGISTERED

## 2022-11-09 PROCEDURE — 2580000003 HC RX 258: Performed by: PHYSICIAN ASSISTANT

## 2022-11-09 PROCEDURE — 3600000002 HC SURGERY LEVEL 2 BASE: Performed by: ORTHOPAEDIC SURGERY

## 2022-11-09 PROCEDURE — 82962 GLUCOSE BLOOD TEST: CPT

## 2022-11-09 PROCEDURE — 88300 SURGICAL PATH GROSS: CPT

## 2022-11-09 PROCEDURE — 26445 RELEASE HAND/FINGER TENDON: CPT | Performed by: ORTHOPAEDIC SURGERY

## 2022-11-09 PROCEDURE — 2580000003 HC RX 258: Performed by: NURSE ANESTHETIST, CERTIFIED REGISTERED

## 2022-11-09 PROCEDURE — 2709999900 HC NON-CHARGEABLE SUPPLY: Performed by: ORTHOPAEDIC SURGERY

## 2022-11-09 PROCEDURE — 3700000001 HC ADD 15 MINUTES (ANESTHESIA): Performed by: ORTHOPAEDIC SURGERY

## 2022-11-09 PROCEDURE — 7100000010 HC PHASE II RECOVERY - FIRST 15 MIN: Performed by: ORTHOPAEDIC SURGERY

## 2022-11-09 PROCEDURE — 3600000012 HC SURGERY LEVEL 2 ADDTL 15MIN: Performed by: ORTHOPAEDIC SURGERY

## 2022-11-09 PROCEDURE — 7100000011 HC PHASE II RECOVERY - ADDTL 15 MIN: Performed by: ORTHOPAEDIC SURGERY

## 2022-11-09 PROCEDURE — 20680 REMOVAL OF IMPLANT DEEP: CPT | Performed by: ORTHOPAEDIC SURGERY

## 2022-11-09 PROCEDURE — 6360000002 HC RX W HCPCS: Performed by: PHYSICIAN ASSISTANT

## 2022-11-09 RX ORDER — IBUPROFEN 800 MG/1
800 TABLET ORAL EVERY 8 HOURS PRN
Qty: 60 TABLET | Refills: 0 | Status: SHIPPED | OUTPATIENT
Start: 2022-11-09

## 2022-11-09 RX ORDER — IBUPROFEN 800 MG/1
800 TABLET ORAL ONCE
Status: COMPLETED | OUTPATIENT
Start: 2022-11-09 | End: 2022-11-09

## 2022-11-09 RX ORDER — FENTANYL CITRATE 50 UG/ML
25 INJECTION, SOLUTION INTRAMUSCULAR; INTRAVENOUS EVERY 5 MIN PRN
Status: CANCELLED | OUTPATIENT
Start: 2022-11-09

## 2022-11-09 RX ORDER — LIDOCAINE HYDROCHLORIDE 10 MG/ML
INJECTION, SOLUTION INFILTRATION; PERINEURAL PRN
Status: DISCONTINUED | OUTPATIENT
Start: 2022-11-09 | End: 2022-11-09 | Stop reason: ALTCHOICE

## 2022-11-09 RX ORDER — MIDAZOLAM HYDROCHLORIDE 1 MG/ML
INJECTION INTRAMUSCULAR; INTRAVENOUS PRN
Status: DISCONTINUED | OUTPATIENT
Start: 2022-11-09 | End: 2022-11-09 | Stop reason: SDUPTHER

## 2022-11-09 RX ORDER — PROPOFOL 10 MG/ML
INJECTION, EMULSION INTRAVENOUS PRN
Status: DISCONTINUED | OUTPATIENT
Start: 2022-11-09 | End: 2022-11-09 | Stop reason: SDUPTHER

## 2022-11-09 RX ORDER — SODIUM CHLORIDE 9 MG/ML
INJECTION, SOLUTION INTRAVENOUS CONTINUOUS
Status: DISCONTINUED | OUTPATIENT
Start: 2022-11-09 | End: 2022-11-09 | Stop reason: HOSPADM

## 2022-11-09 RX ORDER — IBUPROFEN 800 MG/1
TABLET ORAL
Status: COMPLETED
Start: 2022-11-09 | End: 2022-11-09

## 2022-11-09 RX ORDER — SODIUM CHLORIDE 9 MG/ML
INJECTION, SOLUTION INTRAVENOUS PRN
Status: DISCONTINUED | OUTPATIENT
Start: 2022-11-09 | End: 2022-11-09 | Stop reason: HOSPADM

## 2022-11-09 RX ORDER — FENTANYL CITRATE 50 UG/ML
50 INJECTION, SOLUTION INTRAMUSCULAR; INTRAVENOUS EVERY 5 MIN PRN
Status: CANCELLED | OUTPATIENT
Start: 2022-11-09

## 2022-11-09 RX ORDER — ONDANSETRON 2 MG/ML
4 INJECTION INTRAMUSCULAR; INTRAVENOUS
Status: CANCELLED | OUTPATIENT
Start: 2022-11-09 | End: 2022-11-10

## 2022-11-09 RX ORDER — FENTANYL CITRATE 50 UG/ML
INJECTION, SOLUTION INTRAMUSCULAR; INTRAVENOUS PRN
Status: DISCONTINUED | OUTPATIENT
Start: 2022-11-09 | End: 2022-11-09 | Stop reason: SDUPTHER

## 2022-11-09 RX ORDER — SODIUM CHLORIDE 9 MG/ML
INJECTION, SOLUTION INTRAVENOUS CONTINUOUS PRN
Status: DISCONTINUED | OUTPATIENT
Start: 2022-11-09 | End: 2022-11-09 | Stop reason: SDUPTHER

## 2022-11-09 RX ORDER — SODIUM CHLORIDE 9 MG/ML
INJECTION, SOLUTION INTRAVENOUS PRN
Status: CANCELLED | OUTPATIENT
Start: 2022-11-09

## 2022-11-09 RX ORDER — SODIUM CHLORIDE 0.9 % (FLUSH) 0.9 %
5-40 SYRINGE (ML) INJECTION EVERY 12 HOURS SCHEDULED
Status: CANCELLED | OUTPATIENT
Start: 2022-11-09

## 2022-11-09 RX ORDER — SODIUM CHLORIDE 0.9 % (FLUSH) 0.9 %
5-40 SYRINGE (ML) INJECTION EVERY 12 HOURS SCHEDULED
Status: DISCONTINUED | OUTPATIENT
Start: 2022-11-09 | End: 2022-11-09 | Stop reason: HOSPADM

## 2022-11-09 RX ORDER — SODIUM CHLORIDE 0.9 % (FLUSH) 0.9 %
5-40 SYRINGE (ML) INJECTION PRN
Status: DISCONTINUED | OUTPATIENT
Start: 2022-11-09 | End: 2022-11-09 | Stop reason: HOSPADM

## 2022-11-09 RX ORDER — SODIUM CHLORIDE 0.9 % (FLUSH) 0.9 %
5-40 SYRINGE (ML) INJECTION PRN
Status: CANCELLED | OUTPATIENT
Start: 2022-11-09

## 2022-11-09 RX ADMIN — IBUPROFEN 800 MG: 800 TABLET ORAL at 08:29

## 2022-11-09 RX ADMIN — SODIUM CHLORIDE: 9 INJECTION, SOLUTION INTRAVENOUS at 07:02

## 2022-11-09 RX ADMIN — MIDAZOLAM 2 MG: 1 INJECTION INTRAMUSCULAR; INTRAVENOUS at 07:03

## 2022-11-09 RX ADMIN — PROPOFOL 125 MCG/KG/MIN: 10 INJECTION, EMULSION INTRAVENOUS at 07:12

## 2022-11-09 RX ADMIN — IBUPROFEN 800 MG: 800 TABLET, FILM COATED ORAL at 08:29

## 2022-11-09 RX ADMIN — FENTANYL CITRATE 50 MCG: 50 INJECTION, SOLUTION INTRAMUSCULAR; INTRAVENOUS at 07:12

## 2022-11-09 RX ADMIN — WATER 2000 MG: 1 INJECTION INTRAMUSCULAR; INTRAVENOUS; SUBCUTANEOUS at 07:06

## 2022-11-09 RX ADMIN — PROPOFOL 40 MG: 10 INJECTION, EMULSION INTRAVENOUS at 07:14

## 2022-11-09 RX ADMIN — PROPOFOL 40 MG: 10 INJECTION, EMULSION INTRAVENOUS at 07:12

## 2022-11-09 ASSESSMENT — PAIN DESCRIPTION - LOCATION
LOCATION: FINGER (COMMENT WHICH ONE)
LOCATION: FINGER (COMMENT WHICH ONE)

## 2022-11-09 ASSESSMENT — PAIN DESCRIPTION - ORIENTATION
ORIENTATION: LEFT
ORIENTATION: LEFT

## 2022-11-09 ASSESSMENT — PAIN DESCRIPTION - PAIN TYPE
TYPE: SURGICAL PAIN
TYPE: SURGICAL PAIN

## 2022-11-09 ASSESSMENT — PAIN - FUNCTIONAL ASSESSMENT: PAIN_FUNCTIONAL_ASSESSMENT: 0-10

## 2022-11-09 ASSESSMENT — PAIN SCALES - GENERAL
PAINLEVEL_OUTOF10: 8
PAINLEVEL_OUTOF10: 7

## 2022-11-09 NOTE — ANESTHESIA POSTPROCEDURE EVALUATION
Department of Anesthesiology  Postprocedure Note    Patient: Jh Gurrola  MRN: [id number]  YOB: 1980  Date of evaluation: 11/9/2022      Procedure Summary     Date: 11/09/22 Room / Location: [facility] OR 05 / [facility]    Anesthesia Start: 8599 Anesthesia Stop: 0801    Procedures:       LEFT MIDDLE FINGER HARDWARE REMOVAL (Left: Fingers)      EXTENSOR TENDON TENOLYSIS   +++SYNTHES+++ (Left: Hand) Diagnosis:       Acute calcific periarthritis      Contracture of joint of left hand      (Acute calcific periarthritis [M77.9])      (Contracture of joint of left hand [M24.542])    Surgeons: Ross Duque MD Responsible Provider: Michael Zhou MD    Anesthesia Type: MAC ASA Status: 2          Anesthesia Type: MAC    Amy Phase I: Amy Score: 10    Amy Phase II: Amy Score: 10      Anesthesia Post Evaluation    Patient location during evaluation: PACU  Patient participation: complete - patient participated  Level of consciousness: awake  Pain score: 4  Airway patency: patent  Nausea & Vomiting: no nausea and no vomiting  Complications: no  Cardiovascular status: hemodynamically stable  Respiratory status: acceptable  Hydration status: euvolemic  Comments: Pt discharged home. RN states that pt had some discomfort, as indicated above, but was tolerating with admin of PRN analgesics. Pt had no questions or concerns r/t anesthesia care.

## 2022-11-09 NOTE — OP NOTE
procedure. OPERATIVE INDICATIONS:  The patient is a very pleasant 48-year-old  gentleman with persistent recalcitrant left middle finger adhesions,  status post ORIF with bone grafting of a nonunion. After failing  conservative management, he wished to proceed with surgical  intervention. Risks, benefits, alternatives, and complication were  fully outlined and explained to him including, but not limited to the  risk of infection, damage to nerves, vessels, or tendons, failure to  improve his symptoms or worsening of his symptoms, refracture, recurrent  stiffness, need for therapy and/or additional surgery. He understood  and wished to proceed. DESCRIPTION OF PROCEDURE:  The patient was identified in the holding  area. The left middle finger was identified as the surgical site. He  was then seen by Anesthesia, taken to the operating room, placed supine  on the table, and underwent monitored anesthesia care per Anesthesia  Department. All bony prominences were well padded. A well-padded arm  tourniquet was placed. Under sterile conditions, local anesthetic  infiltrated over the dorsum of the finger as well as over the volar  aspect in a digital block type fashion The left upper extremity was then  prepared and draped in the standard sterile fashion. A dorsal incision through his previous scar extending from the PIP joint  proximally to the level just proximal to the MCP joint. Sharp  dissection was carried down to identify the extensor mechanism. Normal  extensor tendon was identified proximally. Flaps were elevated from  proximal to distal and from radial to ulnar to expose the entire  extensor mechanism, which was firmly scarred in both the skin and the  deep tissues. Working around the central tendon, mobilizing the ulnar  lateral band, the extensor mechanism was elevated off the dorsal plates  and screws, which were densely adhered.   Similarly, on the radial  aspect, there was absence of the radial lateral band. Dense scar tissue  was mobilized to expose the underlying plate and screws. Tenolysis  knives were then performed proximally and distally, mobilizing the  extensor tendon from the plate fully from the insertion of the central  slip as well as proximally beneath the sagittal bands, which were  preserved. This mobilized the extensor mechanism nicely. Proximal  excursion on this tendon resulted in PIP joint extension equal to that  of passive extension. Fluoroscopy was then brought in. AP and lateral views confirmed  hardware. Hardware was then removed and fluoroscopy was then brought  in. AP and lateral views confirmed removal of the hardware as well as  union of the complex fracture. There was good stability to the joint  with range of motion under gentle stress maneuvers. The wound was  copiously irrigated out. The screw holes debrided to smooth margins. The patient was reversed from sedation and asked to fully flex the digit  as well as extend the finger with active PIP joint equal to passive,  which was a residual PIP joint flexion contracture of approximately 10  to 15 degrees. With this confirmed, the tourniquet was deflated. Meticulous hemostasis was achieved. Skin closed with nylon suture  followed by soft sterile _____. The patient is scheduled to begin  immediate therapy daily for the next two weeks as well as given home  exercises.         Bakari Hickman MD    D: 11/09/2022 8:01:51       T: 11/09/2022 8:05:54     [email or web address]  Job#: [id number]     Doc#: [id number]    CC:

## 2022-11-09 NOTE — ANESTHESIA PRE PROCEDURE
Department of Anesthesiology  Preprocedure Note       Name:  Nicho Lynn   Age:  36 y.o.  :  1980                                          MRN:  [id number]         Date:  2022      Surgeon: Luis Pineda):  Chika Cerna MD    Procedure: Procedure(s):  LEFT MIDDLE FINGER HARDWARE REMOVAL  EXTENSOR TENDON TENOLYSIS   +++SYNTHES+++    Medications prior to admission:   Prior to Admission medications    Medication Sig Start Date End Date Taking? Authorizing Provider   Semaglutide (RYBELSUS) 14 MG TABS Take 14 mg by mouth daily   Yes Historical Provider, MD   atorvastatin (LIPITOR) 10 MG tablet Take 10 mg by mouth daily 20   Historical Provider, MD   metFORMIN (GLUCOPHAGE) 500 MG tablet Take 500 mg by mouth daily (with breakfast)    Historical Provider, MD   Levothyroxine Sodium (SYNTHROID PO) Take 125 mcg by mouth daily     Historical Provider, MD       Current medications:    No current facility-administered medications for this encounter. Current Outpatient Medications   Medication Sig Dispense Refill    Semaglutide (RYBELSUS) 14 MG TABS Take 14 mg by mouth daily      atorvastatin (LIPITOR) 10 MG tablet Take 10 mg by mouth daily      metFORMIN (GLUCOPHAGE) 500 MG tablet Take 500 mg by mouth daily (with breakfast)      Levothyroxine Sodium (SYNTHROID PO) Take 125 mcg by mouth daily          Allergies: Allergies   Allergen Reactions    Norco [Hydrocodone-Acetaminophen] Shortness Of Breath       Problem List:    Patient Active Problem List   Diagnosis Code    Gangrenous appendicitis K35.891    Displaced fracture of proximal phalanx of left middle finger, initial encounter for open fracture S62.613B    Crush injury of hand, left, initial encounter S67. 22XA    Ischemia of finger I99.8    Hand laceration involving tendon, left, initial encounter N51.110A, [id number]       Past Medical History:        Diagnosis Date    Diabetes mellitus ([facility] [address]     Hypertension     Thyroid disease Past Surgical History:        Procedure Laterality Date    APPENDECTOMY  3/23/2016    laparoscopic    FINGER SURGERY Left 8/24/2020    LEFT HAND I&D WITH PERCUTANEOUS PINNING OF THIRD FINGER performed by Greg Conn MD at [facility] SURGERY Left 9/22/2021    LEFT MIDDLE FINGER PROXIMAL PHALANX OPEN REDUCTION INTERNAL FIXATION WITH BONE GRAFT FROM DISTAL RADIUS performed by Greg Conn MD at [address] [place] [address] History:    Social History     Tobacco Use    Smoking status: Never    Smokeless tobacco: Never   Substance Use Topics    Alcohol use: No                                Counseling given: Not Answered      Vital Signs (Current):   Vitals:    11/03/22 1146   Weight: 185 lb (83.9 kg)   Height: 5' 11\" (1.803 m)                                              BP Readings from Last 3 Encounters:   09/22/21 139/79   09/22/21 100/61   02/03/21 132/78       NPO Status:                                                                                 BMI:   Wt Readings from Last 3 Encounters:   06/06/22 195 lb (88.5 kg)   03/03/22 196 lb (88.9 kg)   12/02/21 190 lb (86.2 kg)     Body mass index is 25.8 kg/m².     CBC:   Lab Results   Component Value Date/Time    WBC 6.2 09/22/2021 07:40 AM    RBC 5.37 09/22/2021 07:40 AM    HGB 15.6 09/22/2021 07:40 AM    HCT 45.0 09/22/2021 07:40 AM    MCV 83.8 09/22/2021 07:40 AM    RDW 12.5 09/22/2021 07:40 AM     09/22/2021 07:40 AM       CMP:   Lab Results   Component Value Date/Time     09/22/2021 07:40 AM    K 4.3 09/22/2021 07:40 AM    K 4.1 11/28/2020 07:38 PM     09/22/2021 07:40 AM    CO2 26 09/22/2021 07:40 AM    BUN 11 09/22/2021 07:40 AM    CREATININE 0.9 09/22/2021 07:40 AM    GFRAA >60 09/22/2021 07:40 AM    LABGLOM >60 09/22/2021 07:40 AM    GLUCOSE 395 09/22/2021 07:40 AM    PROT 7.0 11/28/2020 07:38 PM    CALCIUM 9.5 09/22/2021 07:40 AM    BILITOT 0.6 11/28/2020 07:38 PM    ALKPHOS 81 11/28/2020 07:38 PM    AST 19 11/28/2020

## 2022-11-09 NOTE — BRIEF OP NOTE
Brief Postoperative Note      Patient: Josef Plunkett  YOB: 1980  MRN: [id number]    Date of Procedure: 11/9/2022    Pre-Op Diagnosis: Acute calcific periarthritis [M77.9]  Contracture of joint of left hand [M24.542]    Post-Op Diagnosis: Same       Procedure(s):  LEFT MIDDLE FINGER HARDWARE REMOVAL  EXTENSOR TENDON TENOLYSIS   +++SYNTHES+++    Surgeon(s):  Laron Merlin, MD    Assistant:  Physician Assistant: KIKE Geiger  Resident: Geraldo Westfall DO    Anesthesia: Monitor Anesthesia Care    Estimated Blood Loss (mL): Minimal    Complications: None    Specimens:   ID Type Source Tests Collected by Time Destination   A : Retained Hardware Left Hand Hardware Hardware SURGICAL PATHOLOGY Laron Merlin, MD 11/9/2022 0732        Implants:  * No implants in log *      Drains: * No LDAs found *    Findings: see op note    Electronically signed by KIKE Geiger on 11/9/2022 at 7:56 AM

## 2022-11-09 NOTE — H&P
Department of Orthopedic Surgery  [facility] Jeffrey Guillen MD  History and Physical        CHIEF COMPLAINT: Continued left middle finger stiffness     HISTORY OF PRESENT ILLNESS:                 The patient is a 36 y.o. male who presents with continued left middle finger stiffness with loss of active PIP joint extension and scar adhesions. Reports he does not have any significant pain at his previous nonunion site. However his biggest complaint is loss of active PIP joint extension. He reports continued good flexion. He does have paresthesias in the tip of the middle finger. No new injuries. .     Past Medical History:    Past Medical History             Diagnosis Date    Diabetes mellitus ([address]      Hypertension      Thyroid disease           Past Surgical History:    Past Surgical History             Procedure Laterality Date    APPENDECTOMY   3/23/2016     laparoscopic    FINGER SURGERY Left 8/24/2020     LEFT HAND I&D WITH PERCUTANEOUS PINNING OF THIRD FINGER performed by Lamin Vasquez MD at [address] [place] [address] Left 9/22/2021     LEFT MIDDLE FINGER PROXIMAL PHALANX OPEN REDUCTION INTERNAL FIXATION WITH BONE GRAFT FROM DISTAL RADIUS performed by Lamin Vasquez MD at [facility] OR         Current Medications:   [address] Medications   No current facility-administered medications for this visit. Allergies:  Norco [hydrocodone-acetaminophen]     Social History:   TOBACCO:   reports that he has never smoked. He has never used smokeless tobacco.  ETOH:   reports no history of alcohol use. DRUGS:   reports no history of drug use. ACTIVITIES OF DAILY LIVING:    OCCUPATION:    Family History:   Family History   History reviewed. No pertinent family history.         REVIEW OF SYSTEMS:  CONSTITUTIONAL:  negative  EYES:  negative  HEENT:  negative  RESPIRATORY:  negative  CARDIOVASCULAR:  negative  GASTROINTESTINAL:  negative  GENITOURINARY:  negative  INTEGUMENT/BREAST:  negative  HEMATOLOGIC/LYMPHATIC: negative  ALLERGIC/IMMUNOLOGIC:  negative  ENDOCRINE: Diabetes  MUSCULOSKELETAL: Left middle finger stiffness  NEUROLOGICAL:  negative  BEHAVIOR/PSYCH:  negative     PHYSICAL EXAM:    VITALS:  Ht 5' 11\" (1.803 m)   Wt 195 lb (88.5 kg)   BMI 27.20 kg/m²   CONSTITUTIONAL:  awake, alert, cooperative, no apparent distress, and appears stated age  EYES:  Lids and lashes normal, pupils equal, round and reactive to light, extra ocular muscles intact, sclera clear, conjunctiva normal  ENT:  Normocephalic, without obvious abnormality, atraumatic, sinuses nontender on palpation, external ears without lesions, oral pharynx with moist mucus membranes, tonsils without erythema or exudates, gums normal and good dentition. NECK:  Supple, symmetrical, trachea midline, no adenopathy, thyroid symmetric, not enlarged and no tenderness, skin normal  LUNGS:  CTA  CARDIOVASCULAR:  RRR  ABDOMEN:  Soft,nttp  CHEST/BREASTS:  deferred  GENITAL/URINARY:  deferred  NEUROLOGIC:  Awake, alert, oriented to name, place and time. Cranial nerves II-XII are grossly intact. Motor is 5 out of 5 bilaterally. Sensory is intact. gait is normal.  MUSCULOSKELETAL:     Left upper extremity: Nontender about shoulder elbow and wrist region. Well-healed scar over the dorsum of the wrist and dorsum of the middle finger. Middle finger nontender to palpation over his previous nonunion site. He does have active pull-through of the MCP PIP and DIP joints with composite flexion down to within 1/2 cm distal palmar crease. Active PIP joint extension lags 55 degrees. Passively this can be improved to -15 degrees. Positive tenderness and scar adhesions dorsally over the proximal phalanx. Brisk cap refill of the middle finger. Brisk cap refill all digits. Sensation intact but does report subjectively decreased sensation and paresthesias to the tip of the middle finger.      DATA:    CBC:         Lab Results   Component Value Date     WBC 6.2 09/22/2021 RBC 5.37 09/22/2021     HGB 15.6 09/22/2021     HCT 45.0 09/22/2021     MCV 83.8 09/22/2021     MCH 29.1 09/22/2021     MCHC 34.7 09/22/2021     RDW 12.5 09/22/2021      09/22/2021     MPV 10.4 09/22/2021      PT/INR:          Lab Results   Component Value Date     PROTIME 11.1 08/24/2020     INR 1.0 08/24/2020         Radiology Review: X-rays in the office today: AP lateral obliques of the left hand were obtained demonstrating healed middle finger proximal phalanx nonunion with dorsal plate and screw fixation. There remains a partially united volar radial fragment consistent with probable radial collateral ligament. Impression on office x-rays: Healing nonunion middle finger proximal phalanx fracture with a persistent nonunion of the radial collateral ligament avulsion fracture. IMPRESSION:  Left middle finger scar adhesions of the extensor mechanism limiting PIP joint extension versus rupture of extensor mechanism  Retained hardware middle finger proximal phalanx  Scar adhesions  Diabetes     PLAN:  Today's findings were explained the patient. He does have extensor adhesions limiting active PIP joint extension and his previous nonunion has healed. He does have retained hardware and scar adhesions. Surgical options were explained including removal of hardware and extensor tenolysis. Depending on the status of his extensor mechanism reconstruction may be necessary. He voiced understanding. He like to proceed. Postoperative course including compliance with skilled therapy daily postop day #1 were explained. I have further explained to him increased risk of complications secondary to his diabetes.   All questions answered     I explained the risks, benefits, alternatives and complications of surgery with the patient including but not limited to the risks of infection, possible damage to nerves, vessels, or tendons, stiffness, refracture, loss of range of motion, scar sensitivity, wound healing complications, worsening symptoms, possible need for therapy, as well as the possible need further surgery and unanticipated complications. The patient voiced understanding and all questions were answered. The patient elected to proceed with surgical intervention. History and Physical Update     Patient was seen and examined. Patient's history and physical was reviewed with the patient. There has been no significant interval changes.

## 2022-11-10 ENCOUNTER — EVALUATION (OUTPATIENT)
Dept: OCCUPATIONAL THERAPY | Age: 42
End: 2022-11-10
Payer: COMMERCIAL

## 2022-11-10 DIAGNOSIS — M24.542 CONTRACTURE OF JOINT OF LEFT HAND: ICD-10-CM

## 2022-11-10 DIAGNOSIS — M77.9 EXTENSOR TENDON ADHESIONS: ICD-10-CM

## 2022-11-10 DIAGNOSIS — S62.613B DISPLACED FRACTURE OF PROXIMAL PHALANX OF LEFT MIDDLE FINGER, INITIAL ENCOUNTER FOR OPEN FRACTURE: Primary | ICD-10-CM

## 2022-11-10 PROCEDURE — 97760 ORTHOTIC MGMT&TRAING 1ST ENC: CPT | Performed by: OCCUPATIONAL THERAPIST

## 2022-11-10 PROCEDURE — 97110 THERAPEUTIC EXERCISES: CPT | Performed by: OCCUPATIONAL THERAPIST

## 2022-11-10 PROCEDURE — 97165 OT EVAL LOW COMPLEX 30 MIN: CPT | Performed by: OCCUPATIONAL THERAPIST

## 2022-11-10 NOTE — PROGRESS NOTES
OCCUPATIONAL THERAPY INITIAL EVALUATION    [address]  [address] [id number]  Dept: [phone]  Loc: [phone]   [facility] OT Fax: [phone]    Date:  11/10/2022  Initial Evaluation Date: 11/10/2022   Evaluating Therapist: Uri Capone OT    Patient Name:  Donny Hernandez    :  1980    Restrictions/Precautions:  none noted , low  fall risk  Diagnosis:  L hand extensor tendon tenolysis MF and hardware removal n  S62.613K (ICD-10-CM) - Open displaced fracture of proximal phalanx of left middle finger with nonunion   M77.9 (ICD-10-CM) - Extensor tendon adhesions   M24.542 (ICD-10-CM) - Contracture of joint of finger of left hand          Date of Surgery/Injury: sx     Insurance/Certification information:   W/C    claims # [id number]  Plan of care signed (Y/N): N  Visit# / total visits: 1 / daily for 2 weeks- 10 sessions  ( dated from this date to 2022- approved) then 12 - 16 sessions more ( need approval) . Referring Practitioner:  Dr.A. Dailey  Specific Practitioner Orders: Please schedule pt POD#1 for ROM to the left hand/digits as tolerated, Scar desensitization and management. Daily x 2 weeks    Assessment of current deficits   [] Functional mobility  [x] ADLs  [x] Strength   [] Cognition   [] Functional transfers   [] IADLs  [] Safety Awareness  [] Endurance   [] Fine Motor Coordination  [] Balance  [] Vision/perception  [] Sensation    [] Gross Motor Coordination [x] ROM  [x] Pain   [x] Edema    [x] Scar Adhesion/Skin Integrity     OT PLAN OF CARE   OT POC based on physician orders, patient diagnosis and results of clinical assessment    Frequency/Duration: daily for 2 weeks - 10 sessions approved - then to get more approval for 12 - 16 sessions.    Specific OT Treatment to include:     [x] Instruction in HEP                   Modalities:  [x] Therapeutic Exercise        [x] Ultrasound [] Electrical Stimulation/Attended  [x] PROM/Stretching                    [x] Fluidotherapy          [x]  Paraffin                   [x] AAROM  [x] AROM                 [] Iontophoresis:   [x] Tendon Glides                                               [] Neuromuscular Re-Ed            [] ADL/IADL re-training    [x] Therapeutic Activity       [x] Pain Management with/without modalities PRN                 [x] Manual Therapy                      [x] Splinting                      [x] Scar Management                   []Joint Protection/Training  []Ergonomics                             [x] Joint Mobilization        [] Adaptive Equipment Assessment/Training                             [x] Manual Edema Mobilization   [] Myofascial Release                 [] Energy Conservation/Work Simplification  [x] GM/FM Coordination       [] Safety retraining/education per  individual diagnosis/goals  [] Desensitization       Patient Specific Goal: wants to be able to make a full fist and extend his MF                               GOALS (Long term same as Short term):  ) Patient will demonstrate good understanding of home program (exercises/activities/diagnosis/prognosis/goals) with good accuracy. 2. ) Patient will demonstrate increased active/passive range of motion of their L hand to [facility] for ADL/IADL completion. 3. ) Patient will demonstrate increased /pinch strength of at least 10 / 5 pinch pounds of their Left hand. 4. ) Patient to report decreased pain in their affected L  upper extremity from 5/10 to 1-2/10 or less with resistive functional use. 5. ) Patient to report 100% compliance with their splint wear, care, and precautions if needed. 6. ) Patient will be knowledgeable of edema control techniques as evident with decreases from moderate  to mild/none. 7. ) Patient will demonstrate a non-tender/non-adherent scar.    8. ) Patient will report ADL functions as Mod I/I using L UE .   9. ) Patient will decrease QuickDASH score to 30% or less for increased participation in daily functional activities. Past Medical History:   Past Medical History:   Diagnosis Date    Diabetes mellitus ([address]     Hypertension     Thyroid disease      Past Surgical History:   Past Surgical History:   Procedure Laterality Date    APPENDECTOMY  3/23/2016    laparoscopic    FINGER SURGERY Left 8/24/2020    LEFT HAND I&D WITH PERCUTANEOUS PINNING OF THIRD FINGER performed by Ramo Hernandez MD at [id number] [place] [address] Left 9/22/2021    LEFT MIDDLE FINGER PROXIMAL PHALANX OPEN REDUCTION INTERNAL FIXATION WITH BONE GRAFT FROM DISTAL RADIUS performed by Ramo Hernandez MD at [facility] Left 11/9/2022    EXTENSOR TENDON TENOLYSIS   +++SYNTHES+++ performed by Ramo Hernandez MD at [address] [place] [address]    HAND SURGERY Left 11/9/2022    LEFT MIDDLE FINGER HARDWARE REMOVAL performed by Ramo Hernandez MD at [address] [place] [address]       Reason for Referral: Pt know to this therapy department since his first injury on 8/25/2020 with diagnosis of Excisional debridement of left palm, index and middle finger with open reduction internal fixation of left middle finger proximal phalanx fracture with middle finger A2 pulley repair. He then had another sx on 9/22/2022 with the following procedures: PROCEDURES PERFORMED:  1. Left middle finger proximal phalanx fracture nonunion open reduction  and internal fixation using the [employer] variable angle 1.5-mm locking  plates and screws augmented with distal radius autograft. 2.  [place] of distal radius autograft. 3.  Tenolysis of extensor tendon over proximal phalanx. He is currently being treated since his last sx on 11/9/2022  with the following procedures:         Procedure(s):  LEFT MIDDLE FINGER HARDWARE REMOVAL  EXTENSOR TENDON TENOLYSIS   +++SYNTHES+++  He presents today with his post op dressing on which was removed. His PIP joint of his MF was bent in flexion in the dressing.   Pt was re dressed in a lighter dressing. Home Living: lives in a house with siwife  Prior Level of Function: Indpendent    Cognition:   Alert/Oriented x3     IADL STATUS:   Ind Mod I Min A Mod A Max A Dep Other   Homemaking Responsibility   x       Shopping Responsibility:  x        Mode of Transportation: x         Leisure & Hobbies:      x    Work:      x      Comments:Pt currently using his R hand dominant  for all tasks. 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 ADL STATUS:   Ind Mod I Min A Mod A Max A Dep Other   Feeding:  x        Grooming: x         Bathing: x         UE Dressing: x         LE Dressing:  x        Toileting: x         Transfers: x           Comments: Pt using his R hand only. Pain Level: Pt has 3-5/10 pain in his L hand. Achy on the dorsum of his MF.     UE Assessment: PT has AROM WFL;s in his L shoulder, elbow, forearm, wrist and thumb. Limitations in digits as follows:     Left  Hand AROM   AROM [x]         PROM[]         IF  Digit MCP Extension/Flexion   0-45 H /* 0/34*       PIP Extension/Flexion   0*/100* 0/70*       DIP Extension/Flexion   0*/70-90* 0/32*         MF Digit MCP Extension/Flexion   0-45 H /* -5*/30*       PIP Extension/Flexion   0*/100* -27*/63*  PROM-8*       DIP Extension/Flexion   0*/70-90* 0/32*         RF Digit MCP Extension/Flexion   0-45 H /* 0/40*       PIP Extension/Flexion   0*/100* 0/68*       DIP Extension/Flexion   0*/70-90* 0/42*         SF Digit MCP Extension/Flexion   0-45 H /* 0/38*       PIP Extension/Flexion   0*/100* 0/60*       DIP Extension/Flexion   0*/70-90* 0/46*        Comment: Hand Dominance is Right     Sensation: pt c/o tingling on the tip of his MF and decreased sensation along the scar line. Edema Description/Circumferential Measurements:   Pt has moderate edema on the dorsum of his L hand and MF and slight in the other digits. Dynamometer (setting 2):  May assess next week     Left: NT      Right: NT      Pinch Meter:   Lateral: Left= NT,Right= NT    Palmar 3 point: Left= NT, Right= NT    9 Hole Peg Test:   Left: NT   Right: NT    QuickDASH Score: 66% disability    Intervention: Pt was redressed in a lighter dressing after removal of his post op dressing. 2 * to his continued PIP ext lag in his MF - he was fitted with a finger gutter ext splint he is to wear day and night but to remove EVERY hour to do exercises. Slpint at about -10* extension to adjust to more ext next sessions. HEP: pt instructed to do MP flex/ ext with all fingers - keeping his MF PIP jt staight  ( fitted with a small PIP ext splint he can wear wile doing this exercise)  to do 10 - 15 rrep's every hour. Also shown blocked PIP flex/ ext all fingers together with place and hold further ext with MF>  - to do every hours 10 - 15 rep's . Pt appeared to understand instructions. Goal were mutually agreed upon with the patient. Eval Complexity: low level eval, ortho fit x's one, there exercise x's one. Profile and History- Chart reviewed and Info from patient  Assessment of Occupational Performance and Identification of Deficits- 8   Clinical Decision Making- no additional modifications required    Rehab Potential:                                 [x] Good  [] Fair  [] Poor        Suggested Professional Referral:       [x] No  [] Yes:  Barriers to Goal Achievement[de-identified]          [x] No  [] Yes:  Domestic Concerns:                           [x] No  [] Yes:       Patient. Education:  [x] Plans/Goals, Risks/Benefits discussed  [x] Home exercise program  Method of Education: [x] Verbal  [x] Demo  [x] Written  Comprehension of Education:  [x] Verbalizes understanding. [x] Demonstrates understanding. [] Needs Review. [] Demonstrates/verbalizes understanding of HEP/Ed previously given.         Patient understands diagnosis/prognosis and consents to treatment, plan and goals: [x] Yes    [] No     Goal Formulation: Patient  Time In: 11:00 am            Time Out: 11:55 am                      Timed Code Treatment Minutes: 55 minutes      CODE  Minutes  Units   08385 OT Eval Low 30 1   08151 OT Eval Medium     48041 OT Eval High     51579 Fluidotherapy     05390 Manual     84709 Therapeutic Ex 10 1   75824 Therapeutic Activity     11032 ADL/COMP Tech Train     19255 Neuromuscular Re-Ed     33996 OrthoManagementTraining 15 1   57086 Paraffin     59368 Electrical Stim - Attended     [id number] Iontophoresis     42104 Ultrasound      Other                Electronically signed by: Shirin Rodriguez, OT /L, CHT  # [id number]     [id number] Certification / Comments      Frequency/Duration 5 x's a week for 2 weeks - then further requested  / week for 10 visits. Certification period From: this date   To: 12/1/2022     I have reviewed the Plan of Care established for skilled therapy services and certify that the services are required and that they will be provided while the patient is under my care. Physician's Comments/Revisions:                   Physicians's Printed Name:  Dr. Doroteo Sterling                                   Physician's Signature:                                                               Date:      Please review Patient's OT evaluation and if you agree sign/date and fax back to us at our [address] [place] [address] OT Fax: [phone].  Thank you for your referral!

## 2022-11-11 ENCOUNTER — TREATMENT (OUTPATIENT)
Dept: OCCUPATIONAL THERAPY | Age: 42
End: 2022-11-11
Payer: COMMERCIAL

## 2022-11-11 DIAGNOSIS — M77.9 EXTENSOR TENDON ADHESIONS: ICD-10-CM

## 2022-11-11 DIAGNOSIS — S62.613B DISPLACED FRACTURE OF PROXIMAL PHALANX OF LEFT MIDDLE FINGER, INITIAL ENCOUNTER FOR OPEN FRACTURE: Primary | ICD-10-CM

## 2022-11-11 DIAGNOSIS — M24.542 CONTRACTURE OF JOINT OF LEFT HAND: ICD-10-CM

## 2022-11-11 PROCEDURE — 97140 MANUAL THERAPY 1/> REGIONS: CPT | Performed by: OCCUPATIONAL THERAPIST

## 2022-11-11 PROCEDURE — 97110 THERAPEUTIC EXERCISES: CPT | Performed by: OCCUPATIONAL THERAPIST

## 2022-11-11 NOTE — PROGRESS NOTES
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 Referring Practitioner:  Dr.A. Dailey  Specific Practitioner Orders: Please schedule pt POD#1 for ROM to the left hand/digits as tolerated, Scar desensitization and management. Daily x 2 weeks     Assessment of current deficits   [] Functional mobility             [x] ADLs           [x] Strength                  [] Cognition   [] Functional transfers           [] IADLs          [] Safety Awareness  [] Endurance   [] Fine Motor Coordination    [] Balance      [] Vision/perception    [] Sensation     [] Gross Motor Coordination [x] ROM           [x] Pain                        [x] Edema          [x] Scar Adhesion/Skin Integrity      OT PLAN OF CARE   OT POC based on physician orders, patient diagnosis and results of clinical assessment     Frequency/Duration: daily for 2 weeks - 10 sessions approved - then to get more approval for 12 - 16 sessions.    Specific OT Treatment to include:      [x] Instruction in HEP                   Modalities:  [x] Therapeutic Exercise                 [x] Ultrasound               [] Electrical Stimulation/Attended  [x] PROM/Stretching [x] Fluidotherapy          [x]  Paraffin                   [x] AAROM  [x] AROM                 [] Iontophoresis:   [x] Tendon Glides                                               [] Neuromuscular Re-Ed            [] ADL/IADL re-training    [x] Therapeutic Activity                  [x] Pain Management with/without modalities PRN                 [x] Manual Therapy                      [x] Splinting                                   [x] Scar Management                   []Joint Protection/Training  []Ergonomics                             [x] Joint Mobilization                      [] Adaptive Equipment Assessment/Training                             [x] Manual Edema Mobilization   [] Myofascial Release                 [] Energy Conservation/Work Simplification  [x] GM/FM Coordination                [] Safety retraining/education per  individual diagnosis/goals  [] Desensitization        Patient Specific Goal: wants to be able to make a full fist and extend his MF                               GOALS (Long term same as Short term):  ) Patient will demonstrate good understanding of home program (exercises/activities/diagnosis/prognosis/goals) with good accuracy. 2. ) Patient will demonstrate increased active/passive range of motion of their L hand to [facility] for ADL/IADL completion. 3. ) Patient will demonstrate increased /pinch strength of at least 10 / 5 pinch pounds of their Left hand. 4. ) Patient to report decreased pain in their affected L  upper extremity from 5/10 to 1-2/10 or less with resistive functional use. 5. ) Patient to report 100% compliance with their splint wear, care, and precautions if needed. 6. ) Patient will be knowledgeable of edema control techniques as evident with decreases from moderate  to mild/none. 7. ) Patient will demonstrate a non-tender/non-adherent scar.    8. ) Patient will report ADL functions as Mod I/I using L UE .   9. ) Patient will decrease QuickDASH score to 30% or less for increased participation in daily functional activities. Plan of care signed (Y/N):  N    Pain Level: low entering clinic- ranges from 1-4/10 with exercise    Subjective: \"I did my exercises every hour yesterday. \"     Objective:  Updated POC to be completed by 12/10/2022. INTERVENTION: COMPLETED: SPECIFICS/COMMENTS:   Modality:     MHP  X - 5 min  - to fingers only to warm up tissue. AROM:     R digital  X      x - MP flex/ ext all fingers - holding MF PIP straight - 2 sets of 15- do not pull with PIP - only MP  - blocked PIP ( hook) all fingers flex and ext with MF place and hold for further ext. 2 sets of 10.   - full fisting  5 rep's    R fine coordination  x - grasp and release - full fist with place and hold ext MF every 3rd rep's         AAROM:               PROM/Stretching:     R digital X  x - gentle to MP flexion all fingers  - MF PIP ext. Scar Mass/Edema Control:     Edema control X    x - retrograde L hand and MF - not on incision areas. - size E compression sleeve on hand and mesh on MF        Strengthening:               Other:     HEP  x Reviewied all - added full fisting. Finger gutter splint  x Re- adjusted to ^ PIP ext pull - to about -5*     Assessment/Comments: Pt is making Good progress toward stated plan of care. Focus this session was on reviewing his HEP - ^ PIP ext on his gutter splint and working on R hand joint mobility and extensor tendon glide. Pt showing ^'ed joint mobility in all fingers - pt able to do fisting this date. He is compliant with his HEP. He has about a - 14* ext lag in his MF - so therapist instructed him to continue with the place and hold exercises and with ext splinting during the day and night (decision will be assessed daily as to how long splinting is needed and when he will wean from ). Pt shows small amount of drainage from his incision sites - has dressing supplies at home. Will progress as tolerated. -Rehab Potential: Good  -Requires OT Follow Up: Yes  Time In:11:10 am             Time Out: 12:00 pm              Visit #: 2    Treatment Charges: Mins Time Units   Modalities      Ther Exercise 30 11:25 a 12;00 p  2   Manual Therapy 20 11:10 a - 11:30 a 1   Thera Activities      ADL/Home Mgt       Neuro Re-education      Gait Training      Group Therapy      Non-Billable Service Time      Other      Total Time/Units 50  3       -Response to Treatment: pt pleased with his ^'ed mobility   Goals: Goals for pt can be see on initial eval occurring on 11/10/2022    Plan:   [x]  Continues Plan of care: Treatment covered based on POC and graduated to patient's progress. Pt education continues at each visit to obtain maximum benefits from skilled OT intervention.   []  Alter Plan of care:   []  Discharge:      Elvia Alberts OT //L,  CHT  # [phone]

## 2022-11-14 ENCOUNTER — TREATMENT (OUTPATIENT)
Dept: OCCUPATIONAL THERAPY | Age: 42
End: 2022-11-14

## 2022-11-14 NOTE — PROGRESS NOTES
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 Referring Practitioner:  Dr.A. Dailey  Specific Practitioner Orders: Please schedule pt POD#1 for ROM to the left hand/digits as tolerated, Scar desensitization and management. Daily x 2 weeks     Assessment of current deficits   [] Functional mobility             [x] ADLs           [x] Strength                  [] Cognition   [] Functional transfers           [] IADLs          [] Safety Awareness  [] Endurance   [] Fine Motor Coordination    [] Balance      [] Vision/perception    [] Sensation     [] Gross Motor Coordination [x] ROM           [x] Pain                        [x] Edema          [x] Scar Adhesion/Skin Integrity      OT PLAN OF CARE   OT POC based on physician orders, patient diagnosis and results of clinical assessment     Frequency/Duration: daily for 2 weeks - 10 sessions approved - then to get more approval for 12 - 16 sessions.    Specific OT Treatment to include:      [x] Instruction in HEP                   Modalities:  [x] Therapeutic Exercise                 [x] Ultrasound               [] Electrical Stimulation/Attended  [x] PROM/Stretching [x] Fluidotherapy          [x]  Paraffin                   [x] AAROM  [x] AROM                 [] Iontophoresis:   [x] Tendon Glides                                               [] Neuromuscular Re-Ed            [] ADL/IADL re-training    [x] Therapeutic Activity                  [x] Pain Management with/without modalities PRN                 [x] Manual Therapy                      [x] Splinting                                   [x] Scar Management                   []Joint Protection/Training  []Ergonomics                             [x] Joint Mobilization                      [] Adaptive Equipment Assessment/Training                             [x] Manual Edema Mobilization   [] Myofascial Release                 [] Energy Conservation/Work Simplification  [x] GM/FM Coordination                [] Safety retraining/education per  individual diagnosis/goals  [] Desensitization        Patient Specific Goal: wants to be able to make a full fist and extend his MF                               GOALS (Long term same as Short term):  ) Patient will demonstrate good understanding of home program (exercises/activities/diagnosis/prognosis/goals) with good accuracy. 2. ) Patient will demonstrate increased active/passive range of motion of their L hand to [facility] for ADL/IADL completion. 3. ) Patient will demonstrate increased /pinch strength of at least 10 / 5 pinch pounds of their Left hand. 4. ) Patient to report decreased pain in their affected L  upper extremity from 5/10 to 1-2/10 or less with resistive functional use. 5. ) Patient to report 100% compliance with their splint wear, care, and precautions if needed. 6. ) Patient will be knowledgeable of edema control techniques as evident with decreases from moderate  to mild/none. 7. ) Patient will demonstrate a non-tender/non-adherent scar.    8. ) Patient will report ADL functions as Mod I/I using L UE .   9. ) Patient will decrease QuickDASH Therapy      Non-Billable Service Time      Other      Total Time/Units 53  4       -Response to Treatment: pt pleased with his ^'ed mobility   Goals: Goals for pt can be see on initial eval occurring on 11/10/2022    Plan:   [x]  Continues Plan of care: Treatment covered based on POC and graduated to patient's progress. Pt education continues at each visit to obtain maximum benefits from skilled OT intervention.   []  Alter Plan of care:   []  Discharge:      Lisbeth Lraa/RAJAN, dianna Brad 87 #[phone]

## 2022-11-15 ENCOUNTER — TREATMENT (OUTPATIENT)
Dept: OCCUPATIONAL THERAPY | Age: 42
End: 2022-11-15
Payer: COMMERCIAL

## 2022-11-15 DIAGNOSIS — S62.613B DISPLACED FRACTURE OF PROXIMAL PHALANX OF LEFT MIDDLE FINGER, INITIAL ENCOUNTER FOR OPEN FRACTURE: Primary | ICD-10-CM

## 2022-11-15 DIAGNOSIS — M24.542 CONTRACTURE OF JOINT OF LEFT HAND: ICD-10-CM

## 2022-11-15 DIAGNOSIS — M77.9 EXTENSOR TENDON ADHESIONS: ICD-10-CM

## 2022-11-15 PROCEDURE — 97140 MANUAL THERAPY 1/> REGIONS: CPT | Performed by: OCCUPATIONAL THERAPIST

## 2022-11-15 PROCEDURE — 97110 THERAPEUTIC EXERCISES: CPT | Performed by: OCCUPATIONAL THERAPIST

## 2022-11-15 NOTE — PROGRESS NOTES
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 Referring Practitioner:  Dr.A. Dailey  Specific Practitioner Orders: Please schedule pt POD#1 for ROM to the left hand/digits as tolerated, Scar desensitization and management. Daily x 2 weeks     Assessment of current deficits   [] Functional mobility             [x] ADLs           [x] Strength                  [] Cognition   [] Functional transfers           [] IADLs          [] Safety Awareness  [] Endurance   [] Fine Motor Coordination    [] Balance      [] Vision/perception    [] Sensation     [] Gross Motor Coordination [x] ROM           [x] Pain                        [x] Edema          [x] Scar Adhesion/Skin Integrity      OT PLAN OF CARE   OT POC based on physician orders, patient diagnosis and results of clinical assessment     Frequency/Duration: daily for 2 weeks - 10 sessions approved - then to get more approval for 12 - 16 sessions.    Specific OT Treatment to include:      [x] Instruction in HEP                   Modalities:  [x] Therapeutic Exercise                 [x] Ultrasound               [] Electrical Stimulation/Attended  [x] PROM/Stretching [x] Fluidotherapy          [x]  Paraffin                   [x] AAROM  [x] AROM                 [] Iontophoresis:   [x] Tendon Glides                                               [] Neuromuscular Re-Ed            [] ADL/IADL re-training    [x] Therapeutic Activity                  [x] Pain Management with/without modalities PRN                 [x] Manual Therapy                      [x] Splinting                                   [x] Scar Management                   []Joint Protection/Training  []Ergonomics                             [x] Joint Mobilization                      [] Adaptive Equipment Assessment/Training                             [x] Manual Edema Mobilization   [] Myofascial Release                 [] Energy Conservation/Work Simplification  [x] GM/FM Coordination                [] Safety retraining/education per  individual diagnosis/goals  [] Desensitization        Patient Specific Goal: wants to be able to make a full fist and extend his MF                               GOALS (Long term same as Short term):  ) Patient will demonstrate good understanding of home program (exercises/activities/diagnosis/prognosis/goals) with good accuracy. 2. ) Patient will demonstrate increased active/passive range of motion of their L hand to [facility] for ADL/IADL completion. 3. ) Patient will demonstrate increased /pinch strength of at least 10 / 5 pinch pounds of their Left hand. 4. ) Patient to report decreased pain in their affected L  upper extremity from 5/10 to 1-2/10 or less with resistive functional use. 5. ) Patient to report 100% compliance with their splint wear, care, and precautions if needed. 6. ) Patient will be knowledgeable of edema control techniques as evident with decreases from moderate  to mild/none. 7. ) Patient will demonstrate a non-tender/non-adherent scar.    8. ) Patient will report ADL functions as Mod I/I using L UE .   9. ) Patient will decrease QuickDASH score to 30% or less for increased participation in daily functional activities. Plan of care signed (Y/N):  N    Pain Level: irritating on the ulnar side of the dorsal portion of the hand    Subjective: Pt reports \"My finger is not really painful and is moving better. \"     Objective:  Updated POC to be completed by 12/10/2022. INTERVENTION: COMPLETED: SPECIFICS/COMMENTS:   Modality:     MHP   - 5 min  - to fingers only to warm up tissue. AROM:     R digital  X      X        X    X    X    X    x - MP flex/ ext all fingers - holding MF PIP straight - 2 sets of 15- do not pull with PIP - only MP  - blocked PIP ( hook) all fingers flex and ext with MF place and hold for further ext. 2 sets of 10.   - full fisting  5 rep's   - Gentle reverse MCP blocking x10. + HEP   - Finger lifts place and holds with PIP stretch applied x10 + HEP  - back of hand on table - hook around pencil - then fist then hook - 10 rep's   - towel scrutch - single layer - pull into fist hold 5 sec - 10 rep's  - tendon gliding - all positions. R fine coordination   - grasp and release - full fist with place and hold ext MF every 3rd rep's         AAROM:               PROM/Stretching:     R digital X  x   - gentle to MP flexion all fingers  - MF PIP ext. Scar Mass/Edema Control:     Edema control X    x   - retrograde L hand and MF - not on incision areas. - size E compression sleeve on hand and mesh on MF        Strengthening:               Other:     HEP  x Reviewied all - added full fisting , tendon gliding and finger ext with place and hold. Finger gutter splint  x Re- adjusted to ^ PIP ext pull - to about -5*     Assessment/Comments: Pt is making Good progress toward stated plan of care. Pt tolerated session well with no pian and no drainage at incision site. Pt is showing very good MF motion at all joints and is showing good PIP flexion even with having to wear the extension splint all day and night.   Will progress weaning from the splint next week - at week 2 post op. . Continue to progress as tolerated. IE         11/15/22   MF Digit MCP Extension/Flexion   0-45 H /* -5*/30*  0/66*         PIP Extension/Flexion   0*/100* -27*/63*  PROM-8* -15*/91*          DIP Extension/Flexion   0*/70-90*      0/32*      0/45*          -Rehab Potential: Good  -Requires OT Follow Up: Yes  Time In:11:00 am             Time Out: 11:50  pm              Visit #: 4    Treatment Charges: Mins Time Units   Modalities      Ther Exercise 35 11:15 - 11:50 a 2   Manual Therapy 15 11:00 a - 11:15 a 1   Thera Activities      ADL/Home Mgt       Neuro Re-education      Gait Training      Group Therapy      Non-Billable Service Time      Other      Total Time/Units 50  3       -Response to Treatment: pt pleased with his ^'ed mobility   Goals: Goals for pt can be see on initial eval occurring on 11/10/2022    Plan:   [x]  Continues Plan of care: Treatment covered based on POC and graduated to patient's progress. Pt education continues at each visit to obtain maximum benefits from skilled OT intervention.   []  Alter Plan of care:   []  Discharge:      Mary Beth Vincent OT /RAJAN, CHT,  # [phone]

## 2022-11-16 ENCOUNTER — TREATMENT (OUTPATIENT)
Dept: OCCUPATIONAL THERAPY | Age: 42
End: 2022-11-16
Payer: COMMERCIAL

## 2022-11-16 DIAGNOSIS — S62.613B DISPLACED FRACTURE OF PROXIMAL PHALANX OF LEFT MIDDLE FINGER, INITIAL ENCOUNTER FOR OPEN FRACTURE: Primary | ICD-10-CM

## 2022-11-16 DIAGNOSIS — M24.542 CONTRACTURE OF JOINT OF LEFT HAND: ICD-10-CM

## 2022-11-16 DIAGNOSIS — M77.9 EXTENSOR TENDON ADHESIONS: ICD-10-CM

## 2022-11-16 PROCEDURE — 97140 MANUAL THERAPY 1/> REGIONS: CPT | Performed by: OCCUPATIONAL THERAPIST

## 2022-11-16 PROCEDURE — 97110 THERAPEUTIC EXERCISES: CPT | Performed by: OCCUPATIONAL THERAPIST

## 2022-11-16 NOTE — PROGRESS NOTES
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 Referring Practitioner:  Dr.A. Dailey  Specific Practitioner Orders: Please schedule pt POD#1 for ROM to the left hand/digits as tolerated, Scar desensitization and management. Daily x 2 weeks     Assessment of current deficits   [] Functional mobility             [x] ADLs           [x] Strength                  [] Cognition   [] Functional transfers           [] IADLs          [] Safety Awareness  [] Endurance   [] Fine Motor Coordination    [] Balance      [] Vision/perception    [] Sensation     [] Gross Motor Coordination [x] ROM           [x] Pain                        [x] Edema          [x] Scar Adhesion/Skin Integrity      OT PLAN OF CARE   OT POC based on physician orders, patient diagnosis and results of clinical assessment     Frequency/Duration: daily for 2 weeks - 10 sessions approved - then to get more approval for 12 - 16 sessions.    Specific OT Treatment to include:      [x] Instruction in HEP                   Modalities:  [x] Therapeutic Exercise                 [x] Ultrasound               [] Electrical Stimulation/Attended  [x] PROM/Stretching [x] Fluidotherapy          [x]  Paraffin                   [x] AAROM  [x] AROM                 [] Iontophoresis:   [x] Tendon Glides                                               [] Neuromuscular Re-Ed            [] ADL/IADL re-training    [x] Therapeutic Activity                  [x] Pain Management with/without modalities PRN                 [x] Manual Therapy                      [x] Splinting                                   [x] Scar Management                   []Joint Protection/Training  []Ergonomics                             [x] Joint Mobilization                      [] Adaptive Equipment Assessment/Training                             [x] Manual Edema Mobilization   [] Myofascial Release                 [] Energy Conservation/Work Simplification  [x] GM/FM Coordination                [] Safety retraining/education per  individual diagnosis/goals  [] Desensitization        Patient Specific Goal: wants to be able to make a full fist and extend his MF                               GOALS (Long term same as Short term):  ) Patient will demonstrate good understanding of home program (exercises/activities/diagnosis/prognosis/goals) with good accuracy. 2. ) Patient will demonstrate increased active/passive range of motion of their L hand to [facility] for ADL/IADL completion. 3. ) Patient will demonstrate increased /pinch strength of at least 10 / 5 pinch pounds of their Left hand. 4. ) Patient to report decreased pain in their affected L  upper extremity from 5/10 to 1-2/10 or less with resistive functional use. 5. ) Patient to report 100% compliance with their splint wear, care, and precautions if needed. 6. ) Patient will be knowledgeable of edema control techniques as evident with decreases from moderate  to mild/none. 7. ) Patient will demonstrate a non-tender/non-adherent scar.    8. ) Patient will report ADL functions as Mod I/I using L UE .   9. ) Patient will decrease QuickDASH score to 30% or less for increased participation in daily functional activities. Plan of care signed (Y/N):  N    Pain Level:  pulling of sutures    Subjective: Pt reports increased mobility due to really working on exercises. Objective:  Updated POC to be completed by 12/10/2022. INTERVENTION: COMPLETED: SPECIFICS/COMMENTS:   Modality:     MHP   - 5 min  - to fingers only to warm up tissue. AROM:     R digital  X      X            X    X    X    x - MP flex/ ext all fingers - holding MF PIP straight - 2 sets of 15- do not pull with PIP - only MP  - blocked PIP ( hook) all fingers flex and ext with MF place and hold for further ext. 2 sets of 10.   - full fisting  5 rep's   - Gentle reverse MCP blocking x10. + HEP   - [place] pick-up flexion/extension with holds. - table top, flat fist, table top with extension x15 + HEP  - Finger lifts place and holds with PIP stretch applied x10 + HEP  - back of hand on table - hook around pencil - then fist then hook - 10 rep's   - towel scrutch - single layer - pull into fist hold 5 sec - 10 rep's  - tendon gliding - all positions. R fine coordination   - grasp and release - full fist with place and hold ext MF every 3rd rep's         AAROM:               PROM/Stretching:     R digital X  x   - gentle to MP flexion all fingers  - MF PIP ext. Scar Mass/Edema Control:     Edema control X    x   - retrograde L hand and MF - not on incision areas. - size E compression sleeve on hand and mesh on MF        Strengthening:               Other:     HEP  x Reviewied all - added full fisting , tendon gliding and finger ext with place and hold. Finger gutter splint  x Re- adjusted to ^ PIP ext pull - to about -5*     Assessment/Comments: Pt is making Good progress toward stated plan of care. Pt tolerated session well with functional tasks with full flexion/extension of the MF. Pt demonstrated improved flexion/extension at the end of the session.  MCP reverse joint blocking to be completed once sutures are removed. Continue to progress as tolerated. IE         11/15/22   MF Digit MCP Extension/Flexion   0-45 H /* -5*/30*  0/66*         PIP Extension/Flexion   0*/100* -27*/63*  PROM-8* -15*/91*          DIP Extension/Flexion   0*/70-90*      0/32*      0/45*          -Rehab Potential: Good  -Requires OT Follow Up: Yes  Time In:11:00 am             Time Out: 11:50  pm              Visit #: 5    Treatment Charges: Mins Time Units   Modalities      Ther Exercise 35 11:15 - 11:50 a 2   Manual Therapy 15 11:00 a - 11:15 a 1   Thera Activities      ADL/Home Mgt       Neuro Re-education      Gait Training      Group Therapy      Non-Billable Service Time      Other      Total Time/Units 50  3       -Response to Treatment: pt pleased with his ^'ed mobility   Goals: Goals for pt can be see on initial eval occurring on 11/10/2022    Plan:   [x]  Continues Plan of care: Treatment covered based on POC and graduated to patient's progress. Pt education continues at each visit to obtain maximum benefits from skilled OT intervention.   []  Alter Plan of care:   []  Discharge:      Bernard Underwood/RAJAN, [place] #[id number]

## 2022-11-17 ENCOUNTER — TREATMENT (OUTPATIENT)
Dept: OCCUPATIONAL THERAPY | Age: 42
End: 2022-11-17
Payer: COMMERCIAL

## 2022-11-17 ENCOUNTER — OFFICE VISIT (OUTPATIENT)
Dept: ORTHOPEDIC SURGERY | Age: 42
End: 2022-11-17

## 2022-11-17 VITALS — WEIGHT: 185 LBS | BODY MASS INDEX: 25.9 KG/M2 | HEIGHT: 71 IN

## 2022-11-17 DIAGNOSIS — S62.613B DISPLACED FRACTURE OF PROXIMAL PHALANX OF LEFT MIDDLE FINGER, INITIAL ENCOUNTER FOR OPEN FRACTURE: Primary | ICD-10-CM

## 2022-11-17 DIAGNOSIS — M24.542 CONTRACTURE OF JOINT OF LEFT HAND: ICD-10-CM

## 2022-11-17 DIAGNOSIS — S62.613K: ICD-10-CM

## 2022-11-17 DIAGNOSIS — M77.9 EXTENSOR TENDON ADHESIONS: ICD-10-CM

## 2022-11-17 PROCEDURE — 97140 MANUAL THERAPY 1/> REGIONS: CPT | Performed by: OCCUPATIONAL THERAPIST

## 2022-11-17 PROCEDURE — 97110 THERAPEUTIC EXERCISES: CPT | Performed by: OCCUPATIONAL THERAPIST

## 2022-11-17 PROCEDURE — 99024 POSTOP FOLLOW-UP VISIT: CPT | Performed by: ORTHOPAEDIC SURGERY

## 2022-11-17 NOTE — PROGRESS NOTES
Postop hardware removal and tenolysis of middle finger. Overall he is doing very well in therapy. He is very pleased with his results. Physical exam: Incision healing very nicely. Near full digital extension of the middle finger with a PIP joint extension lag of about 10 to 15 degrees. Near full composite flexion down to within 1.5 cm of distal palmar crease. Remains well aligned with flexion. He is neurovascular intact. X-rays in office today: AP lateral obliques of his left hand were obtained compared to his images from June 6, 2022. There is been interval removal of this hardware. There is good union of the proximal phalanx fracture. Impression office x-rays: Interval removal of hardware left middle finger    Assessment: 2 weeks postop extensor tenolysis hard removal middle finger doing well    Plan    Patient continue with therapy. He will go 2-3 times a week for the next 4 to 6 weeks. He may return to work light duty only. No heavy repetitive lifting with his left hand. He will follow-up in 4 to 6 weeks with x-rays.

## 2022-11-17 NOTE — LETTER
[address] [facility].  [address] [address]  Phone: [phone]  Fax: [phone]    Mike Soriano MD        November 17, 2022     Patient: Alisha St   YOB: 1980   Date of Visit: 11/17/2022       To Whom It May Concern: It is my medical opinion that Francy Caceres may return to work on 11/28/2022 with the following restrictions: lifting/carrying not to exceed 5 lbs. , pushing/pulling not to exceed 5 lbs. .    If you have any questions or concerns, please don't hesitate to call.     Sincerely,        Mike Soriano MD

## 2022-11-17 NOTE — PROGRESS NOTES
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 Referring Practitioner:  Dr.A. Dailey  Specific Practitioner Orders: Please schedule pt POD#1 for ROM to the left hand/digits as tolerated, Scar desensitization and management. Daily x 2 weeks     Assessment of current deficits   [] Functional mobility             [x] ADLs           [x] Strength                  [] Cognition   [] Functional transfers           [] IADLs          [] Safety Awareness  [] Endurance   [] Fine Motor Coordination    [] Balance      [] Vision/perception    [] Sensation     [] Gross Motor Coordination [x] ROM           [x] Pain                        [x] Edema          [x] Scar Adhesion/Skin Integrity      OT PLAN OF CARE   OT POC based on physician orders, patient diagnosis and results of clinical assessment     Frequency/Duration: daily for 2 weeks - 10 sessions approved - then to get more approval for 12 - 16 sessions.    Specific OT Treatment to include:      [x] Instruction in HEP                   Modalities:  [x] Therapeutic Exercise                 [x] Ultrasound               [] Electrical Stimulation/Attended  [x] PROM/Stretching [x] Fluidotherapy          [x]  Paraffin                   [x] AAROM  [x] AROM                 [] Iontophoresis:   [x] Tendon Glides                                               [] Neuromuscular Re-Ed            [] ADL/IADL re-training    [x] Therapeutic Activity                  [x] Pain Management with/without modalities PRN                 [x] Manual Therapy                      [x] Splinting                                   [x] Scar Management                   []Joint Protection/Training  []Ergonomics                             [x] Joint Mobilization                      [] Adaptive Equipment Assessment/Training                             [x] Manual Edema Mobilization   [] Myofascial Release                 [] Energy Conservation/Work Simplification  [x] GM/FM Coordination                [] Safety retraining/education per  individual diagnosis/goals  [] Desensitization        Patient Specific Goal: wants to be able to make a full fist and extend his MF                               GOALS (Long term same as Short term):  ) Patient will demonstrate good understanding of home program (exercises/activities/diagnosis/prognosis/goals) with good accuracy. 2. ) Patient will demonstrate increased active/passive range of motion of their L hand to [facility] for ADL/IADL completion. 3. ) Patient will demonstrate increased /pinch strength of at least 10 / 5 pinch pounds of their Left hand. 4. ) Patient to report decreased pain in their affected L  upper extremity from 5/10 to 1-2/10 or less with resistive functional use. 5. ) Patient to report 100% compliance with their splint wear, care, and precautions if needed. 6. ) Patient will be knowledgeable of edema control techniques as evident with decreases from moderate  to mild/none. 7. ) Patient will demonstrate a non-tender/non-adherent scar.    8. ) Patient will report ADL functions as Mod I/I using L UE .   9. ) Patient will decrease QuickDASH score to 30% or less for increased participation in daily functional activities. Plan of care signed (Y/N):  N    Pain Level: less to day 2* to stiches being taken out no pain. Subjective: Pt reports \" The Dr was very pleased - he said I looked phenomenal!.      Objective:  Updated POC to be completed by 12/10/2022. INTERVENTION: COMPLETED: SPECIFICS/COMMENTS:   Modality:     MHP   - 5 min  - to fingers only to warm up tissue. AROM:     R digital  X      X        x            X    X        x - MP flex/ ext all fingers - holding MF PIP straight - 2 sets of 15- do not pull with PIP - only MP  - blocked PIP ( hook) all fingers flex and ext with MF place and hold for further ext. 2 sets of 10.   - full fisting  5 rep's   - Gentle reverse MCP blocking x10. + HEP   - [place] pick-up flexion/extension with holds. - table top, flat fist, table top with extension x15 + HEP  - Finger lifts place and holds with PIP stretch applied x10 + HEP  - back of hand on table - hook around pencil - then fist then hook - 10 rep's   - towel scrutch - single layer - pull into fist hold 5 sec - 10 rep's  - tendon gliding - all positions. R fine coordination  x - grasp and release -pom poms  full fist with place and hold ext MF every 3rd rep's         AAROM:               PROM/Stretching:     R digital X  x   - gentle to MP flexion all fingers  - MF PIP ext. With place and hold extension. Scar Mass/Edema Control:     Edema control X    x   - retrograde L hand and MF - not on incision areas. - size E compression sleeve on hand and mesh on MF        Strengthening:               Other:     HEP  x Reviewied all - added full fisting , tendon gliding and finger ext with place and hold. Finger gutter splint  x Cont to wear - beginning weaning off next week. Assessment/Comments: Pt is making Good progress toward stated plan of care.  Pt tolerated session well with functional tasks with full flexion/extension of the MF. PT had no drainage today with stiches  having just been removed! Redressed lightly end of session. Pt showing fucntional finger flexion and extension in his MF -  Is pleased with his progress. Pt has no pain 2* To stiches removed and he has no ' pulling.'  New C-9 ordered by Dr. Shea Aschoff to progress as tolerated. IE         11/15/22   MF Digit MCP Extension/Flexion   0-45 H /* -5*/30*  0/66*         PIP Extension/Flexion   0*/100* -27*/63*  PROM-8* -15*/91*          DIP Extension/Flexion   0*/70-90*      0/32*      0/45*          -Rehab Potential: Good  -Requires OT Follow Up: Yes  Time In:  9 :00 am             Time Out: 9 :50  pm              Visit #: 6    Treatment Charges: Mins Time Units   Modalities      Ther Exercise 35 9 :15 - 9:50 a 2   Manual Therapy 15 9 :00 a - 9:15 a 1   Thera Activities      ADL/Home Mgt       Neuro Re-education      Gait Training      Group Therapy      Non-Billable Service Time      Other      Total Time/Units 50  3       -Response to Treatment: pt pleased with his ^'ed mobility and glad his stiches are out and he has less pain and pulling. Goals: Goals for pt can be see on initial eval occurring on 11/10/2022    Plan:   [x]  Continues Plan of care: Treatment covered based on POC and graduated to patient's progress. Pt education continues at each visit to obtain maximum benefits from skilled OT intervention.   []  Alter Plan of care:   []  Discharge:      Uri Capone OT /L, CHT  # [phone]

## 2022-11-18 ENCOUNTER — TREATMENT (OUTPATIENT)
Dept: OCCUPATIONAL THERAPY | Age: 42
End: 2022-11-18
Payer: COMMERCIAL

## 2022-11-18 DIAGNOSIS — M24.542 CONTRACTURE OF JOINT OF LEFT HAND: ICD-10-CM

## 2022-11-18 DIAGNOSIS — M77.9 EXTENSOR TENDON ADHESIONS: ICD-10-CM

## 2022-11-18 DIAGNOSIS — T81.41XA INFECTION OF SUPERFICIAL INCISIONAL SURGICAL SITE AFTER PROCEDURE, INITIAL ENCOUNTER: Primary | ICD-10-CM

## 2022-11-18 DIAGNOSIS — S62.613B DISPLACED FRACTURE OF PROXIMAL PHALANX OF LEFT MIDDLE FINGER, INITIAL ENCOUNTER FOR OPEN FRACTURE: Primary | ICD-10-CM

## 2022-11-18 PROCEDURE — 97110 THERAPEUTIC EXERCISES: CPT | Performed by: OCCUPATIONAL THERAPIST

## 2022-11-18 PROCEDURE — 97140 MANUAL THERAPY 1/> REGIONS: CPT | Performed by: OCCUPATIONAL THERAPIST

## 2022-11-18 RX ORDER — SULFAMETHOXAZOLE AND TRIMETHOPRIM 800; 160 MG/1; MG/1
1 TABLET ORAL 2 TIMES DAILY
Qty: 20 TABLET | Refills: 0 | Status: SHIPPED | OUTPATIENT
Start: 2022-11-18 | End: 2022-11-28

## 2022-11-18 NOTE — PROGRESS NOTES
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 Referring Practitioner:  Dr.A. Dailey  Specific Practitioner Orders: Please schedule pt POD#1 for ROM to the left hand/digits as tolerated, Scar desensitization and management. Daily x 2 weeks     Assessment of current deficits   [] Functional mobility             [x] ADLs           [x] Strength                  [] Cognition   [] Functional transfers           [] IADLs          [] Safety Awareness  [] Endurance   [] Fine Motor Coordination    [] Balance      [] Vision/perception    [] Sensation     [] Gross Motor Coordination [x] ROM           [x] Pain                        [x] Edema          [x] Scar Adhesion/Skin Integrity      OT PLAN OF CARE   OT POC based on physician orders, patient diagnosis and results of clinical assessment     Frequency/Duration: daily for 2 weeks - 10 sessions approved - then to get more approval for 12 - 16 sessions.    Specific OT Treatment to include:      [x] Instruction in HEP                   Modalities:  [x] Therapeutic Exercise                 [x] Ultrasound               [] Electrical Stimulation/Attended  [x] PROM/Stretching [x] Fluidotherapy          [x]  Paraffin                   [x] AAROM  [x] AROM                 [] Iontophoresis:   [x] Tendon Glides                                               [] Neuromuscular Re-Ed            [] ADL/IADL re-training    [x] Therapeutic Activity                  [x] Pain Management with/without modalities PRN                 [x] Manual Therapy                      [x] Splinting                                   [x] Scar Management                   []Joint Protection/Training  []Ergonomics                             [x] Joint Mobilization                      [] Adaptive Equipment Assessment/Training                             [x] Manual Edema Mobilization   [] Myofascial Release                 [] Energy Conservation/Work Simplification  [x] GM/FM Coordination                [] Safety retraining/education per  individual diagnosis/goals  [] Desensitization        Patient Specific Goal: wants to be able to make a full fist and extend his MF                               GOALS (Long term same as Short term):  ) Patient will demonstrate good understanding of home program (exercises/activities/diagnosis/prognosis/goals) with good accuracy. 2. ) Patient will demonstrate increased active/passive range of motion of their L hand to [facility] for ADL/IADL completion. 3. ) Patient will demonstrate increased /pinch strength of at least 10 / 5 pinch pounds of their Left hand. 4. ) Patient to report decreased pain in their affected L  upper extremity from 5/10 to 1-2/10 or less with resistive functional use. 5. ) Patient to report 100% compliance with their splint wear, care, and precautions if needed. 6. ) Patient will be knowledgeable of edema control techniques as evident with decreases from moderate  to mild/none. 7. ) Patient will demonstrate a non-tender/non-adherent scar.    8. ) Patient will report ADL functions as Mod I/I using L UE .   9. ) Patient will decrease QuickDASH score to 30% or less for increased participation in daily functional activities. Plan of care signed (Y/N):  N    Pain Level:  pain slightly more this date - 3/10. Subjective: Pt reports \" My finger is alittle more swollen today. \"     Objective:  Updated POC to be completed by 12/10/2022. INTERVENTION: COMPLETED: SPECIFICS/COMMENTS:   Modality:     MHP   x- 5 min  - to fingers only to warm up tissue. Ice X 5 min  - end of session to decrease pain   AROM:     R digital  X      X        x            X    X    x    x      x - MP flex/ ext all fingers - holding MF PIP straight - 2 sets of 15- do not pull with PIP - only MP  - blocked PIP ( hook) all fingers flex and ext with MF place and hold for further ext. 2 sets of 10.   - full fisting  5 rep's   - Gentle reverse MCP blocking x10. + HEP   - [place] pick-up flexion/extension with holds. - table top, flat fist, table top with extension x15 + HEP  - Finger lifts place and holds with PIP stretch applied x10 + HEP  - MP hyper extension with hand in hook fist - 5 rep's   - back of hand on table - hook around pencil - then fist then hook - 10 rep's   - towel scrutch - single layer - pull into fist hold 5 sec - 10 rep's  - tendon gliding - all positions. - sustained grasp- Jux er ciser - for  2 min. Slight ^ in pain   R fine coordination   - grasp and release -pom poms  full fist with place and hold ext MF every 3rd rep's         AAROM:               PROM/Stretching:     R digital X  x   - gentle to MP flexion all fingers  - MF PIP ext. With place and hold extension. Scar Mass/Edema Control:     Edema control X    x   - retrograde L hand and MF - not on incision areas. - size E compression sleeve on hand and mesh on MF   Scar management x - gentle scar massage with lotion - done while pt does MP ext also. Strengthening:               Other:     HEP  x Reviewied all - added full fisting , tendon gliding and finger ext with place and hold. Finger gutter splint  X    x Cont to wear - beginning weaning off next week. - beginning to wean off the splint - start with off 4 hours during the day and use hand. Assessment/Comments: Pt is making Good progress toward stated plan of care. Pt tolerated session well with slight ^ in pain with sustained gripping tasks this date. . PT had no drainage in his incision area. Began gentle scar massage. Pt came into clinic his date with slightly more edema and redness around incision area. Pt did have Deejay - RN look at his finger. We both think it is just 'irritated' but she is going to call an antibiotic in in case he needs it over the weekend -  per judgement of the pt. Icing end of session to decrease pain. Pt beginning to wean off his ext finger splint - see above. New C-9 ordered by Dr. Elena Alfaro to progress as tolerated. -Rehab Potential: Good  -Requires OT Follow Up: Yes  Time In:  11 :10 am             Time Out: 12 : 10  pm              Visit #: 7    Treatment Charges: Mins Time Units   Modalities      Ther Exercise 35 [id number] p 2   Manual Therapy 15 1115-1130a 1   Thera Activities      ADL/Home Mgt       Neuro Re-education      Gait Training      Group Therapy      Non-Billable Service Time MHP/ \ICe 5/5 1110-1115a  [id number] p 0  0   Other      Total Time/Units 50  3       -Response to Treatment: pt pleased with his ^'ed mobility   Goals: Goals for pt can be see on initial eval occurring on 11/10/2022    Plan:   [x]  Continues Plan of care: Treatment covered based on POC and graduated to patient's progress. Pt education continues at each visit to obtain maximum benefits from skilled OT intervention.   []  Alter Plan of care:   []  Discharge:      Davi Mock, OT /L, CHT  # [phone]

## 2022-11-21 ENCOUNTER — TREATMENT (OUTPATIENT)
Dept: OCCUPATIONAL THERAPY | Age: 42
End: 2022-11-21
Payer: COMMERCIAL

## 2022-11-21 DIAGNOSIS — S62.613B DISPLACED FRACTURE OF PROXIMAL PHALANX OF LEFT MIDDLE FINGER, INITIAL ENCOUNTER FOR OPEN FRACTURE: Primary | ICD-10-CM

## 2022-11-21 DIAGNOSIS — M77.9 EXTENSOR TENDON ADHESIONS: ICD-10-CM

## 2022-11-21 DIAGNOSIS — M24.542 CONTRACTURE OF JOINT OF LEFT HAND: ICD-10-CM

## 2022-11-21 PROCEDURE — 97140 MANUAL THERAPY 1/> REGIONS: CPT | Performed by: OCCUPATIONAL THERAPIST

## 2022-11-21 PROCEDURE — 97110 THERAPEUTIC EXERCISES: CPT | Performed by: OCCUPATIONAL THERAPIST

## 2022-11-21 NOTE — PROGRESS NOTES
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 Referring Practitioner:  Dr.A. Dailey  Specific Practitioner Orders: Please schedule pt POD#1 for ROM to the left hand/digits as tolerated, Scar desensitization and management. Daily x 2 weeks     Assessment of current deficits   [] Functional mobility             [x] ADLs           [x] Strength                  [] Cognition   [] Functional transfers           [] IADLs          [] Safety Awareness  [] Endurance   [] Fine Motor Coordination    [] Balance      [] Vision/perception    [] Sensation     [] Gross Motor Coordination [x] ROM           [x] Pain                        [x] Edema          [x] Scar Adhesion/Skin Integrity      OT PLAN OF CARE   OT POC based on physician orders, patient diagnosis and results of clinical assessment     Frequency/Duration: daily for 2 weeks - 10 sessions approved - then to get more approval for 12 - 16 sessions.    Specific OT Treatment to include:      [x] Instruction in HEP                   Modalities:  [x] Therapeutic Exercise                 [x] Ultrasound               [] Electrical Stimulation/Attended  [x] PROM/Stretching [x] Fluidotherapy          [x]  Paraffin                   [x] AAROM  [x] AROM                 [] Iontophoresis:   [x] Tendon Glides                                               [] Neuromuscular Re-Ed            [] ADL/IADL re-training    [x] Therapeutic Activity                  [x] Pain Management with/without modalities PRN                 [x] Manual Therapy                      [x] Splinting                                   [x] Scar Management                   []Joint Protection/Training  []Ergonomics                             [x] Joint Mobilization                      [] Adaptive Equipment Assessment/Training                             [x] Manual Edema Mobilization   [] Myofascial Release                 [] Energy Conservation/Work Simplification  [x] GM/FM Coordination                [] Safety retraining/education per  individual diagnosis/goals  [] Desensitization        Patient Specific Goal: wants to be able to make a full fist and extend his MF                               GOALS (Long term same as Short term):  ) Patient will demonstrate good understanding of home program (exercises/activities/diagnosis/prognosis/goals) with good accuracy. 2. ) Patient will demonstrate increased active/passive range of motion of their L hand to [facility] for ADL/IADL completion. 3. ) Patient will demonstrate increased /pinch strength of at least 10 / 5 pinch pounds of their Left hand. 4. ) Patient to report decreased pain in their affected L  upper extremity from 5/10 to 1-2/10 or less with resistive functional use. 5. ) Patient to report 100% compliance with their splint wear, care, and precautions if needed. 6. ) Patient will be knowledgeable of edema control techniques as evident with decreases from moderate  to mild/none. 7. ) Patient will demonstrate a non-tender/non-adherent scar.    8. ) Patient will report ADL functions as Mod I/I using L UE .   9. ) Patient will decrease QuickDASH score to 30% or less for increased participation in daily functional activities. Plan of care signed (Y/N):  N    Pain Level:  no pain reported    Subjective: Pt reports improvement with motion     Objective:  Updated POC to be completed by 12/10/2022. INTERVENTION: COMPLETED: SPECIFICS/COMMENTS:   Modality:     MHP   x- 5 min  - to fingers only to warm up tissue. Ice X 5 min  - end of session to decrease pain   AROM:     R digital  X                  X   - MP flex/ ext all fingers - holding MF PIP straight - 2 sets of 15- do not pull with PIP - only MP  - blocked PIP ( hook) all fingers flex and ext with MF place and hold for further ext. 2 sets of 10.   - full fisting  5 rep's   - Gentle reverse MCP blocking x10. + HEP   - [place] pick-up flexion/extension with holds. - table top, flat fist, table top with extension x15 + HEP  - Finger lifts place and holds with PIP stretch applied x10 + HEP  - MP hyper extension with hand in hook fist - 5 rep's   - back of hand on table - hook around pencil - then fist then hook - 10 rep's   - towel scrutch - single layer - pull into fist hold 5 sec - 10 rep's  - tendon gliding - all positions. - sustained grasp- true balance - for  5 min. Slight ^ in pain   R fine coordination   - grasp and release -pom poms  full fist with place and hold ext MF every 3rd rep's         AAROM:               PROM/Stretching:     R digital X  x   - gentle to MP flexion all fingers  - MF PIP ext. With place and hold extension. Scar Mass/Edema Control:     Edema control X    x   - retrograde L hand and MF - not on incision areas. - size E compression sleeve on hand and mesh on MF   Scar management x - gentle scar massage with lotion - done while pt does MP ext also. Strengthening:               Other:     HEP  x Reviewied all - added full fisting , tendon gliding and finger ext with place and hold.      Finger gutter splint  X    x Cont to wear - beginning weaning off next week.   - beginning to wean off the splint - start with off 4 hours during the day and use hand. Assessment/Comments: Pt is making Good progress toward stated plan of care. Pt tolerated session well with sustained grasp completed on the true balance with slight pain reported after 5 mins. Pt complete light AROM exercises after with improvement quickly and decreased pain. Pt demonstrates excellent AROM. Continue to progress as tolerated. -Rehab Potential: Good  -Requires OT Follow Up: Yes  Time In:  11:00 am             Time Out: 11:50 am              Visit #: 8    Treatment Charges: Mins Time Units   Modalities      Ther Exercise 35 11:00 - 11:35 a 2   Manual Therapy 15 11:35 a - 11:50 a 1   Thera Activities      ADL/Home Mgt       Neuro Re-education      Gait Training      Group Therapy      Non-Billable Service Time MHP/ \ICe      Other      Total Time/Units 50  3       -Response to Treatment: pt pleased with his ^'ed mobility   Goals: Goals for pt can be see on initial eval occurring on 11/10/2022    Plan:   [x]  Continues Plan of care: Treatment covered based on POC and graduated to patient's progress. Pt education continues at each visit to obtain maximum benefits from skilled OT intervention.   []  Alter Plan of care:   []  Discharge:      Traci Melissa/RAJAN, Padma Brad 87 #[phone]

## 2022-11-22 ENCOUNTER — TREATMENT (OUTPATIENT)
Dept: OCCUPATIONAL THERAPY | Age: 42
End: 2022-11-22
Payer: COMMERCIAL

## 2022-11-22 DIAGNOSIS — M24.542 CONTRACTURE OF JOINT OF LEFT HAND: ICD-10-CM

## 2022-11-22 DIAGNOSIS — S62.613B DISPLACED FRACTURE OF PROXIMAL PHALANX OF LEFT MIDDLE FINGER, INITIAL ENCOUNTER FOR OPEN FRACTURE: Primary | ICD-10-CM

## 2022-11-22 DIAGNOSIS — M77.9 EXTENSOR TENDON ADHESIONS: ICD-10-CM

## 2022-11-22 PROCEDURE — 97018 PARAFFIN BATH THERAPY: CPT | Performed by: OCCUPATIONAL THERAPIST

## 2022-11-22 PROCEDURE — 97530 THERAPEUTIC ACTIVITIES: CPT | Performed by: OCCUPATIONAL THERAPIST

## 2022-11-22 PROCEDURE — 97140 MANUAL THERAPY 1/> REGIONS: CPT | Performed by: OCCUPATIONAL THERAPIST

## 2022-11-22 PROCEDURE — 97110 THERAPEUTIC EXERCISES: CPT | Performed by: OCCUPATIONAL THERAPIST

## 2022-11-22 NOTE — PROGRESS NOTES
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 Referring Practitioner:  Dr.A. Dailey  Specific Practitioner Orders: Please schedule pt POD#1 for ROM to the left hand/digits as tolerated, Scar desensitization and management. Daily x 2 weeks     Assessment of current deficits   [] Functional mobility             [x] ADLs           [x] Strength                  [] Cognition   [] Functional transfers           [] IADLs          [] Safety Awareness  [] Endurance   [] Fine Motor Coordination    [] Balance      [] Vision/perception    [] Sensation     [] Gross Motor Coordination [x] ROM           [x] Pain                        [x] Edema          [x] Scar Adhesion/Skin Integrity      OT PLAN OF CARE   OT POC based on physician orders, patient diagnosis and results of clinical assessment     Frequency/Duration: daily for 2 weeks - 10 sessions approved - then to get more approval for 12 - 16 sessions.    Specific OT Treatment to include:      [x] Instruction in HEP                   Modalities:  [x] Therapeutic Exercise                 [x] Ultrasound               [] Electrical Stimulation/Attended  [x] PROM/Stretching [x] Fluidotherapy          [x]  Paraffin                   [x] AAROM  [x] AROM                 [] Iontophoresis:   [x] Tendon Glides                                               [] Neuromuscular Re-Ed            [] ADL/IADL re-training    [x] Therapeutic Activity                  [x] Pain Management with/without modalities PRN                 [x] Manual Therapy                      [x] Splinting                                   [x] Scar Management                   []Joint Protection/Training  []Ergonomics                             [x] Joint Mobilization                      [] Adaptive Equipment Assessment/Training                             [x] Manual Edema Mobilization   [] Myofascial Release                 [] Energy Conservation/Work Simplification  [x] GM/FM Coordination                [] Safety retraining/education per  individual diagnosis/goals  [] Desensitization        Patient Specific Goal: wants to be able to make a full fist and extend his MF                               GOALS (Long term same as Short term):  ) Patient will demonstrate good understanding of home program (exercises/activities/diagnosis/prognosis/goals) with good accuracy. 2. ) Patient will demonstrate increased active/passive range of motion of their L hand to [facility] for ADL/IADL completion. 3. ) Patient will demonstrate increased /pinch strength of at least 10 / 5 pinch pounds of their Left hand. 4. ) Patient to report decreased pain in their affected L  upper extremity from 5/10 to 1-2/10 or less with resistive functional use. 5. ) Patient to report 100% compliance with their splint wear, care, and precautions if needed. 6. ) Patient will be knowledgeable of edema control techniques as evident with decreases from moderate  to mild/none. 7. ) Patient will demonstrate a non-tender/non-adherent scar.    8. ) Patient will report ADL functions as Mod I/I using L UE .   9. ) Patient will decrease QuickDASH score to 30% or less for increased participation in daily functional activities. Plan of care signed (Y/N):  N    Pain Level:  no pain reported    Subjective: Pt reports \"My finger feels so much looser with these stiches out and after doing the hot wax. \"     Objective:  Updated POC to be completed by 12/10/2022. INTERVENTION: COMPLETED: SPECIFICS/COMMENTS:   Modality:     Paraffin dip with MHP x - to L hand / fingers while doing ROM    MHP   - 5 min  - to fingers only to warm up tissue. Ice  5 min  - end of session to decrease pain   AROM:     R digital  X      x      x          X    X    X    x - MP flex/ ext all fingers - holding MF PIP straight - 2 sets of 15- do not pull with PIP - only MP  - blocked PIP ( hook) all fingers flex and ext with MF place and hold for further ext. 2 sets of 10.   - full fisting     - Gentle reverse MCP blocking x10. + HEP   - [place] pick-up flexion/extension with holds. - table top, flat fist, table top with extension x15 + HEP  - Finger lifts place and holds with PIP stretch applied x10 + HEP  - MP hyper extension with hand in hook fist - 10rep's   - back of hand on table - hook around pencil - then fist then hook - 10 rep's   - towel scrutch - single layer - pull into fist hold 5 sec - 10 rep's  - tendon gliding - all positions. - sustained grasp- true balance - for  5 min. Slight ^ in pain   R fine coordination        x - grasp and release -pom poms  full fist with place and hold ext MF every 3rd rep's   - beans  - hook fist 15 rep's         AAROM:               PROM/Stretching:     R digital X  x   - gentle to MP flexion all fingers  - MF PIP ext. 2 sets of 10 With place and hold extension. Scar Mass/Edema Control:     Edema control X  x       - retrograde L hand and MF -   - size E compression sleeve on hand and mesh on MF   Scar management x -  scar massage with lotion - done while pt does MP ext also.     Strengthening:     Extension strengthening x - yellow finger extender- 10 rep's at MP ext and then 10 rep's for PIP ext         Other:     HEP  x Reviewied all - added full fisting , tendon gliding and finger ext with place and hold. Pt to bring in his putty next week to try finger ext and add others. Finger gutter splint  X    x Cont to wear - beginning weaning off next week. - beginning to wean off the splint - wearing at night and one hour 1 or 2 x's a day      Assessment/Comments: Pt is making Good progress toward stated plan of care. Pt tolerated session well no ^ in pian. He stated his MF felt a lot looser this date after doing the paraffin dip and with his stitches out. Pt continues to show about -20* PIP ext in his MF walking into clinic but did ^ to -14* by end of session. Continue with night time ext splinting. Pt demonstrates excellent AROM. Continue to progress as tolerated. -Rehab Potential: Good  -Requires OT Follow Up: Yes  Time In:  11:00 am             Time Out: 11:55 am              Visit #: 9    Treatment Charges: Mins Time Units   Modalities-paraffin 10 1100 - 1110 a 1   Ther Exercise 15 1130 -1145 a 1   Manual Therapy 15 11:10 - 1130a 1   Thera Activities 15 1145 - 1200p 1   ADL/Home Mgt       Neuro Re-education      Gait Training      Group Therapy      Non-Billable Service Time MHP/ \ICe      Other      Total Time/Units 55  4       -Response to Treatment: pt pleased with his ^'ed mobility and decreased edema in his MF>   Goals: Goals for pt can be see on initial eval occurring on 11/10/2022    Plan:   [x]  Continues Plan of care: Treatment covered based on POC and graduated to patient's progress. Pt education continues at each visit to obtain maximum benefits from skilled OT intervention.   []  Alter Plan of care:   []  Discharge:      Dilma Kaplan OT /L, CHT  # [phone]

## 2022-11-23 ENCOUNTER — TREATMENT (OUTPATIENT)
Dept: OCCUPATIONAL THERAPY | Age: 42
End: 2022-11-23
Payer: COMMERCIAL

## 2022-11-23 DIAGNOSIS — M77.9 EXTENSOR TENDON ADHESIONS: ICD-10-CM

## 2022-11-23 DIAGNOSIS — S62.613B DISPLACED FRACTURE OF PROXIMAL PHALANX OF LEFT MIDDLE FINGER, INITIAL ENCOUNTER FOR OPEN FRACTURE: Primary | ICD-10-CM

## 2022-11-23 DIAGNOSIS — M24.542 CONTRACTURE OF JOINT OF LEFT HAND: ICD-10-CM

## 2022-11-23 PROCEDURE — 97018 PARAFFIN BATH THERAPY: CPT | Performed by: OCCUPATIONAL THERAPIST

## 2022-11-23 PROCEDURE — 97110 THERAPEUTIC EXERCISES: CPT | Performed by: OCCUPATIONAL THERAPIST

## 2022-11-23 NOTE — PROGRESS NOTES
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 Referring Practitioner:  Dr.A. Dailey  Specific Practitioner Orders: Please schedule pt POD#1 for ROM to the left hand/digits as tolerated, Scar desensitization and management. Daily x 2 weeks     Assessment of current deficits   [] Functional mobility             [x] ADLs           [x] Strength                  [] Cognition   [] Functional transfers           [] IADLs          [] Safety Awareness  [] Endurance   [] Fine Motor Coordination    [] Balance      [] Vision/perception    [] Sensation     [] Gross Motor Coordination [x] ROM           [x] Pain                        [x] Edema          [x] Scar Adhesion/Skin Integrity      OT PLAN OF CARE   OT POC based on physician orders, patient diagnosis and results of clinical assessment     Frequency/Duration: daily for 2 weeks - 10 sessions approved - then to get more approval for 12 - 16 sessions.    Specific OT Treatment to include:      [x] Instruction in HEP                   Modalities:  [x] Therapeutic Exercise                 [x] Ultrasound               [] Electrical Stimulation/Attended  [x] PROM/Stretching                    [x] Fluidotherapy          [x]  Paraffin                   [x] AAROM  [x] AROM                 [] Iontophoresis:   [x] Tendon Glides                                               [] Neuromuscular Re-Ed            [] ADL/IADL re-training    [x] Therapeutic Activity                  [x] Pain Management with/without modalities PRN                 [x] Manual Therapy                      [x] Splinting                                   [x] Scar Management                   []Joint Protection/Training  []Ergonomics                             [x] Joint Mobilization                      [] Adaptive Equipment Assessment/Training                             [x] Manual Edema Mobilization   [] Myofascial Release                 [] Energy Conservation/Work Simplification  [x] GM/FM Coordination                [] Safety retraining/education per  individual diagnosis/goals  [] Desensitization        Patient Specific Goal: wants to be able to make a full fist and extend his MF                               GOALS (Long term same as Short term):  ) Patient will demonstrate good understanding of home program (exercises/activities/diagnosis/prognosis/goals) with good accuracy. 2. ) Patient will demonstrate increased active/passive range of motion of their L hand to [facility] for ADL/IADL completion. 3. ) Patient will demonstrate increased /pinch strength of at least 10 / 5 pinch pounds of their Left hand. 4. ) Patient to report decreased pain in their affected L  upper extremity from 5/10 to 1-2/10 or less with resistive functional use. 5. ) Patient to report 100% compliance with their splint wear, care, and precautions if needed. 6. ) Patient will be knowledgeable of edema control techniques as evident with decreases from moderate  to mild/none. 7. ) Patient will demonstrate a non-tender/non-adherent scar.    8. ) Patient will report ADL functions as Mod I/I using L UE .   9. ) Patient will decrease QuickDASH score to 30% or less for increased participation in daily functional activities. Plan of care signed (Y/N):  N    Pain Level:  no pain reported    Subjective: Pt reports no new changes     Objective:  Updated POC to be completed by 12/10/2022. INTERVENTION: COMPLETED: SPECIFICS/COMMENTS:   Modality:     Paraffin dip with MHP x - to L hand / fingers while doing ROM    MHP   - 5 min  - to fingers only to warm up tissue. Ice  5 min  - end of session to decrease pain   AROM:     R digital  X     - MP flex/ ext all fingers - holding MF PIP straight - 2 sets of 15- do not pull with PIP - only MP  - blocked PIP ( hook) all fingers flex and ext with MF place and hold for further ext. 2 sets of 10.   - full fisting     - Transfer of bunchums focusing on sustained flexion of the hand to hold all bunchums.   - Gentle reverse MCP blocking x10. + HEP   - [place] pick-up flexion/extension with holds. - table top, flat fist, table top with extension x15 + HEP  - Finger lifts place and holds with PIP stretch applied x10 + HEP  - MP hyper extension with hand in hook fist - 10rep's   - back of hand on table - hook around pencil - then fist then hook - 10 rep's   - towel scrutch - single layer - pull into fist hold 5 sec - 10 rep's  - tendon gliding - all positions. - sustained grasp- true balance - for  5 min. Slight ^ in pain   R fine coordination         - grasp and release -pom poms  full fist with place and hold ext MF every 3rd rep's   - beans  - hook fist 15 rep's         AAROM:               PROM/Stretching:     R digital X  x   - gentle to MP flexion all fingers  - MF PIP ext. 2 sets of 10 With place and hold extension. Scar Mass/Edema Control:     Edema control X  x       - retrograde L hand and MF -   - size E compression sleeve on hand and mesh on MF   Scar management x -  scar massage with lotion - done while pt does MP ext also.     Strengthening:     Extension strengthening x - yellow finger extender- 10 rep's at MP ext and then 10 rep's for PIP ext    RUE strengthening x - Issued and completed all exercises light theraputty x5 each. Other:     HEP  x Reviewied all - added full fisting , tendon gliding and finger ext with place and hold. Pt to bring in his putty next week to try finger ext and add others. Finger gutter splint  X    x Cont to wear - beginning weaning off next week. - beginning to wean off the splint - wearing at night and one hour 1 or 2 x's a day      Assessment/Comments: Pt is making Good progress toward stated plan of care. Pt tolerated session well no ^ in pian. He stated his MF felt a lot looser this date after doing the paraffin dip and with his stitches out. Pt was measured at -13* extension at the end of the session and strength was completed during this session. Pt tolerated all strengthening without increased pain and was issued theraputty program. Continue with sessions once C9 is obtained. Dynamometer (setting 2):     Left: 82#      Right: 97#       Pinch Meter:   Lateral: Left= 21#, Right= 24#    Palmar 3 point: Left= 10#, Right= 16#    9 Hole Peg Test:   Left: 24 seconds   Right: 21 seconds         -Rehab Potential: Good  -Requires OT Follow Up: Yes  Time In:  11:00 am             Time Out: 11:55 am              Visit #: 10    Treatment Charges: Mins Time Units   Modalities-paraffin 10 1100 - 1110 a 1   Ther Exercise 55 11:10 - 11:55 3   Manual Therapy      Thera Activities      ADL/Home Mgt       Neuro Re-education      Gait Training      Group Therapy      Non-Billable Service Time MHP/ \ICe      Other      Total Time/Units 55  4       -Response to Treatment: pt pleased with his ^'ed mobility and decreased edema in his MF>   Goals: Goals for pt can be see on initial eval occurring on 11/10/2022    Plan:   [x]  Continues Plan of care: Treatment covered based on POC and graduated to patient's progress.  Pt education continues at each visit to obtain maximum benefits from skilled OT intervention.   []  Alter Plan of care:   []  Discharge:      Bernard Shore R/RAJAN, Padma Brad 87 #[phone]

## 2022-11-29 ENCOUNTER — TREATMENT (OUTPATIENT)
Dept: OCCUPATIONAL THERAPY | Age: 42
End: 2022-11-29

## 2022-11-29 DIAGNOSIS — M24.542 CONTRACTURE OF JOINT OF LEFT HAND: ICD-10-CM

## 2022-11-29 DIAGNOSIS — M77.9 EXTENSOR TENDON ADHESIONS: ICD-10-CM

## 2022-11-29 DIAGNOSIS — S62.613B DISPLACED FRACTURE OF PROXIMAL PHALANX OF LEFT MIDDLE FINGER, INITIAL ENCOUNTER FOR OPEN FRACTURE: Primary | ICD-10-CM

## 2022-11-29 NOTE — PROGRESS NOTES
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 Referring Practitioner:  Dr.A. Dailey  Specific Practitioner Orders: Please schedule pt POD#1 for ROM to the left hand/digits as tolerated, Scar desensitization and management. Daily x 2 weeks     Assessment of current deficits   [] Functional mobility             [x] ADLs           [x] Strength                  [] Cognition   [] Functional transfers           [] IADLs          [] Safety Awareness  [] Endurance   [] Fine Motor Coordination    [] Balance      [] Vision/perception    [] Sensation     [] Gross Motor Coordination [x] ROM           [x] Pain                        [x] Edema          [x] Scar Adhesion/Skin Integrity      OT PLAN OF CARE   OT POC based on physician orders, patient diagnosis and results of clinical assessment     Frequency/Duration: daily for 2 weeks - 10 sessions approved - then to get more approval for 12 - 16 sessions.    Specific OT Treatment to include:      [x] Instruction in HEP                   Modalities:  [x] Therapeutic Exercise                 [x] Ultrasound               [] Electrical Stimulation/Attended  [x] PROM/Stretching [x] Fluidotherapy          [x]  Paraffin                   [x] AAROM  [x] AROM                 [] Iontophoresis:   [x] Tendon Glides                                               [] Neuromuscular Re-Ed            [] ADL/IADL re-training    [x] Therapeutic Activity                  [x] Pain Management with/without modalities PRN                 [x] Manual Therapy                      [x] Splinting                                   [x] Scar Management                   []Joint Protection/Training  []Ergonomics                             [x] Joint Mobilization                      [] Adaptive Equipment Assessment/Training                             [x] Manual Edema Mobilization   [] Myofascial Release                 [] Energy Conservation/Work Simplification  [x] GM/FM Coordination                [] Safety retraining/education per  individual diagnosis/goals  [] Desensitization        Patient Specific Goal: wants to be able to make a full fist and extend his MF                               GOALS (Long term same as Short term):  ) Patient will demonstrate good understanding of home program (exercises/activities/diagnosis/prognosis/goals) with good accuracy. Goal making good gains. Pt showing good follow thru. 2. ) Patient will demonstrate increased active/passive range of motion of their L hand to [facility] for ADL/IADL completion. Goal making good gains. See chart below. The amount of finger extension  lag pt has  now does not interfer with  release of any object. 3. ) Patient will demonstrate increased /pinch strength of at least 10 / 5 pinch pounds of their Left hand. Goal initiated this date. Measurements taken last session. 4. ) Patient to report decreased pain in their affected L  upper extremity from 5/10 to 1-2/10 or less with resistive functional use. 5. ) Patient to report 100% compliance with their splint wear, care, and precautions if needed.    6. ) Patient will be knowledgeable of edema control techniques as evident with decreases from moderate  to mild/none. 7. ) Patient will demonstrate a non-tender/non-adherent scar. 8. ) Patient will report ADL functions as Mod I/I using L UE .   9. ) Patient will decrease QuickDASH score to 30% or less for increased participation in daily functional activities. Plan of care signed (Y/N):  N    Pain Level:  no pain reported    Subjective: Pt states  \" I went back to work to my regular job and I did ok - but my hand did hurt after about 8 hours and I was able to ice it. \"     Objective:  Updated POC to be completed by 12/10/2022. INTERVENTION: COMPLETED: SPECIFICS/COMMENTS:   Modality:     Paraffin dip with MHP x - to L hand / fingers while doing ROM    MHP   - 5 min  - to fingers only to warm up tissue. Ice  5 min  - end of session to decrease pain   AROM:     R digital  X                            x      x    X    X              x - MP flex/ ext all fingers - holding MF PIP straight - 2 sets of 15- do not pull with PIP - only MP  - blocked PIP ( hook) all fingers flex and ext with MF place and hold for further ext. 2 sets of 10.   - full fisting     - Transfer of bunchums focusing on sustained flexion of the hand to hold all bunchums.   - Gentle reverse MCP blocking x10. + HEP   - [place] pick-up flexion/extension with holds. - julia  - holding in hand for 15 pieces then release for fine coordination - 5 min. - table top, flat fist, table top with extension x15 + HEP  - Finger lifts place and holds with PIP stretch applied x10 + HEP  - MP hyper extension with hand in hook fist - 10rep's   - back of hand on table - hook around pencil - then fist then hook - 10 rep's   - towel scrutch - single layer - pull into fist hold 5 sec - 10 rep's  - tendon gliding - all positions. - sustained grasp- jux er ciser - for ^ for 7  min.  No ^'ed pain    R fine coordination        x     - grasp and release -pom poms  full fist with place and hold ext MF every 3rd rep's   - beans  and release - hook fist 15 rep's and full fist.         AAROM:               PROM/Stretching:     R digital X  x   - gentle to MP flexion all fingers  - MF PIP ext. 2 sets of 10 With place and hold extension. Scar Mass/Edema Control:     Edema control X  x       - retrograde L hand and MF -   - size E compression sleeve on hand and mesh on MF   Scar management x -  scar massage with lotion - done while pt does MP ext also. Strengthening:     Extension strengthening x - yellow finger extender- ^ to 15 rep's at MP ext and then 10 rep's for PIP ext    RUE/ hand strengthening X    x - Issued and completed all exercises light theraputty x5 each. - red sponge - grasp hold 5 sec's - 15 rrep';s    Other:     HEP  x Reviewied all - added full fisting , tendon gliding and finger ext with place and hold. Finger gutter splint  X    x Cont to wear - beginning weaning off next week. -wearing ext tube only at night. Assessment/Comments: Pt is making Good progress toward stated plan of care. Pt tolerated session well no ^ in pain. Pt walked with with PIP ext at -18*and by end of session was at -17*. Pt showing full finger flexion with all tasks but states he felt a lot looser by  end of the session then the beginning.  Pt tolerated all strengthening without increased pain.            -Rehab Potential: Good  -Requires OT Follow Up: Yes  Time In:  10:00 am             Time Out: 10:55 am              Visit #: 10    Treatment Charges: Mins Time Units   Modalities-paraffin 10 1100 - 1110 a 1   Ther Exercise 30 11:10 - 11:40 2   Manual Therapy      Thera Activities 15 11:40 - 11: 55 1   ADL/Home Mgt       Neuro Re-education      Gait Training      Group Therapy      Non-Billable Service Time MHP/ \ICe      Other      Total Time/Units 55  4       -Response to Treatment: pt pleased with his ^'ed mobility and decreased edema in his MF>   Goals: Goals for pt can be see on initial eval occurring on 11/10/2022     Plan:   [x]  Continues Plan of care: Treatment covered based on POC and graduated to patient's progress. Pt education continues at each visit to obtain maximum benefits from skilled OT intervention.   []  Alter Plan of care:   []  Discharge:      Roger Magallon OT /RAJAN, CHT  # [phone]

## 2022-11-30 ENCOUNTER — TREATMENT (OUTPATIENT)
Dept: OCCUPATIONAL THERAPY | Age: 42
End: 2022-11-30

## 2022-11-30 DIAGNOSIS — M77.9 EXTENSOR TENDON ADHESIONS: ICD-10-CM

## 2022-11-30 DIAGNOSIS — S62.613B DISPLACED FRACTURE OF PROXIMAL PHALANX OF LEFT MIDDLE FINGER, INITIAL ENCOUNTER FOR OPEN FRACTURE: Primary | ICD-10-CM

## 2022-11-30 DIAGNOSIS — M24.542 CONTRACTURE OF JOINT OF LEFT HAND: ICD-10-CM

## 2022-11-30 NOTE — PROGRESS NOTES
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 Dailey  Specific Practitioner Orders: Please schedule pt POD#1 for ROM to the left hand/digits as tolerated, Scar desensitization and management. Daily x 2 weeks     Assessment of current deficits   [] Functional mobility             [x] ADLs           [x] Strength                  [] Cognition   [] Functional transfers           [] IADLs          [] Safety Awareness  [] Endurance   [] Fine Motor Coordination    [] Balance      [] Vision/perception    [] Sensation     [] Gross Motor Coordination [x] ROM           [x] Pain                        [x] Edema          [x] Scar Adhesion/Skin Integrity      OT PLAN OF CARE   OT POC based on physician orders, patient diagnosis and results of clinical assessment     Frequency/Duration: daily for 2 weeks - 10 sessions approved - then to get more approval for 12 - 16 sessions.    Specific OT Treatment to include:      [x] Instruction in HEP                   Modalities:  [x] Therapeutic Exercise                 [x] Ultrasound               [] Electrical Stimulation/Attended  [x] PROM/Stretching                    [x] Fluidotherapy          [x]  Paraffin [x] AAROM  [x] AROM                 [] Iontophoresis:   [x] Tendon Glides                                               [] Neuromuscular Re-Ed            [] ADL/IADL re-training    [x] Therapeutic Activity                  [x] Pain Management with/without modalities PRN                 [x] Manual Therapy                      [x] Splinting                                   [x] Scar Management                   []Joint Protection/Training  []Ergonomics                             [x] Joint Mobilization                      [] Adaptive Equipment Assessment/Training                             [x] Manual Edema Mobilization   [] Myofascial Release                 [] Energy Conservation/Work Simplification  [x] GM/FM Coordination                [] Safety retraining/education per  individual diagnosis/goals  [] Desensitization        Patient Specific Goal: wants to be able to make a full fist and extend his MF                               GOALS (Long term same as Short term):  ) Patient will demonstrate good understanding of home program (exercises/activities/diagnosis/prognosis/goals) with good accuracy. Goal making good gains. Pt showing good follow thru. 2. ) Patient will demonstrate increased active/passive range of motion of their L hand to [facility] for ADL/IADL completion. Goal making good gains. See chart below. The amount of finger extension  lag pt has  now does not interfer with  release of any object. 3. ) Patient will demonstrate increased /pinch strength of at least 10 / 5 pinch pounds of their Left hand. Goal initiated this date. Measurements taken last session. 4. ) Patient to report decreased pain in their affected L  upper extremity from 5/10 to 1-2/10 or less with resistive functional use. 5. ) Patient to report 100% compliance with their splint wear, care, and precautions if needed.    6. ) Patient will be knowledgeable of edema control techniques as evident with decreases from moderate  to mild/none. 7. ) Patient will demonstrate a non-tender/non-adherent scar. 8. ) Patient will report ADL functions as Mod I/I using L UE .   9. ) Patient will decrease QuickDASH score to 30% or less for increased participation in daily functional activities. Plan of care signed (Y/N):  Y    Pain Level:  no pain reported    Subjective: Pt states  no new changes     Objective:  Updated POC to be completed by 12/10/2022. INTERVENTION: COMPLETED: SPECIFICS/COMMENTS:   Modality:     Paraffin dip with MHP x - to L hand / fingers while doing ROM    MHP   - to fingers only to warm up tissue. Ice  - end of session to decrease pain   AROM:     R digital  X                                      X                   - MP flex/ ext all fingers - holding MF PIP straight - 2 sets of 15- do not pull with PIP - only MP  - blocked PIP ( hook) all fingers flex and ext with MF place and hold for further ext. 2 sets of 10.   - full fisting     - Transfer of bunchums focusing on sustained flexion of the hand to hold all bunchums.   - Rolling up and un-rolling towel with LUE with focus on flexion/extension.   - Gentle reverse MCP blocking x10. + HEP   - [place] pick-up flexion/extension with holds. - julia  - holding in hand for 15 pieces then release for fine coordination - 5 min. - table top, flat fist, table top with extension x15 + HEP  - Finger lifts place and holds with PIP stretch applied x10 + HEP  - MP hyper extension with hand in hook fist - 10rep's   - back of hand on table - hook around pencil - then fist then hook - 10 rep's   - towel scrutch - single layer - pull into fist hold 5 sec - 10 rep's  - tendon gliding - all positions. - sustained grasp- jux er ciser - for ^ for 7  min.  No ^'ed pain    R fine coordination           - grasp and release -pom poms  full fist with place and hold ext MF every 3rd rep's   - beans  and release - hook fist 15 rep's and full fist.         AAROM: PROM/Stretching:     R digital X  x   - gentle to MP flexion all fingers  - MF PIP ext. 2 sets of 10 With place and hold extension. Scar Mass/Edema Control:     Edema control X  x       - retrograde L hand and MF -   - size E compression sleeve on hand and mesh on MF   Scar management x -  scar massage with lotion - done while pt does MP ext also. Strengthening:     Extension strengthening x - yellow finger extender- ^ to 20 rep's at MP ext and then 10 rep's for PIP ext    RUE/ hand strengthening X     - Issued and completed all exercises light theraputty x5 each. Calibrated hand gripper x20 70#  - red sponge - grasp hold 5 sec's - 15 rep';s   - 2.2# ball walking up and down arm wedge with MF.   - Pron/sup with 2.2# & 5.5# therball with extended grasp in order to not drop ball x10 each  - heavy resistance clothes pin with 3 point pinch to  graded objects. Other:     HEP  x Reviewied all - added full fisting , tendon gliding and finger ext with place and hold. Finger gutter splint  X    x Cont to wear - beginning weaning off next week. -wearing ext tube only at night. Assessment/Comments: Pt is making Good progress toward stated plan of care. Pt tolerated session well no ^ in pain. Pt walked with with PIP ext at -18*and by end of session was at -20*. Pt showing full finger flexion with all tasks but states he felt a lot looser by  end of the session then the beginning. Pt tolerated all strengthening without increased pain. Completed reps until tired this date with good tolerance.        -Rehab Potential: Good  -Requires OT Follow Up: Yes  Time In: 1:00 pm          Time Out: 2:00 pm              Visit #: 2    Treatment Charges: Mins Time Units   Modalities-paraffin 10 1:00 - 1:10 p 1   Ther Exercise 30 1:10 - 1:40 p 1   Manual Therapy 10 1:50 p - 2:00 p 1   Thera Activities 10 1:40 - 1:50 p 1   ADL/Home Mgt       Neuro Re-education      Gait Training      Group Therapy Non-Billable Service Time MHP/ \ICe      Other      Total Time/Units 60  4       -Response to Treatment: pt pleased with his ^'ed mobility and decreased edema in his MF>   Goals: Goals for pt can be see on initial eval occurring on 11/10/2022     Plan:   [x]  Continues Plan of care: Treatment covered based on POC and graduated to patient's progress. Pt education continues at each visit to obtain maximum benefits from skilled OT intervention.   []  Alter Plan of care:   []  Discharge:      Agustín Wilburn R/RAJAN, dianna Brad 87 #[phone]

## 2022-12-02 ENCOUNTER — TREATMENT (OUTPATIENT)
Dept: OCCUPATIONAL THERAPY | Age: 42
End: 2022-12-02

## 2022-12-02 DIAGNOSIS — M77.9 EXTENSOR TENDON ADHESIONS: ICD-10-CM

## 2022-12-02 DIAGNOSIS — S62.613B DISPLACED FRACTURE OF PROXIMAL PHALANX OF LEFT MIDDLE FINGER, INITIAL ENCOUNTER FOR OPEN FRACTURE: Primary | ICD-10-CM

## 2022-12-02 DIAGNOSIS — M24.542 CONTRACTURE OF JOINT OF LEFT HAND: ICD-10-CM

## 2022-12-02 NOTE — PROGRESS NOTES
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 Dailey  Specific Practitioner Orders: Please schedule pt POD#1 for ROM to the left hand/digits as tolerated, Scar desensitization and management. Daily x 2 weeks     Assessment of current deficits   [] Functional mobility             [x] ADLs           [x] Strength                  [] Cognition   [] Functional transfers           [] IADLs          [] Safety Awareness  [] Endurance   [] Fine Motor Coordination    [] Balance      [] Vision/perception    [] Sensation     [] Gross Motor Coordination [x] ROM           [x] Pain                        [x] Edema          [x] Scar Adhesion/Skin Integrity      OT PLAN OF CARE   OT POC based on physician orders, patient diagnosis and results of clinical assessment     Frequency/Duration: daily for 2 weeks - 10 sessions approved - then to get more approval for 12 - 16 sessions.    Specific OT Treatment to include:      [x] Instruction in HEP                   Modalities:  [x] Therapeutic Exercise                 [x] Ultrasound               [] Electrical Stimulation/Attended  [x] PROM/Stretching                    [x] Fluidotherapy          [x]  Paraffin from moderate  to mild/none. 7. ) Patient will demonstrate a non-tender/non-adherent scar. 8. ) Patient will report ADL functions as Mod I/I using L UE .   9. ) Patient will decrease QuickDASH score to 30% or less for increased participation in daily functional activities. Plan of care signed (Y/N):  Y    Pain Level:  no pain reported    Subjective: Pt states \"I was really sore after Wednesday, I almost had to leave work\"     Objective:  Updated POC to be completed by 12/10/2022. INTERVENTION: COMPLETED: SPECIFICS/COMMENTS:   Modality:     Paraffin dip with MHP x - to L hand / fingers while doing ROM    MHP   - to fingers only to warm up tissue. Ice  - end of session to decrease pain   AROM:     R digital  X                                      X                   - MP flex/ ext all fingers - holding MF PIP straight - 2 sets of 15- do not pull with PIP - only MP  - blocked PIP ( hook) all fingers flex and ext with MF place and hold for further ext. 2 sets of 10.   - full fisting     - Transfer of bunchums focusing on sustained flexion of the hand to hold all bunchums.   - Rolling up and un-rolling towel with LUE with focus on flexion/extension.   - Gentle reverse MCP blocking x10. + HEP   - [place] pick-up flexion/extension with holds. - julia  - holding in hand for 15 pieces then release for fine coordination - 5 min. - table top, flat fist, table top with extension x15 + HEP  - Finger lifts place and holds with PIP stretch applied x10 + HEP  - MP hyper extension with hand in hook fist - 10rep's   - back of hand on table - hook around pencil - then fist then hook - 10 rep's   - towel scrutch - single layer - pull into fist hold 5 sec - 10 rep's  - tendon gliding - all positions. - sustained grasp- jux er ciser - for ^ for 7  min.  No ^'ed pain    R fine coordination           - grasp and release -pom poms  full fist with place and hold ext MF every 3rd rep's   - beans  and release - hook fist 15 rep's and full fist.         AAROM:               PROM/Stretching:     R digital X  x   - gentle to MP flexion all fingers  - MF PIP ext. 2 sets of 10 With place and hold extension. Scar Mass/Edema Control:     Edema control X  x       - retrograde L hand and MF -   - size E compression sleeve on hand and mesh on MF   Scar management x -  scar massage with lotion - done while pt does MP ext also. Strengthening:     Extension strengthening x - yellow finger extender- ^ to 20 rep's at MP ext and then 10 rep's for PIP ext    RUE/ hand strengthening X                      X        x - Issued and completed all exercises light theraputty x5 each. Calibrated hand gripper x20 ^d to 30# 70#  - red sponge - grasp hold 5 sec's - 15 rep';s   - 2.2# ball walking up and down arm wedge with MF.   - Pron/sup with 2.2# & 5.5# therball with extended grasp in order to not drop ball x10 each  -supination pronation 20 reps 10# therabar   - heavy resistance clothes pin with 3 point pinch to  graded objects.   -true balance 5 mins   Other:     HEP  x Reviewied all - added full fisting , tendon gliding and finger ext with place and hold. Finger gutter splint  X    x Cont to wear - beginning weaning off next week. -wearing ext tube only at night. Assessment/Comments: Pt is making Good progress toward stated plan of care. Pt states that the quantity of the strengthening last session was too much and he was in too much pain throughout the day. Pt tolerated session this date well with minimal increase in pain-moderate fatigue after true balance activity. Will continue to increase as tolerated.        -Rehab Potential: Good  -Requires OT Follow Up: Yes  Time In: 10:00 m          Time Out: 11:00 m              Visit #: 3    Treatment Charges: Mins Time Units   Modalities-paraffin 10 10:00 - 10:10  1   Ther Exercise 30 10:10 - 10:40  1   Manual Therapy 10 10:50  - 11:00  1   Thera Activities 10 10:40 - 10:50  1   ADL/Home Mgt       Neuro Re-education      Gait Training      Group Therapy      Non-Billable Service Time MHP/ \ICe      Other      Total Time/Units 60  4       -Response to Treatment: pt pleased with his ^'ed mobility and decreased edema in his MF>   Goals: Goals for pt can be see on initial eval occurring on 11/10/2022     Plan:   [x]  Continues Plan of care: Treatment covered based on POC and graduated to patient's progress. Pt education continues at each visit to obtain maximum benefits from skilled OT intervention. []  Alter Plan of care:   []  Discharge:       Traci Nixon, Padma Brad [phone] #[phone]

## 2022-12-05 ENCOUNTER — TREATMENT (OUTPATIENT)
Dept: OCCUPATIONAL THERAPY | Age: 42
End: 2022-12-05

## 2022-12-05 DIAGNOSIS — M24.542 CONTRACTURE OF JOINT OF LEFT HAND: Primary | ICD-10-CM

## 2022-12-05 DIAGNOSIS — M77.9 EXTENSOR TENDON ADHESIONS: ICD-10-CM

## 2022-12-05 DIAGNOSIS — S62.613K OPEN DISPLACED FRACTURE OF PROXIMAL PHALANX OF LEFT MIDDLE FINGER WITH NONUNION, SUBSEQUENT ENCOUNTER: ICD-10-CM

## 2022-12-05 NOTE — PROGRESS NOTES
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 Dailey  Specific Practitioner Orders: Please schedule pt POD#1 for ROM to the left hand/digits as tolerated, Scar desensitization and management. Daily x 2 weeks     Assessment of current deficits   [] Functional mobility             [x] ADLs           [x] Strength                  [] Cognition   [] Functional transfers           [] IADLs          [] Safety Awareness  [] Endurance   [] Fine Motor Coordination    [] Balance      [] Vision/perception    [] Sensation     [] Gross Motor Coordination [x] ROM           [x] Pain                        [x] Edema          [x] Scar Adhesion/Skin Integrity      OT PLAN OF CARE   OT POC based on physician orders, patient diagnosis and results of clinical assessment     Frequency/Duration: daily for 2 weeks - 10 sessions approved - then to get more approval for 12 - 16 sessions.    Specific OT Treatment to include:      [x] Instruction in HEP                   Modalities:  [x] Therapeutic Exercise                 [x] Ultrasound               [] Electrical Stimulation/Attended  [x] PROM/Stretching                    [x] Fluidotherapy          [x]  Paraffin [x] AAROM  [x] AROM                 [] Iontophoresis:   [x] Tendon Glides                                               [] Neuromuscular Re-Ed            [] ADL/IADL re-training    [x] Therapeutic Activity                  [x] Pain Management with/without modalities PRN                 [x] Manual Therapy                      [x] Splinting                                   [x] Scar Management                   []Joint Protection/Training  []Ergonomics                             [x] Joint Mobilization                      [] Adaptive Equipment Assessment/Training                             [x] Manual Edema Mobilization   [] Myofascial Release                 [] Energy Conservation/Work Simplification  [x] GM/FM Coordination                [] Safety retraining/education per  individual diagnosis/goals  [] Desensitization        Patient Specific Goal: wants to be able to make a full fist and extend his MF                               GOALS (Long term same as Short term):  ) Patient will demonstrate good understanding of home program (exercises/activities/diagnosis/prognosis/goals) with good accuracy. Goal making good gains. Pt showing good follow thru. 2. ) Patient will demonstrate increased active/passive range of motion of their L hand to [facility] for ADL/IADL completion. Goal making good gains. See chart below. The amount of finger extension  lag pt has  now does not interfer with  release of any object. 3. ) Patient will demonstrate increased /pinch strength of at least 10 / 5 pinch pounds of their Left hand. Goal initiated this date. Measurements taken last session. 4. ) Patient to report decreased pain in their affected L  upper extremity from 5/10 to 1-2/10 or less with resistive functional use. 5. ) Patient to report 100% compliance with their splint wear, care, and precautions if needed.    6. ) Patient will be knowledgeable of edema control techniques as evident with decreases from moderate  to mild/none. 7. ) Patient will demonstrate a non-tender/non-adherent scar. 8. ) Patient will report ADL functions as Mod I/I using L UE .   9. ) Patient will decrease QuickDASH score to 30% or less for increased participation in daily functional activities. Plan of care signed (Y/N):  Y    Pain Level:  no pain reported    Subjective: Pt states he notices more flexion at the PIP joint since not placing on extension tube last night. Objective:  Updated POC to be completed by 12/10/2022. INTERVENTION: COMPLETED: SPECIFICS/COMMENTS:   Modality:     Paraffin dip with MHP x - to L hand / fingers while doing ROM    MHP   - to fingers only to warm up tissue. Ice  - end of session to decrease pain   AROM:     R digital  X                                      X                   - MP flex/ ext all fingers - holding MF PIP straight - 2 sets of 15- do not pull with PIP - only MP  - blocked PIP ( hook) all fingers flex and ext with MF place and hold for further ext. 2 sets of 10.   - full fisting     - Transfer of bunchums focusing on sustained flexion of the hand to hold all bunchums.   - Rolling up and un-rolling towel with LUE with focus on flexion/extension.   - Gentle reverse MCP blocking x10. + HEP   - [place] pick-up flexion/extension with holds. - julia  - holding in hand for 15 pieces then release for fine coordination - 5 min. - table top, flat fist, table top with extension x15 + HEP  - Finger lifts place and holds with PIP stretch applied x10 + HEP  - MP hyper extension with hand in hook fist - 10rep's   - back of hand on table - hook around pencil - then fist then hook - 10 rep's   - towel scrutch - single layer - pull into fist hold 5 sec - 10 rep's  - tendon gliding - all positions. - sustained grasp- jux er ciser - for ^ for 7  min.  No ^'ed pain    R fine coordination           - grasp and release -pom poms  full fist with place and hold ext MF every 3rd rep's   - beans  and release - hook fist 15 rep's and full fist.         AAROM:               PROM/Stretching:     R digital X  x   - gentle to MP flexion all fingers  - MF PIP ext. 2 sets of 10 With place and hold extension. Scar Mass/Edema Control:     Edema control X  x       - retrograde L hand and MF -   - size E compression sleeve on hand and mesh on MF   Scar management x -  scar massage with lotion - done while pt does MP ext also. Strengthening:     Extension strengthening x - yellow finger extender- ^ to 20 rep's at MP ext and then 10 rep's for PIP ext    RUE/ hand strengthening                       X         - Issued and completed all exercises light theraputty x5 each. Calibrated hand gripper x20 ^d to 30# 70#  - red sponge - grasp hold 5 sec's - 15 rep';s   - 2.2# ball walking up and down arm wedge with MF. Wrist flexion/extension x15,  and drop x15  - Pron/sup with 2.2# & 5.5# therball with extended grasp in order to not drop ball x10 each  -supination pronation 20 reps 10# therabar   - heavy resistance clothes pin with 3 point pinch to  graded objects.   -true balance 5 mins   Other:     HEP  x Reviewied all - added full fisting , tendon gliding and finger ext with place and hold. Finger gutter splint  X    x Cont to wear - beginning weaning off next week. -wearing ext tube only at night. Assessment/Comments: Pt is making Good progress toward stated plan of care. Pt tolerated session well with extension lag increased at the PIP joint. Pt reported he was switching to every other night and was educated to go back to every night with extension tube if increased extension lag present. Pt was able to tolerate all strengthening without increased pain. Continue to progress as tolerated.      -Rehab Potential: Good  -Requires OT Follow Up: Yes  Time In: 11:00 am          Time Out: 12:00 pm              Visit #: 4    Treatment Charges: Mins Time Units   Modalities-paraffin 10 11:00 - 11:10  1   Ther Exercise 30 11:10 - 11:40  1   Manual Therapy 10 11:50  - 12:00  1   Thera Activities 10 11:40 - 11:50  1   ADL/Home Mgt       Neuro Re-education      Gait Training      Group Therapy      Non-Billable Service Time MHP/ \ICe      Other      Total Time/Units 60  4       -Response to Treatment: pt pleased with his ^'ed mobility and decreased edema in his MF>   Goals: Goals for pt can be see on initial eval occurring on 11/10/2022     Plan:   [x]  Continues Plan of care: Treatment covered based on POC and graduated to patient's progress. Pt education continues at each visit to obtain maximum benefits from skilled OT intervention.   []  Alter Plan of care:   []  Discharge:      Bernard Serrano/RAJAN, [facility] 87 #[phone]

## 2022-12-06 ENCOUNTER — TREATMENT (OUTPATIENT)
Dept: OCCUPATIONAL THERAPY | Age: 42
End: 2022-12-06

## 2022-12-06 DIAGNOSIS — S62.613B DISPLACED FRACTURE OF PROXIMAL PHALANX OF LEFT MIDDLE FINGER, INITIAL ENCOUNTER FOR OPEN FRACTURE: ICD-10-CM

## 2022-12-06 DIAGNOSIS — M24.542 CONTRACTURE OF JOINT OF LEFT HAND: Primary | ICD-10-CM

## 2022-12-06 DIAGNOSIS — M77.9 EXTENSOR TENDON ADHESIONS: ICD-10-CM

## 2022-12-06 PROBLEM — S62.613K: Status: ACTIVE | Noted: 2022-12-06

## 2022-12-13 ENCOUNTER — TREATMENT (OUTPATIENT)
Dept: OCCUPATIONAL THERAPY | Age: 42
End: 2022-12-13

## 2022-12-13 DIAGNOSIS — M77.9 EXTENSOR TENDON ADHESIONS: ICD-10-CM

## 2022-12-13 DIAGNOSIS — S62.613K OPEN DISPLACED FRACTURE OF PROXIMAL PHALANX OF LEFT MIDDLE FINGER WITH NONUNION, SUBSEQUENT ENCOUNTER: ICD-10-CM

## 2022-12-13 DIAGNOSIS — M24.542 CONTRACTURE OF JOINT OF LEFT HAND: Primary | ICD-10-CM

## 2022-12-13 NOTE — PROGRESS NOTES
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 Dailey  Specific Practitioner Orders: Please schedule pt POD#1 for ROM to the left hand/digits as tolerated, Scar desensitization and management. Daily x 2 weeks     Assessment of current deficits   [] Functional mobility             [x] ADLs           [x] Strength                  [] Cognition   [] Functional transfers           [] IADLs          [] Safety Awareness  [] Endurance   [] Fine Motor Coordination    [] Balance      [] Vision/perception    [] Sensation     [] Gross Motor Coordination [x] ROM           [x] Pain                        [x] Edema          [x] Scar Adhesion/Skin Integrity      OT PLAN OF CARE   OT POC based on physician orders, patient diagnosis and results of clinical assessment     Frequency/Duration: daily for 2 weeks - 10 sessions approved - then to get more approval for 12 - 16 sessions.      Specific OT Treatment to include:   [x] Instruction in HEP                   Modalities:  [x] Therapeutic Exercise                 [x] Ultrasound               [] Electrical Stimulation/Attended  [x] PROM/Stretching                    [x] Fluidotherapy          [x]  Paraffin                   [x] AAROM  [x] AROM [] Iontophoresis:   [x] Tendon Glides                                               [] Neuromuscular Re-Ed            [] ADL/IADL re-training    [x] Therapeutic Activity                  [x] Pain Management with/without modalities PRN                 [x] Manual Therapy                      [x] Splinting                                   [x] Scar Management                   []Joint Protection/Training  []Ergonomics                             [x] Joint Mobilization                      [] Adaptive Equipment Assessment/Training                             [x] Manual Edema Mobilization   [] Myofascial Release                 [] Energy Conservation/Work Simplification  [x] GM/FM Coordination                [] Safety retraining/education per  individual diagnosis/goals  [] Desensitization        Patient Specific Goal: wants to be able to make a full fist and extend his MF                               GOALS (Long term same as Short term):  ) Patient will demonstrate good understanding of home program (exercises/activities/diagnosis/prognosis/goals) with good accuracy. Goal making good gains. Pt showing good follow thru. 2. ) Patient will demonstrate increased active/passive range of motion of their L hand to [facility] for ADL/IADL completion. Goal making good gains. See chart below. The amount of finger extension  lag pt has  now does not interfer with  release of any object. 3. ) Patient will demonstrate increased /pinch strength of at least 10 / 5 pinch pounds of their Left hand. Goal initiated this date. Measurements taken last session. 4. ) Patient to report decreased pain in their affected L  upper extremity from 5/10 to 1-2/10 or less with resistive functional use. Goal met, pt consistently reporting no pain with mild pulling of the extensor tendon during good facilitation. 5. ) Patient to report 100% compliance with their splint wear, care, and precautions if needed.    6. ) Patient will be knowledgeable of edema control techniques as evident with decreases from moderate  to mild/none. 7. ) Patient will demonstrate a non-tender/non-adherent scar. 8. ) Patient will report ADL functions as Mod I/I using L UE .   9. ) Patient will decrease QuickDASH score to 30% or less for increased participation in daily functional activities. TODAY'S TREATMENT     Pain Level:  no pain reported    Subjective: Pt states \"I feel like that extension is where it is, I'm frustrated with it. \"     Objective:  Updated POC to be completed by 12/10/2022. INTERVENTION: COMPLETED: SPECIFICS/COMMENTS:   Modality:     Paraffin dip with MHP x - to L hand / fingers while doing ROM    MHP   - to fingers only to warm up tissue. Ice  - end of session to decrease pain   AROM:     R digital                                            x                      X    X    X   - MP flex/ ext all fingers - holding MF PIP straight - 2 sets of 15- do not pull with PIP - only MP  - blocked PIP ( hook) all fingers flex and ext with MF place and hold for further ext. 2 sets of 10.   - full fisting     - Transfer of bunchums focusing on sustained flexion of the hand to hold all bunchums.   - Rolling up and un-rolling towel with LUE with focus on flexion/extension.   - Gentle reverse MCP blocking x10. + HEP   - [place] pick-up flexion/extension with holds. - julia  - holding in hand for 15 pieces then release for fine coordination - 5 min. - table top, flat fist, table top with extension x15 + HEP  - Finger lifts active first then place and holds with PIP ext splint on x10 + HEP  - MP hyper extension with hand in hook fist - 10rep's   - back of hand on table - hook around pencil - then fist then hook - 10 rep's   - towel scrutch - single layer - pull into fist hold 5 sec - 10 rep's  - tendon gliding - all positions. - sustained grasp- jux er ciser - for ^ for 7  min.  No ^'ed pain   -MP's blocked into flexion, extend and flex PIP's against MP block 2x15  -PIP active extension blocking using yoke splint 2x15, added to HEP  -PIP flex/ext with digit cap splint for active assist 2x15, added to HEP   R fine coordination           - grasp and release -pom poms  full fist with place and hold ext MF every 3rd rep's   - beans  and release - hook fist 15 rep's and full fist.         PROM/Stretching:     R digital X  X    - gentle to MP flexion all fingers  - MF PIP ext. 2 sets of 10 With place and hold extension. Scar Mass/Edema Control:     Edema control X  x   - retrograde L hand and MF -   - size E compression sleeve on hand and mesh on MF   Scar management x -  scar massage with lotion - done while pt does MP ext also. Strengthening:     Extension strengthening     X  - yellow finger extender-2 sets of 20  rep's at MP ext and then 10 rep's for PIP ext   -Composite digital extension against light putty resistance on table top x 20. Added to HEP   RUE/ hand strengthening                                - Issued and completed all exercises light theraputty x5 each. Easy putty -  and manipulate 5 min. Calibrated hand gripper x20 ^d to 30# 70#  - red sponge - grasp hold 5 sec's - 15 rep';s   - 2.2# ball walking up and down arm wedge with MF. Wrist flexion/extension x15,  and drop x15  - Pron/sup with 2.2# & 5.5# therball with extended grasp in order to not drop ball x10 each  -supination pronation 20 reps 10# therabar   - heavy resistance clothes pin with 3 point pinch to  graded objects.   -true balance 5 mins   Other:     HEP  x Reviewied all - added full fisting , tendon gliding and finger ext with place and hold. Exercise Splinting X    X  -Fabricated yoke splint to isolate MF PIP extension with exercise.  -Fabricated DIP cap splint with wings that wrap over dorsal IF and RF for active assist with MF PIP flex/ext to achieve ranges he is unable to with isolation.    Finger gutter splint  X     Cont to wear - beginning weaning off next week. Wearing every night now - to bring in to adjust next session. -wearing ext tube every night. Assessment/Comments: Pt is making Good progress toward stated plan of care. Pt with PIP ext to -16* by end of session. Session focused specifically on maximizing extension of the PIP of the MF. Therapist fabricated two different exercise splints. One to better isolate PIP extension against force and one to allow some active assist from neighboring digits to achieve full ROM. Educated on active PIP flex/ext exercises to perform with splints every 2 hours daily. Pt felt more extensor facilitation when using these splints and was pleased with results. Also added extensor strengthening against putty resistance with digits on table top to enhance extensor pull to his HEP. Pt demonstrated good understanding of today's tasks added to HEP. Will continue to focus on maximizing MF ROM as tolerated. -Rehab Potential: Good  -Requires OT Follow Up: Yes    Time In: 11:05 am          Time Out: 12:00 pm              Visit #: 6  Treatment Charges: Mins Time Units   Modalities-paraffin 10 11:05- 11:15  a 1   Ther Exercise 15 11:45 - 12:00 p 1   Manual Therapy 10 11:15  - 11:25 a 1   Thera Activities 20 11:25 - 11:45 a  1   ADL/Home Mgt       Neuro Re-education      Gait Training      Group Therapy      Non-Billable Service Time MHP/ \ICe      Other      Total Time/Units 55  4       -Response to Treatment: Pleased with increased extensor facilitation felt with exercise splints. Motivated to add to HEP. Plan:   [x]  Continues Plan of care: Treatment covered based on POC and graduated to patient's progress. Pt education continues at each visit to obtain maximum benefits from skilled OT intervention.   []  Alter Plan of care:   []  Discharge:      Carmen Rolon OT R/L, dianna [facility], #[id number]

## 2022-12-14 ENCOUNTER — TREATMENT (OUTPATIENT)
Dept: OCCUPATIONAL THERAPY | Age: 42
End: 2022-12-14

## 2022-12-14 DIAGNOSIS — S62.613K OPEN DISPLACED FRACTURE OF PROXIMAL PHALANX OF LEFT MIDDLE FINGER WITH NONUNION, SUBSEQUENT ENCOUNTER: ICD-10-CM

## 2022-12-14 DIAGNOSIS — M24.542 CONTRACTURE OF JOINT OF LEFT HAND: Primary | ICD-10-CM

## 2022-12-14 DIAGNOSIS — M77.9 EXTENSOR TENDON ADHESIONS: ICD-10-CM

## 2022-12-14 NOTE — PROGRESS NOTES
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 Dailey  Specific Practitioner Orders: Please schedule pt POD#1 for ROM to the left hand/digits as tolerated, Scar desensitization and management. Daily x 2 weeks     Assessment of current deficits   [x] ADLs           [x] Strength      [x] IADLs     [x] Endurance    [x] Fine Motor Coordination       [x] ROM           [x] Pain       [x] Edema        [x] Scar Adhesion/Skin Integrity      OT PLAN OF CARE   OT POC based on physician orders, patient diagnosis and results of clinical assessment     Frequency/Duration: daily for 2 weeks - 10 sessions approved - then to get more approval for 12 - 16 sessions.      Specific OT Treatment to include:   [x] Instruction in HEP                   Modalities:  [x] Therapeutic Exercise                 [x] Ultrasound               [] Electrical Stimulation/Attended  [x] PROM/Stretching                    [x] Fluidotherapy          [x]  Paraffin                   [x] AAROM  [x] AROM                 [] Iontophoresis:   [x] Tendon Glides                                               [] Neuromuscular Re-Ed            [] ADL/IADL re-training    [x] Therapeutic Activity                  [x] Pain Management with/without modalities PRN                 [x] Manual Therapy                      [x] Splinting                                   [x] Scar Management                   []Joint Protection/Training  []Ergonomics                             [x] Joint Mobilization                      [] Adaptive Equipment Assessment/Training                             [x] Manual Edema Mobilization   [] Myofascial Release                 [] Energy Conservation/Work Simplification  [x] GM/FM Coordination                [] Safety retraining/education per  individual diagnosis/goals  [] Desensitization        Patient Specific Goal: wants to be able to make a full fist and extend his MF                               GOALS (Long term same as Short term):  1.) Patient will demonstrate good understanding of home program (exercises/activities/diagnosis/prognosis/goals) with good accuracy. Goal making good gains. Pt showing good follow thru. 2. ) Patient will demonstrate increased active/passive range of motion of their L hand to [facility] for ADL/IADL completion. Goal making good gains. See chart below. The amount of finger extension  lag pt has  now does not interfer with  release of any object. 3. ) Patient will demonstrate increased /pinch strength of at least 10 / 5 pinch pounds of their Left hand. Goal initiated this date. Measurements taken last session. 4. ) Patient to report decreased pain in their affected L  upper extremity from 5/10 to 1-2/10 or less with resistive functional use. Goal met, pt consistently reporting no pain with mild pulling of the extensor tendon during good facilitation. 5. ) Patient to report 100% compliance with their splint wear, care, and precautions if needed. 6. ) Patient will be knowledgeable of edema control techniques as evident with decreases from moderate  to mild/none. 7. ) Patient will demonstrate a non-tender/non-adherent scar.    8. ) Patient will report ADL functions as Mod I/I using L UE .   9. ) Patient will decrease QuickDASH score to 30% or less for increased participation in daily functional activities. TODAY'S TREATMENT     Pain Level:  no pain reported at start of session    Subjective: Pt reports splints are going well but the tip splint may need adjusted. He left these in toolbox at work. Will bring tomorrow. Objective:  Updated POC to be completed by 12/10/2022. INTERVENTION: COMPLETED: SPECIFICS/COMMENTS:   Modality:     Paraffin dip with MHP x - to L hand / fingers while doing ROM    MHP   - to fingers only to warm up tissue. Ice  - end of session to decrease pain   AROM:     R digital        X                                      x                      X             X  - MP flex/ ext all fingers - holding MF PIP straight - 2 sets of 15- do not pull with PIP - only MP  - blocked PIP ( hook) all fingers flex and ext with MF place and hold for further ext. 2 sets of 10.   - full fisting     - Transfer of bunchums focusing on sustained flexion of the hand to hold all bunchums.   - Rolling up and un-rolling towel with LUE with focus on flexion/extension.   - Gentle reverse MCP blocking x10. + HEP   - [place] pick-up flexion/extension with holds. - julia  - holding in hand for 15 pieces then release for fine coordination - 5 min. - table top, flat fist, table top with extension x15 + HEP  - Finger lifts active first then place and holds with PIP ext splint on x10 + HEP  - MP hyper extension with hand in hook fist - 10rep's   - back of hand on table - hook around pencil - then fist then hook - 10 rep's   - towel scrutch - single layer - pull into fist hold 5 sec - 10 rep's  - tendon gliding - all positions. - sustained grasp- jux er ciser - for ^ for 7  min.  No ^'ed pain   -MP's blocked into flexion, extend and flex PIP's  against MP block 2x15  -PIP active extension blocking using yoke splint 2x15, added to HEP  -PIP flex/ext with digit cap splint for active assist 2x15, added to HEP  -Isolated PIP extension place and holds 2x10   R fine coordination           - grasp and release -pom poms  full fist with place and hold ext MF every 3rd rep's   - beans  and release - hook fist 15 rep's and full fist.         PROM/Stretching:     R digital X  X    - gentle to MP flexion all fingers  - MF PIP ext. 2 sets of 10 With place and hold extension. Scar Mass/Edema Control:     Edema control X  x   - retrograde L hand and MF -   - size E compression sleeve on hand and mesh on MF   Scar management x -  scar massage with lotion - done while pt does MP ext also. Strengthening:     Extension strengthening     X         X    X    X  - yellow finger extender-2 sets of 20  rep's at MP ext and then 10 rep's for PIP ext   -Composite digital extension against light putty resistance on table top x 20. Added to HEP. Followed by MF only x 12  -Extension lifts from table top against therapist resistance with MF only x 12  -Composite extension from passive full fist x 15 against therapist resistance  -Palm presses to putty with extended digits x 12   RUE/ hand strengthening                                - Issued and completed all exercises light theraputty x5 each. Easy putty -  and manipulate 5 min. Calibrated hand gripper x20 ^d to 30# 70#  - red sponge - grasp hold 5 sec's - 15 rep';s   - 2.2# ball walking up and down arm wedge with MF. Wrist flexion/extension x15,  and drop x15  - Pron/sup with 2.2# & 5.5# therball with extended grasp in order to not drop ball x10 each  -supination pronation 20 reps 10# therabar   - heavy resistance clothes pin with 3 point pinch to  graded objects.   -true balance 5 mins   Other:     HEP  x Reviewied all - added full fisting , tendon gliding and finger ext with place and hold.       Exercise Splinting X    X  -Fabricated yoke splint to isolate MF PIP extension with exercise.  -Fabricated DIP cap splint with wings that wrap over dorsal IF and RF for active assist with MF PIP flex/ext to achieve ranges he is unable to with isolation. Finger gutter splint  X     Cont to wear - beginning weaning off next week. Wearing every night now - to bring in to adjust next session. -wearing ext tube every night. Assessment/Comments: Pt is making Good progress toward stated plan of care. Today's session continued with focus on active extensor training and strengthening to reduce MF PIP lag. Deferred working on flexion exercises as he feels overall his  strength has improved immensely. All resistive exercises were focused on extension of the digit with MAX fatigue and moderate soreness in the back of the hand by end of session. Pt to continue with exercise splint program and extensor strengthening against putty resistance on days without therapy.     -Rehab Potential: Good  -Requires OT Follow Up: Yes    Time In: 2:05 pm          Time Out: 3:00 pm              Visit #: 7  Treatment Charges: Mins Time Units   Modalities-paraffin 10 2:05- 2:15  p 1   Ther Exercise 35 2:25 - 3:00 p 2   Manual Therapy 10 2:15  - 2:25 p 1   Thera Activities      ADL/Home Mgt       Neuro Re-education      Gait Training      Group Therapy      Non-Billable Service Time MHP/ \ICe      Other      Total Time/Units 55  4     -Response to Treatment: Max fatigue in the extensors by end of session. Plan:   [x]  Continues Plan of care: Treatment covered based on POC and graduated to patient's progress. Pt education continues at each visit to obtain maximum benefits from skilled OT intervention.   []  Alter Plan of care:   []  Discharge:      Gilda Grimes OT R/L, [facility] [address], #[id number]

## 2022-12-15 ENCOUNTER — OFFICE VISIT (OUTPATIENT)
Dept: ORTHOPEDIC SURGERY | Age: 42
End: 2022-12-15

## 2022-12-15 ENCOUNTER — TREATMENT (OUTPATIENT)
Dept: OCCUPATIONAL THERAPY | Age: 42
End: 2022-12-15

## 2022-12-15 VITALS — HEIGHT: 71 IN | RESPIRATION RATE: 20 BRPM | WEIGHT: 185 LBS | BODY MASS INDEX: 25.9 KG/M2

## 2022-12-15 DIAGNOSIS — M24.542 CONTRACTURE OF JOINT OF LEFT HAND: Primary | ICD-10-CM

## 2022-12-15 DIAGNOSIS — M77.9 EXTENSOR TENDON ADHESIONS: ICD-10-CM

## 2022-12-15 DIAGNOSIS — S62.613K OPEN DISPLACED FRACTURE OF PROXIMAL PHALANX OF LEFT MIDDLE FINGER WITH NONUNION, SUBSEQUENT ENCOUNTER: ICD-10-CM

## 2022-12-15 DIAGNOSIS — M24.542 CONTRACTURE OF JOINT OF FINGER OF LEFT HAND: Primary | ICD-10-CM

## 2022-12-15 NOTE — PROGRESS NOTES
6 weeks postop doing very well status post extensor tenolysis of middle finger. He is pleased with his progress in therapy. He does have another month or so of therapy to complete. However overall his range of motion is improving. He does feel he can transition to heavier lifting at work. He feels he can do safely 10 pounds. Physical exam scar maturing nicely. He does have approximately a 20 degree extensor lag at the PIP joint. Full MCP joint extension. Composite flexion down to the distal palmar crease and PIP 95 degrees of flexion. Otherwise neuro vas intact. X-rays in office today: AP lateral obliques of his left hand focused on his middle finger were obtained demonstrating consolidating fracture in excellent alignment to the proximal phalanx of the middle finger. Impression office x-rays: Healed fracture left middle finger proximal phalanx    Assessment 6 weeks post extensor tenolysis doing well    Plan    We will advance him and work restrictions increased to 10 pounds. Finish off therapy which she is due to finish in January 12. He feels he can safely return to full unrestricted activities after completing a course of therapy. We will place him at [facility] on January 12, 2023. At that time we will have full unrestricted work duty.   If there is any problems we will see him back as needed

## 2022-12-15 NOTE — PROGRESS NOTES
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 Dailey  Specific Practitioner Orders: Please schedule pt POD#1 for ROM to the left hand/digits as tolerated, Scar desensitization and management. Daily x 2 weeks     Assessment of current deficits   [x] ADLs           [x] Strength      [x] IADLs     [x] Endurance    [x] Fine Motor Coordination       [x] ROM           [x] Pain       [x] Edema        [x] Scar Adhesion/Skin Integrity      OT PLAN OF CARE   OT POC based on physician orders, patient diagnosis and results of clinical assessment     Frequency/Duration: daily for 2 weeks - 10 sessions approved - then to get more approval for 12 - 16 sessions.      Specific OT Treatment to include:   [x] Instruction in HEP                   Modalities:  [x] Therapeutic Exercise                 [x] Ultrasound               [] Electrical Stimulation/Attended  [x] PROM/Stretching                    [x] Fluidotherapy          [x]  Paraffin                   [x] AAROM  [x] AROM                 [] Iontophoresis:   [x] Tendon Glides                                               [] Neuromuscular Re-Ed            [] ADL/IADL re-training    [x] Therapeutic Activity                  [x] Pain Management with/without modalities PRN                 [x] Manual Therapy                      [x] Splinting                                   [x] Scar Management                   []Joint Protection/Training  []Ergonomics                             [x] Joint Mobilization                      [] Adaptive Equipment Assessment/Training                             [x] Manual Edema Mobilization   [] Myofascial Release                 [] Energy Conservation/Work Simplification  [x] GM/FM Coordination                [] Safety retraining/education per  individual diagnosis/goals  [] Desensitization        Patient Specific Goal: wants to be able to make a full fist and extend his MF                               GOALS (Long term same as Short term):  1.) Patient will demonstrate good understanding of home program (exercises/activities/diagnosis/prognosis/goals) with good accuracy. Goal making good gains. Pt showing good follow thru. 2. ) Patient will demonstrate increased active/passive range of motion of their L hand to [facility] for ADL/IADL completion. Progressing well, all flexion is now functional - extension lag continues to demonstrate reduction but this fluctuates depending on the day. 3. ) Patient will demonstrate increased /pinch strength of at least 10 / 5 pinch pounds of their Left hand. Measurements taken 12/15 demonstrate fluctuation. He feels stronger and more loose after a day of work. 4. ) Patient to report decreased pain in their affected L  upper extremity from 5/10 to 1-2/10 or less with resistive functional use. Goal met, pt consistently reporting no pain with mild pulling of the extensor tendon during good facilitation. 5. ) Patient to report 100% compliance with their splint wear, care, and precautions if needed. Progressing well, using x 2 exercise splint and wearing ext splint at night time.   6. ) Patient will be knowledgeable of edema control techniques as evident with decreases from moderate to mild/none. Progressing well, continues to demonstrate decreases as swelling reduces around prox phalanx. 7. ) Patient will demonstrate a non-tender/non-adherent scar. Progressing well with improving elasticity of scarring noted - no tenderness with scar reported. 8. ) Patient will report ADL functions as Mod I/I using L UE . Goal met, all ADLs IND. 9. ) Patient will decrease QuickDASH score to 30% or less for increased participation in daily functional activities. Goal met, 66% to 6.8% disability    TODAY'S TREATMENT     Pain Level: Moderate soreness in the extensors over dorsal hand and in the MF    Subjective: Pt reports \"Can we have an easy day? I'm pretty sore from yesterday but my finger does look a lot straighter this morning! \"     Objective:  Updated POC  completed 12/15/2022. Next to be completed at discharge. INTERVENTION: COMPLETED: SPECIFICS/COMMENTS:   Modality:     Paraffin dip with MHP x - to L hand / fingers while doing ROM    MHP   - to fingers only to warm up tissue. Ice  - end of session to decrease pain   AROM:     R digital        X                                      x                      X             X     X     X  - MP flex/ ext all fingers - holding MF PIP straight - 2 sets of 15- do not pull with PIP - only MP  - blocked PIP ( hook) all fingers flex and ext with MF place and hold for further ext. 2 sets of 10.   - full fisting     - Transfer of bunchums focusing on sustained flexion of the hand to hold all bunchums.   - Rolling up and un-rolling towel with LUE with focus on flexion/extension.   - Gentle reverse MCP blocking x10. + HEP   - [place] pick-up flexion/extension with holds. - julia  - holding in hand for 15 pieces then release for fine coordination - 5 min.    - table top, flat fist, table top with extension x15 + HEP  - Finger lifts active first then place and holds with PIP ext splint on x10 + HEP  - MP hyper extension with hand in hook fist - 10rep's   - back of hand on table - hook around pencil - then fist then hook - 10 rep's   - towel scrutch - single layer - pull into fist hold 5 sec - 10 rep's  - tendon gliding - all positions. - sustained grasp- jux er ciser - for ^ for 7  min. No ^'ed pain   -MP's blocked into flexion, extend and flex PIP's  against MP block 2x15  -PIP active extension blocking using yoke splint 2x15, added to HEP  -PIP flex/ext with digit cap splint for active assist 2x15, added to HEP  -Isolated PIP extension place and holds 2x10  -Ext lift from table top x 15 active, then 10 place and holds, then 10 active. -Pick away bunchems from the palm of hand with MF opp pinch pull aways x 25   R fine coordination           - grasp and release -pom poms  full fist with place and hold ext MF every 3rd rep's   - beans  and release - hook fist 15 rep's and full fist.         PROM/Stretching:     R digital X  X    - gentle to MP flexion all fingers  - MF PIP ext. 2 sets of 10 With place and hold extension. Scar Mass/Edema Control:     Edema control X  x   - retrograde L hand and MF -   - size E compression sleeve on hand and mesh on MF   Scar management x -  scar massage with lotion - done while pt does MP ext also. Strengthening:     Extension strengthening    - yellow finger extender-2 sets of 20  rep's at MP ext and then 10 rep's for PIP ext   -Composite digital extension against light putty resistance on table top x 20. Added to HEP. Followed by MF only x 12  -Extension lifts from table top against therapist resistance with MF only x 12  -Composite extension from passive full fist x 15 against therapist resistance  -Palm presses to putty with extended digits x 12   RUE/ hand strengthening                                - Issued and completed all exercises light theraputty x5 each. Easy putty -  and manipulate 5 min.    Calibrated hand gripper x20 ^d to 30# 70#  - red sponge - grasp hold 5 sec's - 15 rep';s   - 2.2# ball walking up and down arm wedge with MF. Wrist flexion/extension x15,  and drop x15  - Pron/sup with 2.2# & 5.5# therball with extended grasp in order to not drop ball x10 each  -supination pronation 20 reps 10# therabar   - heavy resistance clothes pin with 3 point pinch to  graded objects.   -true balance 5 mins   Other:     HEP  x Reviewied all - added full fisting , tendon gliding and finger ext with place and hold. Exercise Splinting X    X  -Fabricated yoke splint to isolate MF PIP extension with exercise.  -Fabricated DIP cap splint with wings that wrap over dorsal IF and RF for active assist with MF PIP flex/ext to achieve ranges he is unable to with isolation. Finger gutter splint  X     Cont to wear - beginning weaning off next week. Wearing every night now - to bring in to adjust next session. -wearing ext tube every night. Assessment/Comments: Pt is making Good progress toward stated plan of care. Pt with increased soreness following yesterday's session and requesting a pain management session this date as this is his 3rd session in a row. He did arrived to session with PIP in good extension measuring at it's best at -13* (this was measured from the side of the finger). Progress update completed with significant gains in ROM and function. He feels his  strength is overall doing well. We need to continue working on maximizing PIP extension and extensor strength/endurance to enhance safety with use. Follow-up with Dr. Wilburt Mohs is today following his therapy session.       Progress Update: 12/15                                                                          IE           11/15        12/15  MF Digit MCP Extension/Flexion   0-45 H /* -5*/30*  0/66*  75*       PIP Extension/Flexion   0*/100* -27*/63*  PROM-8* -15*/91*   -13*/96*       DIP Extension/Flexion   0*/70-90*      0/32*      0/45*  80*        Dynamometer (setting 2): Left: 82# to 76#                                              Right: 97# to 106#                                        Pinch Meter:              Lateral: Left= 21# to 19.5#,      Right= 24#               Palmar 3 point: Left= 10# to 11#, Right= 16#    QuickDASH Score: 66% to 6.8% disability    -Rehab Potential: Good  -Requires OT Follow Up: Yes    Time In: 7:35 am          Time Out: 8:25 pm              Visit #: 8  Treatment Charges: Mins Time Units   Modalities-paraffin 10 7:35-7:45 a 0   Ther Exercise 15 8:10-8:25 a 1   Manual Therapy 15 7:45-8:00 a 1   Thera Activities 10 8:00-8:10 a 1   ADL/Home Mgt       Neuro Re-education      Gait Training      Group Therapy      Non-Billable Service Time MHP/ \ICe      Other      Total Time/Units 50  3     -Response to Treatment: Tolerated session well with focus on reducing soreness in the extensors s/p yesterday's session. Plan:   [x]  Continues Plan of care: Continue with focus on maximizing PIP extension and enhancing safety with functional use of the hand. Treatment covered based on POC and graduated to patient's progress. Pt education continues at each visit to obtain maximum benefits from skilled OT intervention.   []  Alter Plan of care:   []  Discharge:      Kasia Lemos OT R/L, [place], #[id number]

## 2022-12-15 NOTE — LETTER
[facility].  [address] [address]  Phone: [phone]  Fax: [phone]    Lexi Smith MD        December 15, 2022     Patient: Rick Hatch   YOB: 1980   Date of Visit: 12/15/2022       To Whom It May Concern: It is my medical opinion that Namrata Evans is okay for a 10lb weight restriction while working. He will complete therapy 1/12/23, at that time, he may return full duty with no restrictions on 1/16/23. If you have any questions or concerns, please don't hesitate to call.     Sincerely,        Lexi Smith MD

## 2022-12-20 ENCOUNTER — TREATMENT (OUTPATIENT)
Dept: OCCUPATIONAL THERAPY | Age: 42
End: 2022-12-20

## 2022-12-20 DIAGNOSIS — M77.9 EXTENSOR TENDON ADHESIONS: ICD-10-CM

## 2022-12-20 DIAGNOSIS — S62.613B DISPLACED FRACTURE OF PROXIMAL PHALANX OF LEFT MIDDLE FINGER, INITIAL ENCOUNTER FOR OPEN FRACTURE: ICD-10-CM

## 2022-12-20 DIAGNOSIS — S62.613K OPEN DISPLACED FRACTURE OF PROXIMAL PHALANX OF LEFT MIDDLE FINGER WITH NONUNION, SUBSEQUENT ENCOUNTER: ICD-10-CM

## 2022-12-20 DIAGNOSIS — M24.542 CONTRACTURE OF JOINT OF LEFT HAND: Primary | ICD-10-CM

## 2022-12-20 NOTE — PROGRESS NOTES
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 Dailey  Specific Practitioner Orders: Please schedule pt POD#1 for ROM to the left hand/digits as tolerated, Scar desensitization and management. Daily x 2 weeks     Assessment of current deficits   [x] ADLs           [x] Strength      [x] IADLs     [x] Endurance    [x] Fine Motor Coordination       [x] ROM           [x] Pain       [x] Edema        [x] Scar Adhesion/Skin Integrity      OT PLAN OF CARE   OT POC based on physician orders, patient diagnosis and results of clinical assessment     Frequency/Duration: daily for 2 weeks - 10 sessions approved - then to get more approval for 12 - 16 sessions.      Specific OT Treatment to include:   [x] Instruction in HEP                   Modalities:  [x] Therapeutic Exercise                 [x] Ultrasound               [] Electrical Stimulation/Attended  [x] PROM/Stretching                    [x] Fluidotherapy          [x]  Paraffin                   [x] AAROM  [x] AROM                 [] Iontophoresis:   [x] Tendon Glides                                               [] Neuromuscular Re-Ed            [] ADL/IADL re-training    [x] Therapeutic Activity                  [x] Pain Management with/without modalities PRN                 [x] Manual Therapy                      [x] Splinting                                   [x] Scar Management                   []Joint Protection/Training  []Ergonomics                             [x] Joint Mobilization                      [] Adaptive Equipment Assessment/Training                             [x] Manual Edema Mobilization   [] Myofascial Release                 [] Energy Conservation/Work Simplification  [x] GM/FM Coordination                [] Safety retraining/education per  individual diagnosis/goals  [] Desensitization        Patient Specific Goal: wants to be able to make a full fist and extend his MF                               GOALS (Long term same as Short term):  1.) Patient will demonstrate good understanding of home program (exercises/activities/diagnosis/prognosis/goals) with good accuracy. Goal making good gains. Pt showing good follow thru. 2. ) Patient will demonstrate increased active/passive range of motion of their L hand to [facility] for ADL/IADL completion. Progressing well, all flexion is now functional - extension lag continues to demonstrate reduction but this fluctuates depending on the day. 3. ) Patient will demonstrate increased /pinch strength of at least 10 / 5 pinch pounds of their Left hand. Measurements taken 12/15 demonstrate fluctuation. He feels stronger and more loose after a day of work. 4. ) Patient to report decreased pain in their affected L  upper extremity from 5/10 to 1-2/10 or less with resistive functional use. Goal met, pt consistently reporting no pain with mild pulling of the extensor tendon during good facilitation. 5. ) Patient to report 100% compliance with their splint wear, care, and precautions if needed. Progressing well, using x 2 exercise splint and wearing ext splint at night time.   6. ) Patient will be knowledgeable of edema control techniques as evident with decreases from moderate to mild/none. Progressing well, continues to demonstrate decreases as swelling reduces around prox phalanx. 7. ) Patient will demonstrate a non-tender/non-adherent scar. Progressing well with improving elasticity of scarring noted - no tenderness with scar reported. 8. ) Patient will report ADL functions as Mod I/I using L UE . Goal met, all ADLs IND. 9. ) Patient will decrease QuickDASH score to 30% or less for increased participation in daily functional activities. Goal met, 66% to 6.8% disability    TODAY'S TREATMENT     Pain Level: Moderate soreness in the extensors over dorsal hand and in the MF    Subjective: Pt reports \"Can we have an easy day? I'm pretty sore from yesterday but my finger does look a lot straighter this morning! \"     Objective:  Updated POC  completed 12/15/2022. Next to be completed at discharge. INTERVENTION: COMPLETED: SPECIFICS/COMMENTS:   Modality:     Paraffin dip with MHP x - to L hand / fingers while doing ROM    MHP   - to fingers only to warm up tissue. Ice  - end of session to decrease pain   AROM:     R digital        X                                                            X         x    X           - MP flex/ ext all fingers - holding MF PIP straight - 2 sets of 15- do not pull with PIP - only MP  - blocked PIP ( hook) all fingers flex and ext with MF place and hold for further ext. 2 sets of 10.   - full fisting     - Transfer of bunchums focusing on sustained flexion of the hand to hold all bunchums.   - Rolling up and un-rolling towel with LUE with focus on flexion/extension.   - Gentle reverse MCP blocking x10. + HEP   - [place] pick-up flexion/extension with holds. - julia  - holding in hand for 15 pieces then release for fine coordination - 5 min.    - table top, flat fist, table top with extension x15 + HEP  - MP hyper extension with hand in hook fist - 10rep's   - back of hand on table - hook around pencil - then fist then hook - 10 rep's   - towel scrutch - single layer - pull into fist hold 5 sec - 10 rep's  - tendon gliding - all positions. - sustained grasp- jux er ciser - for ^ for 7  min. No ^'ed pain   -MP's blocked into flexion, extend and flex PIP's  against MP block 2x15  -PIP active extension blocking using yoke splint 2x15, added to HEP  -PIP flex/ext with digit cap splint for active assist 2x15, added to HEP  -Isolated PIP extension place and holds 2x10  -Ext lift from table top x 15 active, then 10 place and holds, then 10 active. -Pick away bunchems from the palm of hand with MF opp pinch pull aways x 25   R fine coordination           - grasp and release -pom poms  full fist with place and hold ext MF every 3rd rep's   - beans  and release - hook fist 15 rep's and full fist.         PROM/Stretching:     R digital X  X    - gentle to MP flexion all fingers  - MF PIP ext. 2 sets of 10 With place and hold extension. Scar Mass/Edema Control:     Edema control X  x   - retrograde L hand and MF -   - size E compression sleeve on hand and mesh on MF   Scar management x -  scar massage with lotion - done while pt does MP ext also. Strengthening:     Extension strengthening x                        x - harder to red  finger extender-2 sets of 20  rep's at MP ext and then 10 rep's for PIP ext   -Composite digital extension against light putty resistance on table top x 20. Added to HEP. Followed by MF only x 12  -Extension lifts from table top against therapist resistance with MF only x 12  -Composite extension from passive full fist x 15 against therapist resistance  -Palm presses to putty with extended digits x 12  - roll therabar - for PIP ext stretch- then keep extended for 5 sec hold   RUE/ hand strengthening       x                              X    X  x - Issued and completed all exercises light theraputty x5 each.  Easy putty -  and manipulate 5 min.   -Calibrated hand gripper x^ to 50 reps with pom pom pick with  50 #  - red sponge - grasp hold 5 sec's - 15 rep';s   - 2.2# ball walking up and down arm wedge with MF. Wrist flexion/extension x15,  and drop x15  - Pron/sup with 2.2# & 5.5# therball with extended grasp in order to not drop ball x10 each  -supination pronation 20 reps 10# therabar   - heavy resistance clothes pin with 3 point pinch to  graded objects.   -true balance 5 mins  - 3# wt - wrist ext, wrist flexion and forearm rotation 15 rep's each   - mountain climber 3 # wt - 20 rep's   - 2.2 # ball - quick release about 1 min. Other:     HEP  x Reviewied all - added full fisting , tendon gliding and finger ext with place and hold. Exercise Splinting X    X  -Fabricated yoke splint to isolate MF PIP extension with exercise.  -Fabricated DIP cap splint with wings that wrap over dorsal IF and RF for active assist with MF PIP flex/ext to achieve ranges he is unable to with isolation. Finger gutter splint  X     Cont to wear - beginning weaning off next week. Wearing every night now -   -wearing ext tube every night. Assessment/Comments: Pt is making Good progress toward stated plan of care. Pt with increased soreness following yesterday's session and requesting a pain management session this date as this is his 3rd session in a row. He did arrived to session with PIP in good extension measuring at it's best at -13* (this was measured from the side of the finger). Progress update completed with significant gains in ROM and function. He feels his  strength is overall doing well. We need to continue working on maximizing PIP extension and extensor strength/endurance to enhance safety with use. Follow-up with Dr. Joe Deutsch is today following his therapy session.       Progress Update: 12/15                                                                          IE           11/15        12/15  MF Digit MCP Extension/Flexion   0-45 H /* -5*/30*  0/66*  75*       PIP Extension/Flexion   0*/100* -27*/63*  PROM-8* -15*/91*   -13*/96*       DIP Extension/Flexion   0*/70-90*      0/32*      0/45*  80*        Dynamometer (setting 2):                              Left: 82# to 76#                                              Right: 97# to 106#                                        Pinch Meter:              Lateral: Left= 21# to 19.5#,      Right= 24#               Palmar 3 point: Left= 10# to 11#, Right= 16#    QuickDASH Score: 66% to 6.8% disability    -Rehab Potential: Good  -Requires OT Follow Up: Yes    Time In:  11:00  am          Time Out: 12:00  pm              Visit #: 9  Treatment Charges: Mins Time Units   Modalities-paraffin 10 11:00 a-11:10  a 0   Ther Exercise 20  11;40 a- 1200p 1   Manual Therapy 20 11:10-11;30a 1   Thera Activities 10 11:30 - 11:40 a 1   ADL/Home Mgt       Neuro Re-education      Gait Training      Group Therapy      Non-Billable Service Time MHP/ \ICe      Other      Total Time/Units 50  3     -Response to Treatment: Tolerated session well     Plan:   [x]  Continues Plan of care: Continue with focus on maximizing PIP extension and enhancing safety with functional use of the hand. Treatment covered based on POC and graduated to patient's progress. Pt education continues at each visit to obtain maximum benefits from skilled OT intervention.   []  Alter Plan of care:   []  Discharge:      Johnny Naranjo OT /L CHT   # [phone]

## 2022-12-22 ENCOUNTER — TREATMENT (OUTPATIENT)
Dept: OCCUPATIONAL THERAPY | Age: 42
End: 2022-12-22

## 2022-12-22 DIAGNOSIS — S62.613K OPEN DISPLACED FRACTURE OF PROXIMAL PHALANX OF LEFT MIDDLE FINGER WITH NONUNION, SUBSEQUENT ENCOUNTER: ICD-10-CM

## 2022-12-22 DIAGNOSIS — M24.542 CONTRACTURE OF JOINT OF LEFT HAND: Primary | ICD-10-CM

## 2022-12-22 DIAGNOSIS — M77.9 EXTENSOR TENDON ADHESIONS: ICD-10-CM

## 2022-12-22 NOTE — PROGRESS NOTES
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 Dailey  Specific Practitioner Orders: Please schedule pt POD#1 for ROM to the left hand/digits as tolerated, Scar desensitization and management. Daily x 2 weeks     Assessment of current deficits   [x] ADLs           [x] Strength      [x] IADLs     [x] Endurance    [x] Fine Motor Coordination       [x] ROM           [x] Pain       [x] Edema        [x] Scar Adhesion/Skin Integrity      OT PLAN OF CARE   OT POC based on physician orders, patient diagnosis and results of clinical assessment     Frequency/Duration: daily for 2 weeks - 10 sessions approved - then to get more approval for 12 - 16 sessions.      Specific OT Treatment to include:   [x] Instruction in HEP                   Modalities:  [x] Therapeutic Exercise                 [x] Ultrasound               [] Electrical Stimulation/Attended  [x] PROM/Stretching                    [x] Fluidotherapy          [x]  Paraffin                   [x] AAROM  [x] AROM                 [] Iontophoresis:   [x] Tendon Glides                                               [] Neuromuscular Re-Ed            [] ADL/IADL re-training    [x] Therapeutic Activity                  [x] Pain Management with/without modalities PRN                 [x] Manual Therapy                      [x] Splinting                                   [x] Scar Management                   []Joint Protection/Training  []Ergonomics                             [x] Joint Mobilization                      [] Adaptive Equipment Assessment/Training                             [x] Manual Edema Mobilization   [] Myofascial Release                 [] Energy Conservation/Work Simplification  [x] GM/FM Coordination                [] Safety retraining/education per  individual diagnosis/goals  [] Desensitization        Patient Specific Goal: wants to be able to make a full fist and extend his MF                               GOALS (Long term same as Short term):  1.) Patient will demonstrate good understanding of home program (exercises/activities/diagnosis/prognosis/goals) with good accuracy. Goal making good gains. Pt showing good follow thru. 2. ) Patient will demonstrate increased active/passive range of motion of their L hand to [facility] for ADL/IADL completion. Progressing well, all flexion is now functional - extension lag continues to demonstrate reduction but this fluctuates depending on the day. 3. ) Patient will demonstrate increased /pinch strength of at least 10 / 5 pinch pounds of their Left hand. Measurements taken 12/15 demonstrate fluctuation. He feels stronger and more loose after a day of work. 4. ) Patient to report decreased pain in their affected L  upper extremity from 5/10 to 1-2/10 or less with resistive functional use. Goal met, pt consistently reporting no pain with mild pulling of the extensor tendon during good facilitation. 5. ) Patient to report 100% compliance with their splint wear, care, and precautions if needed. Progressing well, using x 2 exercise splint and wearing ext splint at night time.   6. ) Patient will be knowledgeable of edema control techniques as evident with decreases from moderate to mild/none. Progressing well, continues to demonstrate decreases as swelling reduces around prox phalanx. 7. ) Patient will demonstrate a non-tender/non-adherent scar. Progressing well with improving elasticity of scarring noted - no tenderness with scar reported. 8. ) Patient will report ADL functions as Mod I/I using L UE . Goal met, all ADLs IND. 9. ) Patient will decrease QuickDASH score to 30% or less for increased participation in daily functional activities. Goal met, 66% to 6.8% disability    TODAY'S TREATMENT     Pain Level: no  pain     Subjective: Pt reports no new changes     Objective:  Updated POC  completed 12/15/2022. Next to be completed at discharge. INTERVENTION: COMPLETED: SPECIFICS/COMMENTS:   Modality:     Paraffin dip with MHP x - to L hand / fingers while doing ROM    MHP   - to fingers only to warm up tissue. Ice  - end of session to decrease pain   AROM:     R digital                                                                            x               - MP flex/ ext all fingers - holding MF PIP straight - 2 sets of 15- do not pull with PIP - only MP  - blocked PIP ( hook) all fingers flex and ext with MF place and hold for further ext. 2 sets of 10.   - full fisting     - Transfer of bunchums focusing on sustained flexion of the hand to hold all bunchums.   - Rolling up and un-rolling towel with LUE with focus on flexion/extension.   - Gentle reverse MCP blocking x10. + HEP   - [place] pick-up flexion/extension with holds. - julia  - holding in hand for 15 pieces then release for fine coordination - 5 min. - table top, flat fist, table top with extension x15 + HEP  - MP hyper extension with hand in hook fist - 10rep's   - back of hand on table - hook around pencil - then fist then hook - 10 rep's   - towel scrutch - single layer - pull into fist hold 5 sec - 10 rep's  - tendon gliding - all positions.    - sustained grasp- jux er ciser - for ^ for 7 min. No ^'ed pain   -MP's blocked into flexion, extend and flex PIP's  against MP block 2x15  -PIP active extension blocking using yoke splint 2x15, added to HEP  -PIP flex/ext with digit cap splint for active assist 2x15, added to HEP  -Isolated PIP extension place and holds 2x10 avoiding pull from MCP joint.   -Ext lift from table top x 15 active, then 10 place and holds, then 10 active. -Pick away bunchems from the palm of hand with MF opp pinch pull aways x 25   R fine coordination           - grasp and release -pom poms  full fist with place and hold ext MF every 3rd rep's   - beans  and release - hook fist 15 rep's and full fist.         PROM/Stretching:     R digital    - gentle to MP flexion all fingers  - MF PIP ext. 2 sets of 10 With place and hold extension. Scar Mass/Edema Control:     Edema control X  x   - retrograde L hand and MF -   - size E compression sleeve on hand and mesh on MF   Scar management x -  scar massage with lotion - done while pt does MP ext also. Strengthening:     Extension strengthening x                         - harder to red  finger extender-2 sets of 20  rep's at MP ext and then 10 rep's for PIP ext   -Composite digital extension against light putty resistance on table top x 20. Added to HEP. Followed by MF only x 12  -Extension lifts from table top against therapist resistance with MF only x 12  -Composite extension from passive full fist x 15 against therapist resistance  -Palm presses to putty with extended digits x 12  - roll therabar - for PIP ext stretch- then keep extended for 5 sec hold   RUE/ hand strengthening       x                                    x - Issued and completed all exercises light theraputty x5 each.  Easy putty -  and manipulate 5 min.   -Calibrated hand gripper x^ to 50 reps with pom pom pick with  50 #  - red sponge - grasp hold 5 sec's - 15 rep';s   - Digit extension with the light resistance rubber band x15, finger extension x15  - Finger lifts with .5 # x15  - 2.2# ball walking up and down arm wedge with MF. Wrist flexion/extension x15,  and drop x15  - Pron/sup with 2.2# & 5.5# therball with extended grasp in order to not drop ball x10 each  -supination pronation 20 reps 10# therabar   - heavy resistance clothes pin with 3 point pinch to  graded objects.   -true balance 5 mins  - 3# wt - wrist ext, wrist flexion and forearm rotation 15 rep's each   - mountain climber 3 # wt - 20 rep's   - 2.2 # ball - quick release about 1 min.   - 1.1# ball rotation with fingertips and thumb 3x 2    Other:     HEP  x Reviewied all - added full fisting , tendon gliding and finger ext with place and hold. Exercise Splinting X    X  -Fabricated yoke splint to isolate MF PIP extension with exercise.  -Fabricated DIP cap splint with wings that wrap over dorsal IF and RF for active assist with MF PIP flex/ext to achieve ranges he is unable to with isolation. Finger gutter splint  X     Cont to wear - beginning weaning off next week. Wearing every night now -   -wearing ext tube every night. Assessment/Comments: Pt is making Good progress toward stated plan of care. Pt tolerated session well with increased focus on extension. Added light rubber band extension exercises with this being issued for HEP. Pt beginning to demonstrate hyper extension at the MCP in attempt to increased extension at the PIP. Pt educated on isolation of PIP with place and holds. Continue to progress towards goals with focus on extension and continued strengthening.       Progress Update: 12/15                                                                          IE           11/15        12/15  MF Digit MCP Extension/Flexion   0-45 H /* -5*/30*  0/66*  75*       PIP Extension/Flexion   0*/100* -27*/63*  PROM-8* -15*/91*   -13*/96*       DIP Extension/Flexion   0*/70-90*      0/32*      0/45*  80*        Dynamometer (setting 2): Left: 82# to 76#                                              Right: 97# to 106#                                        Pinch Meter:              Lateral: Left= 21# to 19.5#,      Right= 24#               Palmar 3 point: Left= 10# to 11#, Right= 16#    QuickDASH Score: 66% to 6.8% disability    -Rehab Potential: Good  -Requires OT Follow Up: Yes    Time In:  11:00  am          Time Out: 12:00  pm              Visit #: 10  Treatment Charges: Mins Time Units   Modalities-paraffin 15 11:00 am - 11:15 a 1   Ther Exercise 35 11:15 - 11:50 am 2   Manual Therapy 10 11:50 am - 12:00 pm 1   Thera Activities      ADL/Home Mgt       Neuro Re-education      Gait Training      Group Therapy      Non-Billable Service Time MHP/ \ICe      Other      Total Time/Units 60  4     -Response to Treatment: Tolerated session well     Plan:   [x]  Continues Plan of care: Continue with focus on maximizing PIP extension and enhancing safety with functional use of the hand. Treatment covered based on POC and graduated to patient's progress. Pt education continues at each visit to obtain maximum benefits from skilled OT intervention.   []  Alter Plan of care:   []  Discharge:      Bernard Mansfield R/RAJAN, [phone] #[phone]

## 2023-01-03 ENCOUNTER — TREATMENT (OUTPATIENT)
Dept: OCCUPATIONAL THERAPY | Age: 43
End: 2023-01-03
Payer: COMMERCIAL

## 2023-01-03 DIAGNOSIS — M77.9 EXTENSOR TENDON ADHESIONS: ICD-10-CM

## 2023-01-03 DIAGNOSIS — M24.542 CONTRACTURE OF JOINT OF LEFT HAND: Primary | ICD-10-CM

## 2023-01-03 DIAGNOSIS — S62.613K OPEN DISPLACED FRACTURE OF PROXIMAL PHALANX OF LEFT MIDDLE FINGER WITH NONUNION, SUBSEQUENT ENCOUNTER: ICD-10-CM

## 2023-01-03 PROCEDURE — 97140 MANUAL THERAPY 1/> REGIONS: CPT | Performed by: OCCUPATIONAL THERAPIST

## 2023-01-03 PROCEDURE — 97018 PARAFFIN BATH THERAPY: CPT | Performed by: OCCUPATIONAL THERAPIST

## 2023-01-03 PROCEDURE — 97110 THERAPEUTIC EXERCISES: CPT | Performed by: OCCUPATIONAL THERAPIST

## 2023-01-03 NOTE — PROGRESS NOTES
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 Dailey  Specific Practitioner Orders: Please schedule pt POD#1 for ROM to the left hand/digits as tolerated, Scar desensitization and management. Daily x 2 weeks     Assessment of current deficits   [x] ADLs           [x] Strength      [x] IADLs     [x] Endurance    [x] Fine Motor Coordination       [x] ROM           [x] Pain       [x] Edema        [x] Scar Adhesion/Skin Integrity      OT PLAN OF CARE   OT POC based on physician orders, patient diagnosis and results of clinical assessment     Frequency/Duration: daily for 2 weeks - 10 sessions approved - then to get more approval for 12 - 16 sessions.      Specific OT Treatment to include:   [x] Instruction in HEP                   Modalities:  [x] Therapeutic Exercise                 [x] Ultrasound               [] Electrical Stimulation/Attended  [x] PROM/Stretching                    [x] Fluidotherapy          [x]  Paraffin                   [x] AAROM  [x] AROM                 [] Iontophoresis:   [x] Tendon Glides                                               [] Neuromuscular Re-Ed            [] ADL/IADL re-training    [x] Therapeutic Activity                  [x] Pain Management with/without modalities PRN                 [x] Manual Therapy                      [x] Splinting                                   [x] Scar Management                   []Joint Protection/Training  []Ergonomics                             [x] Joint Mobilization                      [] Adaptive Equipment Assessment/Training                             [x] Manual Edema Mobilization   [] Myofascial Release                 [] Energy Conservation/Work Simplification  [x] GM/FM Coordination                [] Safety retraining/education per  individual diagnosis/goals  [] Desensitization        Patient Specific Goal: wants to be able to make a full fist and extend his MF                               GOALS (Long term same as Short term):  1.) Patient will demonstrate good understanding of home program (exercises/activities/diagnosis/prognosis/goals) with good accuracy. Goal making good gains. Pt showing good follow thru. 2. ) Patient will demonstrate increased active/passive range of motion of their L hand to [facility] for ADL/IADL completion. Progressing well, all flexion is now functional - extension lag continues to demonstrate reduction but this fluctuates depending on the day. 3. ) Patient will demonstrate increased /pinch strength of at least 10 / 5 pinch pounds of their Left hand. Measurements taken 12/15 demonstrate fluctuation. He feels stronger and more loose after a day of work. 4. ) Patient to report decreased pain in their affected L  upper extremity from 5/10 to 1-2/10 or less with resistive functional use. Goal met, pt consistently reporting no pain with mild pulling of the extensor tendon during good facilitation. 5. ) Patient to report 100% compliance with their splint wear, care, and precautions if needed. Progressing well, using x 2 exercise splint and wearing ext splint at night time.   6. ) Patient will be knowledgeable of edema control techniques as evident with decreases from moderate to mild/none. Progressing well, continues to demonstrate decreases as swelling reduces around prox phalanx. 7. ) Patient will demonstrate a non-tender/non-adherent scar. Progressing well with improving elasticity of scarring noted - no tenderness with scar reported. 8. ) Patient will report ADL functions as Mod I/I using L UE . Goal met, all ADLs IND. 9. ) Patient will decrease QuickDASH score to 30% or less for increased participation in daily functional activities. Goal met, 66% to 6.8% disability    TODAY'S TREATMENT     Pain Level: no  pain     Subjective: Pt reports no new changes     Objective:  Updated POC  completed 12/15/2022. Next to be completed at discharge. INTERVENTION: COMPLETED: SPECIFICS/COMMENTS:   Modality:     Paraffin dip with MHP x - to L hand / fingers while doing ROM    MHP   - to fingers only to warm up tissue. Ice  - end of session to decrease pain   AROM:     R digital                                                                            x               - MP flex/ ext all fingers - holding MF PIP straight - 2 sets of 15- do not pull with PIP - only MP  - blocked PIP ( hook) all fingers flex and ext with MF place and hold for further ext. 2 sets of 10.   - full fisting     - Transfer of bunchums focusing on sustained flexion of the hand to hold all bunchums.   - Rolling up and un-rolling towel with LUE with focus on flexion/extension.   - Gentle reverse MCP blocking x10. + HEP   - [place] pick-up flexion/extension with holds. - julia  - holding in hand for 15 pieces then release for fine coordination - 5 min. - table top, flat fist, table top with extension x15 + HEP  - MP hyper extension with hand in hook fist - 10rep's   - back of hand on table - hook around pencil - then fist then hook - 10 rep's   - towel scrutch - single layer - pull into fist hold 5 sec - 10 rep's  - tendon gliding - all positions.    - sustained grasp- jux er ciser - for ^ for 7 min. No ^'ed pain   -MP's blocked into flexion, extend and flex PIP's  against MP block 2x15  -PIP active extension blocking using yoke splint 2x15, added to HEP  -PIP flex/ext with digit cap splint for active assist 2x15, added to HEP  -Isolated PIP extension place and holds 2x10 avoiding pull from MCP joint.   -Ext lift from table top x 15 active, then 10 place and holds, then 10 active. -Pick away bunchems from the palm of hand with MF opp pinch pull aways x 25   R fine coordination           - grasp and release -pom poms  full fist with place and hold ext MF every 3rd rep's   - beans  and release - hook fist 15 rep's and full fist.         PROM/Stretching:     R digital    - gentle to MP flexion all fingers  - MF PIP ext. 2 sets of 10 With place and hold extension. Scar Mass/Edema Control:     Edema control X  x   - retrograde L hand and MF -   - size E compression sleeve on hand and mesh on MF   Scar management x -  scar massage with lotion - done while pt does MP ext also. Strengthening:     Extension strengthening x                         - harder to red  finger extender-2 sets of 20  rep's at MP ext and then 10 rep's for PIP ext   -Composite digital extension against light putty resistance on table top x 20. Added to HEP. Followed by MF only x 12  -Extension lifts from table top against therapist resistance with MF only x 12  -Composite extension from passive full fist x 15 against therapist resistance  -Palm presses to putty with extended digits x 12  - roll therabar - for PIP ext stretch- then keep extended for 5 sec hold   RUE/ hand strengthening       x        x        x            x    x      X    X  x - Issued and completed all exercises light theraputty x5 each.  Easy putty -  and manipulate 5 min.   -Calibrated hand gripper x^ to 50 reps with pom pom pick with  50 #  - red sponge - grasp hold 5 sec's - 15 rep';s   - Digit extension with the light resistance rubber band x15, finger extension x15  - Finger lifts with .5 # x15  - 2.2# ball walking up and down arm wedge with MF. Wrist flexion/extension x15,  and drop x15  - Pron/sup with 2.2# & 5.5# therball with extended grasp in order to not drop ball x10 each  -supination pronation 20 reps 10# therabar   - heavy resistance clothes pin with 3 point pinch to  graded objects.   -true balance 5 mins  - 3# wt - wrist ext, wrist flexion and forearm rotation 15 rep's each   - mountain climber 3 # wt - 20 rep's   - 2.2 # ball - quick release about 1 min.   - 1.1# ball rotation with fingertips and thumb 3x 2    Other:     HEP  x Reviewied all - added full fisting , tendon gliding and finger ext with place and hold. Exercise Splinting X    X  -Fabricated yoke splint to isolate MF PIP extension with exercise.  -Fabricated DIP cap splint with wings that wrap over dorsal IF and RF for active assist with MF PIP flex/ext to achieve ranges he is unable to with isolation. Finger gutter splint  X     Cont to wear - beginning weaning off next week. Wearing every night now -   -wearing ext tube every night. Assessment/Comments: Pt is making Good progress toward stated plan of care. Pt tolerated session well, primary focus this date strengthening of the digits and wrist/forearm. Increased duration and frequency as well as intensity with good tolerance and minor increase in discomfort.   Continue to progress towards goals with focus on extension and continued strengthening.      -Rehab Potential: Good  -Requires OT Follow Up: Yes    Time In:  11:00  am          Time Out: 12:00  pm              Visit #: 11    Treatment Charges: Mins Time Units   Modalities-paraffin 15 11:00 am - 11:15 a 1   Ther Exercise 35 11:15 - 11:50 am 2   Manual Therapy 10 11:50 am - 12:00 pm 1   Thera Activities      ADL/Home Mgt       Neuro Re-education      Gait Training      Group Therapy      Non-Billable Service Time MHP/ \ICe      Other      Total Time/Units 60  4     -Response to Treatment: Tolerated session well     Plan:   [x]  Continues Plan of care: Continue with focus on maximizing PIP extension and enhancing safety with functional use of the hand. Treatment covered based on POC and graduated to patient's progress. Pt education continues at each visit to obtain maximum benefits from skilled OT intervention. []  Alter Plan of care:   []  Discharge:       [address], [place] #[id number]

## 2023-01-04 ENCOUNTER — TREATMENT (OUTPATIENT)
Dept: OCCUPATIONAL THERAPY | Age: 43
End: 2023-01-04
Payer: COMMERCIAL

## 2023-01-04 DIAGNOSIS — S62.613K OPEN DISPLACED FRACTURE OF PROXIMAL PHALANX OF LEFT MIDDLE FINGER WITH NONUNION, SUBSEQUENT ENCOUNTER: Primary | ICD-10-CM

## 2023-01-04 DIAGNOSIS — M24.542 CONTRACTURE OF JOINT OF LEFT HAND: ICD-10-CM

## 2023-01-04 DIAGNOSIS — M77.9 EXTENSOR TENDON ADHESIONS: ICD-10-CM

## 2023-01-04 PROCEDURE — 97018 PARAFFIN BATH THERAPY: CPT | Performed by: OCCUPATIONAL THERAPIST

## 2023-01-04 PROCEDURE — 97140 MANUAL THERAPY 1/> REGIONS: CPT | Performed by: OCCUPATIONAL THERAPIST

## 2023-01-04 PROCEDURE — 97110 THERAPEUTIC EXERCISES: CPT | Performed by: OCCUPATIONAL THERAPIST

## 2023-01-04 NOTE — PROGRESS NOTES
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 Dailey  Specific Practitioner Orders: Please schedule pt POD#1 for ROM to the left hand/digits as tolerated, Scar desensitization and management. Daily x 2 weeks     Assessment of current deficits   [x] ADLs           [x] Strength      [x] IADLs     [x] Endurance    [x] Fine Motor Coordination       [x] ROM           [x] Pain       [x] Edema        [x] Scar Adhesion/Skin Integrity      OT PLAN OF CARE   OT POC based on physician orders, patient diagnosis and results of clinical assessment     Frequency/Duration: daily for 2 weeks - 10 sessions approved - then to get more approval for 12 - 16 sessions.      Specific OT Treatment to include:   [x] Instruction in HEP                   Modalities:  [x] Therapeutic Exercise                 [x] Ultrasound               [] Electrical Stimulation/Attended  [x] PROM/Stretching                    [x] Fluidotherapy          [x]  Paraffin                   [x] AAROM  [x] AROM                 [] Iontophoresis:   [x] Tendon Glides                                               [] Neuromuscular Re-Ed            [] ADL/IADL re-training    [x] Therapeutic Activity                  [x] Pain Management with/without modalities PRN                 [x] Manual Therapy                      [x] Splinting                                   [x] Scar Management                   []Joint Protection/Training  []Ergonomics                             [x] Joint Mobilization                      [] Adaptive Equipment Assessment/Training                             [x] Manual Edema Mobilization   [] Myofascial Release                 [] Energy Conservation/Work Simplification  [x] GM/FM Coordination                [] Safety retraining/education per  individual diagnosis/goals  [] Desensitization        Patient Specific Goal: wants to be able to make a full fist and extend his MF                               GOALS (Long term same as Short term):  1.) Patient will demonstrate good understanding of home program (exercises/activities/diagnosis/prognosis/goals) with good accuracy. Goal making good gains. Pt showing good follow thru. 2. ) Patient will demonstrate increased active/passive range of motion of their L hand to [facility] for ADL/IADL completion. Progressing well, all flexion is now functional - extension lag continues to demonstrate reduction but this fluctuates depending on the day. 3. ) Patient will demonstrate increased /pinch strength of at least 10 / 5 pinch pounds of their Left hand. Measurements taken 12/15 demonstrate fluctuation. He feels stronger and more loose after a day of work. 4. ) Patient to report decreased pain in their affected L  upper extremity from 5/10 to 1-2/10 or less with resistive functional use. Goal met, pt consistently reporting no pain with mild pulling of the extensor tendon during good facilitation. 5. ) Patient to report 100% compliance with their splint wear, care, and precautions if needed. Progressing well, using x 2 exercise splint and wearing ext splint at night time.   6. ) Patient will be knowledgeable of edema control techniques as evident with decreases from moderate to mild/none. Progressing well, continues to demonstrate decreases as swelling reduces around prox phalanx. 7. ) Patient will demonstrate a non-tender/non-adherent scar. Progressing well with improving elasticity of scarring noted - no tenderness with scar reported. 8. ) Patient will report ADL functions as Mod I/I using L UE . Goal met, all ADLs IND. 9. ) Patient will decrease QuickDASH score to 30% or less for increased participation in daily functional activities. Goal met, 66% to 6.8% disability    TODAY'S TREATMENT     Pain Level: no pain reported. Subjective: Pt reports no new changes     Objective:  Updated POC  completed 12/15/2022. Next to be completed at discharge. INTERVENTION: COMPLETED: SPECIFICS/COMMENTS:   Modality:     Paraffin dip with MHP x - to L hand / fingers while doing ROM    MHP   - to fingers only to warm up tissue. Ice  - end of session to decrease pain   AROM:     R digital                                                                                           - MP flex/ ext all fingers - holding MF PIP straight - 2 sets of 15- do not pull with PIP - only MP  - blocked PIP ( hook) all fingers flex and ext with MF place and hold for further ext. 2 sets of 10.   - full fisting     - Transfer of bunchums focusing on sustained flexion of the hand to hold all bunchums.   - Rolling up and un-rolling towel with LUE with focus on flexion/extension.   - Gentle reverse MCP blocking x10. + HEP   - [place] pick-up flexion/extension with holds. - julia  - holding in hand for 15 pieces then release for fine coordination - 5 min. - table top, flat fist, table top with extension x15 + HEP  - MP hyper extension with hand in hook fist - 10rep's   - back of hand on table - hook around pencil - then fist then hook - 10 rep's   - towel scrutch - single layer - pull into fist hold 5 sec - 10 rep's  - tendon gliding - all positions.    - sustained grasp- jux er ciser - for ^ for 7  min. No ^'ed pain   -MP's blocked into flexion, extend and flex PIP's  against MP block 2x15  -PIP active extension blocking using yoke splint 2x15, added to HEP  -PIP flex/ext with digit cap splint for active assist 2x15, added to HEP  -Isolated PIP extension place and holds 2x10 avoiding pull from MCP joint.   -Ext lift from table top x 15 active, then 10 place and holds, then 10 active. -Pick away bunchems from the palm of hand with MF opp pinch pull aways x 25   R fine coordination           - grasp and release -pom poms  full fist with place and hold ext MF every 3rd rep's   - beans  and release - hook fist 15 rep's and full fist.         PROM/Stretching:     R digital    - gentle to MP flexion all fingers  - MF PIP ext. 2 sets of 10 With place and hold extension. Scar Mass/Edema Control:     Edema control X  x   - retrograde L hand and MF -   - size E compression sleeve on hand and mesh on MF   Scar management x -  scar massage with lotion - done while pt does MP ext also. Strengthening:     Extension strengthening x                         - harder to red  finger extender-2 sets of 20  rep's at MP ext and then 10 rep's for PIP ext   -Composite digital extension against light putty resistance on table top x 20. Added to HEP. Followed by MF only x 12  -Extension lifts from table top against therapist resistance with MF only x 12  -Composite extension from passive full fist x 15 against therapist resistance  -Palm presses to putty with extended digits x 12  - roll therabar - for PIP ext stretch- then keep extended for 5 sec hold   RUE/ hand strengthening               x                    x     - Issued and completed all exercises light theraputty x5 each.  Easy putty -  and manipulate 5 min.   -Calibrated hand gripper 50 # utilized on the ulnar side and moderate resistance clothes pin on the median side to complete pinching graded pom poms and placing into container.   - red sponge - grasp hold 5 sec's - 15 rep';s   - 5# disc weight hold 4 rounds: 1: 2 mins, 2: 2 mins  - Digit extension with the light resistance rubber band x15, finger extension x15  - Finger lifts with .5 # x15  - hand exerciser with one moderate rubber band and one light 2x15 to complete resisted flexion/extension.   - 2.2# ball walking up and down arm wedge with MF. Wrist flexion/extension x15,  and drop x15  - Pron/sup with 2.2# & 5.5# therball with extended grasp in order to not drop ball x10 each  -supination pronation 20 reps 10# therabar   - heavy resistance clothes pin with 3 point pinch to  graded objects.   -true balance 5 mins  - 3# wt - wrist ext, wrist flexion and forearm rotation 15 rep's each   - mountain climber 3 # wt - 20 rep's   - 2.2 # ball - quick release about 1 min.   - 1.1# ball rotation with fingertips and thumb 3x 2    Other:     HEP  x Reviewied all - added full fisting , tendon gliding and finger ext with place and hold. Exercise Splinting X    X  -Fabricated yoke splint to isolate MF PIP extension with exercise.  -Fabricated DIP cap splint with wings that wrap over dorsal IF and RF for active assist with MF PIP flex/ext to achieve ranges he is unable to with isolation. Finger gutter splint  X     Cont to wear - beginning weaning off next week. Wearing every night now -   -wearing ext tube every night. Assessment/Comments: Pt is making Good progress toward stated plan of care. Pt tolerated session well, primary focus this date strengthening of the digits and wrist/forearm. Increased duration and frequency as well as intensity with good tolerance and minor increase in discomfort.   Continue to progress towards goals with focus on extension and continued strengthening.      -Rehab Potential: Good  -Requires OT Follow Up: Yes    Time In:  11:00  am          Time Out: 12:00  pm              Visit #: 12    Treatment Charges: Mins Time Units   Modalities-paraffin 15 11:00 am - 11:15 a 1   Ther Exercise 35 11:15 - 11:50 am 2   Manual Therapy 10 11:50 am - 12:00 pm 1   Thera Activities      ADL/Home Mgt       Neuro Re-education      Gait Training      Group Therapy      Non-Billable Service Time MHP/ \ICe      Other      Total Time/Units 60  4     -Response to Treatment: Tolerated session well     Plan:   [x]  Continues Plan of care: Continue with focus on maximizing PIP extension and enhancing safety with functional use of the hand. Treatment covered based on POC and graduated to patient's progress. Pt education continues at each visit to obtain maximum benefits from skilled OT intervention.   []  Alter Plan of care:   []  Discharge:      Dorothy Mohs, [place] R/RAJAN, [phone]

## 2023-01-06 ENCOUNTER — TREATMENT (OUTPATIENT)
Dept: OCCUPATIONAL THERAPY | Age: 43
End: 2023-01-06

## 2023-01-06 DIAGNOSIS — M77.9 EXTENSOR TENDON ADHESIONS: ICD-10-CM

## 2023-01-06 DIAGNOSIS — S62.613K OPEN DISPLACED FRACTURE OF PROXIMAL PHALANX OF LEFT MIDDLE FINGER WITH NONUNION, SUBSEQUENT ENCOUNTER: Primary | ICD-10-CM

## 2023-01-06 DIAGNOSIS — S62.613B DISPLACED FRACTURE OF PROXIMAL PHALANX OF LEFT MIDDLE FINGER, INITIAL ENCOUNTER FOR OPEN FRACTURE: ICD-10-CM

## 2023-01-06 DIAGNOSIS — M24.542 CONTRACTURE OF JOINT OF LEFT HAND: ICD-10-CM

## 2023-01-06 NOTE — PROGRESS NOTES
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 Dailey  Specific Practitioner Orders: Please schedule pt POD#1 for ROM to the left hand/digits as tolerated, Scar desensitization and management. Daily x 2 weeks     Assessment of current deficits   [x] ADLs           [x] Strength      [x] IADLs     [x] Endurance    [x] Fine Motor Coordination       [x] ROM           [x] Pain       [x] Edema        [x] Scar Adhesion/Skin Integrity      OT PLAN OF CARE   OT POC based on physician orders, patient diagnosis and results of clinical assessment     Frequency/Duration: daily for 2 weeks - 10 sessions approved - then to get more approval for 12 - 16 sessions.      Specific OT Treatment to include:   [x] Instruction in HEP                   Modalities:  [x] Therapeutic Exercise                 [x] Ultrasound               [] Electrical Stimulation/Attended  [x] PROM/Stretching                    [x] Fluidotherapy          [x]  Paraffin                   [x] AAROM  [x] AROM                 [] Iontophoresis:   [x] Tendon Glides                                               [] Neuromuscular Re-Ed            [] ADL/IADL re-training    [x] Therapeutic Activity                  [x] Pain Management with/without modalities PRN                 [x] Manual Therapy                      [x] Splinting                                   [x] Scar Management                   []Joint Protection/Training  []Ergonomics                             [x] Joint Mobilization                      [] Adaptive Equipment Assessment/Training                             [x] Manual Edema Mobilization   [] Myofascial Release                 [] Energy Conservation/Work Simplification  [x] GM/FM Coordination                [] Safety retraining/education per  individual diagnosis/goals  [] Desensitization        Patient Specific Goal: wants to be able to make a full fist and extend his MF                               GOALS (Long term same as Short term):  1.) Patient will demonstrate good understanding of home program (exercises/activities/diagnosis/prognosis/goals) with good accuracy. Goal making good gains. Pt showing good follow thru. 2. ) Patient will demonstrate increased active/passive range of motion of their L hand to [facility] for ADL/IADL completion. Progressing well, all flexion is now functional - extension lag continues to demonstrate reduction but this fluctuates depending on the day. 3. ) Patient will demonstrate increased /pinch strength of at least 10 / 5 pinch pounds of their Left hand. Measurements taken 12/15 demonstrate fluctuation. He feels stronger and more loose after a day of work. 4. ) Patient to report decreased pain in their affected L  upper extremity from 5/10 to 1-2/10 or less with resistive functional use. Goal met, pt consistently reporting no pain with mild pulling of the extensor tendon during good facilitation. 5. ) Patient to report 100% compliance with their splint wear, care, and precautions if needed. Progressing well, using x 2 exercise splint and wearing ext splint at night time.   6. ) Patient will be knowledgeable of edema control techniques as evident with decreases from moderate to mild/none. Progressing well, continues to demonstrate decreases as swelling reduces around prox phalanx. 7. ) Patient will demonstrate a non-tender/non-adherent scar. Progressing well with improving elasticity of scarring noted - no tenderness with scar reported. 8. ) Patient will report ADL functions as Mod I/I using L UE . Goal met, all ADLs IND. 9. ) Patient will decrease QuickDASH score to 30% or less for increased participation in daily functional activities. Goal met, 66% to 6.8% disability    TODAY'S TREATMENT     Pain Level: no pain reported. Subjective: Pt reports no new changes     Objective:  Updated POC  completed 12/15/2022. Next to be completed at discharge. INTERVENTION: COMPLETED: SPECIFICS/COMMENTS:   Modality:     Paraffin dip with MHP x - to L hand / fingers while doing ROM    MHP   - to fingers only to warm up tissue. Ice  - end of session to decrease pain   AROM:     R digital                                                                                           - MP flex/ ext all fingers - holding MF PIP straight - 2 sets of 15- do not pull with PIP - only MP  - blocked PIP ( hook) all fingers flex and ext with MF place and hold for further ext. 2 sets of 10.   - full fisting     - Transfer of bunchums focusing on sustained flexion of the hand to hold all bunchums.   - Rolling up and un-rolling towel with LUE with focus on flexion/extension.   - Gentle reverse MCP blocking x10. + HEP   - [place] pick-up flexion/extension with holds. - julia  - holding in hand for 15 pieces then release for fine coordination - 5 min. - table top, flat fist, table top with extension x15 + HEP  - MP hyper extension with hand in hook fist - 10rep's   - back of hand on table - hook around pencil - then fist then hook - 10 rep's   - towel scrutch - single layer - pull into fist hold 5 sec - 10 rep's  - tendon gliding - all positions.    - sustained grasp- jux er ciser - for ^ for 7  min. No ^'ed pain   -MP's blocked into flexion, extend and flex PIP's  against MP block 2x15  -PIP active extension blocking using yoke splint 2x15, added to HEP  -PIP flex/ext with digit cap splint for active assist 2x15, added to HEP  -Isolated PIP extension place and holds 2x10 avoiding pull from MCP joint.   -Ext lift from table top x 15 active, then 10 place and holds, then 10 active. -Pick away bunchems from the palm of hand with MF opp pinch pull aways x 25   R fine coordination           - grasp and release -pom poms  full fist with place and hold ext MF every 3rd rep's   - beans  and release - hook fist 15 rep's and full fist.         PROM/Stretching:     R digital    - gentle to MP flexion all fingers  - MF PIP ext. 2 sets of 10 With place and hold extension. Scar Mass/Edema Control:     Edema control X  x   - retrograde L hand and MF -   - size E compression sleeve on hand and mesh on MF   Scar management x -  scar massage with lotion - done while pt does MP ext also. Strengthening:     Extension strengthening x                        x - harder to red  finger extender-2 sets of 20  rep's at MP ext and then 10 rep's for PIP ext   -Composite digital extension against light putty resistance on table top x 20. Added to HEP. Followed by MF only x 12  -Extension lifts from table top against therapist resistance with MF only x 12  -Composite extension from passive full fist x 15 against therapist resistance  -Palm presses to putty with extended digits x 12  - roll therabar - for PIP ext stretch- then keep extended for 5 sec hold   RUE/ hand strengthening       x                                                    X      X        X  x - Issued and completed all exercises light theraputty x5 each.  Easy putty -  and manipulate 5 min.   -Calibrated hand gripper 50 # utilized on the ulnar side and moderate resistance clothes pin on the median side to complete pinching graded pom poms and placing into container. - 100 rep's    - red sponge - grasp hold 5 sec's - 15 rep';s   - 5# disc weight hold 4 rounds: 1: 2 mins, 2: 2 mins  - Digit extension with the light resistance rubber band x15, finger extension x15  - Finger lifts with .5 # x15  - hand exerciser with one moderate rubber band and one light 2x15 to complete resisted flexion/extension.   - 2.2# ball walking up and down arm wedge with MF. Wrist flexion/extension x15,  and drop x15  - Pron/sup with 2.2# & 5.5# therball with extended grasp in order to not drop ball x10 each  -supination pronation 20 reps 10# therabar   - heavy resistance clothes pin with 3 point pinch to  graded objects.   -true balance 5 mins  - ^ to 4# wt - wrist ext, wrist flexion and forearm rotation 15 rep's each   - mountain climber 3 # wt - 20 rep's   - ^ to 3.3 # ball - quick release about 3 sets of  1 min.   - 1.1# ball rotation with fingertips and thumb 3x 2   -green digit gripper 5#  - 30 rep's   - Mr Owen Score - 4# wt - 10 rep's up and down   Other:     HEP  x Reviewied all - added full fisting , tendon gliding and finger ext with place and hold. Exercise Splinting X    X  -Fabricated yoke splint to isolate MF PIP extension with exercise.  -Fabricated DIP cap splint with wings that wrap over dorsal IF and RF for active assist with MF PIP flex/ext to achieve ranges he is unable to with isolation. Finger gutter splint  X     Cont to wear - beginning weaning off next week. Wearing every night now -   -wearing ext tube every night. Assessment/Comments: Pt is making Good progress toward stated plan of care. Pt tolerated session well, primary focus this date strengthening of the digits and wrist/forearm. Increased duration and frequency as well as intensity with good tolerance and minor increase in discomfort.   Continue to progress towards goals with focus on extension and continued strengthening.      -Rehab Potential: Good  -Requires OT Follow Up: Yes    Time In:  11:00  am          Time Out: 12:00  pm              Visit #: 13    Treatment Charges: Mins Time Units   Modalities-paraffin 15 11:00 am - 11:15 a 1   Ther Exercise 35 11:15 - 11:50 am 2   Manual Therapy 10 11:50 am - 12:00 pm 1   Thera Activities      ADL/Home Mgt       Neuro Re-education      Gait Training      Group Therapy      Non-Billable Service Time MHP/ \ICe      Other      Total Time/Units 60  4     -Response to Treatment: Tolerated session well     Plan:   [x]  Continues Plan of care: Continue with focus on maximizing PIP extension and enhancing safety with functional use of the hand. Treatment covered based on POC and graduated to patient's progress. Pt education continues at each visit to obtain maximum benefits from skilled OT intervention.   []  Alter Plan of care:   []  Discharge:      Carly Prince OT /L, CHT  # [phone]

## 2023-01-10 ENCOUNTER — TREATMENT (OUTPATIENT)
Dept: OCCUPATIONAL THERAPY | Age: 43
End: 2023-01-10
Payer: COMMERCIAL

## 2023-01-10 DIAGNOSIS — M24.542 CONTRACTURE OF JOINT OF LEFT HAND: ICD-10-CM

## 2023-01-10 DIAGNOSIS — S62.613K OPEN DISPLACED FRACTURE OF PROXIMAL PHALANX OF LEFT MIDDLE FINGER WITH NONUNION, SUBSEQUENT ENCOUNTER: Primary | ICD-10-CM

## 2023-01-10 DIAGNOSIS — M77.9 EXTENSOR TENDON ADHESIONS: ICD-10-CM

## 2023-01-10 PROCEDURE — 97110 THERAPEUTIC EXERCISES: CPT | Performed by: OCCUPATIONAL THERAPIST

## 2023-01-10 PROCEDURE — 97018 PARAFFIN BATH THERAPY: CPT | Performed by: OCCUPATIONAL THERAPIST

## 2023-01-10 PROCEDURE — 97140 MANUAL THERAPY 1/> REGIONS: CPT | Performed by: OCCUPATIONAL THERAPIST

## 2023-01-10 NOTE — PROGRESS NOTES
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 Dailey  Specific Practitioner Orders: Please schedule pt POD#1 for ROM to the left hand/digits as tolerated, Scar desensitization and management. Daily x 2 weeks     Assessment of current deficits   [x] ADLs           [x] Strength      [x] IADLs     [x] Endurance    [x] Fine Motor Coordination       [x] ROM           [x] Pain       [x] Edema        [x] Scar Adhesion/Skin Integrity      OT PLAN OF CARE   OT POC based on physician orders, patient diagnosis and results of clinical assessment     Frequency/Duration: daily for 2 weeks - 10 sessions approved - then to get more approval for 12 - 16 sessions.      Specific OT Treatment to include:   [x] Instruction in HEP                   Modalities:  [x] Therapeutic Exercise                 [x] Ultrasound               [] Electrical Stimulation/Attended  [x] PROM/Stretching                    [x] Fluidotherapy          [x]  Paraffin                   [x] AAROM  [x] AROM                 [] Iontophoresis:   [x] Tendon Glides                                               [] Neuromuscular Re-Ed            [] ADL/IADL re-training    [x] Therapeutic Activity                  [x] Pain Management with/without modalities PRN                 [x] Manual Therapy                      [x] Splinting                                   [x] Scar Management                   []Joint Protection/Training  []Ergonomics                             [x] Joint Mobilization                      [] Adaptive Equipment Assessment/Training                             [x] Manual Edema Mobilization   [] Myofascial Release                 [] Energy Conservation/Work Simplification  [x] GM/FM Coordination                [] Safety retraining/education per  individual diagnosis/goals  [] Desensitization        Patient Specific Goal: wants to be able to make a full fist and extend his MF                               GOALS (Long term same as Short term):  1.) Patient will demonstrate good understanding of home program (exercises/activities/diagnosis/prognosis/goals) with good accuracy. Goal making good gains. Pt showing good follow thru. 2. ) Patient will demonstrate increased active/passive range of motion of their L hand to [facility] for ADL/IADL completion. Progressing well, all flexion is now functional - extension lag continues to demonstrate reduction but this fluctuates depending on the day. 3. ) Patient will demonstrate increased /pinch strength of at least 10 / 5 pinch pounds of their Left hand. Measurements taken 12/15 demonstrate fluctuation. He feels stronger and more loose after a day of work. 4. ) Patient to report decreased pain in their affected L  upper extremity from 5/10 to 1-2/10 or less with resistive functional use. Goal met, pt consistently reporting no pain with mild pulling of the extensor tendon during good facilitation. 5. ) Patient to report 100% compliance with their splint wear, care, and precautions if needed. Progressing well, using x 2 exercise splint and wearing ext splint at night time.   6. ) Patient will be knowledgeable of edema control techniques as evident with decreases from moderate to mild/none. Progressing well, continues to demonstrate decreases as swelling reduces around prox phalanx. 7. ) Patient will demonstrate a non-tender/non-adherent scar. Progressing well with improving elasticity of scarring noted - no tenderness with scar reported. 8. ) Patient will report ADL functions as Mod I/I using L UE . Goal met, all ADLs IND. 9. ) Patient will decrease QuickDASH score to 30% or less for increased participation in daily functional activities. Goal met, 66% to 6.8% disability    TODAY'S TREATMENT     Pain Level: no pain reported    Subjective: Pt demonstrates excitement to be done with therapy this week! Objective:  Updated POC  completed 12/15/2022. Next to be completed at discharge. INTERVENTION: COMPLETED: SPECIFICS/COMMENTS:   Modality:     Paraffin dip with MHP x - to L hand / fingers while doing ROM    MHP   - to fingers only to warm up tissue. Ice  - end of session to decrease pain   AROM:     R digital                                                                                           - MP flex/ ext all fingers - holding MF PIP straight - 2 sets of 15- do not pull with PIP - only MP  - blocked PIP ( hook) all fingers flex and ext with MF place and hold for further ext. 2 sets of 10.   - full fisting     - Transfer of bunchums focusing on sustained flexion of the hand to hold all bunchums.   - Rolling up and un-rolling towel with LUE with focus on flexion/extension.   - Gentle reverse MCP blocking x10. + HEP   - [place] pick-up flexion/extension with holds. - julia  - holding in hand for 15 pieces then release for fine coordination - 5 min. - table top, flat fist, table top with extension x15 + HEP  - MP hyper extension with hand in hook fist - 10rep's   - back of hand on table - hook around pencil - then fist then hook - 10 rep's   - towel scrutch - single layer - pull into fist hold 5 sec - 10 rep's  - tendon gliding - all positions.    - sustained grasp- jux er ciser - for ^ for 7  min. No ^'ed pain   -MP's blocked into flexion, extend and flex PIP's  against MP block 2x15  -PIP active extension blocking using yoke splint 2x15, added to HEP  -PIP flex/ext with digit cap splint for active assist 2x15, added to HEP  -Isolated PIP extension place and holds 2x10 avoiding pull from MCP joint.   -Ext lift from table top x 15 active, then 10 place and holds, then 10 active. -Pick away bunchems from the palm of hand with MF opp pinch pull aways x 25   R fine coordination           - grasp and release -pom poms  full fist with place and hold ext MF every 3rd rep's   - beans  and release - hook fist 15 rep's and full fist.         PROM/Stretching:     R digital    - gentle to MP flexion all fingers  - MF PIP ext. 2 sets of 10 With place and hold extension. Scar Mass/Edema Control:     Edema control X  x   - retrograde L hand and MF -   - size E compression sleeve on hand and mesh on MF   Scar management x -  scar massage with lotion - done while pt does MP ext also. Strengthening:     Extension strengthening X             X              - harder to red  finger extender-2 sets of 20  rep's at MP ext and then 10 rep's for PIP ext   -Composite digital extension against light putty resistance on table top x 20. Added to HEP. Followed by MF only x 12  -Extension lifts from table top against therapist resistance with MF only x 12  -Composite extension from passive full fist x 15 against therapist resistance  -Palm presses to putty with extended digits x 12  - roll therabar - for PIP ext stretch- then keep extended for 5 sec hold   RUE/ hand strengthening       X                                                                         X      X     X    X    X    X  - Issued and completed all exercises light theraputty x5 each.  Easy putty -  and manipulate 5 min.   -Calibrated hand gripper 50 # utilized on the ulnar side and moderate resistance clothes pin on the median side to complete pinching graded pom poms and placing into container. - 100 rep's    - red sponge - grasp hold 5 sec's - 15 rep';s   - 5# disc weight hold 4 rounds: 1: 2 mins, 2: 2 mins  - Digit extension with the light resistance rubber band x15, finger extension x15  - Finger lifts with .5 # x15  - hand exerciser with one moderate rubber band and one light 2x15 to complete resisted flexion/extension.   - 2.2# ball walking up and down arm wedge with MF. Wrist flexion/extension x15,  and drop x15  - Pron/sup with 2.2# & 5.5# therball with extended grasp in order to not drop ball x10 each  -supination pronation 20 reps 10# therabar   - heavy resistance clothes pin with 3 point pinch to  graded objects.   -true balance 5 mins  - ^ to 4# wt - wrist ext, wrist flexion and forearm rotation 15 rep's each   - mountain climber 3 # wt - 20 rep's   - ^ to 3.3 # ball - quick release about 3 sets of  1 min.   - 1.1# ball rotation with fingertips and thumb 3x 2   -green digit gripper 5#  - 30 rep's   - Mr Tai Numbers - 4# wt - 10 rep's up and down  -Opp pinch golf tees to MF and push into medium resistant putty x 25. Then remove with 3-pnt pinch on tweezers. -Tear apart and make meatballs from medium green resistant putty. -5# Digital curls with wrist flexion on table top x 20  -Light tan band digital extension with wrist extension over edge of table x 20  -Opp pinch to MF 3.3# ball and hold for 15 sec x 10 reps  -Extend MF against 5.5# resistant ball on table top x 15   Other:     HEP  x Reviewied all - added full fisting , tendon gliding and finger ext with place and hold. Exercise Splinting X    X  -Fabricated yoke splint to isolate MF PIP extension with exercise.  -Fabricated DIP cap splint with wings that wrap over dorsal IF and RF for active assist with MF PIP flex/ext to achieve ranges he is unable to with isolation.    Finger gutter splint  X     Cont to wear - beginning weaning off next week. Wearing every night now -   -wearing ext tube every night. Assessment/Comments: Pt is making Good progress toward stated plan of care. Continued with strengthening with focus on isolated digital strengthening of the MF with flex and ext with mild difficulty. He tolerated will with mild fatigue in the hand by end of session. Overall, pt doing well and feels prepared to continue with HEP. He reports no functional limitations at this time. Plan for discharge next session.      -Rehab Potential: Good  -Requires OT Follow Up: Yes    Time In:  11:00  am          Time Out: 11:53 am              Visit #: 14  Treatment Charges: Mins Time Units   Modalities-paraffin 10 11:00 a - 11:10 a 1   Ther Exercise 33 11:20 a - 11: 53 a 2   Manual Therapy 10 11:10 a - 11:20 a 1   Thera Activities      ADL/Home Mgt       Neuro Re-education      Gait Training      Group Therapy      Non-Billable Service Time MHP/ \ICe      Other      Total Time/Units 53  4     -Response to Treatment: Continues to tolerate well with moderate fatigue in hand by end of session. Plan:   [x]  Continues Plan of care: Continue with focus on maximizing PIP extension and enhancing safety with functional use of the hand. Treatment covered based on POC and graduated to patient's progress. Pt education continues at each visit to obtain maximum benefits from skilled OT intervention.   []  Alter Plan of care:   []  Discharge:      Effie Palacios OT R/L, [address], #[id number]

## 2023-01-12 ENCOUNTER — TREATMENT (OUTPATIENT)
Dept: OCCUPATIONAL THERAPY | Age: 43
End: 2023-01-12

## 2023-01-12 DIAGNOSIS — M77.9 EXTENSOR TENDON ADHESIONS: ICD-10-CM

## 2023-01-12 DIAGNOSIS — M24.542 CONTRACTURE OF JOINT OF LEFT HAND: ICD-10-CM

## 2023-01-12 DIAGNOSIS — S62.613K OPEN DISPLACED FRACTURE OF PROXIMAL PHALANX OF LEFT MIDDLE FINGER WITH NONUNION, SUBSEQUENT ENCOUNTER: Primary | ICD-10-CM

## 2023-01-12 NOTE — PROGRESS NOTES
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 Dailey  Specific Practitioner Orders: Please schedule pt POD#1 for ROM to the left hand/digits as tolerated, Scar desensitization and management. Daily x 2 weeks     Assessment of current deficits   [x] ADLs           [x] Strength      [x] IADLs     [x] Endurance    [x] Fine Motor Coordination       [x] ROM           [x] Pain       [x] Edema        [x] Scar Adhesion/Skin Integrity      OT PLAN OF CARE   OT POC based on physician orders, patient diagnosis and results of clinical assessment     Frequency/Duration: daily for 2 weeks - 10 sessions approved - then to get more approval for 12 - 16 sessions. Patient Specific Goal: wants to be able to make a full fist and extend his MF  --> Goal met, pleased with mobility in the hand at this time. GOALS (Long term same as Short term):  1.) Patient will demonstrate good understanding of home program (exercises/activities/diagnosis/prognosis/goals) with good accuracy. Goal met, pt has had excellent compliance to his HEP and will continue integrating daily as necessary.   2. ) Patient will demonstrate increased active/passive range of motion of their L hand to WFLs for ADL/IADL completion. Goal met, ROM is considered functional at this time. Ext lag in the MF is not limiting the pt in any daily or work-related activities. 3. ) Patient will demonstrate increased /pinch strength of at least 10 / 5 pinch pounds of their Left hand. Goal met, refer to measurements below. 4. ) Patient to report decreased pain in their affected L  upper extremity from 5/10 to 1-2/10 or less with resistive functional use. Goal met, pt continues to consistently report no pain with mild pulling of the extensor tendon during good facilitation. 5. ) Patient to report 100% compliance with their splint wear, care, and precautions if needed. Goal met, pt will continue maintaining nighttime extension splint over the next several months and then may wean. All other splints can be discharged at this time. 6. ) Patient will be knowledgeable of edema control techniques as evident with decreases from moderate to mild/none. Goal met, mild around prox phalanx - can remain for up to a year s/p sx intervention. 7. ) Patient will demonstrate a non-tender/non-adherent scar. Goal exceeded, great elasticity of scar with no adhesions or tenderness. 8. ) Patient will report ADL functions as Mod I/I using L UE . Goal met, all ADLs IND. 9. ) Patient will decrease QuickDASH score to 30% or less for increased participation in daily functional activities. Goal met, 66% to 6.8% disability    TODAY'S TREATMENT     Pain Level: no pain reported    Subjective: Pt ecstatic with finishing therapy - pleased with overall results and feels he has no limitations at this time. Objective:  Updated POC  completed 12/15/2022. Next to be completed at discharge. INTERVENTION: COMPLETED: SPECIFICS/COMMENTS:   Modality:     Paraffin dip with MHP x - to L hand / fingers while doing ROM    MHP   - to fingers only to warm up tissue.    Ice  - end of session to decrease pain   AROM:     R digital - MP flex/ ext all fingers - holding MF PIP straight - 2 sets of 15- do not pull with PIP - only MP  - blocked PIP ( hook) all fingers flex and ext with MF place and hold for further ext. 2 sets of 10.   - full fisting     - Transfer of bunchums focusing on sustained flexion of the hand to hold all bunchums.   - Rolling up and un-rolling towel with LUE with focus on flexion/extension.   - Gentle reverse MCP blocking x10. + HEP   - Lena pick-up flexion/extension with holds. - julia  - holding in hand for 15 pieces then release for fine coordination - 5 min. - table top, flat fist, table top with extension x15 + HEP  - MP hyper extension with hand in hook fist - 10rep's   - back of hand on table - hook around pencil - then fist then hook - 10 rep's   - towel scrutch - single layer - pull into fist hold 5 sec - 10 rep's  - tendon gliding - all positions. - sustained grasp- jux er ciser - for ^ for 7  min. No ^'ed pain   -MP's blocked into flexion, extend and flex PIP's  against MP block 2x15  -PIP active extension blocking using yoke splint 2x15, added to HEP  -PIP flex/ext with digit cap splint for active assist 2x15, added to HEP  -Isolated PIP extension place and holds 2x10 avoiding pull from MCP joint.   -Ext lift from table top x 15 active, then 10 place and holds, then 10 active. -Pick away bunchems from the palm of hand with MF opp pinch pull aways x 25   R fine coordination           - grasp and release -pom poms  full fist with place and hold ext MF every 3rd rep's   - beans  and release - hook fist 15 rep's and full fist.         PROM/Stretching:     R digital    - gentle to MP flexion all fingers  - MF PIP ext. 2 sets of 10 With place and hold extension.         Scar Mass/Edema Control:     Edema control X   - retrograde L hand and MF -   - size E compression sleeve on hand and mesh on MF   Scar management X  -  scar massage with lotion - done while pt does MP ext also. Strengthening:     Extension strengthening X             X              - harder to red  finger extender-2 sets of 20  rep's at MP ext and then 10 rep's for PIP ext   -Composite digital extension against light putty resistance on table top x 20. Added to HEP. Followed by MF only x 12  -Extension lifts from table top against therapist resistance with MF only x 12  -Composite extension from passive full fist x 15 against therapist resistance  -Palm presses to putty with extended digits x 12  - roll therabar - for PIP ext stretch- then keep extended for 5 sec hold   RUE/ hand strengthening                                                                                       X    X    X     - Issued and completed all exercises light theraputty x5 each. Easy putty -  and manipulate 5 min.   -Calibrated hand gripper 50 # utilized on the ulnar side and moderate resistance clothes pin on the median side to complete pinching graded pom poms and placing into container. - 100 rep's    - red sponge - grasp hold 5 sec's - 15 rep';s   - 5# disc weight hold 4 rounds: 1: 2 mins, 2: 2 mins  - Digit extension with the light resistance rubber band x15, finger extension x15  - Finger lifts with .5 # x15  - hand exerciser with one moderate rubber band and one light 2x15 to complete resisted flexion/extension.   - 2.2# ball walking up and down arm wedge with MF.   Wrist flexion/extension x15,  and drop x15  - Pron/sup with 2.2# & 5.5# therball with extended grasp in order to not drop ball x10 each  -supination pronation 20 reps 10# therabar   - heavy resistance clothes pin with 3 point pinch to  graded objects.   -true balance 5 mins  - ^ to 4# wt - wrist ext, wrist flexion and forearm rotation 15 rep's each   - mountain climber 3 # wt - 20 rep's   - ^ to 3.3 # ball - quick release about 3 sets of  1 min.   - 1.1# ball rotation with fingertips and thumb 3x 2   -green digit gripper 5#  - 30 rep's   - Mr Geena Francisco - 4# wt - 10 rep's up and down  -Opp pinch golf tees to MF and push into medium resistant putty x 25. Then remove with 3-pnt pinch on tweezers. -Tear apart and make meatballs from medium green resistant putty. -5# Digital curls with wrist flexion on table top x 20  -Light tan band digital extension with wrist extension over edge of table x 20  -Opp pinch to MF 3.3# ball and hold for 15 sec x 10 reps  -Extend MF against 5.5# resistant ball on table top x 15   Other:     HEP  x Reviewied all - added full fisting , tendon gliding and finger ext with place and hold. Exercise Splinting D/C  -Fabricated yoke splint to isolate MF PIP extension with exercise.  -Fabricated DIP cap splint with wings that wrap over dorsal IF and RF for active assist with MF PIP flex/ext to achieve ranges he is unable to with isolation. Finger gutter splint  X- Continue over next few months to maximize tenolysis results then D/C Cont to wear - beginning weaning off next week. Wearing every night now -   -wearing ext tube every night. Assessment/Comments: Pt is making Good progress toward stated plan of care. Updated measurements shown below in BOLD. Pt has demonstrated excellent gains in pinch and overall ROM. The MF PIP still demonstrates an active PIP lag but it can be passively extended to 0* which makes daily and work tasks much easier now. He no longer has pain in the digit with use and feels he has benefited well from our services reporting no functional limitations - skilled OT is no longer necessary at this time. Heavy review of his HEP in which we will continue for the next few months with splinting/exercise and wean self as appropriate.      Progress Update: 1/12                                                                          IE           11/15        12/15     1/12  MF Digit MCP Extension/Flexion   0-45 H /* -5*/30*  0/66*  75* 76*     PIP Extension/Flexion   0*/100* -27*/63*  PROM-8* -15*/91*   -13*/96* -28*/101*      DIP Extension/Flexion   0*/70-90*      0/32*      0/45*  80*  WNLs      Dynamometer (setting 2):                              Left: 82# to 76#  to  87#                                         Right: 97# to 106#                                         Pinch Meter:              Lateral: Left= 21# to 19.5# to 22#,      Right= 24#               Palmar 3 point: Left= 10# to 11# to 13.5#, Right= 16#     QuickDASH Score: 66% to 6.8% disability --> GOAL MET    -Rehab Potential: Good  -Requires OT Follow Up: Yes    Time In:  11:00  am          Time Out: 11:40 am              Visit #: 15  Treatment Charges: Mins Time Units   Modalities-paraffin 10 11:00 a - 11:10 a 1   Ther Exercise 20 11:20 a - 11: 40 a 1   Manual Therapy 10 11:10 a - 11:20 a 1   Thera Activities      ADL/Home Mgt       Neuro Re-education      Gait Training      Group Therapy      Non-Billable Service Time MHP/ \ICe      Other      Total Time/Units 40  3     -Response to Treatment: Pleased with progression and results, will continue with his HEP. Plan:   []  Continues Plan of care: Continue with focus on maximizing PIP extension and enhancing safety with functional use of the hand. Treatment covered based on POC and graduated to patient's progress. Pt education continues at each visit to obtain maximum benefits from skilled OT intervention. []  Alter Plan of care:   [x]  Discharge: W/ substantial HEP in place. All goals met and no functional limitations reported at this time. 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        Daniela Sotomayor, OT R/L, [facility] [address], #[id number]

## 2023-08-23 DIAGNOSIS — M77.9 EXTENSOR TENDON ADHESIONS: ICD-10-CM

## 2023-08-23 DIAGNOSIS — S62.613K: Primary | ICD-10-CM

## 2023-08-23 DIAGNOSIS — M24.542 CONTRACTURE OF JOINT OF FINGER OF LEFT HAND: ICD-10-CM

## 2023-08-23 NOTE — PROGRESS NOTES
OCCUPATIONAL THERAPY PROGRESS NOTE    Date:  3/2/2021   Initial Evaluation Date: 2020                          Evaluating Therapist: Latosha De La Vega     Patient Name:  Evelyn Junior  \"Henok\"               :  1980     Restrictions/Precautions:  ROM as tolerated; modalities prn, low fall risk  Diagnosis:S62.613B (ICD-10-CM) - Displaced fracture of proximal phalanx of left middle finger, initial encounter for open fracture  Excisional debridement of left palm, index and middle finger with open reduction internal fixation of left middle finger proximal phalanx fracture with middle finger A2 pulley repair. Insurance/Certification information:[facility]  Claim # [id number]  Referring Practitioner: Dr. Luis Ramirez  Date of Surgery/Injury: 2020  Plan of care signed (Y/N): Y    Visit# / total visits:  -  3x/week for 6 weeks until to 3/19/2021. COVID-19 Screening completed upon entering facility and patient cleared for treatment today. Pt followed all protocols set forth by [address] [place] [address] for Outpatient services throughout therapy session. Patient has been made aware of all new hygiene protocols due to COVID-19 set forth by [address] [place] [address] Outpatient services and agrees to abide by all protocols. Pain Level: 7/10 occasionally nerve pain in the middle phalanx - did not have any of thi nerve pain during today's session. Subjective: Pt reports\"I think my nerve is starting to come back. These last couple of days I've been getting these sharp nerve pains down the side of my finger (points to radial side of digit). .. The doctor said this would happen once these nerves start coming back. \"     Objective:  Updated POC to be completed at 10th session.     INTERVENTION: COMPLETED: SPECIFICS/COMMENTS:   Modality:     MHP  Hand wrapped in during paraffin for soft tissue warm-up   US  Scar management in the palm of LUE and up affected digit Intensity: 0.8  Duration: 8 mins  Duty Cycle: 100%  Depth: 3.3 MHz   fluidotherapy  For soft tissue warm-up while intermittently moving his wrist and fingers. For desensitization also. 15 mins   Paraffin x Completed for soft tissue warmup- 10 minutes. Pt reports increased ROM after completion. With MHP. Scar presses to follow. Ice   End of session to decrease pain. AROM:     Towel scrunches  5 mins to LUE, digit walks, fist    Blocked PIP flex/ext  MF but other fingers can move with it if he wants 2 sets x10  And all fingers together. PIP ext MF with place and hold ext - to do frequently during the day. Blocked MP flex/ext  All fingers together. 1 set x10. Also did each IF and MF alone and RF and SF together. Wristisizer  5 mins with LUE with full grasp throughout fair tolerance   Red ball activity  Completed rolling of red ball up tall end of incline with arm in incline using tips of digits for emphasis on wrist flex/ext. Once to top of incline raise wrist into extension and then place ball into cup with radial deviation (increased difficulty). Then  cup and drop ball into hand with emphasis on pronation/supination. Use digit tips to roll ball back down tall end of incline. x10 reps with fair tolerance    Completed rolling of red ball up yellow web with tips of digits (emphasis on MF extension), through hole, and down other side with finger and wrist flexion. x20 reps with fair tolerance (fatigue noted)   Marker roll  Completed x15 marker rolls with coband from tip of fingers to palm with fair tolerance. Pt reports increased soreness in MF with this movement. Finger adduc/abduction  A to abduction RF - did with place and hold. IF alone. - Added yellow sponge to complete on either side of 3rd digit.  x10   Wrist all planes  Stiff in wrist flexion LUE  Completed flexion/extension of PIP/DIP 3  sets x10 within pt's tolerance. Focus on extension/flexion of 3rd digit. Completed with A2 pulley splint donned- or therapist supporting this area. Tendon glides  x10 completed with proper education provided. Boading balls  5 mins clock-wiseand counter clock-wise (increased difficulty with clockwise)     Bean transfer  Completed bean transfer with elbow at side for wrist flex/ext and digit flex with fair tolerance   Elliston activity  Completed marble transfer supinate-pronate to  as many marbles as possible and roll down first digit for transfer to container   Elliston flick   Completed marble flick using L MF with emphasis on extension pull. x10 reps with increased fatigue reported   Hook/fist and target guiding  x10 to complete hook/fist using a pen with pt educated on proper positioning throughout. Added to HEP   -Red foam tube: placing half tube into palm and completing flexion of all digits to touch tube. Pt educated to pull down with DIP to assist with improving flexion and touching tube. Paper Tasks  Paper placed between IF/MF and MF/RF, keep paper from being pulled away with flexed fingers x 8 reps ea then extended fingers x 8 reps ea. -Then crumple sheet of paper into a tight ball with no assist x 3.  -Tear sheet of paper into 4 sections, make small balls, then flick them away with MF focusing on extension pull. Extensor tendon digit holds  x10 with palm down: therapist bringing 3rd digit up and pt was to hold for 3 seconds with fair tolerance, HEP within pain tolerance. AAROM:     Red web stretch  Wrist/digit extension, flexion, fist grasp, and twist x15 reps with 3 second holds at end range-fair tolerance. PROM/Stretching:     L MF PIP ext/Flexion x Gentle completed to digits into extension  More tension to PIP ext.  Completed during activity rest breaks Finger flexion IF, RF and SF x R and SF together, IF alone. To do at home so he can loosen fingers up without the confines of the MF   MP flexion PROM with IP flex/ext x Passively flex MP's while pt bends and straightens his IP's. HEP   L MF DIP flexion x Completed during massage. Added to HEP also   Wrist flex. Ext -   Stiff end range. Completed during massage. Scar Mass/Edema Control:     Retrograde massage/Scar tissue massage x On palmar portion down digits and hand- 10 minutes  -Using small massager to break up scar tissue on dorsal MF and anterior palm at base of MF for 5 mins  -Loot! tool implemented this date  - green therabar - roll onto scar   elastomere scar forms  Continues To wear with night splint. made a new form to wear at night with elastomere in his web spaces. .    Gel compression sheet  Med/lg, to wear and hr 1 -2 x's a day IF to decrease scar. Strengthenin#  Wrist flex, wrist ext, RD - UD . 2 sets x15.   1 set x15 of supination/pronation holds   Table top, bend at pip than extend digits while in table top and then full extend with slight resistance. x10 with fair tolerance and slight resistance provided for isometrics strengthening. Finger extension  2 sets x15 red  Completed in yellow web extender x15 with 3 second holds in extension   PIP Ext Strengthening  12 reps completed with orange t-band and MCPs blocked in 90*   Functional Work task  #10 weight in a bin and placing from surface to surface x10 at waist height with fair tolerance. Pt reported after 6 reps he felt that he could feel the weight more through his injured hand. Pt will complete exercise with 5# weight and more reps to work up to his 10 # limit with no pressure on injured hand. Paraffin wax ball x Manipulation (squeezing, pinching) of wax ball for 3 mins after paraffin.  Fair tolerance Lumbrical Strengthening x Completed with red sponge, first intrinsic + x 15 reps followed by isolated MCP flexion at edge of table x 15 reps. Yellow putty with lg tool  Knob to encourage hook fist and put pressure on scar -6 min   Mountain Climbers x 3# 4# dumbbell - arm resting at side - complete full fist to hook fist 2x20 reps   Red foam block   2 sets x10 : composite fist, claw, middle and ring finger press, IF & F/thumb press. Other:     IF, MF ext splint  x Continues- Fabricated to wear at night with his elastomere gel pads to stretch scar tissue and PIP jt. . Made a new elastomere form leaving an open area for his wound to breathe. Pt can try wearing at night. To bring in next session to readjust to ^ ext pull. Completed for improved scar tissue management and MF extension. LMB   For IF and MF. Wear 10 min to 30 for a total of 1 hr a day. HEP  X  Cont with, added MP flexion while flex/ext IP's - can use frap strap also, changed PIP ext from ext tube to LMB splints. Yellow putty for gentle srengthening    frap strap  Can be used to work full finger flexion and or hold MP's down with IP flex. ext- pt to play around with   Splint adjustments      Re-assessment  Completed this date with review of HEP and use of modalities for pain/stiffness management. Assessment/Comments: Pt is making Fair progress toward stated plan of care and established goals. Pt tolerated ^ in resistive activities well today. No nerve pain reported during session. Did have increase tightness in flexor tendons of forearm by end of session following increase in strengthening. Educated on how to manage/reduce tension following therapy. Overall, pt continues to respond well to therapy. R Upper Extremity ROM       AROM [x]? ???     PROM[]???? IE  10/22/2020 1/5 /87280  2/24/2021       WRIST Flexion 0-70* 23*  70*  75*  75*     Extension 0-80* 34*  54*  60* 62*      Radial Deviation 0-20* 18*  20*  20* 21*    Ulnar Deviation 0-30* 20*  30*  30* 34*       R Hand ROM    AROM [x]? ???         PROM[]???? IE   10/22/2020 1/5/2021  2/24/2021      2nd Digit MCP Flexion/Extension */ 0-45 H TBA 46* /*23  70*/0* 77*/0     PIP Flexion/Extension 100*/ 0 57*/- 29* 65*/34*  95*/-22* 99*/-22*     DIP Flexion/Extension 70-90*/0 32*/- 23* 32* /0*  60*/0* 66*/0        L MF     AROM [x]? ???         PROM[]???? 10/12/20 10/15/20   10/22/2020 1/ 5 /2021  2/24/2021       MF Digit MCP Extension/Flexion   0-45 H /* -30*/50* -20*/47*  14*/46*  0*/64* 0/69*     PIP Extension/Flexion   0*/100* -55*/62* -50*/ 60* 51*/53*  -40*/ 84* -41/94*     DIP Extension/Flexion   0*/70-90* Wiggle  P - 40*  wiggle   0*/25*  0*/55* 0/73*      4th Digit MCP Flexion/Extension */ 0-45 H TBA  31*/0*    66*/0* 76*/0     PIP Flexion/Extension 100*/ 0 70*/- 30* 72* /0*   97*/0*  102*/-12     DIP Flexion/Extension 70-90*/0 41*/0* 60* /0*   63*/0*  80*/0         5th Digit MCP Flexion/Extension */ 0-45 H TBA 23*/ 0*   73* /0* 73*/0     PIP Flexion/Extension 100*/ 0 74*. +  /- 4* 80* /0*   95* /0* 93*/-5     DIP Flexion/Extension 70-90*/0 46*/0*  60*/0*   70* /0* 78*/0        Dynamometer (setting 2):                                 Left: 38#    51#                                            RAPWQ: 689#                    Pinch Meter:              Lateral: Left=15# ,   20#                                                             Right=23#                Palmar 3 point: Left=9# ,  13# (pain ^6-7/10)                             Right=20#      9 hole peg test:  Assessed this date.         R  - :22        L   - :45  30 seconds    -Rehab Potential: Good  -Requires OT Follow Up: Yes    Time In: 10:05 am    Time Out: 11:00 am            Visit #: 11    Treatment Charges: Mins Time in - Time out  Units   Modalities- fluiothe/Paraffin 10 10:05 am - 10:15 am 1   Ther Exercise 30 10:30 am - 11:00 am 2   Manual Therapy 15 10:15 am - 10:30 am 1 9) Patient will be knowledgeable of edema control techniques as evident with decreases from severe to mild/none. Goal making good gains. 10) Patient will demonstrate a non-tender/non-adherent scar. Goal making fair gains-continued tightness around scar. 11) Patient will report ADL functions as Mod I/I using LUE. Goal met  12) Patient will demonstrate improved functional activity tolerance from poor to fair + for ADL/IADL completion. Goal making good gains. 13) Patient will decrease QuickDASH score by 50% for increased participation in daily functional activities. Goal making good gains. Plan:   [x]  Continues Plan of care: 3x/week for 6 weeks until 03/19/2021. Treatment covered based on POC and graduated to patient's progress. Pt education continues at each visit to obtain maximum benefits from skilled OT intervention.   []  Alter Plan of care:   []  Discharge:    Julia [facility] [address], OTR/L #[id number] Additional Safety/Bands:

## 2023-11-16 ENCOUNTER — OFFICE VISIT (OUTPATIENT)
Dept: ENDOCRINOLOGY | Age: 43
End: 2023-11-16
Payer: COMMERCIAL

## 2023-11-16 VITALS
HEIGHT: 71 IN | SYSTOLIC BLOOD PRESSURE: 130 MMHG | RESPIRATION RATE: 16 BRPM | WEIGHT: 177 LBS | DIASTOLIC BLOOD PRESSURE: 76 MMHG | BODY MASS INDEX: 24.78 KG/M2 | HEART RATE: 91 BPM | OXYGEN SATURATION: 97 %

## 2023-11-16 DIAGNOSIS — E11.65 POORLY CONTROLLED DIABETES MELLITUS (HCC): Primary | ICD-10-CM

## 2023-11-16 PROCEDURE — 99204 OFFICE O/P NEW MOD 45 MIN: CPT | Performed by: NURSE PRACTITIONER

## 2023-11-16 RX ORDER — BLOOD-GLUCOSE SENSOR
EACH MISCELLANEOUS
Qty: 2 EACH | Refills: 5 | Status: SHIPPED | OUTPATIENT
Start: 2023-11-16

## 2023-11-16 RX ORDER — DULAGLUTIDE 3 MG/.5ML
INJECTION, SOLUTION SUBCUTANEOUS
COMMUNITY
Start: 2023-10-17

## 2023-11-16 RX ORDER — SITAGLIPTIN 100 MG/1
TABLET, FILM COATED ORAL
COMMUNITY
Start: 2023-09-21

## 2023-11-16 RX ORDER — METFORMIN HYDROCHLORIDE 500 MG/1
500 TABLET, EXTENDED RELEASE ORAL 2 TIMES DAILY
COMMUNITY

## 2023-11-16 NOTE — PROGRESS NOTES
[facility] Department of Endocrinology Diabetes and Metabolism   [address] [facility] [place] [facility] [address], [address] [facility]  Phone: [phone]  Fax: [phone]    Date of Service: 11/16/2023    Primary Care Physician: Bee Luciano MD  Referring physician: Bee Luciano MD  Provider: STAN Hough NP     Reason for the visit:    New pt referral, Type 2 DM    History of Present Illness: The history is provided by the patient. No  was used. Accuracy of the patient data is excellent.   Alaina Webster is a very pleasant 39 y.o. male seen today for diabetes management     Alaina Webster was diagnosed with diabetes at age  39  and currently on Metformin   AM and HS, januvia 454YK every AM, Trulicity 3 mg weekly     Previous use of jardiance with side effects   Rybelsus use interacted with vision     The patient has not checking blood sugar in last month  Has previously used [employer] in 10/2023 by PCP order A1c 7.4%    Most recent A1c results summarized below      Patient reported hypoglycemic episodes rare + awareness    The patient has been mindful of what has been eating and following diabetic diet as encouraged  He eats 3 meals and 2 snacks  I reviewed current medications and the patient has no issues with diabetes medications    Alaina Webster is up to date with eye exam and denied any history of diabetic retinopathy   [employer]    The patient performs  own feet care  Microvascular complications:  No Retinopathy, Nephropathy or Neuropathy   Macrovascular complications: no CAD, PVD, or Stroke    PAST MEDICAL HISTORY   Past Medical History:   Diagnosis Date    Diabetes mellitus ([address])     Hypertension     Thyroid disease        PAST SURGICAL HISTORY   Past Surgical History:   Procedure Laterality Date    APPENDECTOMY  3/23/2016    laparoscopic    FINGER SURGERY Left 8/24/2020    LEFT HAND I&D WITH PERCUTANEOUS PINNING OF THIRD

## 2024-05-23 ENCOUNTER — OFFICE VISIT (OUTPATIENT)
Dept: ENDOCRINOLOGY | Age: 44
End: 2024-05-23
Payer: COMMERCIAL

## 2024-05-23 VITALS
SYSTOLIC BLOOD PRESSURE: 119 MMHG | OXYGEN SATURATION: 97 % | BODY MASS INDEX: 24.08 KG/M2 | HEIGHT: 71 IN | WEIGHT: 172 LBS | DIASTOLIC BLOOD PRESSURE: 80 MMHG | HEART RATE: 97 BPM

## 2024-05-23 DIAGNOSIS — E11.65 POORLY CONTROLLED DIABETES MELLITUS (HCC): ICD-10-CM

## 2024-05-23 DIAGNOSIS — E11.65 POORLY CONTROLLED DIABETES MELLITUS (HCC): Primary | ICD-10-CM

## 2024-05-23 DIAGNOSIS — E03.9 HYPOTHYROIDISM, UNSPECIFIED TYPE: ICD-10-CM

## 2024-05-23 LAB
ANION GAP SERPL CALCULATED.3IONS-SCNC: 10 MMOL/L (ref 7–16)
BUN BLDV-MCNC: 9 MG/DL (ref 6–20)
CALCIUM SERPL-MCNC: 9.2 MG/DL (ref 8.6–10.2)
CHLORIDE BLD-SCNC: 103 MMOL/L (ref 98–107)
CHOLESTEROL, TOTAL: 129 MG/DL
CO2: 24 MMOL/L (ref 22–29)
CORTISOL COLLECTION INFO: NORMAL
CORTISOL: 11.5 UG/DL (ref 2.7–18.4)
CREAT SERPL-MCNC: 1 MG/DL (ref 0.7–1.2)
GFR, ESTIMATED: >90 ML/MIN/1.73M2
GLUCOSE BLD-MCNC: 245 MG/DL (ref 74–99)
HBA1C MFR BLD: 11 % (ref 4–5.6)
HDLC SERPL-MCNC: 63 MG/DL
LDL CHOLESTEROL: 58 MG/DL
POTASSIUM SERPL-SCNC: 4.3 MMOL/L (ref 3.5–5)
SODIUM BLD-SCNC: 137 MMOL/L (ref 132–146)
T4 FREE: 1.6 NG/DL (ref 0.9–1.7)
TRIGL SERPL-MCNC: 40 MG/DL
TSH SERPL DL<=0.05 MIU/L-ACNC: 0.15 UIU/ML (ref 0.27–4.2)
VLDLC SERPL CALC-MCNC: 8 MG/DL

## 2024-05-23 PROCEDURE — 99214 OFFICE O/P EST MOD 30 MIN: CPT | Performed by: INTERNAL MEDICINE

## 2024-05-23 PROCEDURE — 3046F HEMOGLOBIN A1C LEVEL >9.0%: CPT | Performed by: INTERNAL MEDICINE

## 2024-05-23 RX ORDER — METFORMIN HYDROCHLORIDE 500 MG/1
500 TABLET, EXTENDED RELEASE ORAL 2 TIMES DAILY
Qty: 60 TABLET | Refills: 11 | Status: SHIPPED | OUTPATIENT
Start: 2024-05-23

## 2024-05-23 NOTE — PROGRESS NOTES
[employer]  [facility] Department of Endocrinology Diabetes and Metabolism   [address], [place] [address]  Phone: [phone]  Fax: [phone]    Date of Service: 5/23/2024    Primary Care Physician: Awadalla, Maged I, MD  Referring physician: No ref. provider found  Provider: Antonino Jose MD     Reason for the visit:  Type 2 DM    History of Present Illness:  The history is provided by the patient. No  was used. Accuracy of the patient data is excellent.  Chago Strauss is a very pleasant 44 y.o. male seen today for diabetes management     Chago Strauss was diagnosed with diabetes at age  36  and currently on Metformin   AM and HS, Trulicity 3 mg weekly     Previous use of jardiance with side effects   Rybelsus use interacted with vision     The patient told me that over the past few weeks he became more conscious about following diabetic diet and breathing has been much better.  He forgot to bring his sugar readings today    A1c today still very high at 11%  Lab Results   Component Value Date/Time    LABA1C 11.0 05/23/2024 03:47 PM           Patient reported hypoglycemic episodes rare + awareness    The patient has been mindful of what has been eating and following diabetic diet as encouraged  He eats 3 meals and 2 snacks  I reviewed current medications and the patient has no issues with diabetes medications    Chago Strauss is up to date with eye exam and denied any history of diabetic retinopathy   [employer]    The patient performs  own feet care  Microvascular complications:  No Retinopathy, Nephropathy or Neuropathy   Macrovascular complications: no CAD, PVD, or Stroke    PAST MEDICAL HISTORY   Past Medical History:   Diagnosis Date    Diabetes mellitus (HCC)     Hypertension     Thyroid disease        PAST SURGICAL HISTORY   Past Surgical History:   Procedure Laterality Date    APPENDECTOMY  3/23/2016    laparoscopic    FINGER

## 2024-05-24 DIAGNOSIS — E11.65 POORLY CONTROLLED DIABETES MELLITUS (HCC): ICD-10-CM

## 2024-05-24 LAB
C-PEPTIDE: 1.9 NG/ML (ref 1.1–4.4)
CREATININE URINE: 119.7 MG/DL (ref 40–278)
MICROALBUMIN/CREAT 24H UR: <12 MG/L (ref 0–19)
MICROALBUMIN/CREAT UR-RTO: NORMAL MCG/MG CREAT (ref 0–30)

## 2024-05-26 LAB — GLUTAMIC ACID DECARB AB: >250 IU/ML (ref 0–5)

## 2024-05-28 ENCOUNTER — TELEPHONE (OUTPATIENT)
Dept: ENDOCRINOLOGY | Age: 44
End: 2024-05-28

## 2024-05-28 NOTE — TELEPHONE ENCOUNTER
Pt very dizzy and has been since taking Trulicity for about a year, has been off and on. Pt states he has mentioned this before but does not remember what the response was. Told him to check his sugar since he hadn't and to call an ambulance if the dizziness gets much worse. Labs are in chart, please review.

## 2024-05-29 DIAGNOSIS — E11.65 POORLY CONTROLLED DIABETES MELLITUS (HCC): ICD-10-CM

## 2024-05-30 ENCOUNTER — TELEPHONE (OUTPATIENT)
Dept: ENDOCRINOLOGY | Age: 44
End: 2024-05-30

## 2024-05-30 RX ORDER — BLOOD-GLUCOSE SENSOR
EACH MISCELLANEOUS
Qty: 2 EACH | Refills: 5 | Status: SHIPPED | OUTPATIENT
Start: 2024-05-30

## 2024-05-31 NOTE — TELEPHONE ENCOUNTER
Notify patient  Thyroid hormones are slightly above goal.  Please confirm patient currently on levothyroxine 125 mcg 1 tablet daily.  If so slightly adjust the dose to 1 tablet 6 days a week and half tablet on Sunday.    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  With this result, I do recommend starting on insulin to improve your diabetes.  Please send us a sugar log to help starting insulin therapy.

## 2024-06-14 ENCOUNTER — PATIENT MESSAGE (OUTPATIENT)
Dept: ENDOCRINOLOGY | Age: 44
End: 2024-06-14

## 2024-06-17 ENCOUNTER — TELEPHONE (OUTPATIENT)
Dept: ENDOCRINOLOGY | Age: 44
End: 2024-06-17

## 2024-06-17 NOTE — TELEPHONE ENCOUNTER
Pt called, received frestyl message to schedule appt this week, he is unable to come in due to work, but can come in next week;   Called office 2x, unable to reach office due to patient care.  Please call him w/appt

## 2024-06-17 NOTE — TELEPHONE ENCOUNTER
Pt has Type 1 as his Emile Antibody level is > 250. Insulin is the only medication that is indicated for Type 1 DM (MICHI, adult onset). Ozmepic will not lower his BS. He needs to start Long acting and short acting insulin. I also will call in a CGM, is he able to come in this week for an appt

## 2024-06-17 NOTE — TELEPHONE ENCOUNTER
Per pt last notes he was to be getting his numbers checked because dr wanted him to start on insulin.  Pt wants ozempic sent in because he can not get trulicity anywhere.       Eddie attached      I didn't want to pend any medications until number reviewed to see if you wanted to add insulin.

## 2024-06-17 NOTE — TELEPHONE ENCOUNTER
----- Message from Chago Strauss sent at 6/14/2024  1:57 PM EDT -----  Regarding: Colt  Contact: [phone]  I've called five pharmacies and nobody has 3.0 or 1.5. I know they cover ozempic cause a few guys are on it here

## 2024-06-24 ENCOUNTER — OFFICE VISIT (OUTPATIENT)
Dept: ENDOCRINOLOGY | Age: 44
End: 2024-06-24
Payer: COMMERCIAL

## 2024-06-24 VITALS
HEART RATE: 85 BPM | DIASTOLIC BLOOD PRESSURE: 78 MMHG | OXYGEN SATURATION: 96 % | SYSTOLIC BLOOD PRESSURE: 123 MMHG | WEIGHT: 173 LBS | HEIGHT: 71 IN | BODY MASS INDEX: 24.22 KG/M2

## 2024-06-24 DIAGNOSIS — E10.9 TYPE 1 DIABETES MELLITUS WITHOUT COMPLICATION (HCC): Primary | ICD-10-CM

## 2024-06-24 DIAGNOSIS — E03.9 HYPOTHYROIDISM, UNSPECIFIED TYPE: ICD-10-CM

## 2024-06-24 DIAGNOSIS — E78.5 HYPERLIPIDEMIA, UNSPECIFIED HYPERLIPIDEMIA TYPE: ICD-10-CM

## 2024-06-24 PROCEDURE — 95251 CONT GLUC MNTR ANALYSIS I&R: CPT | Performed by: CLINICAL NURSE SPECIALIST

## 2024-06-24 PROCEDURE — 3046F HEMOGLOBIN A1C LEVEL >9.0%: CPT | Performed by: CLINICAL NURSE SPECIALIST

## 2024-06-24 PROCEDURE — 99214 OFFICE O/P EST MOD 30 MIN: CPT | Performed by: CLINICAL NURSE SPECIALIST

## 2024-06-24 RX ORDER — SEMAGLUTIDE 0.68 MG/ML
INJECTION, SOLUTION SUBCUTANEOUS
Qty: 9 ML | Refills: 1 | Status: SHIPPED | OUTPATIENT
Start: 2024-06-24

## 2024-06-24 NOTE — PROGRESS NOTES
MCV 83.8 09/22/2021 07:40 AM    MCH 29.1 09/22/2021 07:40 AM    MCHC 34.7 (H) 09/22/2021 07:40 AM    RDW 12.5 09/22/2021 07:40 AM     09/22/2021 07:40 AM    MPV 10.4 09/22/2021 07:40 AM      Lab Results   Component Value Date/Time     05/23/2024 09:56 AM    K 4.3 05/23/2024 09:56 AM    K 4.1 11/28/2020 07:38 PM    CO2 24 05/23/2024 09:56 AM    BUN 9 05/23/2024 09:56 AM    CREATININE 1.0 05/23/2024 09:56 AM    CALCIUM 9.2 05/23/2024 09:56 AM    LABGLOM >90 05/23/2024 09:56 AM    GFRAA >60 09/22/2021 07:40 AM      Lab Results   Component Value Date/Time    TSH 0.15 (L) 05/23/2024 09:56 AM    T4FREE 1.6 05/23/2024 09:56 AM     Lab Results   Component Value Date/Time    LABA1C 11.0 05/23/2024 03:47 PM    GLUCOSE 245 05/23/2024 09:56 AM    MALBCR Can not be calculated 05/24/2024 10:26 AM     Lab Results   Component Value Date/Time    LABA1C 11.0 05/23/2024 03:47 PM     Lab Results   Component Value Date/Time    TRIG 40 05/23/2024 09:56 AM    HDL 63 05/23/2024 09:56 AM    CHOL 129 05/23/2024 09:56 AM     No results found for: \"VITD25\"    ASSESSMENT & RECOMMENDATIONS   Chago Strauss, a 44 y.o.-old male seen in for the following issues       Assessment:      Diagnosis Orders   1. Type 1 diabetes mellitus without complication (HCC)        2. Hypothyroidism, unspecified type  TSH    T4, Free      3. Hyperlipidemia, unspecified hyperlipidemia type              Plan:     1. Type 1 diabetes mellitus without complication (HCC)    2. Hypothyroidism, unspecified type    3. Hyperlipidemia, unspecified hyperlipidemia type          Diabetes Mellitus Type   1  Much improved   Eddie reviewed   Pt had + RAFAELA antibodies with normal c-peptide   Pt understands in the future he will most likely need insullin   For now we will continue metformin 500 mg twice daily  Stop truliicity and start Ozempic 0.5 mg omnce weekly.  Pharmacy can not get trulicity   Did not tolerate jardiance   Did not tolerate rybelsus   Continue Trulicity 3

## 2024-06-26 DIAGNOSIS — E03.9 HYPOTHYROIDISM, UNSPECIFIED TYPE: ICD-10-CM

## 2024-06-26 LAB
T4 FREE: 1.6 NG/DL (ref 0.9–1.7)
TSH SERPL DL<=0.05 MIU/L-ACNC: 0.16 UIU/ML (ref 0.27–4.2)

## 2024-06-27 ENCOUNTER — TELEPHONE (OUTPATIENT)
Dept: ENDOCRINOLOGY | Age: 44
End: 2024-06-27

## 2024-06-27 NOTE — TELEPHONE ENCOUNTER
Pt returned your call.  Staff was busy due to pt care at the time.  He asks that you please return his call when you get the chance.

## 2024-06-27 NOTE — TELEPHONE ENCOUNTER
----- Message from STAN Correa - CNS sent at 6/27/2024  7:48 AM EDT -----  Please call pt and inform him that thyroid function test show slight over treatment .  Decrease dose of LT5 to 125 mcg 1 tablwt Monday thru Friday, 1/2 tablet on Saturday and Sunday

## 2024-06-27 NOTE — TELEPHONE ENCOUNTER
Spoke to pt and he said he is only taking levothyroxine 100 mcg one tablet mon-sat and 1/2 on sun. Spoke to framk and he said to take the 100 mcg one tablet mon-fri and 1/2 tablet on sat and sun.

## 2024-06-27 NOTE — TELEPHONE ENCOUNTER
The patient states that the bottle he has says 100mcg daily that he has been taking, I advised him to follow the provided instructions and I would update you about the dose in case you would need to give different directions

## 2024-10-15 ENCOUNTER — OFFICE VISIT (OUTPATIENT)
Dept: ENDOCRINOLOGY | Age: 44
End: 2024-10-15
Payer: COMMERCIAL

## 2024-10-15 VITALS
HEART RATE: 97 BPM | HEIGHT: 71 IN | SYSTOLIC BLOOD PRESSURE: 102 MMHG | BODY MASS INDEX: 24.08 KG/M2 | WEIGHT: 172 LBS | DIASTOLIC BLOOD PRESSURE: 65 MMHG | OXYGEN SATURATION: 98 %

## 2024-10-15 DIAGNOSIS — E03.9 HYPOTHYROIDISM, UNSPECIFIED TYPE: ICD-10-CM

## 2024-10-15 DIAGNOSIS — E10.9 TYPE 1 DIABETES MELLITUS WITHOUT COMPLICATION (HCC): Primary | ICD-10-CM

## 2024-10-15 DIAGNOSIS — E10.9 TYPE 1 DIABETES MELLITUS WITHOUT COMPLICATION (HCC): ICD-10-CM

## 2024-10-15 LAB
ANION GAP SERPL CALCULATED.3IONS-SCNC: 14 MMOL/L (ref 7–16)
BUN BLDV-MCNC: 14 MG/DL (ref 6–20)
CALCIUM SERPL-MCNC: 9.2 MG/DL (ref 8.6–10.2)
CHLORIDE BLD-SCNC: 105 MMOL/L (ref 98–107)
CO2: 22 MMOL/L (ref 22–29)
CREAT SERPL-MCNC: 1.1 MG/DL (ref 0.7–1.2)
GFR, ESTIMATED: 81 ML/MIN/1.73M2
GLUCOSE BLD-MCNC: 150 MG/DL (ref 74–99)
HBA1C MFR BLD: 7.5 % (ref 4–5.6)
POTASSIUM SERPL-SCNC: 4.2 MMOL/L (ref 3.5–5)
SODIUM BLD-SCNC: 141 MMOL/L (ref 132–146)
T4 FREE: 1.3 NG/DL (ref 0.9–1.7)
TSH SERPL DL<=0.05 MIU/L-ACNC: 0.36 UIU/ML (ref 0.27–4.2)

## 2024-10-15 PROCEDURE — 3046F HEMOGLOBIN A1C LEVEL >9.0%: CPT | Performed by: INTERNAL MEDICINE

## 2024-10-15 PROCEDURE — 99214 OFFICE O/P EST MOD 30 MIN: CPT | Performed by: INTERNAL MEDICINE

## 2024-10-15 NOTE — PROGRESS NOTES
[employer]  [facility] Department of Endocrinology Diabetes and Metabolism   [address], [place] [address]  Phone: [phone]  Fax: [phone]    Date of Service: 10/15/2024    Primary Care Physician: Awadalla, Maged I, MD  Referring physician: No ref. 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 No  was used. Accuracy of the patient data is excellent.  Chago Strauss is a very pleasant 44 y.o. male seen today for diabetes management     Chago Strauss was diagnosed with diabetes at age  36  and currently on Ozempic 0.5 mg weekly    Blood work from May 2024 showed positive jakob 65 antibodies confirming MICHI diabetes    C-peptide is still normal and and because of this we still treating him as a type 2 diabetes for now.    The patient currently doing extremely well on Ozempic 0.5 mg weekly.      He checks glucose using freestyle nicolas CGM.  Most glucose level in range.  He is due for blood work.  GMI on CGM getting an A1c of 6.5% which is much better than 11% from the last visit.  The patient is due for blood work now    The patient also has primary hypothyroidism and currently on levothyroxine 100 mcg daily  He is due for thyroid check    Lab Results   Component Value Date/Time    LABA1C 11.0 05/23/2024 03:47 PM       Patient reported hypoglycemic episodes rare + awareness    The patient has been mindful of what has been eating and following diabetic diet as encouraged  He eats 3 meals and 2 snacks  I reviewed current medications and the patient has no issues with diabetes medications    Chago Strauss is up to date with eye exam and denied any history of diabetic retinopathy   [employer]    The patient performs  own feet care  Microvascular complications:  No Retinopathy, Nephropathy or Neuropathy

## 2024-10-16 ENCOUNTER — TELEPHONE (OUTPATIENT)
Dept: ENDOCRINOLOGY | Age: 44
End: 2024-10-16

## 2024-10-17 NOTE — TELEPHONE ENCOUNTER
Notify patient  Excellent, thyroid hormones are very good.  Continue current dose of thyroid medications

## 2024-12-09 RX ORDER — SEMAGLUTIDE 0.68 MG/ML
INJECTION, SOLUTION SUBCUTANEOUS
Qty: 9 ML | Refills: 1 | Status: SHIPPED | OUTPATIENT
Start: 2024-12-09

## 2024-12-09 RX ORDER — LEVOTHYROXINE SODIUM 100 UG/1
100 TABLET ORAL DAILY
Qty: 90 TABLET | Refills: 1 | Status: SHIPPED | OUTPATIENT
Start: 2024-12-09

## 2024-12-17 DIAGNOSIS — E78.5 HYPERLIPIDEMIA, UNSPECIFIED HYPERLIPIDEMIA TYPE: Primary | ICD-10-CM

## 2024-12-17 RX ORDER — ATORVASTATIN CALCIUM 10 MG/1
10 TABLET, FILM COATED ORAL DAILY
Qty: 90 TABLET | Refills: 3 | Status: SHIPPED | OUTPATIENT
Start: 2024-12-17

## 2025-04-07 ENCOUNTER — HOSPITAL ENCOUNTER (EMERGENCY)
Age: 45
Discharge: HOME | End: 2025-04-07
Attending: STUDENT IN AN ORGANIZED HEALTH CARE EDUCATION/TRAINING PROGRAM
Payer: COMMERCIAL

## 2025-04-07 ENCOUNTER — APPOINTMENT (OUTPATIENT)
Dept: CT IMAGING | Age: 45
End: 2025-04-07
Payer: COMMERCIAL

## 2025-04-07 VITALS
RESPIRATION RATE: 18 BRPM | SYSTOLIC BLOOD PRESSURE: 112 MMHG | TEMPERATURE: 97.7 F | OXYGEN SATURATION: 97 % | DIASTOLIC BLOOD PRESSURE: 88 MMHG | HEART RATE: 96 BPM

## 2025-04-07 DIAGNOSIS — S39.012A LUMBOSACRAL STRAIN, INITIAL ENCOUNTER: ICD-10-CM
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
## 2025-04-07 DIAGNOSIS — S23.9XXA THORACIC SPRAIN: ICD-10-CM

## 2025-04-07 PROCEDURE — 72131 CT LUMBAR SPINE W/O DYE: CPT

## 2025-04-07 PROCEDURE — 6370000000 HC RX 637 (ALT 250 FOR IP): Performed by: NURSE PRACTITIONER

## 2025-04-07 PROCEDURE — 6360000002 HC RX W HCPCS: Performed by: NURSE PRACTITIONER

## 2025-04-07 PROCEDURE — 99284 EMERGENCY DEPT VISIT MOD MDM: CPT

## 2025-04-07 PROCEDURE — 96372 THER/PROPH/DIAG INJ SC/IM: CPT

## 2025-04-07 PROCEDURE — 72128 CT CHEST SPINE W/O DYE: CPT

## 2025-04-07 RX ORDER — PREDNISONE 20 MG/1
40 TABLET ORAL DAILY
Qty: 8 TABLET | Refills: 0 | Status: SHIPPED | OUTPATIENT
Start: 2025-04-07 | End: 2025-04-11

## 2025-04-07 RX ORDER — MELOXICAM 7.5 MG/1
7.5 TABLET ORAL DAILY
Qty: 30 TABLET | Refills: 0 | Status: SHIPPED | OUTPATIENT
Start: 2025-04-07

## 2025-04-07 RX ORDER — ORPHENADRINE CITRATE 30 MG/ML
60 INJECTION INTRAMUSCULAR; INTRAVENOUS ONCE
Status: COMPLETED | OUTPATIENT
Start: 2025-04-07 | End: 2025-04-07

## 2025-04-07 RX ORDER — PREDNISONE 20 MG/1
40 TABLET ORAL ONCE
Status: COMPLETED | OUTPATIENT
Start: 2025-04-07 | End: 2025-04-07

## 2025-04-07 RX ORDER — KETOROLAC TROMETHAMINE 30 MG/ML
30 INJECTION, SOLUTION INTRAMUSCULAR; INTRAVENOUS ONCE
Status: COMPLETED | OUTPATIENT
Start: 2025-04-07 | End: 2025-04-07

## 2025-04-07 RX ORDER — LIDOCAINE 4 G/G
1 PATCH TOPICAL DAILY
Qty: 30 PATCH | Refills: 0 | Status: SHIPPED | OUTPATIENT
Start: 2025-04-07 | End: 2025-05-07

## 2025-04-07 RX ORDER — METHOCARBAMOL 750 MG/1
750 TABLET, FILM COATED ORAL 4 TIMES DAILY PRN
Qty: 40 TABLET | Refills: 0 | Status: SHIPPED | OUTPATIENT
Start: 2025-04-07 | End: 2025-04-17

## 2025-04-07 RX ORDER — DIAZEPAM 5 MG/1
10 TABLET ORAL ONCE
Status: COMPLETED | OUTPATIENT
Start: 2025-04-07 | End: 2025-04-07

## 2025-04-07 RX ADMIN — DIAZEPAM 10 MG: 5 TABLET ORAL at 17:12

## 2025-04-07 RX ADMIN — KETOROLAC TROMETHAMINE 30 MG: 30 INJECTION, SOLUTION INTRAMUSCULAR at 18:52

## 2025-04-07 RX ADMIN — ORPHENADRINE CITRATE 60 MG: 30 INJECTION INTRAMUSCULAR; INTRAVENOUS at 19:00

## 2025-04-07 RX ADMIN — PREDNISONE 40 MG: 20 TABLET ORAL at 17:12

## 2025-04-07 ASSESSMENT — PAIN - FUNCTIONAL ASSESSMENT
PAIN_FUNCTIONAL_ASSESSMENT: 0-10
PAIN_FUNCTIONAL_ASSESSMENT: ACTIVITIES ARE NOT PREVENTED
PAIN_FUNCTIONAL_ASSESSMENT: 0-10

## 2025-04-07 ASSESSMENT — PAIN DESCRIPTION - DESCRIPTORS
DESCRIPTORS: ACHING;SORE
DESCRIPTORS: ACHING;SORE
DESCRIPTORS: ACHING;SORE;SHOOTING;DISCOMFORT

## 2025-04-07 ASSESSMENT — PAIN SCALES - GENERAL
PAINLEVEL_OUTOF10: 10

## 2025-04-07 ASSESSMENT — LIFESTYLE VARIABLES
HOW OFTEN DO YOU HAVE A DRINK CONTAINING ALCOHOL: NEVER
HOW MANY STANDARD DRINKS CONTAINING ALCOHOL DO YOU HAVE ON A TYPICAL DAY: PATIENT DOES NOT DRINK

## 2025-04-07 ASSESSMENT — PAIN DESCRIPTION - ORIENTATION
ORIENTATION: MID;LOWER;UPPER
ORIENTATION: RIGHT;LEFT;LOWER;MID
ORIENTATION: RIGHT;LEFT;LOWER;MID

## 2025-04-07 ASSESSMENT — PAIN DESCRIPTION - PAIN TYPE: TYPE: ACUTE PAIN

## 2025-04-07 ASSESSMENT — PAIN DESCRIPTION - FREQUENCY: FREQUENCY: CONTINUOUS

## 2025-04-07 ASSESSMENT — PAIN DESCRIPTION - LOCATION
LOCATION: BACK

## 2025-04-07 ASSESSMENT — PAIN DESCRIPTION - ONSET: ONSET: ON-GOING

## 2025-04-07 NOTE — ED PROVIDER NOTES
ensure accuracy; however, inadvertent computerized transcription errors may be present       Edith Moreau APRN - CNP  04/07/25 [id number]

## 2025-04-07 NOTE — DISCHARGE INSTRUCTIONS
Keep active  Consider icing back for 15 to 20 min every 4 to 6 hour as needed  Can also think about heat for the same amount of time  Prednisone for 4 days - no ibuprofen alleve aspirin while on same  Use the muscle relaxers lidoderm patch  Can use mobic once prednisone is finished but no ibuprofen alleve etc while on same    Followup with Occupational health in [place] to see when can safetly return back to work alissa since having some pain    Can think about massage therapy to help with pain    ++ did get valium in the ED++ which will show up on a drug screen

## 2025-05-01 ENCOUNTER — TELEPHONE (OUTPATIENT)
Dept: PHYSICAL THERAPY | Age: 45
End: 2025-05-01

## (undated) DEVICE — SOLUTION IV IRRIG POUR BRL 0.9% SODIUM CHL 2F7124

## (undated) DEVICE — SURGICAL PROCEDURE PACK HND

## (undated) DEVICE — 4-PORT MANIFOLD: Brand: NEPTUNE 2

## (undated) DEVICE — GLOVE SURG SZ 6 THK91MIL LTX FREE SYN POLYISOPRENE ANTI

## (undated) DEVICE — INTENDED FOR TISSUE SEPARATION, AND OTHER PROCEDURES THAT REQUIRE A SHARP SURGICAL BLADE TO PUNCTURE OR CUT.: Brand: BARD-PARKER ® STAINLESS STEEL BLADES

## (undated) DEVICE — SINGLE USE DEVICE INTENDED TO COVER EXPOSED ENDS OF ORTHOPEDIC PIN AND K-WIRES TO HELP PROTECT THE EXPOSED WIRE FROM SNAGGING ON CLOTHING.: Brand: OXBORO™ PIN COVER

## (undated) DEVICE — COVER HNDL LT DISP

## (undated) DEVICE — PADDING CAST W2INXL4YD COT LO LINTING WYTEX

## (undated) DEVICE — SET IRR L100IN DIA0.280IN C100ML 2 NVENT PIERCING PINS 3

## (undated) DEVICE — Device

## (undated) DEVICE — DRAPE CARM MINI FOR IMAG SYS INSIGHT FLROSCN

## (undated) DEVICE — K WIRE FIX L6IN DIA0.045IN 1600645] MICROAIRE SURGICAL INSTRUMENTS INC]

## (undated) DEVICE — GLOVE ORANGE PI 8 1/2   MSG9085

## (undated) DEVICE — SPLINT ORTH W4XL15IN PLSTR OF PARIS LO EXOTHERM SMOOTH

## (undated) DEVICE — GLOVE SURG SZ 9 L12IN FNGR THK13MIL WHT ISOLEX POLYISOPRENE

## (undated) DEVICE — DRILL SYSTEM 7

## (undated) DEVICE — ELECTROSURGICAL PENCIL BUTTON SWITCH E-Z CLEAN COATED BLADE ELECTRODE 10 FT (3 M) CORD HOLSTER: Brand: MEGADYNE

## (undated) DEVICE — CLOTH SURG PREP PREOPERATIVE CHLORHEXIDINE GLUC 2% READYPREP

## (undated) DEVICE — TRAY PROCED PLASTIC CUSTOME SOFT TISS

## (undated) DEVICE — ADHESIVE SKIN CLSR 0.7ML TOP DERMBND ADV

## (undated) DEVICE — 3M™ IOBAN™ 2 ANTIMICROBIAL INCISE DRAPE 6640EZ: Brand: IOBAN™ 2

## (undated) DEVICE — Z INACTIVE USE 2660664 SOLUTION IRRIG 3000ML 0.9% SOD CHL USP UROMATIC PLAS CONT

## (undated) DEVICE — PADDING,UNDERCAST,COTTON, 3X4YD STERILE: Brand: MEDLINE

## (undated) DEVICE — ZIMMER® STERILE DISPOSABLE TOURNIQUET CUFF WITH PROTECTIVE SLEEVE AND PLC, DUAL PORT, SINGLE BLADDER, 18 IN. (46 CM)

## (undated) DEVICE — INSTRUMENT SYSTEM 4 BATTERY REUSABLE

## (undated) DEVICE — TRAY DRILL SYSTEM 4 REUSABLE

## (undated) DEVICE — READY WET SKIN SCRUB TRAY-LF: Brand: MEDLINE INDUSTRIES, INC.

## (undated) DEVICE — DOUBLE BASIN SET: Brand: MEDLINE INDUSTRIES, INC.

## (undated) DEVICE — GLOVE SURG SZ 65 L12IN FNGR THK79MIL GRN LTX FREE

## (undated) DEVICE — SOLUTION IV IRRIG WATER 1000ML POUR BRL 2F7114

## (undated) DEVICE — TRAY SET HAND REUSABLE

## (undated) DEVICE — PADDING UNDERCAST W4INXL4YD COT FBR LO LINTING WYTEX

## (undated) DEVICE — DRAPE MICSCP 65MM LENS FOR LEICA VARI-LENS2

## (undated) DEVICE — BNDG,ELSTC,MATRIX,STRL,2"X5YD,LF,HOOK&LP: Brand: MEDLINE

## (undated) DEVICE — 3M™ STERI-DRAPE™ U-DRAPE 1015: Brand: STERI-DRAPE™

## (undated) DEVICE — BIT DRL L65/39MM DIA1.1MM FOR PILOT H J LATCH CPL 1.5MM SCR

## (undated) DEVICE — ZIMMER® STERILE DISPOSABLE TOURNIQUET CUFF WITH PLC, DUAL PORT, SINGLE BLADDER, 18 IN. (46 CM)

## (undated) DEVICE — BIT DRL L57 31MM DIA15MM FOR GLIDING H J LATCH CPL 15MM

## (undated) DEVICE — CRADLE ARM W8.75XH12.5XL16IN FOAM SUPP ELEVATION VENT

## (undated) DEVICE — K WIRE FIX L6IN DIA0.045IN 1600645] MICROAIRE SURGICAL INSTRUMENTS INC]
Type: IMPLANTABLE DEVICE | Site: MIDDLE FINGER | Status: NON-FUNCTIONAL
Removed: 2021-09-22

## (undated) DEVICE — DRAPE EQUIP MICSCP OH2

## (undated) DEVICE — TOWEL,OR,DSP,ST,BLUE,DLX,10/PK,8PK/CS: Brand: MEDLINE

## (undated) DEVICE — STRIP,CLOSURE,WOUND,MEDI-STRIP,1/4X3: Brand: MEDLINE

## (undated) DEVICE — GLOVE SURG SZ 65 THK91MIL LTX FREE SYN POLYISOPRENE

## (undated) DEVICE — DRIP REDUCTION MANIFOLD